# Patient Record
Sex: FEMALE | Race: WHITE | NOT HISPANIC OR LATINO | Employment: OTHER | URBAN - METROPOLITAN AREA
[De-identification: names, ages, dates, MRNs, and addresses within clinical notes are randomized per-mention and may not be internally consistent; named-entity substitution may affect disease eponyms.]

---

## 2017-02-07 ENCOUNTER — ALLSCRIPTS OFFICE VISIT (OUTPATIENT)
Dept: OTHER | Facility: OTHER | Age: 77
End: 2017-02-07

## 2017-02-07 DIAGNOSIS — F41.9 ANXIETY DISORDER: ICD-10-CM

## 2017-02-07 DIAGNOSIS — Z86.79 PERSONAL HISTORY OF OTHER DISEASES OF THE CIRCULATORY SYSTEM: ICD-10-CM

## 2017-02-07 DIAGNOSIS — E78.1 PURE HYPERGLYCERIDEMIA: ICD-10-CM

## 2017-02-07 DIAGNOSIS — E55.9 VITAMIN D DEFICIENCY: ICD-10-CM

## 2017-02-07 DIAGNOSIS — N63.0 BREAST LUMP: ICD-10-CM

## 2017-02-07 DIAGNOSIS — G89.4 CHRONIC PAIN SYNDROME: ICD-10-CM

## 2017-02-17 ENCOUNTER — LAB CONVERSION - ENCOUNTER (OUTPATIENT)
Dept: OTHER | Facility: OTHER | Age: 77
End: 2017-02-17

## 2017-02-17 ENCOUNTER — LAB (OUTPATIENT)
Dept: LAB | Facility: CLINIC | Age: 77
End: 2017-02-17
Payer: MEDICARE

## 2017-02-17 ENCOUNTER — TRANSCRIBE ORDERS (OUTPATIENT)
Dept: LAB | Facility: CLINIC | Age: 77
End: 2017-02-17

## 2017-02-17 DIAGNOSIS — G89.4 CHRONIC PAIN SYNDROME: ICD-10-CM

## 2017-02-17 DIAGNOSIS — Z86.79 PERSONAL HISTORY OF OTHER DISEASES OF THE CIRCULATORY SYSTEM: ICD-10-CM

## 2017-02-17 DIAGNOSIS — E78.1 PURE HYPERGLYCERIDEMIA: ICD-10-CM

## 2017-02-17 DIAGNOSIS — E55.9 VITAMIN D DEFICIENCY: ICD-10-CM

## 2017-02-17 DIAGNOSIS — F41.9 ANXIETY DISORDER: ICD-10-CM

## 2017-02-17 LAB
25(OH)D3 SERPL-MCNC: 13.8 NG/ML (ref 30–100)
ALBUMIN SERPL BCP-MCNC: 4 G/DL (ref 3.5–5)
ALP SERPL-CCNC: 81 U/L (ref 46–116)
ALT SERPL W P-5'-P-CCNC: 22 U/L (ref 12–78)
AMORPH URATE CRY URNS QL MICRO: ABNORMAL /HPF
ANION GAP SERPL CALCULATED.3IONS-SCNC: 5 MMOL/L (ref 4–13)
AST SERPL W P-5'-P-CCNC: 18 U/L (ref 5–45)
BACTERIA UR QL AUTO: ABNORMAL /HPF
BILIRUB SERPL-MCNC: 0.48 MG/DL (ref 0.2–1)
BILIRUB UR QL STRIP: NEGATIVE
BUN SERPL-MCNC: 20 MG/DL (ref 5–25)
CALCIUM SERPL-MCNC: 9.2 MG/DL (ref 8.3–10.1)
CHLORIDE SERPL-SCNC: 105 MMOL/L (ref 100–108)
CHOLEST SERPL-MCNC: 234 MG/DL (ref 50–200)
CLARITY UR: ABNORMAL
CO2 SERPL-SCNC: 32 MMOL/L (ref 21–32)
COLOR UR: YELLOW
CREAT SERPL-MCNC: 0.63 MG/DL (ref 0.6–1.3)
GFR SERPL CREATININE-BSD FRML MDRD: >60 ML/MIN/1.73SQ M
GLUCOSE SERPL-MCNC: 91 MG/DL (ref 65–140)
GLUCOSE UR STRIP-MCNC: NEGATIVE MG/DL
HDLC SERPL-MCNC: 46 MG/DL (ref 40–60)
HGB UR QL STRIP.AUTO: NEGATIVE
KETONES UR STRIP-MCNC: NEGATIVE MG/DL
LDLC SERPL CALC-MCNC: 144 MG/DL (ref 0–100)
LEUKOCYTE ESTERASE UR QL STRIP: ABNORMAL
MUCOUS THREADS UR QL AUTO: ABNORMAL
NITRITE UR QL STRIP: NEGATIVE
NON-SQ EPI CELLS URNS QL MICRO: ABNORMAL /HPF
PH UR STRIP.AUTO: 6 [PH] (ref 4.5–8)
POTASSIUM SERPL-SCNC: 3.8 MMOL/L (ref 3.5–5.3)
PROT SERPL-MCNC: 7.6 G/DL (ref 6.4–8.2)
PROT UR STRIP-MCNC: NEGATIVE MG/DL
RBC #/AREA URNS AUTO: ABNORMAL /HPF
SODIUM SERPL-SCNC: 142 MMOL/L (ref 136–145)
SP GR UR STRIP.AUTO: 1.02 (ref 1–1.03)
TRIGL SERPL-MCNC: 222 MG/DL
TSH SERPL DL<=0.05 MIU/L-ACNC: 2.75 UIU/ML (ref 0.36–3.74)
UROBILINOGEN UR QL STRIP.AUTO: 0.2 E.U./DL
WBC #/AREA URNS AUTO: ABNORMAL /HPF

## 2017-02-17 PROCEDURE — 87086 URINE CULTURE/COLONY COUNT: CPT

## 2017-02-17 PROCEDURE — 80061 LIPID PANEL: CPT

## 2017-02-17 PROCEDURE — 82306 VITAMIN D 25 HYDROXY: CPT

## 2017-02-17 PROCEDURE — 84443 ASSAY THYROID STIM HORMONE: CPT

## 2017-02-17 PROCEDURE — 80053 COMPREHEN METABOLIC PANEL: CPT

## 2017-02-17 PROCEDURE — 81001 URINALYSIS AUTO W/SCOPE: CPT

## 2017-02-17 PROCEDURE — 36415 COLL VENOUS BLD VENIPUNCTURE: CPT

## 2017-02-18 LAB — BACTERIA UR CULT: NORMAL

## 2017-02-20 ENCOUNTER — GENERIC CONVERSION - ENCOUNTER (OUTPATIENT)
Dept: OTHER | Facility: OTHER | Age: 77
End: 2017-02-20

## 2017-03-02 ENCOUNTER — ALLSCRIPTS OFFICE VISIT (OUTPATIENT)
Dept: OTHER | Facility: OTHER | Age: 77
End: 2017-03-02

## 2017-06-05 ENCOUNTER — ALLSCRIPTS OFFICE VISIT (OUTPATIENT)
Dept: OTHER | Facility: OTHER | Age: 77
End: 2017-06-05

## 2017-07-13 ENCOUNTER — GENERIC CONVERSION - ENCOUNTER (OUTPATIENT)
Dept: OTHER | Facility: OTHER | Age: 77
End: 2017-07-13

## 2017-08-04 ENCOUNTER — ALLSCRIPTS OFFICE VISIT (OUTPATIENT)
Dept: OTHER | Facility: OTHER | Age: 77
End: 2017-08-04

## 2017-09-07 ENCOUNTER — ALLSCRIPTS OFFICE VISIT (OUTPATIENT)
Dept: OTHER | Facility: OTHER | Age: 77
End: 2017-09-07

## 2017-09-07 DIAGNOSIS — M85.80 OTHER SPECIFIED DISORDERS OF BONE DENSITY AND STRUCTURE, UNSPECIFIED SITE: ICD-10-CM

## 2017-09-13 ENCOUNTER — GENERIC CONVERSION - ENCOUNTER (OUTPATIENT)
Dept: OTHER | Facility: OTHER | Age: 77
End: 2017-09-13

## 2017-09-19 ENCOUNTER — TRANSCRIBE ORDERS (OUTPATIENT)
Dept: ADMINISTRATIVE | Facility: HOSPITAL | Age: 77
End: 2017-09-19

## 2017-09-19 DIAGNOSIS — Z82.49 FAMILY HISTORY OF ABDOMINAL AORTIC ANEURYSM (AAA): Primary | ICD-10-CM

## 2017-09-28 ENCOUNTER — HOSPITAL ENCOUNTER (OUTPATIENT)
Dept: ULTRASOUND IMAGING | Facility: CLINIC | Age: 77
Discharge: HOME | End: 2017-09-28
Payer: MEDICARE

## 2017-09-28 DIAGNOSIS — Z82.49 FAMILY HISTORY OF ABDOMINAL AORTIC ANEURYSM (AAA): ICD-10-CM

## 2017-09-28 PROCEDURE — 76706 US ABDL AORTA SCREEN AAA: CPT

## 2017-10-03 ENCOUNTER — GENERIC CONVERSION - ENCOUNTER (OUTPATIENT)
Dept: OTHER | Facility: OTHER | Age: 77
End: 2017-10-03

## 2017-11-14 ENCOUNTER — HOSPITAL ENCOUNTER (OUTPATIENT)
Dept: MAMMOGRAPHY | Facility: CLINIC | Age: 77
Discharge: HOME | End: 2017-11-14
Payer: MEDICARE

## 2017-11-14 ENCOUNTER — GENERIC CONVERSION - ENCOUNTER (OUTPATIENT)
Dept: OTHER | Facility: OTHER | Age: 77
End: 2017-11-14

## 2017-11-14 DIAGNOSIS — M85.80 OTHER SPECIFIED DISORDERS OF BONE DENSITY AND STRUCTURE: ICD-10-CM

## 2017-11-14 PROCEDURE — 77080 DXA BONE DENSITY AXIAL: CPT

## 2018-01-11 NOTE — RESULT NOTES
Verified Results  * DXA BONE DENSITY SPINE HIP AND PELVIS [id number] 12:56PM Onesimo Tipton Order Number: [id number]    - Patient Instructions: To schedule this appointment, please contact Central Scheduling at [phone]  Test Name Result Flag Reference   DXA BONE DENSITY SPINE HIP AND PELVIS (Report)     CENTRAL DXA SCAN     CLINICAL HISTORY:  65year old post-menopausal  female risk factors include estrogen deficient     TECHNIQUE: Bone densitometry was performed using a Horizon C bone densitometer  Regions of interest appear properly placed  There are no obvious fractures or other confounding variables which could limit the study  COMPARISON: None  RESULTS:    LUMBAR SPINE: L1, L4:   BMD 1 123 gm/cm2   T-score 0 8   Z-score 3 4     LEFT TOTAL HIP:   BMD 0 830 gm/cm2   T-score -0 9   Z-score 1 1     LEFT FEMORAL NECK:   BMD 0 656 gm/cm2   T-score -1 7   Z-score 0 5     LEFT FOREARM :   BMD 0 569 gm/sq-cm,   T-score is -2 0   Z-score is 1 1          IMPRESSION:   1  Based on the [facility] classification, the T-score of -2 0 in the distal one 3rd left radius  is consistent with low bone mineral density  2  Any secondary causes of low bone mineral density should be excluded prior to treatment, if clinically indicated  3  A daily intake of at least 1200 mg calcium and 800 to 1000 IU of Vitamin D, as well as weight bearing and muscle strengthening exercise, fall prevention and avoidance of tobacco and excessive alcohol intake as basic preventive measures are suggested  4  Repeat DXA in 18 - 24 months, on the same machine, as clinically indicated  The 10 year risk of hip fracture is 2 9%, with the 10 year risk of major osteoporotic fracture being 12%, as calculated by the [facility] fracture risk assessment tool (FRAX)  The current NOF guidelines recommend treating patients with FRAX 10 year risk score    of >3% for hip fracture and >20% for major osteoporotic fracture  WHO CLASSIFICATION:   Normal (a T-score of -1 0 or higher)   Low bone mineral density (a T-score of less than -1 0 but higher than -2 5)   Osteoporosis (a T-score of -2 5 or less)   Severe osteoporosis (a T-score of -2 5 or less with a fragility fracture)             Workstation performed: [id number]     Signed by:   Paige Haji MD   11/14/17

## 2018-01-11 NOTE — PROGRESS NOTES
Assessment    1  Medicare annual wellness visit, initial (V70 0) (Z00 00)   2  Urinary incontinence in female (788 30) (R32)   3  Finger laceration, initial encounter (883 0) (B43 165J)    Plan  FamHx: Family history of abdominal aortic aneurysm (AAA), SocHx: Former smoker    · [address] AAA; Status:Active; Requested TAA:[id number];   Osteopenia    · * DXA BONE DENSITY SPINE HIP AND PELVIS; Status:Active; Requested  IAT:[id number];   Urinary incontinence in female    · The plan of care for urinary incontinence is detailed in the plan and/or discussion section  of today's note ; Status:Complete;   Done: [id number]    Discussion/Summary    Read over the paperwork given  Will give tdap today  Make sure we get copies of all consults from your other physicians  Let me know if you change your mind about the stool test  pt will make her own appts for dexa, and us of the abdomen  Impression: Initial Annual Wellness Visit  Cardiovascular screening and counseling: the risks and benefits of screening were discussed and screening is current  Diabetes screening and counseling: the risks and benefits of screening were discussed and screening is current  Colorectal cancer screening and counseling: the risks and benefits of screening were discussed, the patient declines screening, due for fecal occult blood testing and Pt refuses at this time  Breast cancer screening and counseling: the risks and benefits of screening were discussed and screening is current  Cervical cancer screening and counseling: the risks and benefits of screening were discussed and screening not indicated  Osteoporosis screening and counseling: the risks and benefits of screening were discussed and h/o femur fracture when she fell off a curb  Abdominal aortic aneurysm screening and counseling: the risks and benefits of screening were discussed, screening US recommended and Mom had aaa  Pt is former smoker     Glaucoma screening and counseling: the risks and benefits of screening were discussed and screening is current  Immunizations: the risks and benefits of influenza vaccination were discussed with the patient, influenza vaccine is due today, the risks and benefits of pneumococcal vaccination were discussed with the patient, pneumococcal vaccine due today, the risks and benefits of the Zostavax vaccine were discussed with the patient, Zostavax vaccination up to date, the risks and benefits of the Tdap vaccine were discussed with the patient and Will need tdap as she had a recent laceration to the right 3rd finger  Advance Directive Planning: complete and up to date, Given 5 wishes and Polst  Patient Discussion: plan discussed with the patient, follow-up visit needed in one year  Possible side effects of new medications were reviewed with the patient/guardian today  The treatment plan was reviewed with the patient/guardian  The patient/guardian understands and agrees with the treatment plan      Chief Complaint  Pt is here for her annual wellness exam       History of Present Illness  HPI: Pt is here for her first annual wellness exam    Welcome to Medicare and Wellness Visits: The patient is being seen for the initial annual wellness visit  Medicare Screening and Risk Factors   Hospitalizations: no previous hospitalizations  Once per lifetime medicare screening tests: AAA screening US has not yet been done  Medicare Screening Tests Risk Questions   Abdominal aortic aneurysm risk assessment: family history of AAA  Osteoporosis risk assessment:  and female gender  HIV risk assessment: none indicated  Drug and Alcohol Use: The patient is a former cigarette smoker and quit smoking 2005  The patient reports occasional alcohol use  Alcohol concern:   The patient has no concerns about alcohol abuse  She has never used illicit drugs     Diet and Physical Activity: Current diet includes limited junk food, 2 servings of fruit per day, 2 servings of vegetables per day, 0-1 servings of meat per day, 2 servings of whole grains per day, 0 servings of simple carbohydrates per day, 2 servings of dairy products per day, 1 cups of coffee per day, 0 cans of regular soda per day and 0 cans of diet soda per day  She exercises 3 per week times per week  Exercise: water exercises, stretching 3 hours per week  Mood Disorder and Cognitive Impairment Screening: PHQ-9 Depression Scale   Over the past 2 weeks, how often have you been bothered by the following problems? 1 ) Little interest or pleasure in doing things? Not at all    2 ) Feeling down, depressed or hopeless? Several days  3 ) Trouble falling asleep or sleeping too much? Several days  4 ) Feeling tired or having little energy? Several days  5 ) Poor appetite or overeating? Several days  6 ) Feeling bad about yourself, or that you are a failure, or have let yourself or your family down? Several days  7 ) Trouble concentrating on things, such as reading a newspaper or watching television? Not at all    8 ) Moving or speaking so slowly that other people could have noticed, or the opposite, moving or speaking faster than usual? Not at all    9 ) Thoughts that you would be off dead or of hurting yourself in some way? Not at all  TOTAL SCORE: 5, severity of depression is mild  Cognitive impairment screening: denies difficulty learning/retaining new information, denies difficulty handling complex tasks, denies difficulty with reasoning, denies difficulty with spatial ability and orientation, denies difficulty with language and denies difficulty with behavior  Functional Ability/Level of Safety: Hearing is slightly decreased, slightly decreased in the right ear and slightly decreased in the left ear  She reports hearing difficulties  She uses a hearing aid   The patient is currently able to do activities of daily living with limitations and able to participate in social activities with limitations, but able to drive without limitations  Activities of daily living details: needs help shopping and needs help doing housework  Fall risk factors: The patient fell 0 times in the past 12 months  Injury History: visual impairment, urinary incontinence and no previous fall  Home safety risk factors:  no unfamiliar surroundings, no loose rugs, no poor household lighting, no uneven floors, no household clutter, grab bars in the bathroom and handrails on the stairs  Advance Directives: Advance directives: living will, durable power of  for health care directives and advance directives  end of life decisions were reviewed with the patient and I agree with the patient's decisions  Concerns with the patient's end of life decisions: Does not want to be resuscitated if she stops breathing unless it was pneumonia  Co-Managers and Medical Equipment/Suppliers: See Patient Care Team   Preventive Quality Program 65 and Older: Falls Risk: The patient fell 0 times in the past 12 months  The patient is currently experiencing urinary symptoms  Urinary Incontinence Symptoms includes:    Date of last glaucoma screen was 6 months ago  Patient Care Team    Care Team Member Role Specialty Office Number   Alissaarchie Singletary  Specialist Pain Management [phone] [address] Specialist Pain Management [phone]   [facility] Specialist Neurology [phone]   Isa Husain MD Specialist Nephrology [phone], [address] [phone]   [address] Osteopathy [phone]   chen, Kellie Carty   [phone]   monica nieves   [phone]     Active Problems    1  Anxiety (300 00) (F41 9)   2  Breast mass, left (611 72) (N63)   3  Cervical arthritis with myelopathy (721 1) (M47 12)   4  Cervicogenic headache (784 0) (R51)   5  Chronic pain syndrome (338 4) (G89 4)   6   Depression with anxiety (300 4) (F41 8)   7  Encounter for special screening examination for genitourinary disorder (V81 6) (Z13 89)   8  Foraminal stenosis of cervical region (723 0) (M99 81)   9  Headache, cervicogenic (784 0) (R51)   10  Hypertriglyceridemia, essential (272 1) (E78 1)   11  Insomnia, unspecified type (780 52) (G47 00)   12  Knee pain, left (719 46) (M25 562)   13  Medicare annual wellness visit, initial (V70 0) (Z00 00)   14  Need for pneumococcal vaccine (V03 82) (Z23)   15  Paroxysmal SVT (supraventricular tachycardia) (427 0) (I47 1)   16  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)   17   Vitamin D deficiency (268 9) (E55 9)    Past Medical History    · History of Dry eye syndrome (375 15) (H04 129)   · H/O supraventricular tachycardia (V12 59) (Z86 79)   · History of arthritis (V13 4) (Z87 39)   · History of cataract surgery (V45 61) (Z98 49)   · History of migraine (V12 49) (Z86 69)   · History of total right knee replacement (V43 65) (Z96 651)   · History of Macular degeneration (362 50) (H35 30)   · History of Memory loss (780 93) (R41 3)   · History of Spinal stenosis of lumbar region (724 02) (M48 06)   · History of Vitamin D deficiency (268 9) (E55 9)   · History of Vitamin D deficiency (268 9) (E55 9)    Surgical History    · History of Appendectomy   · History of Cataract Surgery   · History of Femur Repair   · History of Lower Back Surgery Lumbar Disc   · History of Total Knee Replacement Left   · History of Total Knee Replacement Right    Family History  Mother    · Family history of abdominal aortic aneurysm (AAA) (V17 49) (Z82 49)   · Family history of hypertension (V17 49) (Z82 49)   · Family history of malignant neoplasm of breast (V16 3) (Z80 3)  Father    · Family history of hypertension (V17 49) (Z82 49)  Sister    · Family history of hypertension (V17 49) (Z82 49)   · Family history of High cholesterol    Social History    · Always uses seat belt   · Does not use illicit drugs (V49 89) (Z78 9)   · Former smoker (Q92 31) (P14 680)   · Former smoker (W21 49) (P65 701)   · Occasional alcohol use   · Retired   ·     Current Meds   1  Biotin 7500 MCG Oral Tablet; take 1 tablet by mouth once daily; Therapy: (Recorded:07Feb2017) to Recorded   2  BusPIRone HCl - 15 MG Oral Tablet; Take 1/2 to 1 pill twice daily if needed for anxiety; Therapy: 46UWN8445 to (Last Rx:02Mar2017)  Requested for: 79AZB1967 Ordered   3  [employer] Natural Fish Oil 1000 MG Oral Capsule; TAKE 2 CAPSULE Daily; Therapy: 74UHK8832 to (Evaluate:30Jun2017); Last Rx:02Mar2017 Ordered   4  Cyclobenzaprine HCl - 10 MG Oral Tablet; Take 1 PO HS PRN; Therapy: 55YEO6919 to (Last Rx:04Aug2017)  Requested for: 19Ljs2284 Ordered   5  DiazePAM 5 MG Oral Tablet; TAKE 1 TABLET  AS NEEDED; Therapy: 01VOX2310 to (Evaluate:01Exi2152); Last Rx:05Jun2017 Ordered   6  Gabapentin 300 MG Oral Capsule; Therapy: (Paige Farris) to Recorded   7  PreserVision AREDS CAPS; Take 1 capsule twice daily; Therapy: (Recorded:75Ykb9485) to Recorded   8  Restasis 0 05 % Ophthalmic Emulsion; INSTILL 1 DROP IN [facility] EYE TWICE DAILY; Therapy: (Recorded:26Feb2016) to Recorded   9  Sertraline HCl - 100 MG Oral Tablet; TAKE 1 1/2 TABS ONCE DAILY; Last Rx:02Mar2017   Ordered   10  Verapamil HCl  MG Oral Capsule Extended Release 24 Hour; TAKE 1 TABLET    BY MOUTH AT NIGHT  Requested for: 62ROU4641; Last Rx:07Feb2017 Ordered   11  Vitamin D (Ergocalciferol) 12565 UNIT Oral Capsule; Take 1 capsule twice weekly for 8    weeks; Therapy: 06ZYM5302 to (Last Rx:02Mar2017)  Requested for: 71XJO3459 Ordered   12  Vitamin D 1000 UNIT Oral Tablet; TAKE 2000 MG Daily; Therapy: 02KKA9259 to (Evaluate:01Apr2017); Last Rx:02Mar2017 Ordered    Allergies    1  Morphine Derivatives   2  Penicillins   3   Dilaudid TABS    Immunizations   1    PCV  07-Feb-2017    PPSV  01-Jan-1998    Td/DT  01-Mar-1996     Vitals  Signs    Heart Rate: 78  Respiration: 16  Systolic: 109, LUE, Sitting  Diastolic: 60, LUE, Sitting  Height: 5 ft 5 in  Weight: 182 lb   BMI Calculated: 30 29  BSA Calculated: 1 9  O2 Saturation: 97    Results/Data  PHQ-9 Adult Depression Screening 07Sep2017 02:09PM User Larrys     Test Name Result Flag Reference   PHQ-9 Adult Depression Score 5     Over the last two weeks, how often have you been bothered by any of the following problems? Little interest or pleasure in doing things: Not at all - 0  Feeling down, depressed, or hopeless: Several days - 1  Trouble falling or staying asleep, or sleeping too much: Several days - 1  Feeling tired or having little energy: Several days - 1  Poor appetite or over eating: Several days - 1  Feeling bad about yourself - or that you are a failure or have let yourself or your family down: Several days - 1  Trouble concentrating on things, such as reading the newspaper or watching television: Not at all - 0  Moving or speaking so slowly that other people could have noticed  Or the opposite -  being so fidgety or restless that you have been moving around a lot more than usual: Not at all - 0  Thoughts that you would be better off dead, or of hurting yourself in some way: Not at all - 0   PHQ-9 Adult Depression Screening Negative     PHQ-9 Difficulty Level Not difficult at all     PHQ-9 Severity Mild Depression         Attending Note  Collaborating Physician Note: Collaborating Note: I supervised the Advanced Practitioner and I agree with the Advanced Practitioner note  Signatures   Electronically signed by :  CL Gupta; Sep  7 2017  2:40PM EST                       (Author)    Electronically signed by : Rambo Mancilla DO; Sep  7 2017  3:28PM EST                       (Co-author)

## 2018-01-12 VITALS
DIASTOLIC BLOOD PRESSURE: 60 MMHG | BODY MASS INDEX: 30.32 KG/M2 | SYSTOLIC BLOOD PRESSURE: 120 MMHG | WEIGHT: 182 LBS | OXYGEN SATURATION: 97 % | RESPIRATION RATE: 16 BRPM | HEIGHT: 65 IN | HEART RATE: 78 BPM

## 2018-01-12 NOTE — RESULT NOTES
Verified Results  (1) URINALYSIS w URINE C/S REFLEX (will reflex a microscopy if leukocytes, occult blood, or nitrites are not within normal limits) [id number] 10:02AM [facility] Order Number: [id number]     Test Name Result Flag Reference   COLOR Yellow     CLARITY Cloudy     PH UA 6 0  4 5-8 0   LEUKOCYTE ESTERASE UA Small A Negative   NITRITE UA Negative  Negative   PROTEIN UA Negative mg/dl  Negative   GLUCOSE UA Negative mg/dl  Negative   KETONES UA Negative mg/dl  Negative   UROBILINOGEN UA 0 2 E U /dl  0 2, 1 0 E U /dl   BILIRUBIN UA Negative  Negative   BLOOD UA Negative  Negative   SPECIFIC GRAVITY UA 1 022  1 003-1 030   AMORPHOUS URATES Occasional /hpf     BACTERIA Moderate /hpf A None Seen, Occasional   EPITHELIAL CELLS Occasional /hpf  None Seen, Occasional   RBC UA None Seen /hpf  None Seen   WBC UA 2-4 /hpf A None Seen   CLINICAL REPORT (Report)     Test:        Urine culture  Specimen Type:   Urine  Specimen Date:   2/17/2017 10:02 AM  Result Date:    2/18/2017 1:36 PM  Result Status:   Final result  Resulting Lab:    [facility]            Tel: [phone]      CULTURE                                       ------------------                                   10,000-19,000 cfu/ml Mixed Contaminants X4   MUCOUS THREADS Occasional  Occasional, Moderate, Innumerable

## 2018-01-13 VITALS
SYSTOLIC BLOOD PRESSURE: 114 MMHG | BODY MASS INDEX: 29.33 KG/M2 | WEIGHT: 176.03 LBS | OXYGEN SATURATION: 96 % | HEIGHT: 65 IN | HEART RATE: 73 BPM | RESPIRATION RATE: 18 BRPM | DIASTOLIC BLOOD PRESSURE: 58 MMHG

## 2018-01-13 NOTE — MISCELLANEOUS
Message   Recorded as Task   Date: 03/10/2016 11:37 AM, Created By: Rustam Hernández   Task Name: Call Back   Assigned To: Sukhi Aparicio   Regarding Patient: Latasha Camp, Status: Active   Comment:    Tavia Vaz - 10 Mar 2016 11:37 AM     TASK CREATED  please call pt for MRI results  [address] [place] [address] - 10 Mar 2016 3:05 PM     TASK EDITED  Called and discussed results of MRI with the patient   She is considering pain management        Signatures   Electronically signed by : Librado Salinas MD; Mar 10 2016  3:05PM EST                       (Author)

## 2018-01-13 NOTE — RESULT NOTES
Verified Results  US ABDOMINAL AORTA SCREENING AAA [id number] 09:22AM Marisabel Uriarte     Test Name Result Flag Reference   US ABDOMINAL AORTA SCREENING AAA (Report)     ABDOMINAL AORTIC ULTRASOUND     INDICATION: Z82 49: Family history of ischemic heart disease and other diseases of the circulatory system  History taken directly from the electronic ordering system  Evaluate for aortic aneurysm  COMPARISON: None  FINDINGS:      Ultrasound of the abdominal aorta was performed in longitudinal and transverse planes with a curvilinear transducer  Proximal aorta: 1 8 x 1 8 cm   Mid aorta:  1 8 x 1 9 cm   Distal aorta:  2 0 x 1 6 cm   Right common iliac origin: 1 2 cm   Left common iliac origin: 1 1 cm     No periaortic collections or adenopathy detected  IMPRESSION:     No evidence for abdominal aortic aneurysm         Workstation performed: [id number]     Signed by:   Tati Redman MD   10/3/17

## 2018-01-14 VITALS
SYSTOLIC BLOOD PRESSURE: 120 MMHG | DIASTOLIC BLOOD PRESSURE: 64 MMHG | BODY MASS INDEX: 29.82 KG/M2 | WEIGHT: 179 LBS | HEIGHT: 65 IN | RESPIRATION RATE: 17 BRPM | TEMPERATURE: 98.2 F | HEART RATE: 84 BPM | OXYGEN SATURATION: 96 %

## 2018-01-14 VITALS
BODY MASS INDEX: 28.57 KG/M2 | OXYGEN SATURATION: 97 % | HEIGHT: 65 IN | SYSTOLIC BLOOD PRESSURE: 120 MMHG | HEART RATE: 64 BPM | WEIGHT: 171.5 LBS | DIASTOLIC BLOOD PRESSURE: 60 MMHG | RESPIRATION RATE: 16 BRPM

## 2018-01-15 VITALS
WEIGHT: 170 LBS | SYSTOLIC BLOOD PRESSURE: 126 MMHG | HEIGHT: 65 IN | BODY MASS INDEX: 28.32 KG/M2 | OXYGEN SATURATION: 98 % | RESPIRATION RATE: 16 BRPM | HEART RATE: 70 BPM | DIASTOLIC BLOOD PRESSURE: 78 MMHG

## 2018-01-31 ENCOUNTER — TRANSCRIBE ORDERS (OUTPATIENT)
Dept: ADMINISTRATIVE | Facility: HOSPITAL | Age: 78
End: 2018-01-31

## 2018-01-31 DIAGNOSIS — M54.2 CERVICALGIA: Primary | ICD-10-CM

## 2018-02-06 ENCOUNTER — HOSPITAL ENCOUNTER (OUTPATIENT)
Dept: MRI IMAGING | Facility: CLINIC | Age: 78
Discharge: HOME | End: 2018-02-06
Payer: MEDICARE

## 2018-02-06 DIAGNOSIS — M54.2 CERVICALGIA: ICD-10-CM

## 2018-02-06 PROCEDURE — 70553 MRI BRAIN STEM W/O & W/DYE: CPT

## 2018-02-06 PROCEDURE — A9585 GADOBUTROL INJECTION: HCPCS | Performed by: DENTIST

## 2018-02-06 RX ADMIN — GADOBUTROL 8 ML: 604.72 INJECTION INTRAVENOUS at 14:22

## 2018-04-13 ENCOUNTER — OFFICE VISIT (OUTPATIENT)
Dept: FAMILY MEDICINE CLINIC | Facility: CLINIC | Age: 78
End: 2018-04-13
Payer: MEDICARE

## 2018-04-13 VITALS
OXYGEN SATURATION: 97 % | DIASTOLIC BLOOD PRESSURE: 62 MMHG | BODY MASS INDEX: 26.82 KG/M2 | WEIGHT: 161 LBS | HEIGHT: 65 IN | SYSTOLIC BLOOD PRESSURE: 124 MMHG | HEART RATE: 84 BPM

## 2018-04-13 DIAGNOSIS — I47.1 SVT (SUPRAVENTRICULAR TACHYCARDIA) (HCC): Primary | ICD-10-CM

## 2018-04-13 DIAGNOSIS — R51.9 INTRACTABLE HEADACHE, UNSPECIFIED CHRONICITY PATTERN, UNSPECIFIED HEADACHE TYPE: ICD-10-CM

## 2018-04-13 PROCEDURE — 99213 OFFICE O/P EST LOW 20 MIN: CPT | Performed by: FAMILY MEDICINE

## 2018-04-13 RX ORDER — GABAPENTIN 300 MG/1
1 CAPSULE ORAL
COMMUNITY
End: 2018-08-20 | Stop reason: CLARIF

## 2018-04-13 RX ORDER — ACETAMINOPHEN 325 MG/1
650 TABLET ORAL EVERY 6 HOURS
COMMUNITY
End: 2018-12-06

## 2018-04-13 RX ORDER — CYCLOSPORINE 0.5 MG/ML
1 EMULSION OPHTHALMIC 2 TIMES DAILY
COMMUNITY
End: 2021-08-12 | Stop reason: ALTCHOICE

## 2018-04-13 RX ORDER — BUSPIRONE HYDROCHLORIDE 15 MG/1
TABLET ORAL
COMMUNITY
Start: 2017-03-02 | End: 2018-04-13 | Stop reason: ALTCHOICE

## 2018-04-13 RX ORDER — VIT A/VIT C/VIT E/ZINC/COPPER 4296-226
1 CAPSULE ORAL 2 TIMES DAILY
COMMUNITY
End: 2018-04-13 | Stop reason: ALTCHOICE

## 2018-04-13 RX ORDER — SERTRALINE HYDROCHLORIDE 100 MG/1
100 TABLET, FILM COATED ORAL
COMMUNITY
End: 2018-05-20 | Stop reason: SDUPTHER

## 2018-04-13 RX ORDER — ASHW RT/MAG BRK/EPMD/THEAN/PHO 200-150 MG
2 TABLET ORAL DAILY
COMMUNITY
Start: 2017-03-02 | End: 2018-04-13 | Stop reason: ALTCHOICE

## 2018-04-13 RX ORDER — DIAZEPAM 5 MG/1
1 TABLET ORAL
COMMUNITY
Start: 2017-06-05 | End: 2018-04-13 | Stop reason: ALTCHOICE

## 2018-04-13 RX ORDER — ONABOTULINUMTOXINA 100 [USP'U]/1
INJECTION, POWDER, LYOPHILIZED, FOR SOLUTION INTRADERMAL; INTRAMUSCULAR
COMMUNITY
Start: 2018-03-09 | End: 2018-05-04 | Stop reason: CLARIF

## 2018-04-13 RX ORDER — VERAPAMIL HYDROCHLORIDE 120 MG/1
CAPSULE, EXTENDED RELEASE ORAL
COMMUNITY
Start: 2018-04-01 | End: 2018-05-20 | Stop reason: SDUPTHER

## 2018-04-13 RX ORDER — GABAPENTIN 100 MG/1
100 CAPSULE ORAL 2 TIMES DAILY
Qty: 60 CAPSULE | Refills: 0 | Status: SHIPPED | OUTPATIENT
Start: 2018-04-13 | End: 2018-05-04 | Stop reason: CLARIF

## 2018-04-13 RX ORDER — CYCLOBENZAPRINE HCL 10 MG
TABLET ORAL
COMMUNITY
Start: 2017-08-04 | End: 2018-04-13 | Stop reason: ALTCHOICE

## 2018-04-13 RX ORDER — MULTIVITAMIN WITH IRON
1 TABLET ORAL DAILY
COMMUNITY
End: 2018-04-13 | Stop reason: ALTCHOICE

## 2018-04-13 RX ORDER — ERGOCALCIFEROL 1.25 MG/1
CAPSULE ORAL
COMMUNITY
Start: 2017-03-02 | End: 2018-11-20

## 2018-04-13 NOTE — PATIENT INSTRUCTIONS
Will start gabapentin 100 mg twice daily during the day and continue on the 300 mg at night    Keep your appointment with the neurologist   Have the labs and urine testing done fasting and will discuss at follow-up

## 2018-04-13 NOTE — PROGRESS NOTES
Assessment/Plan:     Chronic Problems:  No problem-specific Assessment & Plan notes found for this encounter  Visit Diagnosis:  Diagnoses and all orders for this visit:    SVT (supraventricular tachycardia) ([address] )  Comments:  Continue on the verapamil  Orders:  -     CBC and differential; Future  -     Comprehensive metabolic panel; Future  -     Lipid panel; Future  -     Urinalysis with reflex to microscopic    Intractable headache, unspecified chronicity pattern, unspecified headache type  Comments: Will add on 100 mg a gabapentin b i d  and advised to keep appointment with Dr Kaelyn Avelar  Orders:  -     gabapentin (NEURONTIN) 100 mg capsule; Take 1 capsule (100 mg total) by mouth 2 (two) times a day With a 300 mg at night    Other orders  -     acetaminophen (TYLENOL) 325 mg tablet; Take 650 mg by mouth every 6 (six) hours  -     Discontinue: Multiple Vitamins-Minerals (PRESERVISION AREDS) CAPS; Take 1 capsule by mouth 2 (two) times a day  -     cycloSPORINE (RESTASIS) 0 05 % ophthalmic emulsion; Apply 1 drop to eye 2 (two) times a day  -     sertraline (ZOLOFT) 100 mg tablet; Take 100 mg by mouth  -     gabapentin (NEURONTIN) 300 mg capsule; Take 1 capsule by mouth  -     Discontinue: cyclobenzaprine (FLEXERIL) 10 mg tablet; Take by mouth  -     Discontinue: Omega-3 Fatty Acids ([employer] NATURAL FISH OIL) 1000 MG CAPS; Take 2 capsules by mouth daily  -     Discontinue: Biotin 7500 MCG TABS; Take 1 tablet by mouth daily  -     Discontinue: busPIRone (BUSPAR) 15 mg tablet; Take by mouth  -     Discontinue: diazepam (VALIUM) 5 mg tablet; Take 1 tablet by mouth  -     Discontinue: Magnesium 250 MG TABS; Take 1 tablet by mouth daily  -     BOTOX 100 units;   -     verapamil (VERELAN PM) 120 MG 24 hr capsule;   -     ergocalciferol (VITAMIN D2) 50,000 units; Take by mouth  -     cholecalciferol (VITAMIN D3) 1,000 units tablet; Take by mouth Daily          Subjective:    Patient ID: Kirk Vazquez is a 77 y o  female  Pt is here with c/o botox in her face and head 3 weeks ago for the pain in the head  [place] [facility] [place]  Was not any better after the injection  Feels this pain is affecting her life  Spends a lot of time in bed  Feels her pain is not managed well  Also noticed her eye is not opening like the other eye  He according to the pt, left the room when she expressed her concerns  Has appt with neuro the end of May, Dr Musa Hays  Requesting labs to be drawn  Feels she is not thinking well when she has this pain  Pain is like a pressure feeling which moves when she moves  Tender to the touch of forehead and above the eye and on the scalp  Also has it over the left eye  On gabapentin 300 mg at night by Dr Rebel Xie  Does not have the pain in the head when she sleeps  May wake up with pain  Had an mri of the brain and reports that it was normal          The following portions of the patient's history were reviewed and updated as appropriate: allergies, current medications, past family history, past medical history, past social history, past surgical history and problem list     Review of Systems   Constitutional: Positive for activity change  Negative for appetite change, chills, diaphoresis, fatigue and fever  HENT: Positive for ear pain  Negative for congestion, postnasal drip, sinus pain and sinus pressure  Eyes: Positive for visual disturbance (losing her vision r/t macular degeneration)  Respiratory: Negative for cough, shortness of breath and wheezing  Cardiovascular: Negative for chest pain and palpitations  Gastrointestinal: Positive for vomiting  Negative for abdominal pain, constipation, diarrhea and nausea  Genitourinary:        Sometimes feels she has burning with urination  Not always  No cloudy urine  Musculoskeletal: Positive for arthralgias (knees are better, but still with pains in hands  ) and gait problem     Neurological: Positive for dizziness (first thing in the am  )  Negative for light-headedness and headaches  Psychiatric/Behavioral: Positive for dysphoric mood  The patient is nervous/anxious  /62   Pulse 84   Ht 5' 5" (1 651 m)   Wt 73 kg (161 lb)   SpO2 97%   BMI 26 79 kg/m²   Social History     Social History    Marital status:      Spouse name: N/A    Number of children: N/A    Years of education: N/A     Occupational History    Not on file  Social History Main Topics    Smoking status: Never Smoker    Smokeless tobacco: Never Used    Alcohol use No    Drug use: No    Sexual activity: Not on file     Other Topics Concern    Not on file     Social History Narrative    No narrative on file     No past medical history on file  No family history on file  No past surgical history on file  Current Outpatient Prescriptions:     cholecalciferol (VITAMIN D3) 1,000 units tablet, Take by mouth Daily, Disp: , Rfl:     ergocalciferol (VITAMIN D2) 50,000 units, Take by mouth, Disp: , Rfl:     acetaminophen (TYLENOL) 325 mg tablet, Take 650 mg by mouth every 6 (six) hours, Disp: , Rfl:     BOTOX 100 units, , Disp: , Rfl:     cycloSPORINE (RESTASIS) 0 05 % ophthalmic emulsion, Apply 1 drop to eye 2 (two) times a day, Disp: , Rfl:     gabapentin (NEURONTIN) 100 mg capsule, Take 1 capsule (100 mg total) by mouth 2 (two) times a day With a 300 mg at night, Disp: 60 capsule, Rfl: 0    gabapentin (NEURONTIN) 300 mg capsule, Take 1 capsule by mouth, Disp: , Rfl:     sertraline (ZOLOFT) 100 mg tablet, Take 100 mg by mouth, Disp: , Rfl:     verapamil (VERELAN PM) 120 MG 24 hr capsule, , Disp: , Rfl:     Allergies   Allergen Reactions    Hydromorphone Shortness Of Breath     Other reaction(s): Respiratory Distress  Other reaction(s): HORENESS    Morphine Itching    Penicillins      Rash          Lab Review   No visits with results within 6 Month(s) from this visit     Latest known visit with results is:   Lab on 02/17/2017   Component Date Value    TSH 3RD GENERATON 02/17/2017 2 750     Sodium 02/17/2017 142     Potassium 02/17/2017 3 8     Chloride 02/17/2017 105     CO2 02/17/2017 32     Anion Gap 02/17/2017 5     BUN 02/17/2017 20     Creatinine 02/17/2017 0 63     Glucose 02/17/2017 91     Calcium 02/17/2017 9 2     AST 02/17/2017 18     ALT 02/17/2017 22     Alkaline Phosphatase 02/17/2017 81     Total Protein 02/17/2017 7 6     Albumin 02/17/2017 4 0     Total Bilirubin 02/17/2017 0 48     eGFR 02/17/2017 >60 0     Cholesterol 02/17/2017 234*    Triglycerides 02/17/2017 222*    HDL, Direct 02/17/2017 46     LDL Calculated 02/17/2017 144*    Color, UA 02/17/2017 Yellow     Clarity, UA 02/17/2017 Cloudy     Specific Gravity, UA 02/17/2017 1 022     pH, UA 02/17/2017 6 0     Leukocytes, UA 02/17/2017 Small*    Nitrite, UA 02/17/2017 Negative     Protein, UA 02/17/2017 Negative     Glucose, UA 02/17/2017 Negative     Ketones, UA 02/17/2017 Negative     Urobilinogen, UA 02/17/2017 0 2     Bilirubin, UA 02/17/2017 Negative     Blood, UA 02/17/2017 Negative     Vit D, 25-Hydroxy 02/17/2017 13 8*    RBC, UA 02/17/2017 None Seen     WBC, UA 02/17/2017 2-4*    Epithelial Cells 02/17/2017 Occasional     Bacteria, UA 02/17/2017 Moderate*    AMORPH URATES 02/17/2017 Occasional     MUCOUS THREADS 02/17/2017 Occasional     Urine Culture 02/17/2017 10,000-19,000 cfu/ml Mixed Contaminants X4         Imaging: No results found  Objective:     Physical Exam   Constitutional: She is oriented to person, place, and time  She appears well-developed and well-nourished  No distress  HENT:   Head: Normocephalic and atraumatic  Right Ear: External ear normal    Left Ear: External ear normal    Nose: Nose normal    Mouth/Throat: Oropharynx is clear and moist  No oropharyngeal exudate  Eyes: Conjunctivae and EOM are normal  Pupils are equal, round, and reactive to light   Right eye exhibits no discharge  Left eye exhibits no discharge  Right eye with ? Ptosis  Neck: Normal range of motion  Neck supple  Cardiovascular: Normal rate, regular rhythm and normal heart sounds  No murmur heard  Pulmonary/Chest: Effort normal and breath sounds normal  No respiratory distress  She has no wheezes  She has no rales  Musculoskeletal:   Ambulates with a walker  Lymphadenopathy:     She has no cervical adenopathy  Neurological: She is alert and oriented to person, place, and time  No cranial nerve deficit  Coordination normal    Very tender to bilateral forehead  Skin: She is not diaphoretic  Psychiatric: She has a normal mood and affect  Her behavior is normal  Judgment and thought content normal          Patient Instructions    Will start gabapentin 100 mg twice daily during the day and continue on the 300 mg at night    Keep your appointment with the neurologist   Have the labs and urine testing done fasting and will discuss at follow-up      Follow up here in 3 weeks    CL Cardozo

## 2018-04-18 ENCOUNTER — TELEPHONE (OUTPATIENT)
Dept: FAMILY MEDICINE CLINIC | Facility: CLINIC | Age: 78
End: 2018-04-18

## 2018-04-18 NOTE — TELEPHONE ENCOUNTER
I believe there is another triage about this in the emr re: blood in her urine  Her cholesterol and trigs are slightly elevated but not really an indication to start cholesterol meds at this time

## 2018-04-18 NOTE — TELEPHONE ENCOUNTER
Pt would like to know the results of the labs she had done yesterday they are scanned into the pt's chart

## 2018-04-20 DIAGNOSIS — R31.9 HEMATURIA, UNSPECIFIED TYPE: Primary | ICD-10-CM

## 2018-04-23 ENCOUNTER — TELEPHONE (OUTPATIENT)
Dept: FAMILY MEDICINE CLINIC | Facility: CLINIC | Age: 78
End: 2018-04-23

## 2018-04-23 DIAGNOSIS — R94.6 THYROID FUNCTION STUDY ABNORMALITY: Primary | ICD-10-CM

## 2018-04-23 NOTE — TELEPHONE ENCOUNTER
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

## 2018-05-03 ENCOUNTER — TELEPHONE (OUTPATIENT)
Dept: FAMILY MEDICINE CLINIC | Facility: CLINIC | Age: 78
End: 2018-05-03

## 2018-05-03 NOTE — TELEPHONE ENCOUNTER
Called patient n/a lmov  for call back         ----- Message from [address] [place] [address]  sent at 5/3/2018  1:53 PM EDT -----  Mammo wnl Repeat yearly

## 2018-05-04 ENCOUNTER — OFFICE VISIT (OUTPATIENT)
Dept: FAMILY MEDICINE CLINIC | Facility: CLINIC | Age: 78
End: 2018-05-04
Payer: MEDICARE

## 2018-05-04 VITALS
RESPIRATION RATE: 16 BRPM | HEART RATE: 70 BPM | TEMPERATURE: 98.8 F | DIASTOLIC BLOOD PRESSURE: 60 MMHG | OXYGEN SATURATION: 97 % | BODY MASS INDEX: 26.66 KG/M2 | HEIGHT: 65 IN | WEIGHT: 160 LBS | SYSTOLIC BLOOD PRESSURE: 118 MMHG

## 2018-05-04 DIAGNOSIS — G89.29 OTHER CHRONIC PAIN: ICD-10-CM

## 2018-05-04 DIAGNOSIS — F41.8 DEPRESSION WITH ANXIETY: ICD-10-CM

## 2018-05-04 DIAGNOSIS — G50.0 TRIGEMINAL NEURALGIA: Primary | ICD-10-CM

## 2018-05-04 PROCEDURE — 99214 OFFICE O/P EST MOD 30 MIN: CPT | Performed by: FAMILY MEDICINE

## 2018-05-04 RX ORDER — OXCARBAZEPINE 150 MG/1
TABLET, FILM COATED ORAL
Refills: 1 | COMMUNITY
Start: 2018-04-18 | End: 2018-08-20 | Stop reason: CLARIF

## 2018-05-04 NOTE — PATIENT INSTRUCTIONS
[place] risk 6%  You do not need meds for cholesterol  Will refer to Dr Nas Stevens for consideration of medical marijuana for your TMJ  Continue the current meds  Call when you need refills  Consider the gamma knife  Read about it

## 2018-05-04 NOTE — PROGRESS NOTES
Assessment/Plan:     Chronic Problems:  No problem-specific Assessment & Plan notes found for this encounter  Visit Diagnosis:  Diagnoses and all orders for this visit:    Trigeminal neuralgia  Comments: Will refer to pain management  Keep the appt with Dr Gen Sung  Advised to read about gamma knife  Depression with anxiety  Comments:  Stable  continue meds  Other chronic pain  Comments:  Number given for Dr Juana Mendenhall  Other orders  -     OXcarbazepine (TRILEPTAL) 150 mg tablet; Take 1 1/2 tablet in the morning and 2 tablets at bedtime            Subjective:    Patient ID: Jw Wise is a 77 y o  female  Pt is here for routine f/u  Followed with Dr Gen Sung for trigeminal neuralgia  Has been on 300 mg of gabapentin which helps her sleep  Now on trileptal bid but not helping  Has to discuss with Dr Gen Sung  Feels very miserable from the trigeminal neuralgia  Also with posterior neck pain  Has had mri's in the past  Told she has degenerative changes  Also states that Dr Gen Sung increased her vitamin d3 to 5000 units once a week, but pt is not clear  Had labs done and here to discuss results  Takes all other meds as directed  No side effects noted  The following portions of the patient's history were reviewed and updated as appropriate: allergies, current medications, past family history, past medical history, past social history, past surgical history and problem list     Review of Systems   Constitutional: Negative for chills, diaphoresis, fatigue and fever  HENT: Negative  Eyes: Negative  Respiratory: Negative for cough, shortness of breath and wheezing  Cardiovascular: Negative for chest pain and palpitations  Gastrointestinal: Negative for constipation, diarrhea, nausea, rectal pain and vomiting  Genitourinary: Positive for urgency  Negative for dysuria and frequency  Musculoskeletal: Positive for arthralgias (Has joint pains in the neck   Thinks it is muscular  ) and gait problem  Negative for back pain, joint swelling and myalgias  Skin:        Had her mammo yesterday  Told it was wnl   Neurological: Positive for dizziness  Negative for light-headedness and headaches  /60   Pulse 70   Temp 98 8 °F (37 1 °C) (Tympanic)   Resp 16   Ht 5' 5" (1 651 m)   Wt 72 6 kg (160 lb)   SpO2 97%   BMI 26 63 kg/m²   Social History     Social History    Marital status:       Spouse name: N/A    Number of children: N/A    Years of education: N/A     Occupational History    retired      Social History Main Topics    Smoking status: Former Smoker    Smokeless tobacco: Never Used    Alcohol use Yes      Comment: occasional    Drug use: No    Sexual activity: Not on file     Other Topics Concern    Not on file     Social History Narrative    Always uses seatbelt     Past Medical History:   Diagnosis Date    Arthritis     Dry eye syndrome     Macular degeneration     Memory loss     Migraine     Spinal stenosis of lumbar region     Supraventricular tachycardia ([address] )     Vitamin D deficiency     last assessed 2/7/17     Family History   Problem Relation Age of Onset    Aortic aneurysm Mother      abdominal aortic aneurysm    Hypertension Mother     Breast cancer Mother     Hypertension Father     Hypertension Sister     Hyperlipidemia Sister      Past Surgical History:   Procedure Laterality Date    APPENDECTOMY      BACK SURGERY      lower back surgery lumbar disc    CATARACT EXTRACTION      FEMUR FRACTURE SURGERY      REPLACEMENT TOTAL KNEE Right     REPLACEMENT TOTAL KNEE Left        Current Outpatient Prescriptions:     acetaminophen (TYLENOL) 325 mg tablet, Take 650 mg by mouth every 6 (six) hours, Disp: , Rfl:     cholecalciferol (VITAMIN D3) 1,000 units tablet, Take by mouth Daily, Disp: , Rfl:     cycloSPORINE (RESTASIS) 0 05 % ophthalmic emulsion, Apply 1 drop to eye 2 (two) times a day, Disp: , Rfl:     ergocalciferol (VITAMIN D2) 50,000 units, Take by mouth, Disp: , Rfl:     gabapentin (NEURONTIN) 300 mg capsule, Take 1 capsule by mouth, Disp: , Rfl:     OXcarbazepine (TRILEPTAL) 150 mg tablet, Take 1 1/2 tablet in the morning and 2 tablets at bedtime  , Disp: , Rfl: 1    sertraline (ZOLOFT) 100 mg tablet, Take 100 mg by mouth, Disp: , Rfl:     verapamil (VERELAN PM) 120 MG 24 hr capsule, , Disp: , Rfl:     Allergies   Allergen Reactions    Hydromorphone Shortness Of Breath     Other reaction(s): Respiratory Distress  Other reaction(s): HORENESS    Morphine Itching    Penicillins      Rash          Lab Review   No visits with results within 6 Month(s) from this visit     Latest known visit with results is:   Lab on 02/17/2017   Component Date Value    TSH 3RD GENERATON 02/17/2017 2 750     Sodium 02/17/2017 142     Potassium 02/17/2017 3 8     Chloride 02/17/2017 105     CO2 02/17/2017 32     Anion Gap 02/17/2017 5     BUN 02/17/2017 20     Creatinine 02/17/2017 0 63     Glucose 02/17/2017 91     Calcium 02/17/2017 9 2     AST 02/17/2017 18     ALT 02/17/2017 22     Alkaline Phosphatase 02/17/2017 81     Total Protein 02/17/2017 7 6     Albumin 02/17/2017 4 0     Total Bilirubin 02/17/2017 0 48     eGFR 02/17/2017 >60 0     Cholesterol 02/17/2017 234*    Triglycerides 02/17/2017 222*    HDL, Direct 02/17/2017 46     LDL Calculated 02/17/2017 144*    Color, UA 02/17/2017 Yellow     Clarity, UA 02/17/2017 Cloudy     Specific Gravity, UA 02/17/2017 1 022     pH, UA 02/17/2017 6 0     Leukocytes, UA 02/17/2017 Small*    Nitrite, UA 02/17/2017 Negative     Protein, UA 02/17/2017 Negative     Glucose, UA 02/17/2017 Negative     Ketones, UA 02/17/2017 Negative     Urobilinogen, UA 02/17/2017 0 2     Bilirubin, UA 02/17/2017 Negative     Blood, UA 02/17/2017 Negative     Vit D, 25-Hydroxy 02/17/2017 13 8*    RBC, UA 02/17/2017 None Seen     WBC, UA 02/17/2017 2-4*    Epithelial Cells 02/17/2017 Occasional     Bacteria, UA 02/17/2017 Moderate*    AMORPH URATES 02/17/2017 Occasional     MUCOUS THREADS 02/17/2017 Occasional     Urine Culture 02/17/2017 10,000-19,000 cfu/ml Mixed Contaminants X4         Imaging: No results found  Objective:     Physical Exam   Constitutional: She is oriented to person, place, and time  HENT:   Head: Normocephalic  Right Ear: External ear normal    Left Ear: External ear normal    Eyes: EOM are normal  Pupils are equal, round, and reactive to light  Right eye exhibits no discharge  Neck: Neck supple  No tracheal deviation present  No thyromegaly present  Cardiovascular: Normal rate and normal heart sounds  No murmur heard  Pulmonary/Chest: No respiratory distress  She has no wheezes  She has no rales  Abdominal: Soft  There is no tenderness  Musculoskeletal:   Ambulates with a walker  Lymphadenopathy:     She has no cervical adenopathy  Neurological: She is alert and oriented to person, place, and time  Skin: Skin is warm and dry  Psychiatric: She has a normal mood and affect  Her behavior is normal  Judgment and thought content normal          Patient Instructions   [place] risk 6%  You do not need meds for cholesterol  Will refer to Dr My Stevenson for consideration of medical marijuana for your TMJ  Continue the current meds  Call when you need refills  Consider the gamma knife  Read about it         CL Mcdonnell

## 2018-05-04 NOTE — PROGRESS NOTES
Assessment/Plan:    No problem-specific Assessment & Plan notes found for this encounter  {Assess/PlanSmartLinks:30670}      Subjective:      Patient ID: Derrick Fothergill is a 77 y o  female  HPI    {Common ambulatory SmartLinks:07936}    Review of Systems      Objective: There were no vitals taken for this visit           Physical Exam

## 2018-05-10 DIAGNOSIS — R51.9 INTRACTABLE HEADACHE, UNSPECIFIED CHRONICITY PATTERN, UNSPECIFIED HEADACHE TYPE: ICD-10-CM

## 2018-05-11 RX ORDER — GABAPENTIN 100 MG/1
CAPSULE ORAL
Qty: 60 CAPSULE | Refills: 0 | Status: SHIPPED | OUTPATIENT
Start: 2018-05-11 | End: 2018-08-20 | Stop reason: CLARIF

## 2018-05-20 DIAGNOSIS — I10 ESSENTIAL HYPERTENSION: Primary | ICD-10-CM

## 2018-05-20 DIAGNOSIS — F34.1 DYSTHYMIA: ICD-10-CM

## 2018-05-20 RX ORDER — SERTRALINE HYDROCHLORIDE 100 MG/1
TABLET, FILM COATED ORAL
Qty: 135 TABLET | Refills: 1 | Status: SHIPPED | OUTPATIENT
Start: 2018-05-20 | End: 2019-02-21 | Stop reason: ALTCHOICE

## 2018-05-20 RX ORDER — VERAPAMIL HYDROCHLORIDE 120 MG/1
CAPSULE, EXTENDED RELEASE ORAL
Qty: 90 CAPSULE | Refills: 1 | Status: SHIPPED | OUTPATIENT
Start: 2018-05-20 | End: 2018-10-23 | Stop reason: SDUPTHER

## 2018-05-21 ENCOUNTER — TELEPHONE (OUTPATIENT)
Dept: FAMILY MEDICINE CLINIC | Facility: CLINIC | Age: 78
End: 2018-05-21

## 2018-05-21 NOTE — TELEPHONE ENCOUNTER
Pt called , she would like to know what neurosurgeon you would recommend for the facial pain  She has

## 2018-05-21 NOTE — TELEPHONE ENCOUNTER
Let her know we can set her up with [facility]  I will write out the referral if she is agreeable to this

## 2018-05-21 NOTE — TELEPHONE ENCOUNTER
[facility]   [address] [address] [address] [facility], [address] [place] [address]   Phone: [phone]

## 2018-05-22 ENCOUNTER — TELEPHONE (OUTPATIENT)
Dept: FAMILY MEDICINE CLINIC | Facility: CLINIC | Age: 78
End: 2018-05-22

## 2018-05-22 DIAGNOSIS — G50.0 TRIGEMINAL NEURALGIA: Primary | ICD-10-CM

## 2018-06-04 ENCOUNTER — HOSPITAL ENCOUNTER (EMERGENCY)
Facility: HOSPITAL | Age: 78
Discharge: HOME | End: 2018-06-04
Admitting: EMERGENCY MEDICINE
Payer: MEDICARE

## 2018-06-04 ENCOUNTER — APPOINTMENT (EMERGENCY)
Dept: CT IMAGING | Facility: HOSPITAL | Age: 78
End: 2018-06-04
Payer: MEDICARE

## 2018-06-04 VITALS
RESPIRATION RATE: 20 BRPM | TEMPERATURE: 97.9 F | HEART RATE: 74 BPM | OXYGEN SATURATION: 96 % | BODY MASS INDEX: 28.32 KG/M2 | WEIGHT: 170 LBS | DIASTOLIC BLOOD PRESSURE: 72 MMHG | SYSTOLIC BLOOD PRESSURE: 180 MMHG | HEIGHT: 65 IN

## 2018-06-04 DIAGNOSIS — R53.1 WEAKNESS: Primary | ICD-10-CM

## 2018-06-04 DIAGNOSIS — G89.29 OTHER CHRONIC PAIN: ICD-10-CM

## 2018-06-04 DIAGNOSIS — Z78.9 INABILITY TO PERFORM ACTIVITIES OF DAILY LIVING: ICD-10-CM

## 2018-06-04 DIAGNOSIS — G50.0 TRIGEMINAL NEURALGIA: ICD-10-CM

## 2018-06-04 DIAGNOSIS — G44.209 TENSION HEADACHE: ICD-10-CM

## 2018-06-04 LAB
ALBUMIN SERPL BCP-MCNC: 3.9 G/DL (ref 3.5–5)
ALP SERPL-CCNC: 69 U/L (ref 46–116)
ALT SERPL W P-5'-P-CCNC: 17 U/L (ref 12–78)
ANION GAP SERPL CALCULATED.3IONS-SCNC: 8 MMOL/L (ref 4–13)
AST SERPL W P-5'-P-CCNC: 17 U/L (ref 5–45)
BASOPHILS # BLD AUTO: 0.03 THOUSANDS/ΜL (ref 0–0.1)
BASOPHILS NFR BLD AUTO: 1 % (ref 0–1)
BILIRUB SERPL-MCNC: 0.4 MG/DL (ref 0.2–1)
BUN SERPL-MCNC: 11 MG/DL (ref 5–25)
CALCIUM SERPL-MCNC: 9.4 MG/DL (ref 8.3–10.1)
CHLORIDE SERPL-SCNC: 102 MMOL/L (ref 100–108)
CO2 SERPL-SCNC: 31 MMOL/L (ref 21–32)
CREAT SERPL-MCNC: 0.65 MG/DL (ref 0.6–1.3)
EOSINOPHIL # BLD AUTO: 0.11 THOUSAND/ΜL (ref 0–0.61)
EOSINOPHIL NFR BLD AUTO: 2 % (ref 0–6)
ERYTHROCYTE [DISTWIDTH] IN BLOOD BY AUTOMATED COUNT: 13.1 % (ref 11.6–15.1)
GFR SERPL CREATININE-BSD FRML MDRD: 85 ML/MIN/1.73SQ M
GLUCOSE SERPL-MCNC: 98 MG/DL (ref 65–140)
HCT VFR BLD AUTO: 38 % (ref 34.8–46.1)
HGB BLD-MCNC: 12.5 G/DL (ref 11.5–15.4)
HOLD SPECIMEN: NORMAL
IMM GRANULOCYTES # BLD AUTO: 0.02 THOUSAND/UL (ref 0–0.2)
IMM GRANULOCYTES NFR BLD AUTO: 0 % (ref 0–2)
LYMPHOCYTES # BLD AUTO: 0.94 THOUSANDS/ΜL (ref 0.6–4.47)
LYMPHOCYTES NFR BLD AUTO: 20 % (ref 14–44)
MCH RBC QN AUTO: 31.2 PG (ref 26.8–34.3)
MCHC RBC AUTO-ENTMCNC: 32.9 G/DL (ref 31.4–37.4)
MCV RBC AUTO: 95 FL (ref 82–98)
MONOCYTES # BLD AUTO: 0.43 THOUSAND/ΜL (ref 0.17–1.22)
MONOCYTES NFR BLD AUTO: 9 % (ref 4–12)
NEUTROPHILS # BLD AUTO: 3.1 THOUSANDS/ΜL (ref 1.85–7.62)
NEUTS SEG NFR BLD AUTO: 68 % (ref 43–75)
NRBC BLD AUTO-RTO: 0 /100 WBCS
PLATELET # BLD AUTO: 183 THOUSANDS/UL (ref 149–390)
PMV BLD AUTO: 9.4 FL (ref 8.9–12.7)
POTASSIUM SERPL-SCNC: 4 MMOL/L (ref 3.5–5.3)
PROT SERPL-MCNC: 7.1 G/DL (ref 6.4–8.2)
RBC # BLD AUTO: 4.01 MILLION/UL (ref 3.81–5.12)
SODIUM SERPL-SCNC: 141 MMOL/L (ref 136–145)
WBC # BLD AUTO: 4.63 THOUSAND/UL (ref 4.31–10.16)

## 2018-06-04 PROCEDURE — 36415 COLL VENOUS BLD VENIPUNCTURE: CPT

## 2018-06-04 PROCEDURE — 80053 COMPREHEN METABOLIC PANEL: CPT

## 2018-06-04 PROCEDURE — 96372 THER/PROPH/DIAG INJ SC/IM: CPT

## 2018-06-04 PROCEDURE — 96374 THER/PROPH/DIAG INJ IV PUSH: CPT

## 2018-06-04 PROCEDURE — 85025 COMPLETE CBC W/AUTO DIFF WBC: CPT

## 2018-06-04 PROCEDURE — 93005 ELECTROCARDIOGRAM TRACING: CPT

## 2018-06-04 PROCEDURE — 99285 EMERGENCY DEPT VISIT HI MDM: CPT

## 2018-06-04 PROCEDURE — 70450 CT HEAD/BRAIN W/O DYE: CPT

## 2018-06-04 RX ORDER — ACETAMINOPHEN 325 MG/1
975 TABLET ORAL ONCE
Status: COMPLETED | OUTPATIENT
Start: 2018-06-04 | End: 2018-06-04

## 2018-06-04 RX ORDER — KETOROLAC TROMETHAMINE 30 MG/ML
15 INJECTION, SOLUTION INTRAMUSCULAR; INTRAVENOUS ONCE
Status: COMPLETED | OUTPATIENT
Start: 2018-06-04 | End: 2018-06-04

## 2018-06-04 RX ORDER — OLANZAPINE 10 MG/1
5 INJECTION, POWDER, LYOPHILIZED, FOR SOLUTION INTRAMUSCULAR ONCE
Status: COMPLETED | OUTPATIENT
Start: 2018-06-04 | End: 2018-06-04

## 2018-06-04 RX ORDER — LIDOCAINE HYDROCHLORIDE 10 MG/ML
5 INJECTION, SOLUTION EPIDURAL; INFILTRATION; INTRACAUDAL; PERINEURAL ONCE
Status: COMPLETED | OUTPATIENT
Start: 2018-06-04 | End: 2018-06-04

## 2018-06-04 RX ADMIN — WATER 10 ML: 1 INJECTION INTRAMUSCULAR; INTRAVENOUS; SUBCUTANEOUS at 13:18

## 2018-06-04 RX ADMIN — ACETAMINOPHEN 975 MG: 325 TABLET ORAL at 12:59

## 2018-06-04 RX ADMIN — OLANZAPINE 5 MG: 10 INJECTION, POWDER, FOR SOLUTION INTRAMUSCULAR at 13:17

## 2018-06-04 RX ADMIN — KETOROLAC TROMETHAMINE 15 MG: 30 INJECTION, SOLUTION INTRAMUSCULAR at 13:01

## 2018-06-04 RX ADMIN — LIDOCAINE HYDROCHLORIDE 5 ML: 10 INJECTION, SOLUTION EPIDURAL; INFILTRATION; INTRACAUDAL; PERINEURAL at 13:17

## 2018-06-04 NOTE — SOCIAL WORK
CM received consult  Per ED Attending request to setup [address] [address]  CM met with pt and son at bedside to discuss  Pt was open to [facility]  CM offered OP CM and pt declined  Pt is agreeable to follow up with PCP   CM sent referral

## 2018-06-04 NOTE — ED NOTES
Pt offered IV fluids at this time due to c/o CAMEJO  Pt is currently refusing IV fluids at this time        Yvonne Cruz RN  06/04/18 0372

## 2018-06-04 NOTE — ED PROVIDER NOTES
History  Chief Complaint   Patient presents with    Headache     Patient presents with c/o head and face pain  States she was diagnosed with trigeminal neuralgia 2 months ago  This is a 69-year-old female coming here today with a chief complaint of headache  She suffers from trigeminal neuralgia and admits to not taking her oxcarbazepine medication this morning  She is reporting her typical trigeminal symptoms involving the left jaw but also is complaining of sort of a right-sided headache that originates at the base of the occiput  She denies any trauma recently  Denies any infection  States that she cannot take the pain anymore she cannot live like this  Patient's son reports that he believes a lot of her symptoms are related to depression and grieving for this reason will offer crisis services  Prior to Admission Medications   Prescriptions Last Dose Informant Patient Reported? Taking?    OXcarbazepine (TRILEPTAL) 150 mg tablet   Yes No   Sig: Take 1 1/2 tablet in the morning and 2 tablets at bedtime     acetaminophen (TYLENOL) 325 mg tablet   Yes No   Sig: Take 650 mg by mouth every 6 (six) hours   cholecalciferol (VITAMIN D3) 1,000 units tablet   Yes No   Sig: Take by mouth Daily   cycloSPORINE (RESTASIS) 0 05 % ophthalmic emulsion   Yes No   Sig: Apply 1 drop to eye 2 (two) times a day   ergocalciferol (VITAMIN D2) 50,000 units   Yes No   Sig: Take by mouth   gabapentin (NEURONTIN) 100 mg capsule   No No   Sig: TAKE ONE CAPSULE BY MOUTH TWICE DAILY WITH A 300 MG CAPSULE AT NIGHT   gabapentin (NEURONTIN) 300 mg capsule   Yes No   Sig: Take 1 capsule by mouth   sertraline (ZOLOFT) 100 mg tablet   No No   Sig: TAKE 1 AND 1/2 TABLETS BY  MOUTH ONCE DAILY   verapamil (VERELAN PM) 120 MG 24 hr capsule   No No   Sig: TAKE 1 CAPSULE BY MOUTH AT  NIGHT      Facility-Administered Medications: None       Past Medical History:   Diagnosis Date    Arthritis     Dry eye syndrome     Macular degeneration     Memory loss     Migraine     Spinal stenosis of lumbar region     Supraventricular tachycardia ([address] )     Vitamin D deficiency     last assessed 2/7/17       Past Surgical History:   Procedure Laterality Date    APPENDECTOMY      BACK SURGERY      lower back surgery lumbar disc    CATARACT EXTRACTION      FEMUR FRACTURE SURGERY      REPLACEMENT TOTAL KNEE Right     REPLACEMENT TOTAL KNEE Left        Family History   Problem Relation Age of Onset    Aortic aneurysm Mother      abdominal aortic aneurysm    Hypertension Mother    Robert Gipson Breast cancer Mother     Hypertension Father     Hypertension Sister     Hyperlipidemia Sister      I have reviewed and agree with the history as documented      Social History   Substance Use Topics    Smoking status: Former Smoker    Smokeless tobacco: Never Used    Alcohol use Yes      Comment: occasional        Review of Systems    Physical Exam  Physical Exam    Vital Signs  ED Triage Vitals   Temperature Pulse Respirations Blood Pressure SpO2   06/04/18 1506 06/04/18 1104 06/04/18 1104 06/04/18 1104 06/04/18 1104   97 9 °F (36 6 °C) 82 20 (!) 187/77 94 %      Temp Source Heart Rate Source Patient Position - Orthostatic VS BP Location FiO2 (%)   06/04/18 1104 06/04/18 1104 06/04/18 1104 06/04/18 1104 --   Oral Monitor Lying Right arm       Pain Score       06/04/18 1104       9           Vitals:    06/04/18 1104 06/04/18 1315   BP: (!) 187/77 (!) 180/72   Pulse: 82 74   Patient Position - Orthostatic VS: Lying Lying       Visual Acuity  Visual Acuity      Most Recent Value   L Pupil Size (mm)  4   R Pupil Size (mm)  4          ED Medications  Medications   lidocaine (PF) (XYLOCAINE-MPF) 1 % injection 5 mL (5 mL Infiltration Given 6/4/18 1317)   OLANZapine (ZyPREXA) IM injection 5 mg (5 mg Intramuscular Given 6/4/18 1317)   ketorolac (TORADOL) injection 15 mg (15 mg Intravenous Given 6/4/18 1301)   acetaminophen (TYLENOL) tablet 975 mg (975 mg Oral Given 6/4/18 1259)   sterile water injection **AcuDose Override Pull** (10 mL  Given 6/4/18 1318)       Diagnostic Studies  Results Reviewed     Procedure Component Value Units Date/Time    Comprehensive metabolic panel [[id number]] Collected:  06/04/18 1138    Lab Status:  Final result Specimen:  Blood from Arm, Left Updated:  06/04/18 1201     Sodium 141 mmol/L      Potassium 4 0 mmol/L      Chloride 102 mmol/L      CO2 31 mmol/L      Anion Gap 8 mmol/L      BUN 11 mg/dL      Creatinine 0 65 mg/dL      Glucose 98 mg/dL      Calcium 9 4 mg/dL      AST 17 U/L      ALT 17 U/L      Alkaline Phosphatase 69 U/L      Total Protein 7 1 g/dL      Albumin 3 9 g/dL      Total Bilirubin 0 40 mg/dL      eGFR 85 ml/min/1 73sq m     Narrative:         National Kidney Disease Education Program recommendations are as follows:  GFR calculation is accurate only with a steady state creatinine  Chronic Kidney disease less than 60 ml/min/1 73 sq  meters  Kidney failure less than 15 ml/min/1 73 sq  meters  CBC and differential [[id number]] Collected:  06/04/18 1138    Lab Status:  Final result Specimen:  Blood from Arm, Left Updated:  06/04/18 1144     WBC 4 63 Thousand/uL      RBC 4 01 Million/uL      Hemoglobin 12 5 g/dL      Hematocrit 38 0 %      MCV 95 fL      MCH 31 2 pg      MCHC 32 9 g/dL      RDW 13 1 %      MPV 9 4 fL      Platelets 534 Thousands/uL      nRBC 0 /100 WBCs      Neutrophils Relative 68 %      Immat GRANS % 0 %      Lymphocytes Relative 20 %      Monocytes Relative 9 %      Eosinophils Relative 2 %      Basophils Relative 1 %      Neutrophils Absolute 3 10 Thousands/µL      Immature Grans Absolute 0 02 Thousand/uL      Lymphocytes Absolute 0 94 Thousands/µL      Monocytes Absolute 0 43 Thousand/µL      Eosinophils Absolute 0 11 Thousand/µL      Basophils Absolute 0 03 Thousands/µL                  CT head without contrast   Final Result by Edmar Butler MD (06/04 1256)      No acute intracranial abnormality    Chronic microangiopathic changes  Workstation performed: [id number]                    Procedures  Procedures       Phone Contacts  ED Phone Contact    ED Course                               MDM  Number of Diagnoses or Management Options  Inability to perform activities of daily living: new and requires workup  Other chronic pain: new and requires workup  Tension headache: new and requires workup  Trigeminal neuralgia: new and requires workup  Weakness: new and requires workup  Diagnosis management comments: At this time the patient's headache is significantly improved  She is ready for discharge home  She declined crisis services per underlying depression  Symptomatic relief with trigger point injection to the occipital scalp using 1% lidocaine in increments  Approximately total of 6 mL was used  Amount and/or Complexity of Data Reviewed  Clinical lab tests: reviewed and ordered  Tests in the radiology section of CPT®: reviewed and ordered  Independent visualization of images, tracings, or specimens: yes    Patient Progress  Patient progress: improved    CritCare Time    Disposition  Final diagnoses:   Weakness   Inability to perform activities of daily living   Other chronic pain   Tension headache   Trigeminal neuralgia     Time reflects when diagnosis was documented in both MDM as applicable and the Disposition within this note     Time User Action Codes Description Comment    6/4/2018  1:46 PM Cass Leisure [R53 1] Weakness     6/4/2018  1:46 PM Cass Leisure [R53 81] Inability to perform activities of daily living     6/4/2018  1:46 PM Cass Leisure [G89 29] Other chronic pain     6/4/2018  2:29 PM Kajal Fraser [K97 506] Tension headache     6/4/2018  2:30 PM Kajal Fraser [G50 0] Trigeminal neuralgia       ED Disposition     ED Disposition Condition Comment    Discharge  [address] discharge to home/self care      Condition at discharge: Good        MD Documentation Most Recent Value   Sending [facility]      Follow-up Information     Follow up With Specialties Details Why Contact Marcello Mccarthy DO Family Medicine In 3 days For Continued Evaluation [address] [place] [address]  PUNEET Tellez   [phone]            Discharge Medication List as of 6/4/2018  2:30 PM      CONTINUE these medications which have NOT CHANGED    Details   acetaminophen (TYLENOL) 325 mg tablet Take 650 mg by mouth every 6 (six) hours, Historical Med      cholecalciferol (VITAMIN D3) 1,000 units tablet Take by mouth Daily, Starting Thu 3/2/2017, Historical Med      cycloSPORINE (RESTASIS) 0 05 % ophthalmic emulsion Apply 1 drop to eye 2 (two) times a day, Historical Med      ergocalciferol (VITAMIN D2) 50,000 units Take by mouth, Starting Thu 3/2/2017, Historical Med      !! gabapentin (NEURONTIN) 100 mg capsule TAKE ONE CAPSULE BY MOUTH TWICE DAILY WITH A 300 MG CAPSULE AT NIGHT, Normal      !! gabapentin (NEURONTIN) 300 mg capsule Take 1 capsule by mouth, Historical Med      OXcarbazepine (TRILEPTAL) 150 mg tablet Take 1 1/2 tablet in the morning and 2 tablets at bedtime  , Historical Med      sertraline (ZOLOFT) 100 mg tablet TAKE 1 AND 1/2 TABLETS BY  MOUTH ONCE DAILY, Normal      verapamil (VERELAN PM) 120 MG 24 hr capsule TAKE 1 CAPSULE BY MOUTH AT  NIGHT, Normal       !! - Potential duplicate medications found  Please discuss with provider  Outpatient Discharge Orders  Ambulatory Referral to Home Health   Standing Status: Future  Standing Exp   Date: 12/04/18         ED Provider  Electronically Signed by           CL Vasquez  06/04/18 CL Sharpe  06/04/18 5338

## 2018-06-04 NOTE — DISCHARGE INSTRUCTIONS
Chronic Pain   WHAT YOU NEED TO KNOW:   Chronic pain is pain that does not get better for 3 months or longer  Chronic pain may hurt all the time, or come and go  DISCHARGE INSTRUCTIONS:   Call 911 or have someone call 911 for any of the following:   · You are breathing slower than normal, or you have trouble breathing  · You cannot be awakened  · You have a seizure  Return to the emergency department if:   · Your heart is beating slower than normal     · Your heart feels like it is jumping or fluttering  · You cannot think clearly  Contact your healthcare provider if:   · You have side effects from prescription pain medicine, such as itching, nausea, or vomiting  · You have trouble sleeping  · Your pain gets worse, even after you take medicine  · You don't think the medicine is working  · You have questions or concerns about your condition or care  Medicines: You may need any of the following:  · Acetaminophen  decreases pain  It is available without a doctor's order  Ask how much to take and how often to take it  Follow directions  Read the labels of all other medicines you are using to see if they also contain acetaminophen, or ask your doctor or pharmacist  Acetaminophen can cause liver damage if not taken correctly  Do not use more than 4 grams (4,000 milligrams) total of acetaminophen in one day  · NSAIDs , such as ibuprofen, help decrease swelling, pain, and fever  This medicine is available with or without a doctor's order  NSAIDs can cause stomach bleeding or kidney problems in certain people  If you take blood thinner medicine, always ask your healthcare provider if NSAIDs are safe for you  Always read the medicine label and follow directions  · Prescription pain medicine  called narcotics or opioids may be given  Ask your healthcare provider how to take this medicine safely       · Anesthetics  can be rubbed on your skin or injected into a nerve or muscle to numb an area  · Other medicines  may reduce pain, anxiety, muscle tension, or swelling  · Take your medicine as directed  Contact your healthcare provider if you think your medicine is not helping or if you have side effects  Tell him of her if you are allergic to any medicine  Keep a list of the medicines, vitamins, and herbs you take  Include the amounts, and when and why you take them  Bring the list or the pill bottles to follow-up visits  Carry your medicine list with you in case of an emergency  Manage your chronic pain:   · Apply heat  on the area in pain for 20 to 30 minutes every 2 hours for as many days as directed  Heat helps decrease pain and muscle spasms  · Apply ice  on the part of your body that hurts for 15 to 20 minutes every hour or as directed  Use an ice pack, or put crushed ice in a plastic bag  Cover it with a towel  Ice decreases pain and swelling, and helps prevent tissue damage  · Go to physical therapy as directed  A physical therapist teaches you exercises to help improve movement and strength, and to decrease pain  · Exercise for 30 minutes, 3 times a week  Regular physical activity can help decrease pain and improve your quality of life  Ask your healthcare provider about the best exercise plan for your type of pain  · Get enough sleep  Create a relaxing bedtime routine  Go to sleep and wake up at the same time every day  Avoid caffeine in the afternoon  · Talk with a counselor or therapist   A type of counseling called cognitive behavioral therapy (CBT) can help your chronic pain by changing the way you think about it  CBT can also improve your mood, sleep, and ability to move  What you must know if you take narcotic pain medicine:   · You may need to take a bowel movement softener  The most common side effect of prescription pain medicine is constipation  Bowel movement softeners are available over the counter  · Do not mix prescription pain medicines  This can cause an overdose of medicine, which can become life-threatening  Read labels  Make sure you know the ingredients in all of your medicines  · Do not drink alcohol  when you take prescription pain medicine  It is not safe to mix narcotics or opioids with alcohol or illegal drugs  · Prescription pain medicine may impair your ability to drive or work safely  They may also cause dizziness and increase your risk for falling  · Store prescription pain medicine in a safe location at home  Keep your medicine away from children and other people  Never share your medicine with anyone  Follow up with your healthcare provider as directed: You may be referred to a pain specialist  Write down your questions so you remember to ask them during your visits  © 2017 [employer]  Information is for End User's use only and may not be sold, redistributed or otherwise used for commercial purposes  All illustrations and images included in CareNotes® are the copyrighted property of [employer]  or [employer]  The above information is an  only  It is not intended as medical advice for individual conditions or treatments  Talk to your doctor, nurse or pharmacist before following any medical regimen to see if it is safe and effective for you  Tension Headache   WHAT YOU NEED TO KNOW:   Tension headaches are most often caused by stress, eye strain, or muscle tightness  The pain of a tension headache may start in the forehead or the back of the head  The pain often spreads over the whole head and down into the neck and shoulders  Over-the-counter pain medicine is the most useful and common treatment for a tension headache  Exercise, biofeedback, meditation, or relaxation techniques may also decrease your headache pain    DISCHARGE INSTRUCTIONS:   Return to the emergency department if:   · You have a sudden headache that seems different or much worse than your usual headaches  · You have difficulty seeing, speaking, or moving  · You pass out, become confused, or have a seizure  · You have a headache, fever, and a stiff neck  Contact your healthcare provider if:   · Your headaches continue to get worse  · Your headaches happen so often that they affect your ability to do your work or normal activities  · You need to take medicine to help your headaches more often than your healthcare provider says you should  · Your headaches get so bad that they cause you to vomit  · You have questions or concerns about your condition or care  Medicines:   · NSAIDs , such as ibuprofen, help decrease swelling, pain, and fever  This medicine is available with or without a doctor's order  NSAIDs can cause stomach bleeding or kidney problems in certain people  If you take blood thinner medicine, always ask your healthcare provider if NSAIDs are safe for you  Always read the medicine label and follow directions  · Acetaminophen  decreases pain and fever  It is available without a doctor's order  Ask how much to take and how often to take it  Follow directions  Read the labels of all other medicines you are using to see if they also contain acetaminophen, or ask your doctor or pharmacist  Acetaminophen can cause liver damage if not taken correctly  Do not use more than 4 grams (4,000 milligrams) total of acetaminophen in one day  · Take your medicine as directed  Contact your healthcare provider if you think your medicine is not helping or if you have side effects  Tell him of her if you are allergic to any medicine  Keep a list of the medicines, vitamins, and herbs you take  Include the amounts, and when and why you take them  Bring the list or the pill bottles to follow-up visits  Carry your medicine list with you in case of an emergency  Manage your symptoms:   · Keep a headache record  Include when the headaches start and stop and what made them better   Describe your symptoms, such as how the pain feels, where it is, and how bad it is  Record anything you ate or drank for the past 24 hours before your headache  Bring this to follow-up visits  · Apply heat as directed  Heat may help decrease headache pain and muscle spasms  Apply heat on the area for 20 to 30 minutes every 2 hours for as many days as directed  A warm bath may also help relieve muscle tension and spasms  · Apply ice as directed  Ice may help decrease headache pain  Use an ice pack or put crushed ice in a plastic bag  Cover it with a towel and place it on the area for 15 to 20 minutes every hour as directed  Prevent tension headaches:   · Avoid muscle tension  Do not stay in one position for long periods of time  Use a different pillow if you wake up with sore neck and shoulder muscles  Find ways to relax your muscles, such as massage or resting in a quiet, dark room  · Avoid eye strain  Make sure you have good lighting when you read, sew, or do similar activities  Get yearly eye exams and wear glasses as directed  · Get enough sleep  Get 8 to 10 hours of sleep each night  Create a sleep schedule  Go to bed and wake up at the same times each day  It may be helpful to do something relaxing before bed  Do not watch television right before bed  · Eat a variety of healthy foods  Healthy foods include fruits, vegetables, whole-grain breads, low-fat dairy products, beans, lean meats, and fish  Do not eat foods that trigger your headaches  · Exercise regularly  Exercise helps decrease stress and headaches  Ask about the best exercise plan for you  · Drink liquids as directed  You may need to drink more liquid to prevent dehydration  Dehydration can make a tension headache worse  Ask your healthcare provider how much liquid to drink and which liquids are best for you  Limit caffeine as directed  Caffeine may make a tension headache worse  · Do not drink alcohol    Alcohol can trigger a headache  It can also prevent medicines from stopping your headache  · Do not smoke  Nicotine and other chemicals in cigarettes and cigars can trigger a headache and also cause lung damage  Ask your healthcare provider for information if you currently smoke and need help to quit  E-cigarettes or smokeless tobacco still contain nicotine  Talk to your healthcare provider before you use these products  Follow up with your healthcare provider as directed:  Bring the headache record with you  Write down your questions so you remember to ask them during your visits  © 2017 [employer] Information is for End User's use only and may not be sold, redistributed or otherwise used for commercial purposes  All illustrations and images included in CareNotes® are the copyrighted property of [employer]  or [employer]  The above information is an  only  It is not intended as medical advice for individual conditions or treatments  Talk to your doctor, nurse or pharmacist before following any medical regimen to see if it is safe and effective for you  Trigeminal Neuralgia   WHAT YOU NEED TO KNOW:   Trigeminal neuralgia (TN) is a problem with your trigeminal nerve that causes severe facial pain  You have a trigeminal nerve on each side of your face  The nerves allow you to feel pain, touch, and temperature changes in different areas of your face  DISCHARGE INSTRUCTIONS:   Medicines:  Do not stop taking your medicines without talking with your healthcare provider first  Dinah Canseco can have a bad reaction if you stop your TN medicines suddenly  You may need any of the following:  · Anticonvulsants: These control seizures, help prevent pain attacks, and decrease symptoms  · Antidepressants: These decrease pain and help prevent depression  · Muscle relaxers:  When your facial muscles are relaxed, you may be less likely to have pain attacks  · Pain medicines:   You may be given pain medicines if your facial pain is severe  · Take your medicine as directed  Contact your healthcare provider if you think your medicine is not helping or if you have side effects  Tell him or her if you are allergic to any medicine  Keep a list of the medicines, vitamins, and herbs you take  Include the amounts, and when and why you take them  Bring the list or the pill bottles to follow-up visits  Carry your medicine list with you in case of an emergency  Follow up with your healthcare provider as directed: You may need to have blood tests to check your blood levels of certain medicines  Ask how often you need to return to have these blood tests  Write down your questions so you remember to ask them during your visits  Manage your trigeminal neuralgia:  Even if you have not had symptoms for a long time, keep your medicines nearby  If your symptoms return, contact your healthcare provider before you start to take your medicines again  Contact your healthcare provider if:   · The medicines you are taking are not decreasing your pain  · You feel worried and depressed and find it hard to do your daily activities  · You have headaches, mouth sores, an upset stomach, or diarrhea  · Your pain feels worse, different, or moves to another area of your face  · You have questions or concerns about your condition or care  Return to the emergency department if:   · You have a fever, stiff neck, or you develop a skin rash or peeling skin  · You have clear or yellow fluid leaking from your procedure or surgery site  · You are feeling so depressed you want to harm yourself  · You are confused and cannot think clearly  · You are not eating or drinking, and you are losing weight  · You have eye pain, eye numbness, or sudden vision or hearing changes  · You have sudden dizziness, problems with movement, weakness, or numbness in your face    © 2017 [employer]  Information is for End User's use only and may not be sold, redistributed or otherwise used for commercial purposes  All illustrations and images included in CareNotes® are the copyrighted property of [employer]  or [employer]  The above information is an  only  It is not intended as medical advice for individual conditions or treatments  Talk to your doctor, nurse or pharmacist before following any medical regimen to see if it is safe and effective for you

## 2018-06-04 NOTE — ED NOTES
Pt is a 77 y o  female who presented to the ED due to having a headache, but mentioned to staff that she was very depressed  During assessment, pt admits to severe depression, but indicated that it increases when her pain is more severe  Pt resides with her son at this time, and is not linked with any outpt MH tx  Pt also denies any prior MH tx of any kind, and is declining both inpt and outpt services upon d/c  Pt appears guarded, and uninterested in discussing symptoms other than what brought her in  Pt denies SI/HI, and there is no psychosis present  Pt will be d/c when medically stable  Chief Complaint   Patient presents with    Headache     Patient presents with c/o head and face pain  States she was diagnosed with trigeminal neuralgia 2 months ago  Intake Assessment completed, Safety Risk Assessment completed      Karthik Prescott LMSW  06/04/18  [id number]

## 2018-06-05 LAB
ATRIAL RATE: 65 BPM
P AXIS: 57 DEGREES
PR INTERVAL: 134 MS
QRS AXIS: 21 DEGREES
QRSD INTERVAL: 76 MS
QT INTERVAL: 438 MS
QTC INTERVAL: 455 MS
T WAVE AXIS: 23 DEGREES
VENTRICULAR RATE: 65 BPM

## 2018-06-05 PROCEDURE — 93010 ELECTROCARDIOGRAM REPORT: CPT | Performed by: INTERNAL MEDICINE

## 2018-06-06 ENCOUNTER — TELEPHONE (OUTPATIENT)
Dept: FAMILY MEDICINE CLINIC | Facility: CLINIC | Age: 78
End: 2018-06-06

## 2018-06-07 ENCOUNTER — TELEPHONE (OUTPATIENT)
Dept: FAMILY MEDICINE CLINIC | Facility: CLINIC | Age: 78
End: 2018-06-07

## 2018-06-07 NOTE — TELEPHONE ENCOUNTER
Yuki called to let you know that pt was in the ER recently for her Trigeminal Neuralgia  She was referred to Home care which pt refused  Yuki will do PT twice a week for 4 weeks  *per Yuki    Pt is forgetting things a lot, she is very depressed but want to wait to schedule an appt with after she sees Neurologist on 6/18  *per Yuki  Pt said her med list on her d/c summary from the ER is wrong  She is not taking Vitamin D and her Trileptal she takes 1-2 tablets TID not     1 1/2 tablet in the morning and 2 tablets at bedtime as appears on her d/c summary  Pt is taking Preservision and fish oil  ia that ok?   Pt also wants to complete a DNR form which I faxed to Yuki: [phone]

## 2018-06-07 NOTE — TELEPHONE ENCOUNTER
Yes it is ok for her to take the [address], she can discuss the Trileptal with the neurologist, but she can take it the way she is until she sees them and decides if they are going to change the dose

## 2018-06-18 ENCOUNTER — TELEPHONE (OUTPATIENT)
Dept: FAMILY MEDICINE CLINIC | Facility: CLINIC | Age: 78
End: 2018-06-18

## 2018-06-18 DIAGNOSIS — G50.0 TRIGEMINAL NEURALGIA: Primary | ICD-10-CM

## 2018-06-18 NOTE — TELEPHONE ENCOUNTER
Pt is Meg's  She needs a referral to a neurologist with the diag trigeminal neurologia    She would prefer not to wait for it until Sandie Eduardo comes back from vacation

## 2018-06-19 ENCOUNTER — TELEPHONE (OUTPATIENT)
Dept: FAMILY MEDICINE CLINIC | Facility: CLINIC | Age: 78
End: 2018-06-19

## 2018-06-19 NOTE — TELEPHONE ENCOUNTER
Pt called returing a call to Asia Mahoney asked me to get a fax number from the pt   Pt stated she doesn't have a fax number she was calling for a prescription for Dr Cherri Roman  I informed the pt the referral was created and can be picked up or faxed  Pt gave me the office number where she tried to schedule an appointment with Pema Sparrow  But need the referral    [phone]  [place] [address]  The order is in the chart and be accessed by that office

## 2018-07-09 ENCOUNTER — TELEPHONE (OUTPATIENT)
Dept: FAMILY MEDICINE CLINIC | Facility: CLINIC | Age: 78
End: 2018-07-09

## 2018-07-31 ENCOUNTER — TELEPHONE (OUTPATIENT)
Dept: NEUROLOGY | Facility: CLINIC | Age: 78
End: 2018-07-31

## 2018-08-14 ENCOUNTER — TRANSITIONAL CARE MANAGEMENT (OUTPATIENT)
Dept: FAMILY MEDICINE CLINIC | Facility: CLINIC | Age: 78
End: 2018-08-14

## 2018-08-20 ENCOUNTER — LAB (OUTPATIENT)
Dept: LAB | Facility: HOSPITAL | Age: 78
End: 2018-08-20
Payer: MEDICARE

## 2018-08-20 ENCOUNTER — OFFICE VISIT (OUTPATIENT)
Dept: FAMILY MEDICINE CLINIC | Facility: CLINIC | Age: 78
End: 2018-08-20
Payer: MEDICARE

## 2018-08-20 VITALS
WEIGHT: 163.4 LBS | DIASTOLIC BLOOD PRESSURE: 82 MMHG | SYSTOLIC BLOOD PRESSURE: 130 MMHG | OXYGEN SATURATION: 98 % | BODY MASS INDEX: 27.22 KG/M2 | HEART RATE: 75 BPM | HEIGHT: 65 IN

## 2018-08-20 DIAGNOSIS — R35.0 URINARY FREQUENCY: ICD-10-CM

## 2018-08-20 DIAGNOSIS — Z23 NEED FOR VACCINATION: ICD-10-CM

## 2018-08-20 DIAGNOSIS — E87.1 HYPONATREMIA: ICD-10-CM

## 2018-08-20 DIAGNOSIS — G89.29 CHRONIC NONINTRACTABLE HEADACHE, UNSPECIFIED HEADACHE TYPE: Primary | ICD-10-CM

## 2018-08-20 DIAGNOSIS — R51.9 CHRONIC NONINTRACTABLE HEADACHE, UNSPECIFIED HEADACHE TYPE: Primary | ICD-10-CM

## 2018-08-20 LAB
ALBUMIN SERPL BCP-MCNC: 4.5 G/DL (ref 3.5–5)
ALP SERPL-CCNC: 68 U/L (ref 46–116)
ALT SERPL W P-5'-P-CCNC: 33 U/L (ref 12–78)
ANION GAP SERPL CALCULATED.3IONS-SCNC: 12 MMOL/L (ref 4–13)
AST SERPL W P-5'-P-CCNC: 27 U/L (ref 5–45)
BACTERIA UR QL AUTO: ABNORMAL /HPF
BILIRUB SERPL-MCNC: 0.6 MG/DL (ref 0.2–1)
BILIRUB UR QL STRIP: NEGATIVE
BUN SERPL-MCNC: 12 MG/DL (ref 5–25)
CALCIUM SERPL-MCNC: 10 MG/DL (ref 8.3–10.1)
CHLORIDE SERPL-SCNC: 103 MMOL/L (ref 100–108)
CLARITY UR: CLEAR
CO2 SERPL-SCNC: 28 MMOL/L (ref 21–32)
COLOR UR: ABNORMAL
CREAT SERPL-MCNC: 0.66 MG/DL (ref 0.6–1.3)
GFR SERPL CREATININE-BSD FRML MDRD: 84 ML/MIN/1.73SQ M
GLUCOSE SERPL-MCNC: 100 MG/DL (ref 65–140)
GLUCOSE UR STRIP-MCNC: NEGATIVE MG/DL
HGB UR QL STRIP.AUTO: ABNORMAL
KETONES UR STRIP-MCNC: NEGATIVE MG/DL
LEUKOCYTE ESTERASE UR QL STRIP: ABNORMAL
NITRITE UR QL STRIP: NEGATIVE
NON-SQ EPI CELLS URNS QL MICRO: ABNORMAL /HPF
PH UR STRIP.AUTO: 5.5 [PH] (ref 4.5–8)
POTASSIUM SERPL-SCNC: 4.3 MMOL/L (ref 3.5–5.3)
PROT SERPL-MCNC: 7.9 G/DL (ref 6.4–8.2)
PROT UR STRIP-MCNC: NEGATIVE MG/DL
RBC #/AREA URNS AUTO: ABNORMAL /HPF
SODIUM SERPL-SCNC: 143 MMOL/L (ref 136–145)
SP GR UR STRIP.AUTO: 1.01 (ref 1–1.03)
UROBILINOGEN UR QL STRIP.AUTO: 0.2 E.U./DL
WBC #/AREA URNS AUTO: ABNORMAL /HPF

## 2018-08-20 PROCEDURE — 81001 URINALYSIS AUTO W/SCOPE: CPT

## 2018-08-20 PROCEDURE — 36415 COLL VENOUS BLD VENIPUNCTURE: CPT

## 2018-08-20 PROCEDURE — G0008 ADMIN INFLUENZA VIRUS VAC: HCPCS

## 2018-08-20 PROCEDURE — 90662 IIV NO PRSV INCREASED AG IM: CPT

## 2018-08-20 PROCEDURE — 80053 COMPREHEN METABOLIC PANEL: CPT

## 2018-08-20 PROCEDURE — 99496 TRANSJ CARE MGMT HIGH F2F 7D: CPT | Performed by: FAMILY MEDICINE

## 2018-08-20 PROCEDURE — 87086 URINE CULTURE/COLONY COUNT: CPT

## 2018-08-20 RX ORDER — MV-MIN/FA/VIT K/LUTEIN/ZEAXANT 200MCG-5MG
CAPSULE ORAL
COMMUNITY
End: 2022-03-15 | Stop reason: ALTCHOICE

## 2018-08-20 NOTE — PATIENT INSTRUCTIONS
Will repeat labs to make sure that your numbers have remained normal   Will also repeat the urine specimen and call with all results  Keep your follow-up appointment with Neurology  If you think that you need more time with physical therapy you are to call here and let me now  You seem like you have done well with this additional therapy

## 2018-08-20 NOTE — PROGRESS NOTES
Assessment/Plan:     Chronic Problems:  Chronic nonintractable headache  Keep the appointment with Neurology  Let them know the medications you have been on in the past that have not worked or have caused problems  Visit Diagnosis:  Diagnoses and all orders for this visit:    Chronic nonintractable headache, unspecified headache type    Hyponatremia  Comments: Will recheck labs and call with results  Orders:  -     Comprehensive metabolic panel; Future    Urinary frequency  Comments: Will will repeat urinalysis and culture  Orders:  -     Urinalysis with microscopic; Future  -     Urine culture; Future    Need for vaccination  -     influenza vaccine, [id number], high-dose, PF 0 5 mL, for patients 65 yr+ (FLUZONE HIGH-DOSE)    Other orders  -     Multiple Vitamins-Minerals (PRESERVISION AREDS 2+MULTI VIT) CAPS; BID        Subjective:     Patient ID: Loulou Guillen is a 77 y o  female  Pt is here for hospital f/u  Was following with Dr Lorena Blackman for tgn and on trileptal and apparently the dose was too high  According to son, she became, weak, nauseated, confused, difficulty ambulating  Son thought she was going into a coma  Taken to [facility] via ambulance and was admitted for 4 days and the transferred to rehab for about 4 weeks  Seen by another neurologist, DR Nathalie Berger,  and told she did not have tgn as the sx were not consistent  No further facial pain, but gets pain in the head, but not tgn, not migraines  Has appt with Dr Lorena Blackman but prefers not to go back  Has an appt with neuro from [facility], Dr Cristy Reagan  Still feels very weak and feels she made excellent results in rehab  No head pains now, but terrible first thing in the am or when she changes position at night  Meds reviewed with patient and current  Used methocarbamol for sleep with good results  Pt was treated for uti while admitted and was found to have hyponatremia on admission  Did not take the topamax, but finished the cipro for the uti   No uti symptoms now  Active Problems    Patient Active Problem List   Diagnosis    Chronic nonintractable headache       Past Medical History     Past Medical History:   Diagnosis Date    Arthritis     Dry eye syndrome     Macular degeneration     Memory loss     Migraine     Spinal stenosis of lumbar region     Supraventricular tachycardia ([address] )     Vitamin D deficiency     last assessed 2/7/17       Surgical History    Past Surgical History:   Procedure Laterality Date    APPENDECTOMY      BACK SURGERY      lower back surgery lumbar disc    CATARACT EXTRACTION      FEMUR FRACTURE SURGERY      REPLACEMENT TOTAL KNEE Right     REPLACEMENT TOTAL KNEE Left        Current Meds       Current Outpatient Prescriptions:     acetaminophen (TYLENOL) 325 mg tablet, Take 650 mg by mouth every 6 (six) hours, Disp: , Rfl:     cholecalciferol (VITAMIN D3) 1,000 units tablet, Take by mouth Daily, Disp: , Rfl:     cycloSPORINE (RESTASIS) 0 05 % ophthalmic emulsion, Apply 1 drop to eye 2 (two) times a day, Disp: , Rfl:     ergocalciferol (VITAMIN D2) 50,000 units, Take by mouth, Disp: , Rfl:     Multiple Vitamins-Minerals (PRESERVISION AREDS 2+MULTI VIT) CAPS, BID, Disp: , Rfl:     sertraline (ZOLOFT) 100 mg tablet, TAKE 1 AND 1/2 TABLETS BY  MOUTH ONCE DAILY, Disp: 135 tablet, Rfl: 1    verapamil (VERELAN PM) 120 MG 24 hr capsule, TAKE 1 CAPSULE BY MOUTH AT  NIGHT, Disp: 90 capsule, Rfl: 1    Allergies    Allergies   Allergen Reactions    Hydromorphone Shortness Of Breath     Other reaction(s): Respiratory Distress  Other reaction(s): HORENESS    Morphine Itching    Penicillins      Rash       No images are attached to the encounter      Health Management    Health Maintenance   Topic Date Due    GLAUCOMA SCREENING 65 + YR  01/25/2006    INFLUENZA VACCINE  09/01/2018    Fall Risk  05/04/2019    Urinary Incontinence Screening  05/04/2019    DTaP,Tdap,and Td Vaccines (2 - Td) 09/07/2027    Pneumococcal PPSV23/PCV13 65+ Years / Low and Medium Risk  Completed       CBC:     Results from last 6 Months  Lab Units 06/04/18  1138   WBC Thousand/uL 4 63   RBC Million/uL 4 01   HEMOGLOBIN g/dL 12 5   HEMATOCRIT % 38 0   MCV fL 95   MCH pg 31 2   MCHC g/dL 32 9   RDW % 13 1   MPV fL 9 4   PLATELETS Thousands/uL 183   NRBC AUTO /100 WBCs 0   NEUTROS PCT % 68   LYMPHS PCT % 20   MONOS PCT % 9   EOS PCT % 2   BASOS PCT % 1   NEUTROS ABS Thousands/µL 3 10   LYMPHS ABS Thousands/µL 0 94   MONOS ABS Thousand/µL 0 43   EOS ABS Thousand/µL 0 11     Chemistry Profile:     Results from last 6 Months  Lab Units 06/04/18  1138   SODIUM mmol/L 141   POTASSIUM mmol/L 4 0   CHLORIDE mmol/L 102   CO2 mmol/L 31   ANION GAP mmol/L 8   BUN mg/dL 11   CREATININE mg/dL 0 65   GLUCOSE RANDOM mg/dL 98   CALCIUM mg/dL 9 4   AST U/L 17   ALT U/L 17   ALK PHOS U/L 69   TOTAL PROTEIN g/dL 7 1   BILIRUBIN TOTAL mg/dL 0 40   EGFR ml/min/1 73sq m 85     Coagulation Studies:     Endocrine Studies:       Invalid input(s): [facility]    Imaging: No results found  Review of Systems   Constitutional: Negative for chills, diaphoresis, fatigue and fever  HENT: Negative  Eyes: Negative  But h/o macular degeneration right eye   Respiratory: Negative for cough, shortness of breath and wheezing  Cardiovascular: Positive for palpitations (thinks she may have had rare pvc's, and no tachycaria  )  Negative for chest pain  Gastrointestinal: Negative  Genitourinary: Positive for frequency  Negative for dysuria and urgency  Musculoskeletal:        Feels her joint pains are better  Was walking with a cane in rehab, and wants to start walking with a cane again  Currently using a walker  Neurological: Positive for headaches (mostly in the am and with change of position)  Negative for dizziness and light-headedness  Psychiatric/Behavioral: Negative for dysphoric mood  The patient is nervous/anxious            Objective: Physical Exam   Constitutional: She is oriented to person, place, and time  She appears well-developed and well-nourished  No distress  HENT:   Head: Normocephalic and atraumatic  Right Ear: External ear normal    Left Ear: External ear normal    Mouth/Throat: Oropharynx is clear and moist    Eyes: Conjunctivae and EOM are normal  Pupils are equal, round, and reactive to light  Right eye exhibits no discharge  Left eye exhibits no discharge  Neck: Normal range of motion  Neck supple  No thyromegaly present  Cardiovascular: Normal rate, regular rhythm and normal heart sounds  Exam reveals no friction rub  No murmur heard  Pulmonary/Chest: Effort normal and breath sounds normal  No respiratory distress  She has no wheezes  She has no rales  She exhibits no tenderness  Abdominal: Soft  Bowel sounds are normal  There is no tenderness  Musculoskeletal: Normal range of motion  She exhibits no edema, tenderness or deformity  Ambulates well with a walker  Lymphadenopathy:     She has no cervical adenopathy  Neurological: She is alert and oriented to person, place, and time  No cranial nerve deficit  Skin: Skin is warm and dry  No rash noted  She is not diaphoretic  Psychiatric: She has a normal mood and affect  Her behavior is normal  Judgment and thought content normal          /82   Pulse 75   Ht 5' 5" (1 651 m)   Wt 74 1 kg (163 lb 6 4 oz)   SpO2 98%   BMI 27 19 kg/m²     Vitals:    08/20/18 1306   BP: 130/82   Pulse: 75   SpO2: 98%   Weight: 74 1 kg (163 lb 6 4 oz)   Height: 5' 5" (1 651 m)       Transitional Care Management Review:  Shahla West is a 77 y o  female here for TCM follow up  During the TCM phone call patient stated:    Date and time hospital follow up call was made:  8/14/2018  1:14 PM  Patient was hopsitalized at:   Other (comment), Beni Paci  Disposition:  Home  Current symptoms:  None  Post hospital issues:  None  Scheduled for follow up?:  Yes  I have advised the patient to call PCP with any new or worsening symptoms (please type in name along with any credentials):  PATRICIA Schulz

## 2018-08-20 NOTE — ASSESSMENT & PLAN NOTE
Keep the appointment with Neurology  Let them know the medications you have been on in the past that have not worked or have caused problems

## 2018-08-21 LAB — BACTERIA UR CULT: NORMAL

## 2018-09-13 ENCOUNTER — OFFICE VISIT (OUTPATIENT)
Dept: NEUROLOGY | Facility: CLINIC | Age: 78
End: 2018-09-13
Payer: MEDICARE

## 2018-09-13 VITALS
DIASTOLIC BLOOD PRESSURE: 64 MMHG | HEIGHT: 65 IN | WEIGHT: 166.7 LBS | SYSTOLIC BLOOD PRESSURE: 139 MMHG | BODY MASS INDEX: 27.77 KG/M2 | HEART RATE: 67 BPM

## 2018-09-13 DIAGNOSIS — G50.9 TRIGEMINAL NEUROPATHY: Primary | ICD-10-CM

## 2018-09-13 DIAGNOSIS — G50.0 TRIGEMINAL NEURALGIA: ICD-10-CM

## 2018-09-13 DIAGNOSIS — M54.81 OCCIPITAL NEURALGIA OF RIGHT SIDE: ICD-10-CM

## 2018-09-13 DIAGNOSIS — M79.18 MYOFASCIAL PAIN ON RIGHT SIDE: ICD-10-CM

## 2018-09-13 DIAGNOSIS — G89.29 CHRONIC NONINTRACTABLE HEADACHE, UNSPECIFIED HEADACHE TYPE: ICD-10-CM

## 2018-09-13 DIAGNOSIS — R51.9 CHRONIC NONINTRACTABLE HEADACHE, UNSPECIFIED HEADACHE TYPE: ICD-10-CM

## 2018-09-13 PROCEDURE — 99215 OFFICE O/P EST HI 40 MIN: CPT | Performed by: PSYCHIATRY & NEUROLOGY

## 2018-09-13 RX ORDER — DIVALPROEX SODIUM 250 MG/1
TABLET, EXTENDED RELEASE ORAL
Qty: 60 TABLET | Refills: 1 | Status: SHIPPED | OUTPATIENT
Start: 2018-09-13 | End: 2018-11-20

## 2018-09-13 NOTE — PROGRESS NOTES
Patient ID: Nyla Finch is a 77 y o  female  Assessment/Plan:     Problem List Items Addressed This Visit        Nervous and Auditory    Trigeminal neuralgia    Trigeminal neuropathy - Primary    Relevant Medications    divalproex sodium (DEPAKOTE ER) 250 mg 24 hr tablet       Other    Chronic nonintractable headache    Relevant Medications    divalproex sodium (DEPAKOTE ER) 250 mg 24 hr tablet    Occipital neuralgia of right side    Relevant Orders    Ambulatory referral to Physical Medicine Rehab    Myofascial pain on right side    Relevant Orders    Ambulatory referral to [facility]         Mrs Gurjit Mark presented for evaluation of facial pain and neck pain - we agreed that she has likely several conditions, including right trigeminal neuropathy and right occipital neuralgia with cervicalgia on the same side  Patient had pain to palpation at right upper neck and shoulder with severe foraminal stenosis described on 2016 MRI cervical spine  Patient has been taking diazepam for her issues, with no significant benefits described  We agreed to start B2 200 mg bid and B12 1000 mcg and continue magnesium 400 mg daily; Depakote  mg for 1 week than 500 mg maintenance will be started for headaches and trigeminal neuropathy  No other focal neurologic deficit has héctor reported  Patient is to follow with Dr Blessing Weaver for further evaluation on myofascial pain syndrome  [facility] with [facility] - patient had prior therapy with Botox for her intractable headaches, re-evaluation would be recommended  Greater than 50% of the 60 minutes evaluation was a face-to-face discussion regarding  the pathophysiology of her current symptoms and further plan, as well as counseling, educating, and coordinating the patient's care  Subjective: right sided head pain and face pain    HPI/History of Present Illness  Mrs Gurjit Mark is a 77year old right handed female with a history of chronic pain syndrome, depression and anxiety, dysthymia, HTN, SVT, intractable headaches, who presents today for a consult for trigeminal neuralgia  Patient complains of right sided head pain x 2 years with no relief  Patient stated that she has been caring diagnosis of trigeminal neuralgia and trileptal and gabapentin has not been helping her  She has been on Zoloft 100 mg as well  CT head and MRI brain ( indication cervicalgia) from February 2018 was negative for brain mass, ischemic or hemorrhagic changes  Pain starts from morning she has head pain and face pain- with involvement right ear and V2 and at times V1  Perception of ear blocking  Cold packs to right side of her neck  Magnesium 400 mg has been taken  Patient has been describing facial pain max is 5/10 an best days 0/10  Patient has pain 20-25 days a months  Patient stated that pain radiate to her right occipital area and it is severe  Pain improves       CT Head:Impression:No acute appearing findings  MRI brain February 2018: There are no areas of restricted diffusion  No midline shift, mass effect, or extra-axial collection is identified  No areas of gradient susceptibility to suggest blood product deposition      No abnormal enhancement is identified       Scattered patchy areas of increased T2 and FLAIR signal  are present in the supratentorial white matter which is a nonspecific finding but likely represents mild chronic microvascular ischemia      Mild generalized volume loss noted      VENTRICLES:  The ventricles are normal in size and contour      VASCULATURE:  Major arterial and dural venous flow voids are preserved by spin echo criteria      MRI C-spine 2016: Multilevel degenerative spondylosis     Grade 1 anterolisthesis, mild bilateral foraminal stenosis C4-5     Mild central canal stenosis, moderate to severe bilateral foraminal stenosis C5-C6 and C6-7     Mild bilateral foraminal stenosis, small right foraminal disc protrusion C7-T1      The following portions of the patient's history were reviewed and updated as appropriate:   She  has a past medical history of Arthritis; Dry eye syndrome; Macular degeneration; Memory loss; Migraine; Spinal stenosis of lumbar region; Supraventricular tachycardia ([address] ); and Vitamin D deficiency  She   Patient Active Problem List    Diagnosis Date Noted    Trigeminal neuralgia 09/13/2018    Occipital neuralgia of right side 09/13/2018    Trigeminal neuropathy 09/13/2018    Myofascial pain on right side 09/13/2018    Chronic nonintractable headache 08/20/2018     She  has a past surgical history that includes Cataract extraction; Replacement total knee (Right); Appendectomy; Femur fracture surgery; Back surgery; and Replacement total knee (Left)  Her family history includes Aortic aneurysm in her mother; Breast cancer in her mother; Hyperlipidemia in her sister; Hypertension in her father, mother, and sister  She  reports that she has quit smoking  She has never used smokeless tobacco  She reports that she drinks alcohol  She reports that she does not use drugs  Current Outpatient Prescriptions   Medication Sig Dispense Refill    acetaminophen (TYLENOL) 325 mg tablet Take 650 mg by mouth every 6 (six) hours      cholecalciferol (VITAMIN D3) 1,000 units tablet Take by mouth Daily      cycloSPORINE (RESTASIS) 0 05 % ophthalmic emulsion Apply 1 drop to eye 2 (two) times a day      divalproex sodium (DEPAKOTE ER) 250 mg 24 hr tablet Take 1 tab at night for 7 nights then 2 tab at night 60 tablet 1    ergocalciferol (VITAMIN D2) 50,000 units Take by mouth      Multiple Vitamins-Minerals (PRESERVISION AREDS 2+MULTI VIT) CAPS BID      sertraline (ZOLOFT) 100 mg tablet TAKE 1 AND 1/2 TABLETS BY  MOUTH ONCE DAILY 135 tablet 1    verapamil (VERELAN PM) 120 MG 24 hr capsule TAKE 1 CAPSULE BY MOUTH AT  NIGHT 90 capsule 1     No current facility-administered medications for this visit        Current Outpatient Prescriptions on File Prior to Visit   Medication Sig    acetaminophen (TYLENOL) 325 mg tablet Take 650 mg by mouth every 6 (six) hours    cholecalciferol (VITAMIN D3) 1,000 units tablet Take by mouth Daily    cycloSPORINE (RESTASIS) 0 05 % ophthalmic emulsion Apply 1 drop to eye 2 (two) times a day    ergocalciferol (VITAMIN D2) 50,000 units Take by mouth    Multiple Vitamins-Minerals (PRESERVISION AREDS 2+MULTI VIT) CAPS BID    sertraline (ZOLOFT) 100 mg tablet TAKE 1 AND 1/2 TABLETS BY  MOUTH ONCE DAILY    verapamil (VERELAN PM) 120 MG 24 hr capsule TAKE 1 CAPSULE BY MOUTH AT  NIGHT     No current facility-administered medications on file prior to visit  She is allergic to hydromorphone; morphine; and penicillins            Objective:    Blood pressure 139/64, pulse 67, height 5' 5" (1 651 m), weight 75 6 kg (166 lb 11 2 oz)  Physical Exam/Neurological Exam    CONSTITUTIONAL: NAD, pleasant  NECK: supple, no lymphadenopathy, no thyromegaly, no JVD  CARDIOVASCULAR: RRR, normal S1S2, no murmurs, no rubs  RESP: clear to auscultation bilaterally, no wheezes/rhonchi/rales  ABDOMEN: soft, non tender, non distended  SKIN: no rash or skin lesions  EXTREMITIES: no edema, pulses 2+bilaterally  PSYCH: appropriate mood and affect  NEUROLOGIC COMPREHENSIVE EXAM: Patient is oriented to person, place and time, NAD; appropriate affect  CN II, III, IV, V, VI, VII,VIII,IX,X,XI-XII intact with EOMI, PERRLA, OKN intact, VF grossly intact, fundi poorly visualized secondary to pupillary constriction; symmetric face noted  Motor: 5/5 UE/LE bilateral symmetric; Sensory: decreased to pinprick right V2-V3 distribution; normal vibration sensation feet bilaterally; Coordination within normal limits on FTN and PETERSON testing; DTR: 2/4 through, no Babinski, no clonus  Tandem gait is abnormal  Romberg: negative      ROS:  12 points of review of system was reviewed with the patient and was unremarkable with exception: see HPI  Review of Systems   Constitutional: Negative  Negative for appetite change and fever  HENT: Negative  Negative for hearing loss, tinnitus, trouble swallowing and voice change  Eyes: Negative  Negative for photophobia and pain  Dry eyes, ringing in ears, blurred vision, hearing loss   Respiratory: Negative  Negative for shortness of breath  Cardiovascular: Negative  Negative for palpitations  Gastrointestinal: Negative  Negative for nausea and vomiting  Endocrine: Negative  Negative for cold intolerance and heat intolerance  Genitourinary: Positive for urgency  Negative for dysuria and frequency  Musculoskeletal: Positive for neck pain  Negative for myalgias  Joint and muscle pain   Skin: Negative  Negative for rash  Neurological: Negative  Negative for dizziness, tremors, seizures, syncope, facial asymmetry, speech difficulty, weakness, light-headedness, numbness and headaches  Head pain   Hematological: Negative  Does not bruise/bleed easily  Psychiatric/Behavioral: Negative for confusion, hallucinations and sleep disturbance  The patient is nervous/anxious

## 2018-09-20 ENCOUNTER — OFFICE VISIT (OUTPATIENT)
Dept: NEUROLOGY | Facility: CLINIC | Age: 78
End: 2018-09-20
Payer: MEDICARE

## 2018-09-20 VITALS
BODY MASS INDEX: 28.32 KG/M2 | WEIGHT: 170 LBS | RESPIRATION RATE: 14 BRPM | HEIGHT: 65 IN | HEART RATE: 64 BPM | SYSTOLIC BLOOD PRESSURE: 123 MMHG | DIASTOLIC BLOOD PRESSURE: 58 MMHG

## 2018-09-20 DIAGNOSIS — M54.2 NECK PAIN: ICD-10-CM

## 2018-09-20 DIAGNOSIS — M79.18 CERVICAL MYOFASCIAL PAIN SYNDROME: Primary | ICD-10-CM

## 2018-09-20 DIAGNOSIS — M48.02 DEGENERATIVE CERVICAL SPINAL STENOSIS: ICD-10-CM

## 2018-09-20 PROCEDURE — 99205 OFFICE O/P NEW HI 60 MIN: CPT | Performed by: PHYSICAL MEDICINE & REHABILITATION

## 2018-09-20 PROCEDURE — 20553 NJX 1/MLT TRIGGER POINTS 3/>: CPT | Performed by: PHYSICAL MEDICINE & REHABILITATION

## 2018-09-20 RX ORDER — BUPIVACAINE HYDROCHLORIDE 2.5 MG/ML
5 INJECTION, SOLUTION INFILTRATION; PERINEURAL ONCE
Status: COMPLETED | OUTPATIENT
Start: 2018-09-20 | End: 2018-09-20

## 2018-09-20 RX ORDER — CALCIUM CARBONATE 300MG(750)
TABLET,CHEWABLE ORAL
COMMUNITY
Start: 2018-09-13 | End: 2018-11-20

## 2018-09-20 RX ORDER — CYCLOBENZAPRINE HCL 5 MG
5 TABLET ORAL 3 TIMES DAILY PRN
Qty: 30 TABLET | Refills: 3 | Status: SHIPPED | OUTPATIENT
Start: 2018-09-20 | End: 2018-10-08 | Stop reason: ALTCHOICE

## 2018-09-20 RX ADMIN — BUPIVACAINE HYDROCHLORIDE 5 ML: 2.5 INJECTION, SOLUTION INFILTRATION; PERINEURAL at 13:02

## 2018-09-20 NOTE — PATIENT INSTRUCTIONS
Try the over the counter Creams/Gels/Sprays:  1  Menthol products (Bengay, Salonpas, Icy HOT)  2  Arnica Cream  3   EMU oil

## 2018-09-20 NOTE — PROGRESS NOTES
Physical Medicine & Rehabilitation New Patient Evaluation  Mariaa Childress [place] 77 y o  female      ASSESSMENT/PLAN:     Sylvia Hi is a 77year old female with PMH for chronic headaches, trigeminal neuralgia, known lumbar spinal stenosis who presents today for evaluation of her chronic neck and upper back pain    Patient has failed previous conservative treatment including ice, multiple medications, previous physical therapy, massage  On today's examination, patient has significantly reduced range of motion in extension as well as lateral bending bilaterally and rotation towards the left  Patient also has palpable taut tight muscular knots which when pressed reproduce her exact pain  As part of a conservative treatment program discussed today, I have offered her trigger point injections today which are indicated and may need to be repeated every 3 months  Patient is agreeable to trigger point injection today  Additionally she has never tried Flexeril as a muscular relaxant therefore I would like her to try this 5 mg t i d  p r n  for muscular spasms  She has been educated on potential side effects and has been instructed to call the office with questions or concerns after beginning the medication  Patient also like referral to our Pain Management Department for chronic pain  Today I have also offered her a cervical spine x-ray to review her alignment with known history of cervical stenosis with last MRI performed in 2016, she did not want to obtain an x-ray at this time  Additionally I have offered her repeat physical therapy program after trigger point injections as part of her comprehensive treatment plan, she would like to considered this during our next visit  Diagnoses and all orders for this visit:    Cervical myofascial pain syndrome  -     cyclobenzaprine (FLEXERIL) 5 mg tablet;  Take 1 tablet (5 mg total) by mouth 3 (three) times a day as needed for muscle spasms  -     bupivacaine (MARCAINE) 0 25 % injection 5 mL; Inject 5 mL as directed once   - trigger points administered today see procedure note below    Neck pain  -     Ambulatory referral to Pain Management; Future    Degenerative cervical spinal stenosis  -     Ambulatory referral to Pain Management; Future      Return to clinic in 3 months      Procedure Note:     Procedure: Trigger point injections    Indication: Cervical myofascial pain syndrome       Informed consent was obtained, risks/benefits of procedure and medication were reviewed with the patient, and consent signed by patient prior to procedure and placed in the chart        Trigger and tender points were palpated, identified, and marked right upper trapezius, right levator scapula, right splenius capitis, right semispinalis capitis, left upper trapezius, left levator scapula, right infraspinatus    Patient's skin was prepped and cleaned with alcohol  Using a 25 gauge 1 inch needle, 0 5cc of 0 25% Marcaine was injected into each of 9 trigger/tender points marked in the muscles identified     No complications were experienced and patient tolerated procedure well        Post injection immediate reduction of pain: 40-50 %     Post procedure instructions were provided with patient understanding  *I have spent 60 minutes with Patient  today in which greater than 50% of this time was spent in counseling/coordination of care regarding Intructions for management, Patient and family education and Impressions  HPI:   Joe Pitts 77 y o  female right handed, with  has a past medical history of Arthritis; Dry eye syndrome; Macular degeneration; Memory loss; Migraine; Spinal stenosis of lumbar region; Supraventricular tachycardia ([address] ); and Vitamin D deficiency  Old records were reviewed personally        Last 14 NI 6/4/18 showing no intracranial abnormality    MRI Brain 2/6/18 normal     MRI C-Spine 3/4/16: Grade 1 anteriolisthesis with mild bilateral foraminal stenosis C4/C5, mild central canal stenosis with moderate - severe bilateral foraminal stenosis C5-C7  Small right foraminal disc protrusion C7-T1    SUBJECTIVE:  Patient presents today in the office with chief complaint of headache and associated chronic neck tightness  The headaches have been going on for years  Headaches are intermittent, but daily and worsens with intensity with movement of her head  On average her intensity is moderate to severe  Her headaches are more located on the right side of her scalp  She describes headaches as a sharp pain  She also has a history of trigeminal neuralgia  She is currently being followed by [facility] and has recently been started on Depakote, which she has just started is unclear if she finds helpful  In the past she has received Botox injections for her headaches from an orthopedic spine doctor  With regards her neck, she has a known history of cervical spinal stenosis  In the past she has seen an orthopaedic physician spine physician in [place], [place] who performed previous epidural steroid injections as well as radiofrequency ablation at the cervical facet levels without improvement  She does state her neck feels tight constantly  She also reports a constant sensation of heaviness and feels painful with certain neck movement  She describes her neck tightness is feeling like it is locked in cement  No weakness in arm or numbness in arm or fingers  She is not dropping objects or feeling like her balance is overtly affected  She has a known history of lumbar spinal stenosis and does ambulate with a Rollator walker at baseline with fair overall function  In the past she has tried ICE/lying down, massage, previous muscle relaxants (most recently Valium), and pain medications without much relief  She states she has taken Tylenol with codeine and finds this helpful to help her get to sleep on it    When asked she has done physical therapy she states she has done physical therapy for half her life  She is unclear whether she has had a focussed program however for her headaches and neck  Review of Systems:     Review of Systems   Constitutional: Negative  HENT: Negative  Eyes: Negative  Respiratory: Negative  Cardiovascular: Negative  Gastrointestinal: Negative  Endocrine: Negative  Genitourinary: Negative  Musculoskeletal: Positive for neck pain  Skin: Negative  Allergic/Immunologic: Negative  Neurological: Negative  Hematological: Negative  Psychiatric/Behavioral: Negative          OBJECTIVE:   /58 (BP Location: Left arm, Patient Position: Sitting, Cuff Size: Standard)   Pulse 64   Resp 14   Ht 5' 5" (1 651 m)   Wt 77 1 kg (170 lb)   BMI 28 29 kg/m²      Physical Exam  Physical Exam   Constitutional: She appears well-developed and well-nourished  HENT:   Head: Normocephalic and atraumatic  Eyes: EOM are normal  Pupils are equal, round, and reactive to light  Cardiovascular: Normal rate and regular rhythm  Pulmonary/Chest: Breath sounds normal  She has no wheezes  She has no rales  Abdominal: Soft  Bowel sounds are normal  She exhibits no distension  There is no tenderness  Musculoskeletal:   Cervical spine exam:  On inspection patient does have loss of cervical lordosis and mild kyphosis thoracic  Cervical range of motion is significantly limited in extension lateral bending bilaterally and lateral rotation towards the left  She does have provocative pain with these motions additionally  Palpation of right side of her neck and bilateral upper back musculature are provocative of pain with tight taut muscle bands felt and when palpated reproduce her pain with radiation and exquisite tenderness - causing patient to become tearful   Skin: Skin is warm  Psychiatric: She has a normal mood and affect  Nursing note and vitals reviewed      Neuro:  Sensation to light touch intact  Negative Green's bilaterally  Negative Spurling's bilaterally  Gait:   Slow gait speed, step through gait pattern with Rollator walker  Sit to stand transfer is performed a modified independent level with a Rollator walker  Motor Exam:   Right Left Site Right Left Site   4+ 4+ S Ab:  Shoulder Abductors   HF:  Hip Flexors   4+ 4+ EF:  Elbow Flexors   H Ab: Hip Abductors   4+ 4+ EE:  Elbow Extensors   KF: Knee Flexors   4+ 4+ WE:  Wrist Extensors   KE:  Knee Extensors     FF:  Finger Flexors   DR:  Dorsi Flexors     HI:  Hand Intrinsics   EHL: Ext Carroll Longus   4+ 4+    PF:  Plantar Flexors       Imaging: I personally reviewed pertinent imaging      Labs:  Lab Results   Component Value Date     08/20/2018    K 4 3 08/20/2018     08/20/2018    CO2 28 08/20/2018    BUN 12 08/20/2018    CREATININE 0 66 08/20/2018    CALCIUM 10 0 08/20/2018    AST 27 08/20/2018    ALT 33 08/20/2018    ALKPHOS 68 08/20/2018    EGFR 84 08/20/2018       Past Medical History:   Diagnosis Date    Arthritis     Dry eye syndrome     Macular degeneration     Memory loss     Migraine     Spinal stenosis of lumbar region     Supraventricular tachycardia ([address] )     Vitamin D deficiency     last assessed 2/7/17       Patient Active Problem List    Diagnosis Date Noted    Trigeminal neuralgia 09/13/2018    Occipital neuralgia of right side 09/13/2018    Trigeminal neuropathy 09/13/2018    Myofascial pain on right side 09/13/2018    Chronic nonintractable headache 08/20/2018       Past Surgical History:   Procedure Laterality Date    APPENDECTOMY      BACK SURGERY      lower back surgery lumbar disc    CATARACT EXTRACTION      FEMUR FRACTURE SURGERY      REPLACEMENT TOTAL KNEE Right     REPLACEMENT TOTAL KNEE Left        Family History   Problem Relation Age of Onset    Aortic aneurysm Mother         abdominal aortic aneurysm    Hypertension Mother    [facility] Breast cancer Mother     Hypertension Father     Hypertension Sister     Hyperlipidemia Sister        Social History     Allergies   Allergen Reactions    Hydromorphone Shortness Of Breath     Other reaction(s): Respiratory Distress  Other reaction(s): HORENESS    Morphine Itching    Penicillins      Rash         Current Outpatient Prescriptions:     acetaminophen (TYLENOL) 325 mg tablet, Take 650 mg by mouth every 6 (six) hours, Disp: , Rfl:     Cholecalciferol (VITAMIN D PO), Take 5,000 Units by mouth Daily  , Disp: , Rfl:     cyanocobalamin 100 MCG tablet, Take 100 mcg by mouth daily, Disp: , Rfl:     cycloSPORINE (RESTASIS) 0 05 % ophthalmic emulsion, Apply 1 drop to eye 2 (two) times a day, Disp: , Rfl:     divalproex sodium (DEPAKOTE ER) 250 mg 24 hr tablet, Take 1 tab at night for 7 nights then 2 tab at night, Disp: 60 tablet, Rfl: 1    ergocalciferol (VITAMIN D2) 50,000 units, Take by mouth, Disp: , Rfl:     Magnesium 400 MG TABS, , Disp: , Rfl:     Multiple Vitamins-Minerals (PRESERVISION AREDS 2+MULTI VIT) CAPS, BID, Disp: , Rfl:     sertraline (ZOLOFT) 100 mg tablet, TAKE 1 AND 1/2 TABLETS BY  MOUTH ONCE DAILY, Disp: 135 tablet, Rfl: 1    verapamil (VERELAN PM) 120 MG 24 hr capsule, TAKE 1 CAPSULE BY MOUTH AT  NIGHT, Disp: 90 capsule, Rfl: 1    cyclobenzaprine (FLEXERIL) 5 mg tablet, Take 1 tablet (5 mg total) by mouth 3 (three) times a day as needed for muscle spasms, Disp: 30 tablet, Rfl: 3  No current facility-administered medications for this visit

## 2018-10-08 ENCOUNTER — OFFICE VISIT (OUTPATIENT)
Dept: FAMILY MEDICINE CLINIC | Facility: CLINIC | Age: 78
End: 2018-10-08
Payer: MEDICARE

## 2018-10-08 VITALS
WEIGHT: 169 LBS | HEIGHT: 65 IN | TEMPERATURE: 98.3 F | DIASTOLIC BLOOD PRESSURE: 60 MMHG | HEART RATE: 79 BPM | SYSTOLIC BLOOD PRESSURE: 124 MMHG | OXYGEN SATURATION: 97 % | BODY MASS INDEX: 28.16 KG/M2

## 2018-10-08 DIAGNOSIS — R51.9 CHRONIC NONINTRACTABLE HEADACHE, UNSPECIFIED HEADACHE TYPE: Primary | ICD-10-CM

## 2018-10-08 DIAGNOSIS — G50.0 TRIGEMINAL NEURALGIA: ICD-10-CM

## 2018-10-08 DIAGNOSIS — H61.21 IMPACTED CERUMEN OF RIGHT EAR: ICD-10-CM

## 2018-10-08 DIAGNOSIS — G89.29 CHRONIC NONINTRACTABLE HEADACHE, UNSPECIFIED HEADACHE TYPE: Primary | ICD-10-CM

## 2018-10-08 DIAGNOSIS — M48.02 SPINAL STENOSIS OF CERVICAL REGION: ICD-10-CM

## 2018-10-08 PROCEDURE — 99214 OFFICE O/P EST MOD 30 MIN: CPT | Performed by: FAMILY MEDICINE

## 2018-10-08 PROCEDURE — 69209 REMOVE IMPACTED EAR WAX UNI: CPT | Performed by: FAMILY MEDICINE

## 2018-10-08 RX ORDER — BUTALBITAL, ACETAMINOPHEN AND CAFFEINE 50; 325; 40 MG/1; MG/1; MG/1
1 TABLET ORAL EVERY 4 HOURS PRN
Qty: 29 TABLET | Refills: 0 | Status: SHIPPED | OUTPATIENT
Start: 2018-10-08 | End: 2018-10-31 | Stop reason: SDUPTHER

## 2018-10-08 NOTE — PATIENT INSTRUCTIONS
Discussed all with patient  Right ear lavaged and curetted and now clear of any cerumen  Try the Fioricet for the headaches as I believe this will help 1 pill every 4-6 hours as needed for the headache  If no pain relief okay to call here and I will dispense some Tylenol with codeine until you see pain management  You need to consider if you want to cancel the appointment with the marijuana specialist and just keep the appointment with pain management

## 2018-10-08 NOTE — PROGRESS NOTES
Assessment/Plan:     Chronic Problems:  Chronic nonintractable headache  Will treat with fioricet prior to seeing pain management and then all scripts will come from him  Pt advised to reconsider medical marijuana as she is committed to possibly getting markie's  Trigeminal neuralgia  Keep f/u with Dr Deon Black  Visit Diagnosis:  Diagnoses and all orders for this visit:    Chronic nonintractable headache, unspecified headache type  -     butalbital-acetaminophen-caffeine (FIORICET,ESGIC) -40 mg per tablet; Take 1 tablet by mouth every 4 (four) hours as needed for headaches    Trigeminal neuralgia    Spinal stenosis of cervical region  Comments:  Advised to have the eval done by pain management  Impacted cerumen of right ear  Comments:  Lavaged and curetted til clear  Other orders  -     Ear cerumen removal          Subjective:    Patient ID: Isamar Vega is a 77 y o  female  Pt is here s/p appt with Dr Deon Black  Has f/u with Dr Ayaan Barrow, physiatry for the pain in her head  Was given a cervical trigger point injection but still has not helped much  Also with TGN and spinal stenosis and told she would need to see Dr Jamies again  Pt has fu appt at the end of the month with her  Today has pain on the right temple in to the scalp and the right side of her face and down to the right ear  Also thinks the right ear is blocked  Pain is worse under her eyes  Pt states she needs pain relief from these headaches and tylenol is not cutting it  Pt is on depakote for the tgn with little relief  Also on flexeril, but she can't take it as she is too foggy  Feels diazepam works best  Fonnie Ansley all other meds as directed  No side effects noted  Pt was also given a referral to pain management and has appt at the end of the month  Pt also has appt with Dr Gianni Mcgraw for evaluation of medical marijuana           The following portions of the patient's history were reviewed and updated as appropriate: allergies, current medications, past family history, past medical history, past social history, past surgical history and problem list     Review of Systems   Constitutional: Negative for chills, diaphoresis, fatigue and fever  HENT: Negative for ear pain (but ear feels blocked with cement), sinus pain, sinus pressure, sneezing and sore throat  Eyes: Negative  Respiratory: Negative for cough, shortness of breath and wheezing  Cardiovascular: Negative for chest pain and palpitations  Genitourinary: Negative  Musculoskeletal: Positive for back pain and neck pain  Neurological: Positive for headaches  Negative for dizziness and light-headedness  /60   Pulse 79   Temp 98 3 °F (36 8 °C)   Ht 5' 5" (1 651 m)   Wt 76 7 kg (169 lb)   SpO2 97%   BMI 28 12 kg/m²   Social History     Social History    Marital status:       Spouse name: N/A    Number of children: N/A    Years of education: N/A     Occupational History    retired      Social History Main Topics    Smoking status: Former Smoker    Smokeless tobacco: Never Used    Alcohol use Yes      Comment: occasional    Drug use: No    Sexual activity: Not on file     Other Topics Concern    Not on file     Social History Narrative    Always uses seatbelt     Past Medical History:   Diagnosis Date    Arthritis     Dry eye syndrome     Macular degeneration     Memory loss     Migraine     Spinal stenosis of lumbar region     Supraventricular tachycardia ([address] )     Vitamin D deficiency     last assessed 2/7/17     Family History   Problem Relation Age of Onset    Aortic aneurysm Mother         abdominal aortic aneurysm    Hypertension Mother     Breast cancer Mother     Hypertension Father     Hypertension Sister     Hyperlipidemia Sister      Past Surgical History:   Procedure Laterality Date    APPENDECTOMY      BACK SURGERY      lower back surgery lumbar disc    CATARACT EXTRACTION      FEMUR FRACTURE SURGERY      REPLACEMENT TOTAL KNEE Right     REPLACEMENT TOTAL KNEE Left        Current Outpatient Prescriptions:     acetaminophen (TYLENOL) 325 mg tablet, Take 650 mg by mouth every 6 (six) hours, Disp: , Rfl:     Cholecalciferol (VITAMIN D PO), Take 5,000 Units by mouth Daily  , Disp: , Rfl:     cyanocobalamin 100 MCG tablet, Take 100 mcg by mouth daily, Disp: , Rfl:     cycloSPORINE (RESTASIS) 0 05 % ophthalmic emulsion, Apply 1 drop to eye 2 (two) times a day, Disp: , Rfl:     divalproex sodium (DEPAKOTE ER) 250 mg 24 hr tablet, Take 1 tab at night for 7 nights then 2 tab at night, Disp: 60 tablet, Rfl: 1    ergocalciferol (VITAMIN D2) 50,000 units, Take by mouth, Disp: , Rfl:     Magnesium 400 MG TABS, , Disp: , Rfl:     Multiple Vitamins-Minerals (PRESERVISION AREDS 2+MULTI VIT) CAPS, BID, Disp: , Rfl:     sertraline (ZOLOFT) 100 mg tablet, TAKE 1 AND 1/2 TABLETS BY  MOUTH ONCE DAILY, Disp: 135 tablet, Rfl: 1    butalbital-acetaminophen-caffeine (FIORICET,ESGIC) -40 mg per tablet, Take 1 tablet by mouth every 4 (four) hours as needed for headaches, Disp: 29 tablet, Rfl: 0    verapamil (VERELAN PM) 120 MG 24 hr capsule, TAKE 1 CAPSULE BY MOUTH AT  NIGHT, Disp: 90 capsule, Rfl: 1    Allergies   Allergen Reactions    Hydromorphone Shortness Of Breath     Other reaction(s): Respiratory Distress  Other reaction(s): HORENESS    Morphine Itching    Penicillins      Rash          Lab Review   Lab on 08/20/2018   Component Date Value    Clarity, UA 08/20/2018 Clear     Color, UA 08/20/2018 Light Yellow     Specific Gravity, UA 08/20/2018 1 010     pH, UA 08/20/2018 5 5     Glucose, UA 08/20/2018 Negative     Ketones, UA 08/20/2018 Negative     Blood, UA 08/20/2018 Trace-Intact*    Protein, UA 08/20/2018 Negative     Nitrite, UA 08/20/2018 Negative     Bilirubin, UA 08/20/2018 Negative     Urobilinogen, UA 08/20/2018 0 2     Leukocytes, UA 08/20/2018 Trace*    WBC, UA 08/20/2018 2-4*  RBC, UA 08/20/2018 0-1*    Bacteria, UA 08/20/2018 None Seen     Epithelial Cells 08/20/2018 Occasional     Urine Culture 08/20/2018 10,000-19,000 cfu/ml      Sodium 08/20/2018 143     Potassium 08/20/2018 4 3     Chloride 08/20/2018 103     CO2 08/20/2018 28     ANION GAP 08/20/2018 12     BUN 08/20/2018 12     Creatinine 08/20/2018 0 66     Glucose 08/20/2018 100     Calcium 08/20/2018 10 0     AST 08/20/2018 27     ALT 08/20/2018 33     Alkaline Phosphatase 08/20/2018 68     Total Protein 08/20/2018 7 9     Albumin 08/20/2018 4 5     Total Bilirubin 08/20/2018 0 60     eGFR 08/20/2018 84         Imaging: No results found  Objective:     Physical Exam   Constitutional: She is oriented to person, place, and time  She appears well-developed and well-nourished  No distress  HENT:   Head: Normocephalic and atraumatic  Left Ear: External ear normal    Tender over the right maxillary area  Cerumen impaction on the right removed with lavage and curetting  Eyes: Pupils are equal, round, and reactive to light  Conjunctivae and EOM are normal    Neck:   Limited rom to cervical spine  Cardiovascular: Normal rate, regular rhythm and normal heart sounds  Pulmonary/Chest: Effort normal and breath sounds normal  No respiratory distress  She has no wheezes  Musculoskeletal: She exhibits no deformity  Ambulates very slowly with a walker  Lymphadenopathy:     She has no cervical adenopathy  Neurological: She is alert and oriented to person, place, and time  Skin: Skin is warm and dry  She is not diaphoretic  Psychiatric: She has a normal mood and affect   Her behavior is normal  Judgment and thought content normal      Ear cerumen removal  Date/Time: 10/8/2018 1:49 PM  Performed by: Chelsea Berry by: Gera Smoker     Patient location:  Bedside  Other Assisting Provider: No    Consent:     Consent obtained:  Verbal    Consent given by:  Patient    Risks discussed:  Bleeding, infection, pain and TM perforation  Universal protocol:     Procedure explained and questions answered to patient or proxy's satisfaction: yes      Patient identity confirmed:  Verbally with patient  Procedure details:     Local anesthetic:  None    Location:  R ear and external auditory canal    Procedure type: irrigation      Approach:  External  Post-procedure details:     Complication:  None    Hearing quality:  Improved    Patient tolerance of procedure: Tolerated well, no immediate complications        procd  Patient Instructions   Discussed all with patient  Right ear lavaged and curetted and now clear of any cerumen  Try the Fioricet for the headaches as I believe this will help 1 pill every 4-6 hours as needed for the headache  If no pain relief okay to call here and I will dispense some Tylenol with codeine until you see pain management  You need to consider if you want to cancel the appointment with the marijuana specialist and just keep the appointment with pain management        CL Vu

## 2018-10-08 NOTE — ASSESSMENT & PLAN NOTE
Keep f/u with Dr Zelaya Figures  TRU 4 in PAT assessment (HTN, neck circumference> 17\", Male > 48 yrs of age). BMI 35. Results faxed to PCP for further recommendations regarding sleep apnea evaluation. Confirmation of fax received.

## 2018-10-08 NOTE — ASSESSMENT & PLAN NOTE
Will treat with fioricet prior to seeing pain management and then all scripts will come from him  Pt advised to reconsider medical marijuana as she is committed to possibly getting markie's

## 2018-10-22 ENCOUNTER — CONSULT (OUTPATIENT)
Dept: PAIN MEDICINE | Facility: CLINIC | Age: 78
End: 2018-10-22
Payer: MEDICARE

## 2018-10-22 VITALS
WEIGHT: 170 LBS | TEMPERATURE: 97.9 F | HEART RATE: 82 BPM | SYSTOLIC BLOOD PRESSURE: 138 MMHG | DIASTOLIC BLOOD PRESSURE: 76 MMHG | HEIGHT: 65 IN | BODY MASS INDEX: 28.32 KG/M2

## 2018-10-22 DIAGNOSIS — G89.4 CHRONIC PAIN DISORDER: Primary | ICD-10-CM

## 2018-10-22 DIAGNOSIS — M48.02 DEGENERATIVE CERVICAL SPINAL STENOSIS: ICD-10-CM

## 2018-10-22 DIAGNOSIS — G50.0 TRIGEMINAL NEURALGIA: ICD-10-CM

## 2018-10-22 DIAGNOSIS — M54.81 OCCIPITAL NEURALGIA OF RIGHT SIDE: ICD-10-CM

## 2018-10-22 PROCEDURE — 99204 OFFICE O/P NEW MOD 45 MIN: CPT | Performed by: ANESTHESIOLOGY

## 2018-10-22 NOTE — PATIENT INSTRUCTIONS
Occipital Nerve Block    What is Occipital Neuralgia and why is an Occipital Nerve Block helpful? Occipital neuralgia is a distinct type of headache characterized by piercing, throbbing or electric-shock-like chronic pain in the upper neck, back of the head and behind the ears, usually on one side of the head  Typically, the pain of occipital neuralgia begins in the neck and then spreads upwards  Some individuals will also experience pain in the scalp, forehead and behind the eyes  Their scalp may also be tender to the touch and their eyes especially sensitive to light  The location of pain is related to the areas supplied by the greater and lesser occipital nerves, which run from the area where the spinal column meets the neck, up to the scalp at the back of the head  The pain is caused by irritation or injury to the nerves, which can be the result of trauma to the back of the head, pinching of the nerves by overly tight neck muscles, compression of the nerve as it leaves the spine due to osteoarthritis, or tumors or other types of lesions in the neck  An Occipital Nerve Block is an injection of a small amount of local anesthetic and a small amount of steroid into the area of the occipital nerve where you are experiencing pain and tenderness  The physician who performs the injection specializes in the treatment of pain  The procedure is done in the office and you are awake for the procedure  What happens during the procedure? You may be sitting or lying down for the injection  The area that is to be injected will be cleansed with an antiseptic solution  The doctor will apply pressure with his finger to locate the nerve  Once the specific area of the nerve is identified, it will then be injected  What happens after the procedure? A dressing may be applied to the injection site  You will remain in the office for about 15-20 minutes and the nurse will monitor your blood pressure and pulse   You may experience increased pain following the injection  It is not uncommon to experience some occasional dizziness after the injection  The nurse will review your discharge instructions and you will be able to go home  General Pre/Post Instructions  Eating: You may eat a light, but not full meal at least 1 hour before the procedure, unless receiving intravenous sedation  If you are an insulin dependent diabetic do not alter your normal food intake  Medications: Take your routine medications before the procedure (such as high blood pressure and diabetes medications) except for those that need to be discontinued five days before the procedure such as aspirin and all anti-inflammatory medications (e g  Motrin/Ibuprofen, Aleve, Relafen, Daypro)  These medicines may be re-started the day after the procedure  You may take your regular pain medicine as needed before/after the procedure  If you are taking Coumadin, Heparin, Lovenox, Plavix or Ticlid you must notify the office so that the timing of stopping these medications can be explained  Exercise: You must bring a  with you  You may return to your current level of activities the next day including return to work  Things that may Delay the Procedure  If you are on antibiotics please notify our office; we may delay the procedure  If you have an active infection or fever we will not do the procedure

## 2018-10-22 NOTE — PROGRESS NOTES
Assessment:  1  Chronic pain disorder    2  Degenerative cervical spinal stenosis    3  Occipital neuralgia of right side    4  Trigeminal neuralgia        Plan:  Isamar Vega is a 77 y o  female who presents for initial consultation for head and neck pain  The patient present today with neck and head pain that is multifactorial nature  Her symptoms are consistent with right sided occipital neuralgia, right-sided trigeminal neuralgia and cervical spondylosis causing cervical stenosis  I discussed with the patient about moving forward with a treatment plan that is multimodal in nature  The patient reports increased radiating scalp pain consistent with her right-sided occipital neuralgia  Therefore at this time I discussed with the patient about moving forward with a right occipital nerve block  The patient was educated on the procedures most common risks  She verbalized understanding, would like to proceed with the procedure  Therefore the patient will be scheduled for upcoming office visit to move forward with a right occipital nerve block  She also was noted to have increased cervical spondylosis, which is causing cervical stenosis  I discussed with the patient about repeating a bilateral C3-C6 radiofrequency ablation, to help with her pain  However, we will first proceed with the right occipital nerve block first      I also discussed with her about moving forward with a right-sided sphenopalatine ganglion nerve block to help with the pain and symptoms she experiences from her right-sided trigeminal neuralgia  However, we will first move forward with other treatment first prior to moving forward with this procedure  The patient verbalized understanding  Complete risks and benefits including bleeding, infection, tissue reaction, nerve injury and allergic reaction were discussed  The approach was demonstrated using models and literature was provided  Verbal and written consent was obtained       The patient will follow up after her right occipital nerve block, or sooner with the worsening of symptoms  My impressions and treatment recommendations were discussed in detail with the patient who verbalized understanding and had no further questions  Discharge instructions were provided  I personally saw and examined the patient and I agree with the above discussed plan of care  Orders Placed This Encounter   Procedures    Nerve block     Right sided occipital nerve block  Standing Status:   Future     Standing Expiration Date:   10/22/2019     No orders of the defined types were placed in this encounter  History of Present Illness:    Yudi Walker is a 77 y o  female who presents for initial consultation for head and neck pain  She denies any predisposing injury trauma  She reports that her pain is moderate to severe nature occurring intermittently with symptoms worsened during the morning hours  She currently rates her pain 8 out 10 numeric pain scale  She describes her pain as dull/aching, pressure-like pain  She reports that the pain starts in the right side of her neck and radiates up the posterior aspect of her scalp into her forehead causing headaches  She reports that her pain is decreased with lying down, relaxation  She reports no change in pain with standing, sitting, walking, relaxation, coughing/sneezing, bowel movements  The patient reports that her pain is increased with bending and exercising  The patient's treatment history includes nerve block/injection and heat/ice treatment with moderate pain relief  The patient reports excellent relief of symptoms with the use of heat/ice treatment  The patient is currently taking Tylenol q 6 hours as needed in Fioricet 1 tablet Q 6 hours as needed which provides minimal pain relief      I have personally reviewed and/or updated the patient's past medical history, past surgical history, family history, social history, current medications, allergies, and vital signs today  Review of Systems:    Review of Systems   Constitutional: Negative for fever and unexpected weight change  HENT: Negative for trouble swallowing  Eyes: Negative for visual disturbance  Respiratory: Negative for shortness of breath and wheezing  Cardiovascular: Negative for chest pain and palpitations  Gastrointestinal: Negative for constipation, diarrhea, nausea and vomiting  Endocrine: Negative for cold intolerance, heat intolerance and polydipsia  Genitourinary: Negative for difficulty urinating and frequency  Musculoskeletal: Negative for arthralgias, gait problem, joint swelling and myalgias  Skin: Negative for rash  Neurological: Negative for dizziness, seizures, syncope, weakness and headaches  Hematological: Does not bruise/bleed easily  Psychiatric/Behavioral: Negative for dysphoric mood  All other systems reviewed and are negative        Patient Active Problem List   Diagnosis    Chronic nonintractable headache    Trigeminal neuralgia    Occipital neuralgia of right side    Trigeminal neuropathy    Myofascial pain on right side       Past Medical History:   Diagnosis Date    Arthritis     Dry eye syndrome     Macular degeneration     Memory loss     Migraine     Spinal stenosis of lumbar region     Supraventricular tachycardia ([address] )     Vitamin D deficiency     last assessed 2/7/17       Past Surgical History:   Procedure Laterality Date    APPENDECTOMY      BACK SURGERY      lower back surgery lumbar disc    CATARACT EXTRACTION      FEMUR FRACTURE SURGERY      REPLACEMENT TOTAL KNEE Right     REPLACEMENT TOTAL KNEE Left        Family History   Problem Relation Age of Onset    Aortic aneurysm Mother         abdominal aortic aneurysm    Hypertension Mother    [facility] Breast cancer Mother     Hypertension Father     Hypertension Sister     Hyperlipidemia Sister        Social History     Occupational History    retired Social History Main Topics    Smoking status: Former Smoker    Smokeless tobacco: Never Used    Alcohol use Yes      Comment: occasional    Drug use: No    Sexual activity: Not on file       Current Outpatient Prescriptions on File Prior to Visit   Medication Sig    acetaminophen (TYLENOL) 325 mg tablet Take 650 mg by mouth every 6 (six) hours    butalbital-acetaminophen-caffeine (FIORICET,ESGIC) -40 mg per tablet Take 1 tablet by mouth every 4 (four) hours as needed for headaches    Cholecalciferol (VITAMIN D PO) Take 5,000 Units by mouth Daily      cyanocobalamin 100 MCG tablet Take 100 mcg by mouth daily    cycloSPORINE (RESTASIS) 0 05 % ophthalmic emulsion Apply 1 drop to eye 2 (two) times a day    divalproex sodium (DEPAKOTE ER) 250 mg 24 hr tablet Take 1 tab at night for 7 nights then 2 tab at night    ergocalciferol (VITAMIN D2) 50,000 units Take by mouth    Magnesium 400 MG TABS     Multiple Vitamins-Minerals (PRESERVISION AREDS 2+MULTI VIT) CAPS BID    sertraline (ZOLOFT) 100 mg tablet TAKE 1 AND 1/2 TABLETS BY  MOUTH ONCE DAILY    verapamil (VERELAN PM) 120 MG 24 hr capsule TAKE 1 CAPSULE BY MOUTH AT  NIGHT     No current facility-administered medications on file prior to visit  Allergies   Allergen Reactions    Hydromorphone Shortness Of Breath     Other reaction(s): Respiratory Distress  Other reaction(s): HORENESS    Morphine Itching    Penicillins      Rash       Physical Exam:    /76   Pulse 82   Temp 97 9 °F (36 6 °C) (Oral)   Ht 5' 5" (1 651 m)   Wt 77 1 kg (170 lb)   BMI 28 29 kg/m²     Constitutional: normal, well developed, well nourished, alert, in no distress and non-toxic and no overt pain behavior   and overweight  Eyes: anicteric  HEENT: grossly intact  Neck: supple, symmetric, trachea midline and no masses   Pulmonary:even and unlabored  Cardiovascular:No edema or pitting edema present  Skin:Normal without rashes or lesions and well hydrated  Psychiatric:Mood and affect appropriate  Neurologic:Cranial Nerves II-XII grossly intact  Musculoskeletal:Ambulates with a walker      Cervical Spine Exam    Appearance:  Normal lordosis  Palpation/Tenderness:  right cervical paraspinal tenderness  right trapezium tenderness  Sensory:  no sensory deficits noted  Range of Motion:  Flexion:  Minimally limited  with pain  Extension:  Minimally limited  with pain  Lateral Flexion - Left:  Minimally limited  with pain  Lateral Flexion - Right:  Minimally limited  with pain  Rotation - Left:  Minimally limited  with pain  Rotation - Right:  Minimally limited  with pain  Motor Strength:  Left Arm Flexion  5/5  Left Arm Extension  5/5  Right Arm Flexion  5/5  Right Arm Extension  5/5  Left Wrist Flexion  5/5  Left Wrist Extension  5/5  Right wrist flexion 5/5  Right wrist extension 5/5  Left Finger Abduction  5/5  Right Finger Abduction  5/5  Left    5/5  Right   5/5  Special Tests:  Left Spurlings:  negative  Right Spurlings  negative       Imaging    MRI BRAIN WITH AND WITHOUT CONTRAST     INDICATION: M54 2: Cervicalgia  History taken directly from the electronic ordering system      COMPARISON:  None      TECHNIQUE:  Sagittal T1, axial T2, axial FLAIR, axial T1, axial [place], axial diffusion  Sagittal, axial and coronal T1 postcontrast   Axial BRAVO post contrast        IV Contrast:  8 mL of Gadobutrol injection (SINGLE-DOSE)      IMAGE QUALITY:   Diagnostic      FINDINGS:  BRAIN PARENCHYMA:    There are no areas of restricted diffusion  No midline shift, mass effect, or extra-axial collection is identified   No areas of gradient susceptibility to suggest blood product deposition      No abnormal enhancement is identified       Scattered patchy areas of increased T2 and FLAIR signal  are present in the supratentorial white matter which is a nonspecific finding but likely represents mild chronic microvascular ischemia      Mild generalized volume loss noted      VENTRICLES:  The ventricles are normal in size and contour      VASCULATURE:  Major arterial and dural venous flow voids are preserved by spin echo criteria      ORBITS:  Changes of bilateral lens replacements noted      PARANASAL SINUSES:  Normal      EXTRACRANIAL SOFT TISSUES:  Normal      SELLA AND PITUITARY GLAND:  Hypothalamic and pituitary region are grossly normal        CALVARIUM AND SKULL BASE:  Craniocervical junction is within normal limits  Marrow signal is within normal limits without signs of an infiltrative process         IMPRESSION:     No acute intracranial abnormality or pathologic enhancement      Mild chronic microvascular ischemia

## 2018-10-23 DIAGNOSIS — I10 ESSENTIAL HYPERTENSION: ICD-10-CM

## 2018-10-23 RX ORDER — VERAPAMIL HYDROCHLORIDE 120 MG/1
CAPSULE, EXTENDED RELEASE ORAL
Qty: 90 CAPSULE | Refills: 1 | Status: SHIPPED | OUTPATIENT
Start: 2018-10-23 | End: 2019-08-08 | Stop reason: SDUPTHER

## 2018-10-25 ENCOUNTER — OFFICE VISIT (OUTPATIENT)
Dept: FAMILY MEDICINE CLINIC | Facility: CLINIC | Age: 78
End: 2018-10-25
Payer: MEDICARE

## 2018-10-25 VITALS
DIASTOLIC BLOOD PRESSURE: 62 MMHG | OXYGEN SATURATION: 96 % | HEIGHT: 65 IN | WEIGHT: 169 LBS | SYSTOLIC BLOOD PRESSURE: 128 MMHG | TEMPERATURE: 99.3 F | BODY MASS INDEX: 28.16 KG/M2 | HEART RATE: 88 BPM

## 2018-10-25 DIAGNOSIS — R68.89 FLU-LIKE SYMPTOMS: ICD-10-CM

## 2018-10-25 DIAGNOSIS — R68.83 CHILLS: ICD-10-CM

## 2018-10-25 DIAGNOSIS — M54.81 OCCIPITAL NEURALGIA OF RIGHT SIDE: ICD-10-CM

## 2018-10-25 DIAGNOSIS — B34.9 VIRAL SYNDROME: ICD-10-CM

## 2018-10-25 DIAGNOSIS — R50.9 FEVER, UNSPECIFIED FEVER CAUSE: Primary | ICD-10-CM

## 2018-10-25 DIAGNOSIS — G50.0 TRIGEMINAL NEURALGIA: ICD-10-CM

## 2018-10-25 LAB — S PYO AG THROAT QL: NEGATIVE

## 2018-10-25 PROCEDURE — 87631 RESP VIRUS 3-5 TARGETS: CPT | Performed by: FAMILY MEDICINE

## 2018-10-25 PROCEDURE — 87880 STREP A ASSAY W/OPTIC: CPT | Performed by: FAMILY MEDICINE

## 2018-10-25 PROCEDURE — 99214 OFFICE O/P EST MOD 30 MIN: CPT | Performed by: FAMILY MEDICINE

## 2018-10-25 RX ORDER — GUAIFENESIN AND CODEINE PHOSPHATE 100; 10 MG/5ML; MG/5ML
SOLUTION ORAL
Qty: 120 ML | Refills: 0 | Status: SHIPPED | OUTPATIENT
Start: 2018-10-25 | End: 2018-12-06

## 2018-10-25 NOTE — PROGRESS NOTES
Standard Visit   Assessment/Plan:     Visit Diagnosis:  There are no diagnoses linked to this encounter  Subjective:    Patient ID: Henry Merritt is a 77 y o  female  Patient is here with the following concerns:    HPI    The following portions of the patient's history were reviewed and updated as appropriate: allergies, current medications, past family history, past medical history, past social history, past surgical history and problem list     Review of Systems      /62   Pulse 88   Temp 99 3 °F (37 4 °C)   Ht 5' 5" (1 651 m)   Wt 76 7 kg (169 lb)   SpO2 96%   BMI 28 12 kg/m²   Social History     Social History    Marital status:       Spouse name: N/A    Number of children: N/A    Years of education: N/A     Occupational History    retired      Social History Main Topics    Smoking status: Former Smoker    Smokeless tobacco: Never Used    Alcohol use Yes      Comment: occasional    Drug use: No    Sexual activity: Not on file     Other Topics Concern    Not on file     Social History Narrative    Always uses seatbelt     Past Medical History:   Diagnosis Date    Arthritis     Dry eye syndrome     Macular degeneration     Memory loss     Migraine     Spinal stenosis of lumbar region     Supraventricular tachycardia ([address] )     Vitamin D deficiency     last assessed 2/7/17     Family History   Problem Relation Age of Onset    Aortic aneurysm Mother         abdominal aortic aneurysm    Hypertension Mother     Breast cancer Mother     Hypertension Father     Hypertension Sister     Hyperlipidemia Sister      Past Surgical History:   Procedure Laterality Date    APPENDECTOMY      BACK SURGERY      lower back surgery lumbar disc    CATARACT EXTRACTION      FEMUR FRACTURE SURGERY      REPLACEMENT TOTAL KNEE Right     REPLACEMENT TOTAL KNEE Left        Current Outpatient Prescriptions:     acetaminophen (TYLENOL) 325 mg tablet, Take 650 mg by mouth every 6 (six) hours, Disp: , Rfl:     butalbital-acetaminophen-caffeine (FIORICET,ESGIC) -40 mg per tablet, Take 1 tablet by mouth every 4 (four) hours as needed for headaches, Disp: 29 tablet, Rfl: 0    Cholecalciferol (VITAMIN D PO), Take 5,000 Units by mouth Daily  , Disp: , Rfl:     cyanocobalamin 100 MCG tablet, Take 100 mcg by mouth daily, Disp: , Rfl:     cycloSPORINE (RESTASIS) 0 05 % ophthalmic emulsion, Apply 1 drop to eye 2 (two) times a day, Disp: , Rfl:     divalproex sodium (DEPAKOTE ER) 250 mg 24 hr tablet, Take 1 tab at night for 7 nights then 2 tab at night, Disp: 60 tablet, Rfl: 1    ergocalciferol (VITAMIN D2) 50,000 units, Take by mouth, Disp: , Rfl:     Magnesium 400 MG TABS, , Disp: , Rfl:     Multiple Vitamins-Minerals (PRESERVISION AREDS 2+MULTI VIT) CAPS, BID, Disp: , Rfl:     sertraline (ZOLOFT) 100 mg tablet, TAKE 1 AND 1/2 TABLETS BY  MOUTH ONCE DAILY, Disp: 135 tablet, Rfl: 1    verapamil (VERELAN PM) 120 MG 24 hr capsule, TAKE 1 CAPSULE BY MOUTH AT  NIGHT, Disp: 90 capsule, Rfl: 1      Objective:     Physical Exam      There are no Patient Instructions on file for this visit          March Collet, CRNP

## 2018-10-25 NOTE — PROGRESS NOTES
Standard Visit   Assessment/Plan:     Visit Diagnosis:  Diagnoses and all orders for this visit:    Fever, unspecified fever cause  -     Influenza A/B and RSV by PCR  -     POCT rapid strepA    Chills  -     Influenza A/B and RSV by PCR    Flu-like symptoms  -     Influenza A/B and RSV by PCR  -     guaifenesin-codeine (GUAIFENESIN AC) 100-10 MG/5ML liquid; May take 1 tsp up to 3 times a day as needed for cough aches and pains p r n    patient is not allergic to codeine or morphine the allergy is in error    Occipital neuralgia of right side    Trigeminal neuralgia    Viral syndrome      Send swab out for testing of flu  Point of care strep test negative   Symptomatic care  Hydrate Rest   Cheratussin for cough   Caution with the cough syrup caution with sedation  Any problems with the cough syrup gives a call or proceed to the emergency room   Any worsening of symptoms proceed to the emergency room         Subjective:    Patient ID: Cirilo Monroy is a 77 y o  female  Patient is here with the following concerns:    Here with complaint of sore throat of 3 days duration  States it came on suddenly  Started with a sore throat and continues to have a sore throat   She has a cough   She denies any sick contacts   She denies any contact with children   She has chronic trigeminal neuralgia  For which she takes Depakote and Verapamil   she has chills and feels feverish but she has no actual temperature   she does state that she did get a flu shot   she denies any rash vomiting or diarrhea   denies any abdominal pain          The following portions of the patient's history were reviewed and updated as appropriate: allergies, current medications, past family history, past medical history, past social history, past surgical history and problem list     Review of Systems   Constitutional: Positive for activity change and chills  Negative for fever  HENT: Positive for congestion, sneezing and sore throat      Eyes: Negative  Respiratory: Positive for cough  Negative for chest tightness and shortness of breath  Cardiovascular: Negative for chest pain and leg swelling  Gastrointestinal: Negative for abdominal pain  Endocrine: Negative  Genitourinary: Negative for dysuria  Musculoskeletal: Negative for back pain  Neurological: Positive for headaches  /62   Pulse 88   Temp 99 3 °F (37 4 °C)   Ht 5' 5" (1 651 m)   Wt 76 7 kg (169 lb)   SpO2 96%   BMI 28 12 kg/m²   Social History     Social History    Marital status:       Spouse name: N/A    Number of children: N/A    Years of education: N/A     Occupational History    retired      Social History Main Topics    Smoking status: Former Smoker    Smokeless tobacco: Never Used    Alcohol use Yes      Comment: occasional    Drug use: No    Sexual activity: Not on file     Other Topics Concern    Not on file     Social History Narrative    Always uses seatbelt     Past Medical History:   Diagnosis Date    Arthritis     Dry eye syndrome     Macular degeneration     Memory loss     Migraine     Spinal stenosis of lumbar region     Supraventricular tachycardia ([address] )     Vitamin D deficiency     last assessed 2/7/17     Family History   Problem Relation Age of Onset    Aortic aneurysm Mother         abdominal aortic aneurysm    Hypertension Mother     Breast cancer Mother     Hypertension Father     Hypertension Sister     Hyperlipidemia Sister      Past Surgical History:   Procedure Laterality Date    APPENDECTOMY      BACK SURGERY      lower back surgery lumbar disc    CATARACT EXTRACTION      FEMUR FRACTURE SURGERY      REPLACEMENT TOTAL KNEE Right     REPLACEMENT TOTAL KNEE Left        Current Outpatient Prescriptions:     acetaminophen (TYLENOL) 325 mg tablet, Take 650 mg by mouth every 6 (six) hours, Disp: , Rfl:     butalbital-acetaminophen-caffeine (FIORICET,ESGIC) -40 mg per tablet, Take 1 tablet by mouth every 4 (four) hours as needed for headaches, Disp: 29 tablet, Rfl: 0    Cholecalciferol (VITAMIN D PO), Take 5,000 Units by mouth Daily  , Disp: , Rfl:     cyanocobalamin 100 MCG tablet, Take 100 mcg by mouth daily, Disp: , Rfl:     cycloSPORINE (RESTASIS) 0 05 % ophthalmic emulsion, Apply 1 drop to eye 2 (two) times a day, Disp: , Rfl:     divalproex sodium (DEPAKOTE ER) 250 mg 24 hr tablet, Take 1 tab at night for 7 nights then 2 tab at night, Disp: 60 tablet, Rfl: 1    ergocalciferol (VITAMIN D2) 50,000 units, Take by mouth, Disp: , Rfl:     guaifenesin-codeine (GUAIFENESIN AC) 100-10 MG/5ML liquid, May take 1 tsp up to 3 times a day as needed for cough aches and pains p r n   patient is not allergic to codeine or morphine the allergy is in error, Disp: 120 mL, Rfl: 0    Magnesium 400 MG TABS, , Disp: , Rfl:     Multiple Vitamins-Minerals (PRESERVISION AREDS 2+MULTI VIT) CAPS, BID, Disp: , Rfl:     sertraline (ZOLOFT) 100 mg tablet, TAKE 1 AND 1/2 TABLETS BY  MOUTH ONCE DAILY, Disp: 135 tablet, Rfl: 1    verapamil (VERELAN PM) 120 MG 24 hr capsule, TAKE 1 CAPSULE BY MOUTH AT  NIGHT, Disp: 90 capsule, Rfl: 1      Objective:     Physical Exam   Constitutional: She is oriented to person, place, and time  She appears well-nourished  HENT:   Head: Normocephalic and atraumatic  Eyes: Pupils are equal, round, and reactive to light  Neck: Normal range of motion  Neck supple  Cardiovascular: Normal rate and regular rhythm  Pulmonary/Chest: Effort normal and breath sounds normal    Abdominal: Soft  Musculoskeletal: Normal range of motion  Neurological: She is alert and oriented to person, place, and time  Skin: Skin is warm and dry  Social History     Social History    Marital status:       Spouse name: N/A    Number of children: N/A    Years of education: N/A     Occupational History    retired      Social History Main Topics    Smoking status: Former Smoker    Smokeless tobacco: Never Used    Alcohol use Yes      Comment: occasional    Drug use: No    Sexual activity: Not on file     Other Topics Concern    Not on file     Social History Narrative    Always uses seatbelt     Past Medical History:   Diagnosis Date    Arthritis     Dry eye syndrome     Macular degeneration     Memory loss     Migraine     Spinal stenosis of lumbar region     Supraventricular tachycardia ([address] )     Vitamin D deficiency     last assessed 2/7/17       Current Outpatient Prescriptions:     acetaminophen (TYLENOL) 325 mg tablet, Take 650 mg by mouth every 6 (six) hours, Disp: , Rfl:     butalbital-acetaminophen-caffeine (FIORICET,ESGIC) -40 mg per tablet, Take 1 tablet by mouth every 4 (four) hours as needed for headaches, Disp: 29 tablet, Rfl: 0    Cholecalciferol (VITAMIN D PO), Take 5,000 Units by mouth Daily  , Disp: , Rfl:     cyanocobalamin 100 MCG tablet, Take 100 mcg by mouth daily, Disp: , Rfl:     cycloSPORINE (RESTASIS) 0 05 % ophthalmic emulsion, Apply 1 drop to eye 2 (two) times a day, Disp: , Rfl:     divalproex sodium (DEPAKOTE ER) 250 mg 24 hr tablet, Take 1 tab at night for 7 nights then 2 tab at night, Disp: 60 tablet, Rfl: 1    ergocalciferol (VITAMIN D2) 50,000 units, Take by mouth, Disp: , Rfl:     guaifenesin-codeine (GUAIFENESIN AC) 100-10 MG/5ML liquid, May take 1 tsp up to 3 times a day as needed for cough aches and pains p r n   patient is not allergic to codeine or morphine the allergy is in error, Disp: 120 mL, Rfl: 0    Magnesium 400 MG TABS, , Disp: , Rfl:     Multiple Vitamins-Minerals (PRESERVISION AREDS 2+MULTI VIT) CAPS, BID, Disp: , Rfl:     sertraline (ZOLOFT) 100 mg tablet, TAKE 1 AND 1/2 TABLETS BY  MOUTH ONCE DAILY, Disp: 135 tablet, Rfl: 1    verapamil (VERELAN PM) 120 MG 24 hr capsule, TAKE 1 CAPSULE BY MOUTH AT  NIGHT, Disp: 90 capsule, Rfl: 1  Family History   Problem Relation Age of Onset    Aortic aneurysm Mother         abdominal aortic aneurysm    Hypertension Mother    Ardeth Needs Breast cancer Mother     Hypertension Father     Hypertension Sister     Hyperlipidemia Sister        Patient Instructions    Send swab out for testing of flu  Point of care strep test negative   Symptomatic care  Hydrate Rest   Cheratussin for cough   Caution with the cough syrup caution with sedation  Any problems with the cough syrup gives a call or proceed to the emergency room   Any worsening of symptoms proceed to the emergency room             Shabbir Haq

## 2018-10-25 NOTE — PATIENT INSTRUCTIONS
Send swab out for testing of flu  Point of care strep test negative   Symptomatic care  Hydrate Rest   Cheratussin for cough   Caution with the cough syrup caution with sedation  Any problems with the cough syrup gives a call or proceed to the emergency room   Any worsening of symptoms proceed to the emergency room

## 2018-10-26 LAB
FLUAV AG SPEC QL: NORMAL
FLUBV AG SPEC QL: NORMAL
RSV B RNA SPEC QL NAA+PROBE: NORMAL

## 2018-10-31 ENCOUNTER — OFFICE VISIT (OUTPATIENT)
Dept: NEUROLOGY | Facility: CLINIC | Age: 78
End: 2018-10-31
Payer: MEDICARE

## 2018-10-31 VITALS
DIASTOLIC BLOOD PRESSURE: 62 MMHG | HEIGHT: 65 IN | BODY MASS INDEX: 27.82 KG/M2 | SYSTOLIC BLOOD PRESSURE: 110 MMHG | WEIGHT: 167 LBS

## 2018-10-31 DIAGNOSIS — G50.0 TRIGEMINAL NEURALGIA: Primary | ICD-10-CM

## 2018-10-31 DIAGNOSIS — R51.9 CHRONIC NONINTRACTABLE HEADACHE, UNSPECIFIED HEADACHE TYPE: ICD-10-CM

## 2018-10-31 DIAGNOSIS — G89.29 CHRONIC NONINTRACTABLE HEADACHE, UNSPECIFIED HEADACHE TYPE: ICD-10-CM

## 2018-10-31 DIAGNOSIS — G50.9 TRIGEMINAL NEUROPATHY: ICD-10-CM

## 2018-10-31 PROCEDURE — 99214 OFFICE O/P EST MOD 30 MIN: CPT | Performed by: PSYCHIATRY & NEUROLOGY

## 2018-10-31 RX ORDER — BUTALBITAL, ACETAMINOPHEN AND CAFFEINE 50; 325; 40 MG/1; MG/1; MG/1
1 TABLET ORAL EVERY 6 HOURS PRN
Qty: 12 TABLET | Refills: 4 | Status: SHIPPED | OUTPATIENT
Start: 2018-10-31 | End: 2018-12-06

## 2018-10-31 RX ORDER — DIVALPROEX SODIUM 500 MG/1
500 TABLET, EXTENDED RELEASE ORAL DAILY
Qty: 30 TABLET | Refills: 2 | Status: SHIPPED | OUTPATIENT
Start: 2018-10-31 | End: 2018-11-07 | Stop reason: SDUPTHER

## 2018-10-31 RX ORDER — BUTALBITAL, ACETAMINOPHEN AND CAFFEINE 50; 325; 40 MG/1; MG/1; MG/1
1 TABLET ORAL EVERY 6 HOURS PRN
Qty: 12 TABLET | Refills: 3 | Status: SHIPPED | OUTPATIENT
Start: 2018-10-31 | End: 2018-10-31 | Stop reason: SDUPTHER

## 2018-10-31 NOTE — PROGRESS NOTES
Patient ID: Ace Helton is a 77 y o  female  Assessment/Plan:   Problem List Items Addressed This Visit        Nervous and Auditory    Trigeminal neuralgia - Primary    Relevant Medications    divalproex sodium (DEPAKOTE ER) 500 mg 24 hr tablet    Other Relevant Orders    Valproic acid level, free    CBC and differential    Comprehensive metabolic panel    Trigeminal neuropathy       Other    Chronic nonintractable headache    Relevant Medications    butalbital-acetaminophen-caffeine (FIORICET,ESGIC) -40 mg per tablet         Today I had the pleasure of seeing your patient,Jessica Desouza , in consultation at [address]  Mrs Greg Celis has presented for follow up on occipital neuralgia with myofascial pian and trigeminal neuralgia for at least 2 years now  Patient had started Depakote  mg HS with no significant improvement reported  Response to Fioricet was beneficial but she has been using this medication daily and rebound headache is of concern  Patient was not interested in following with Headache team at the moment despite appointment is available on 11/13/18  She has scheduled appointment with Dr Anatoly Mullins - she is looking forward to meet with physician and she has high expectation in her pain control  Depakote  mg HS will be re-started again and blood level will be checked in 2 weeks after re-initiation of the medication  No new weakness or numbness - remarkable disbalance with known lumbar spinal stenosis noted  Patient is to call us back to report on her pain control progress and if she re-considers to be seen by Headache team    Follow up with me in 5-6 months  Subjective:trigeminal neuralgia, occipital pain    HPI/History of Present Illness  Mrs Greg Celis presents for a follow up of trigeminal neuropathy with no improvement described  Patient has been seeing by Dr Nati Dennison with TPI provided - patient stated that he had improvement      TPI were provided with some improvement in her upper shoulder pain and more in right occipital area- no pain to palpation noted today  Dr Drummond He will be with nerve block   Patient  feeling more sick recently likely due to recovering from viral infection  Patient discontinued depakote for 2 weeks during her sickness - she has worsening pain in face and right occipital area  Retroauricular pain may continue to V2 area and at times V1, pain is severe during the encounter, no pain in right upper neck, mild tenderness at occipital nerve exit  MRI C-spine 2016: Multilevel degenerative spondylosis  Grade 1 anterolisthesis, mild bilateral foraminal stenosis C4-5  Mild central canal stenosis, moderate to severe bilateral foraminal stenosis C5-C6 and C6-7  Mild bilateral foraminal stenosis, small right foraminal disc protrusion C7-T  The following portions of the patient's history were reviewed and updated as appropriate:   She  has a past medical history of Arthritis; Dry eye syndrome; Macular degeneration; Memory loss; Migraine; Spinal stenosis of lumbar region; Supraventricular tachycardia ([address] ); and Vitamin D deficiency  She   Patient Active Problem List    Diagnosis Date Noted    Trigeminal neuralgia 09/13/2018    Occipital neuralgia of right side 09/13/2018    Trigeminal neuropathy 09/13/2018    Myofascial pain on right side 09/13/2018    Chronic nonintractable headache 08/20/2018     She  has a past surgical history that includes Cataract extraction; Replacement total knee (Right); Appendectomy; Femur fracture surgery; Back surgery; and Replacement total knee (Left)  Her family history includes Aortic aneurysm in her mother; Breast cancer in her mother; Hyperlipidemia in her sister; Hypertension in her father, mother, and sister  She  reports that she has quit smoking  She has never used smokeless tobacco  She reports that she drinks alcohol  She reports that she does not use drugs    Current Outpatient Prescriptions Medication Sig Dispense Refill    acetaminophen (TYLENOL) 325 mg tablet Take 650 mg by mouth every 6 (six) hours      Cholecalciferol (VITAMIN D PO) Take 5,000 Units by mouth Daily        cyanocobalamin 100 MCG tablet Take 100 mcg by mouth daily      cycloSPORINE (RESTASIS) 0 05 % ophthalmic emulsion Apply 1 drop to eye 2 (two) times a day      divalproex sodium (DEPAKOTE ER) 250 mg 24 hr tablet Take 1 tab at night for 7 nights then 2 tab at night 60 tablet 1    ergocalciferol (VITAMIN D2) 50,000 units Take by mouth      guaifenesin-codeine (GUAIFENESIN AC) 100-10 MG/5ML liquid May take 1 tsp up to 3 times a day as needed for cough aches and pains p r n    patient is not allergic to codeine or morphine the allergy is in error 120 mL 0    Magnesium 400 MG TABS       Multiple Vitamins-Minerals (PRESERVISION AREDS 2+MULTI VIT) CAPS BID      sertraline (ZOLOFT) 100 mg tablet TAKE 1 AND 1/2 TABLETS BY  MOUTH ONCE DAILY 135 tablet 1    verapamil (VERELAN PM) 120 MG 24 hr capsule TAKE 1 CAPSULE BY MOUTH AT  NIGHT 90 capsule 1    butalbital-acetaminophen-caffeine (FIORICET,ESGIC) -40 mg per tablet Take 1 tablet by mouth every 6 (six) hours as needed for headaches 12 tablet 4    divalproex sodium (DEPAKOTE ER) 500 mg 24 hr tablet Take 1 tablet (500 mg total) by mouth daily 30 tablet 2     No current facility-administered medications for this visit        Current Outpatient Prescriptions on File Prior to Visit   Medication Sig    acetaminophen (TYLENOL) 325 mg tablet Take 650 mg by mouth every 6 (six) hours    Cholecalciferol (VITAMIN D PO) Take 5,000 Units by mouth Daily      cyanocobalamin 100 MCG tablet Take 100 mcg by mouth daily    cycloSPORINE (RESTASIS) 0 05 % ophthalmic emulsion Apply 1 drop to eye 2 (two) times a day    divalproex sodium (DEPAKOTE ER) 250 mg 24 hr tablet Take 1 tab at night for 7 nights then 2 tab at night    ergocalciferol (VITAMIN D2) 50,000 units Take by mouth    guaifenesin-codeine (GUAIFENESIN AC) 100-10 MG/5ML liquid May take 1 tsp up to 3 times a day as needed for cough aches and pains p r n    patient is not allergic to codeine or morphine the allergy is in error    Magnesium 400 MG TABS     Multiple Vitamins-Minerals (PRESERVISION AREDS 2+MULTI VIT) CAPS BID    sertraline (ZOLOFT) 100 mg tablet TAKE 1 AND 1/2 TABLETS BY  MOUTH ONCE DAILY    verapamil (VERELAN PM) 120 MG 24 hr capsule TAKE 1 CAPSULE BY MOUTH AT  NIGHT    [DISCONTINUED] butalbital-acetaminophen-caffeine (FIORICET,ESGIC) -40 mg per tablet Take 1 tablet by mouth every 4 (four) hours as needed for headaches     No current facility-administered medications on file prior to visit  She is allergic to hydromorphone; morphine; and penicillins            Objective:    Blood pressure 110/62, height 5' 5" (1 651 m), weight 75 8 kg (167 lb)  Physical Exam/Neurological Exam    CONSTITUTIONAL: NAD, pleasant  NECK: supple, no lymphadenopathy, no thyromegaly, no JVD  CARDIOVASCULAR: RRR, normal S1S2, no murmurs, no rubs  RESP: clear to auscultation bilaterally, no wheezes/rhonchi/rales  ABDOMEN: soft, non tender, non distended  SKIN: no rash or skin lesions  EXTREMITIES: no edema, pulses 2+bilaterally  PSYCH: appropriate mood and affect  NEUROLOGIC COMPREHENSIVE EXAM: Patient is oriented to person, place and time, NAD; appropriate affect  CN II, III, IV, V, VI, VII,VIII,IX,X,XI-XII intact with EOMI, PERRLA, OKN intact, VF grossly intact, fundi poorly visualized secondary to pupillary constriction; symmetric face noted  Motor: 5/5 UE/LE bilateral symmetric; Sensory: decreased to pinprick right V2-V3 distribution; normal vibration sensation feet bilaterally; Coordination within normal limits on FTN and PETERSON testing; DTR: 2/4 through, no Babinski, no clonus  Tandem gait is abnormal  Romberg: negative    ROS:  12 points of review of system was reviewed with the patient and was unremarkable with exception: see HPI  Review of Systems   Constitutional: Negative  Negative for appetite change and fever  HENT: Negative  Negative for hearing loss, tinnitus, trouble swallowing and voice change  Eyes: Negative  Negative for photophobia and pain  Respiratory: Negative  Negative for shortness of breath  Cardiovascular: Negative  Negative for palpitations  Gastrointestinal: Negative  Negative for nausea and vomiting  Endocrine: Negative  Negative for cold intolerance and heat intolerance  Genitourinary: Negative  Negative for dysuria, frequency and urgency  Musculoskeletal: Positive for neck pain  Negative for myalgias  Skin: Negative  Negative for rash  Neurological: Positive for headaches  Negative for dizziness, tremors, seizures, syncope, facial asymmetry, speech difficulty, weakness, light-headedness and numbness  Hematological: Negative  Does not bruise/bleed easily  Psychiatric/Behavioral: Negative for confusion, hallucinations and sleep disturbance  The patient is nervous/anxious

## 2018-11-07 ENCOUNTER — PROCEDURE VISIT (OUTPATIENT)
Dept: PAIN MEDICINE | Facility: CLINIC | Age: 78
End: 2018-11-07
Payer: MEDICARE

## 2018-11-07 ENCOUNTER — TELEPHONE (OUTPATIENT)
Dept: NEUROLOGY | Facility: CLINIC | Age: 78
End: 2018-11-07

## 2018-11-07 VITALS — TEMPERATURE: 98 F | HEART RATE: 64 BPM | SYSTOLIC BLOOD PRESSURE: 149 MMHG | DIASTOLIC BLOOD PRESSURE: 74 MMHG

## 2018-11-07 DIAGNOSIS — G50.0 TRIGEMINAL NEURALGIA: ICD-10-CM

## 2018-11-07 DIAGNOSIS — M54.81 OCCIPITAL NEURALGIA OF RIGHT SIDE: Primary | ICD-10-CM

## 2018-11-07 PROCEDURE — 64405 NJX AA&/STRD GR OCPL NRV: CPT | Performed by: ANESTHESIOLOGY

## 2018-11-07 RX ORDER — METHYLPREDNISOLONE ACETATE 40 MG/ML
40 INJECTION, SUSPENSION INTRA-ARTICULAR; INTRALESIONAL; INTRAMUSCULAR; SOFT TISSUE ONCE
Status: COMPLETED | OUTPATIENT
Start: 2018-11-07 | End: 2018-11-09

## 2018-11-07 RX ORDER — ROPIVACAINE HYDROCHLORIDE 5 MG/ML
2 INJECTION, SOLUTION EPIDURAL; INFILTRATION; PERINEURAL ONCE
Status: COMPLETED | OUTPATIENT
Start: 2018-11-07 | End: 2018-11-09

## 2018-11-07 RX ORDER — DIVALPROEX SODIUM 500 MG/1
500 TABLET, EXTENDED RELEASE ORAL DAILY
Qty: 90 TABLET | Refills: 1 | Status: SHIPPED | OUTPATIENT
Start: 2018-11-07 | End: 2018-12-04 | Stop reason: SDUPTHER

## 2018-11-07 NOTE — PROGRESS NOTES
Pre-procedure Diagnosis:   1  Occipital neuralgia of right side      Post-procedure Diagnosis:   1  Occipital neuralgia of right side      Operation Title(s):  Right occipital nerve block  Attending Surgeon:   Kareen Haro MD  Anesthesia:   Local    Indications: The patient is a 77y o  year-old female with a diagnosis of   1  Occipital neuralgia of right side      The patient's history and physical exam were reviewed  The risks, benefits and alternatives to the procedure were discussed, and all questions were answered to the patient's satisfaction  The patient agreed to proceed, and written informed consent was obtained  Procedure in Detail: The patient was brought into the exam room and placed in the sitting position on the exam room chair with the head flexed on a pillow  The area of the right occiput was palpated and cleansed with alcohol  Then a 1 5 inch 25 guage needle was advanced until bone was contacted  Negative aspiration was confirmed and a solution consisting of 1 mL 0 25% bupivacaine and 0 5 mL Depo-Medrol (40mg/ml) was easily injected  The same procedure was repeated for the right lesser occipital nerve  Disposition: The patient tolerated the procedure well and there were no apparent complications  The patient was given written discharge instructions for the procedure

## 2018-11-09 RX ADMIN — ROPIVACAINE HYDROCHLORIDE 2 ML: 5 INJECTION, SOLUTION EPIDURAL; INFILTRATION; PERINEURAL at 13:44

## 2018-11-09 RX ADMIN — METHYLPREDNISOLONE ACETATE 40 MG: 40 INJECTION, SUSPENSION INTRA-ARTICULAR; INTRALESIONAL; INTRAMUSCULAR; SOFT TISSUE at 13:43

## 2018-11-20 ENCOUNTER — TELEPHONE (OUTPATIENT)
Dept: PAIN MEDICINE | Facility: MEDICAL CENTER | Age: 78
End: 2018-11-20

## 2018-11-20 ENCOUNTER — APPOINTMENT (OUTPATIENT)
Dept: LAB | Facility: HOSPITAL | Age: 78
End: 2018-11-20
Attending: PSYCHIATRY & NEUROLOGY
Payer: MEDICARE

## 2018-11-20 ENCOUNTER — OFFICE VISIT (OUTPATIENT)
Dept: FAMILY MEDICINE CLINIC | Facility: CLINIC | Age: 78
End: 2018-11-20
Payer: MEDICARE

## 2018-11-20 VITALS
HEIGHT: 65 IN | HEART RATE: 60 BPM | SYSTOLIC BLOOD PRESSURE: 120 MMHG | BODY MASS INDEX: 27.79 KG/M2 | OXYGEN SATURATION: 97 % | TEMPERATURE: 96.6 F | RESPIRATION RATE: 14 BRPM | DIASTOLIC BLOOD PRESSURE: 60 MMHG

## 2018-11-20 DIAGNOSIS — G50.0 TRIGEMINAL NEURALGIA: ICD-10-CM

## 2018-11-20 DIAGNOSIS — G89.29 CHRONIC NONINTRACTABLE HEADACHE, UNSPECIFIED HEADACHE TYPE: Primary | ICD-10-CM

## 2018-11-20 DIAGNOSIS — R51.9 CHRONIC NONINTRACTABLE HEADACHE, UNSPECIFIED HEADACHE TYPE: Primary | ICD-10-CM

## 2018-11-20 LAB
ALBUMIN SERPL BCP-MCNC: 3.7 G/DL (ref 3.5–5)
ALP SERPL-CCNC: 52 U/L (ref 46–116)
ALT SERPL W P-5'-P-CCNC: 17 U/L (ref 12–78)
ANION GAP SERPL CALCULATED.3IONS-SCNC: 7 MMOL/L (ref 4–13)
AST SERPL W P-5'-P-CCNC: 13 U/L (ref 5–45)
BASOPHILS # BLD AUTO: 0.04 THOUSANDS/ΜL (ref 0–0.1)
BASOPHILS NFR BLD AUTO: 1 % (ref 0–1)
BILIRUB SERPL-MCNC: 0.3 MG/DL (ref 0.2–1)
BUN SERPL-MCNC: 19 MG/DL (ref 5–25)
CALCIUM SERPL-MCNC: 9 MG/DL (ref 8.3–10.1)
CHLORIDE SERPL-SCNC: 106 MMOL/L (ref 100–108)
CO2 SERPL-SCNC: 32 MMOL/L (ref 21–32)
CREAT SERPL-MCNC: 0.62 MG/DL (ref 0.6–1.3)
EOSINOPHIL # BLD AUTO: 0.14 THOUSAND/ΜL (ref 0–0.61)
EOSINOPHIL NFR BLD AUTO: 2 % (ref 0–6)
ERYTHROCYTE [DISTWIDTH] IN BLOOD BY AUTOMATED COUNT: 12.7 % (ref 11.6–15.1)
GFR SERPL CREATININE-BSD FRML MDRD: 86 ML/MIN/1.73SQ M
GLUCOSE P FAST SERPL-MCNC: 86 MG/DL (ref 65–99)
HCT VFR BLD AUTO: 41.4 % (ref 34.8–46.1)
HGB BLD-MCNC: 13.3 G/DL (ref 11.5–15.4)
IMM GRANULOCYTES # BLD AUTO: 0.02 THOUSAND/UL (ref 0–0.2)
IMM GRANULOCYTES NFR BLD AUTO: 0 % (ref 0–2)
LYMPHOCYTES # BLD AUTO: 1.82 THOUSANDS/ΜL (ref 0.6–4.47)
LYMPHOCYTES NFR BLD AUTO: 31 % (ref 14–44)
MCH RBC QN AUTO: 31.1 PG (ref 26.8–34.3)
MCHC RBC AUTO-ENTMCNC: 32.1 G/DL (ref 31.4–37.4)
MCV RBC AUTO: 97 FL (ref 82–98)
MONOCYTES # BLD AUTO: 0.53 THOUSAND/ΜL (ref 0.17–1.22)
MONOCYTES NFR BLD AUTO: 9 % (ref 4–12)
NEUTROPHILS # BLD AUTO: 3.29 THOUSANDS/ΜL (ref 1.85–7.62)
NEUTS SEG NFR BLD AUTO: 57 % (ref 43–75)
NRBC BLD AUTO-RTO: 0 /100 WBCS
PLATELET # BLD AUTO: 185 THOUSANDS/UL (ref 149–390)
PMV BLD AUTO: 9.9 FL (ref 8.9–12.7)
POTASSIUM SERPL-SCNC: 4.2 MMOL/L (ref 3.5–5.3)
PROT SERPL-MCNC: 6.8 G/DL (ref 6.4–8.2)
RBC # BLD AUTO: 4.27 MILLION/UL (ref 3.81–5.12)
SODIUM SERPL-SCNC: 145 MMOL/L (ref 136–145)
WBC # BLD AUTO: 5.84 THOUSAND/UL (ref 4.31–10.16)

## 2018-11-20 PROCEDURE — 80053 COMPREHEN METABOLIC PANEL: CPT

## 2018-11-20 PROCEDURE — 80165 DIPROPYLACETIC ACID FREE: CPT

## 2018-11-20 PROCEDURE — 99213 OFFICE O/P EST LOW 20 MIN: CPT | Performed by: FAMILY MEDICINE

## 2018-11-20 PROCEDURE — 85025 COMPLETE CBC W/AUTO DIFF WBC: CPT

## 2018-11-20 PROCEDURE — 36415 COLL VENOUS BLD VENIPUNCTURE: CPT

## 2018-11-20 NOTE — TELEPHONE ENCOUNTER
Patient left message at [facility] on 11/19/18 at 326 pm,  Pt states she never received call from office after her procedure, requests call back    Call back number for patient is [phone]

## 2018-11-20 NOTE — PROGRESS NOTES
Assessment/Plan:     Chronic Problems:  Trigeminal neuropathy  Stay on the current meds and continue your medical marijuana as this has helped your pain by 60%  Speak with them about the gamma knife  Chronic nonintractable headache  Keep your appt with Dr Christine Sprague  Visit Diagnosis:  Diagnoses and all orders for this visit:    Chronic nonintractable headache, unspecified headache type          Subjective:    Patient ID: Shira Dubose is a 77 y o  female  Pt is here for routine f/u  Feels she went to see Dr Shaan Villarreal and he put her on medical marijuana  Seems like it is helping  Following with Dr Pinon [facility] for occipital block with some relief for 2 1/2 weeks  No headaches at all, but the headache started to come back  Still had tgn pain  Not sure if the pain now is from her head or tgn  Dr Christine Sprague  told her she may have atypical tgn  Also when she lies on her back she has tender spots in her head that hurt to the touch  Takes a vape pen for her medical marijuana, also uses oil under the tongue  Pt also had labs done and here to discuss results  Takes all other meds as directed  No side effects noted  The following portions of the patient's history were reviewed and updated as appropriate: allergies, current medications, past family history, past medical history, past social history, past surgical history and problem list     Review of Systems   Constitutional: Positive for fatigue  Negative for chills, diaphoresis and fever  HENT:        Still gets sharp shooting pains in the left ear that travel to her right side of her face  Pt has f/u appt with Dr Zi Mccullough for ? another occipital block  Pt had botox in the past with no relief  Eyes: Negative  Respiratory: Negative for cough, shortness of breath and wheezing  Cardiovascular: Negative for chest pain and palpitations  Gastrointestinal: Negative  Genitourinary: Negative      Musculoskeletal: Positive for back pain (but that is her usual pains  )  Neurological: Positive for dizziness (when she takes medical marijuana), light-headedness and headaches  Psychiatric/Behavioral: Negative for dysphoric mood  The patient is nervous/anxious (feels marijuana helps with that  )  /60   Pulse 60   Temp (!) 96 6 °F (35 9 °C) (Tympanic)   Resp 14   Ht 5' 5" (1 651 m)   SpO2 97%   BMI 27 79 kg/m²   Social History     Social History    Marital status:       Spouse name: N/A    Number of children: N/A    Years of education: N/A     Occupational History    retired      Social History Main Topics    Smoking status: Former Smoker    Smokeless tobacco: Never Used    Alcohol use Yes      Comment: occasional    Drug use: No    Sexual activity: Not on file     Other Topics Concern    Not on file     Social History Narrative    Always uses seatbelt     Past Medical History:   Diagnosis Date    Arthritis     Dry eye syndrome     Macular degeneration     Memory loss     Migraine     Spinal stenosis of lumbar region     Supraventricular tachycardia ([address] )     Vitamin D deficiency     last assessed 2/7/17     Family History   Problem Relation Age of Onset    Aortic aneurysm Mother         abdominal aortic aneurysm    Hypertension Mother     Breast cancer Mother     Hypertension Father     Hypertension Sister     Hyperlipidemia Sister      Past Surgical History:   Procedure Laterality Date    APPENDECTOMY      BACK SURGERY      lower back surgery lumbar disc    CATARACT EXTRACTION      FEMUR FRACTURE SURGERY      REPLACEMENT TOTAL KNEE Right     REPLACEMENT TOTAL KNEE Left        Current Outpatient Prescriptions:     acetaminophen (TYLENOL) 325 mg tablet, Take 650 mg by mouth every 6 (six) hours, Disp: , Rfl:     butalbital-acetaminophen-caffeine (FIORICET,ESGIC) -40 mg per tablet, Take 1 tablet by mouth every 6 (six) hours as needed for headaches, Disp: 12 tablet, Rfl: 4    Cholecalciferol (VITAMIN D PO), Take 5,000 Units by mouth Daily  , Disp: , Rfl:     cycloSPORINE (RESTASIS) 0 05 % ophthalmic emulsion, Apply 1 drop to eye 2 (two) times a day, Disp: , Rfl:     divalproex sodium (DEPAKOTE ER) 500 mg 24 hr tablet, Take 1 tablet (500 mg total) by mouth daily, Disp: 90 tablet, Rfl: 1    guaifenesin-codeine (GUAIFENESIN AC) 100-10 MG/5ML liquid, May take 1 tsp up to 3 times a day as needed for cough aches and pains p r n   patient is not allergic to codeine or morphine the allergy is in error, Disp: 120 mL, Rfl: 0    Multiple Vitamins-Minerals (PRESERVISION AREDS 2+MULTI VIT) CAPS, BID, Disp: , Rfl:     sertraline (ZOLOFT) 100 mg tablet, TAKE 1 AND 1/2 TABLETS BY  MOUTH ONCE DAILY, Disp: 135 tablet, Rfl: 1    verapamil (VERELAN PM) 120 MG 24 hr capsule, TAKE 1 CAPSULE BY MOUTH AT  NIGHT, Disp: 90 capsule, Rfl: 1    Allergies   Allergen Reactions    Hydromorphone Shortness Of Breath     Other reaction(s): Respiratory Distress  Other reaction(s): HORENESS    Morphine Itching    Penicillins      Rash          Lab Review   Appointment on 11/20/2018   Component Date Value    WBC 11/20/2018 5 84     RBC 11/20/2018 4 27     Hemoglobin 11/20/2018 13 3     Hematocrit 11/20/2018 41 4     MCV 11/20/2018 97     MCH 11/20/2018 31 1     MCHC 11/20/2018 32 1     RDW 11/20/2018 12 7     MPV 11/20/2018 9 9     Platelets 34/05/6966 185     nRBC 11/20/2018 0     Neutrophils Relative 11/20/2018 57     Immat GRANS % 11/20/2018 0     Lymphocytes Relative 11/20/2018 31     Monocytes Relative 11/20/2018 9     Eosinophils Relative 11/20/2018 2     Basophils Relative 11/20/2018 1     Neutrophils Absolute 11/20/2018 3 29     Immature Grans Absolute 11/20/2018 0 02     Lymphocytes Absolute 11/20/2018 1 82     Monocytes Absolute 11/20/2018 0 53     Eosinophils Absolute 11/20/2018 0 14     Basophils Absolute 11/20/2018 0 04     Sodium 11/20/2018 145     Potassium 11/20/2018 4 2     Chloride 11/20/2018 106     CO2 11/20/2018 32     ANION GAP 11/20/2018 7     BUN 11/20/2018 19     Creatinine 11/20/2018 0 62     Glucose, Fasting 11/20/2018 86     Calcium 11/20/2018 9 0     AST 11/20/2018 13     ALT 11/20/2018 17     Alkaline Phosphatase 11/20/2018 52     Total Protein 11/20/2018 6 8     Albumin 11/20/2018 3 7     Total Bilirubin 11/20/2018 0 30     eGFR 11/20/2018 86    Office Visit on 10/25/2018   Component Date Value    INFLU A PCR 10/25/2018 None Detected     INFLU B PCR 10/25/2018 None Detected     RSV PCR 10/25/2018 None Detected      RAPID STREP A 10/25/2018 Negative         Imaging: No results found  Objective:     Physical Exam   Constitutional: She is oriented to person, place, and time  She appears well-developed and well-nourished  No distress  HENT:   Head: Normocephalic and atraumatic  Right Ear: External ear normal    Left Ear: External ear normal    Mouth/Throat: Oropharynx is clear and moist    Eyes: Pupils are equal, round, and reactive to light  Conjunctivae and EOM are normal  Right eye exhibits no discharge  Left eye exhibits no discharge  Neck: Normal range of motion  Neck supple  Cardiovascular: Normal rate, regular rhythm and normal heart sounds  Exam reveals no friction rub  No murmur heard  Pulmonary/Chest: Effort normal and breath sounds normal  No respiratory distress  She has no wheezes  She has no rales  She exhibits no tenderness  Musculoskeletal: Normal range of motion  She exhibits no edema, tenderness or deformity  Ambulates with a walker  Neurological: She is alert and oriented to person, place, and time  No cranial nerve deficit  Skin: Skin is warm and dry  No rash noted  She is not diaphoretic  Psychiatric: She has a normal mood and affect  Her behavior is normal  Judgment and thought content normal          Patient Instructions   Discussed all with patient  I am thrilled that your pain level is 60% better    Continue with your current medications the way they are  When you see Dr Navin Parker, ask him if you are a candidate for [facility]   Labs are all normal        CL Milner

## 2018-11-20 NOTE — PATIENT INSTRUCTIONS
Discussed all with patient  I am thrilled that your pain level is 60% better  Continue with your current medications the way they are  When you see Dr Marjorie Baum, ask him if you are a candidate for [facility]   Labs are all normal

## 2018-11-20 NOTE — TELEPHONE ENCOUNTER
S/w pt, she said she never received a call from our office after her procedure, apologized, pt is s/p R occ NB on 11/7  Pt said she was feeling better but now the pain is coming back  She said the pain is minimal but there and she is struggling to find out where the pain is coming from  Pt said she has pain in the back of her back and over her R eye  Pt said her trigeminal neuralgia is acting up  Pt wondering what the next step is?

## 2018-11-20 NOTE — ASSESSMENT & PLAN NOTE
Stay on the current meds and continue your medical marijuana as this has helped your pain by 60%  Speak with them about the gamma knife

## 2018-11-23 LAB — VALPROATE FREE SERPL-MCNC: 6.6 UG/ML (ref 6–22)

## 2018-12-04 DIAGNOSIS — G50.0 TRIGEMINAL NEURALGIA: ICD-10-CM

## 2018-12-04 RX ORDER — DIVALPROEX SODIUM 500 MG/1
500 TABLET, EXTENDED RELEASE ORAL DAILY
Qty: 90 TABLET | Refills: 0 | Status: SHIPPED | OUTPATIENT
Start: 2018-12-04 | End: 2019-01-22 | Stop reason: SDUPTHER

## 2018-12-06 ENCOUNTER — OFFICE VISIT (OUTPATIENT)
Dept: PAIN MEDICINE | Facility: CLINIC | Age: 78
End: 2018-12-06
Payer: MEDICARE

## 2018-12-06 VITALS
WEIGHT: 167 LBS | BODY MASS INDEX: 27.82 KG/M2 | RESPIRATION RATE: 18 BRPM | DIASTOLIC BLOOD PRESSURE: 70 MMHG | TEMPERATURE: 99.1 F | SYSTOLIC BLOOD PRESSURE: 130 MMHG | HEIGHT: 65 IN

## 2018-12-06 DIAGNOSIS — M54.81 OCCIPITAL NEURALGIA OF RIGHT SIDE: ICD-10-CM

## 2018-12-06 DIAGNOSIS — M48.02 CERVICAL SPINAL STENOSIS: ICD-10-CM

## 2018-12-06 DIAGNOSIS — G50.0 TRIGEMINAL NEURALGIA OF RIGHT SIDE OF FACE: Primary | ICD-10-CM

## 2018-12-06 PROCEDURE — 99214 OFFICE O/P EST MOD 30 MIN: CPT | Performed by: NURSE PRACTITIONER

## 2018-12-06 NOTE — PROGRESS NOTES
Pt c/o facial and head pain    Assessment:  1  Trigeminal neuralgia of right side of face    2  Occipital neuralgia of right side    3  Cervical spinal stenosis        Plan:  Ilan Kowalski is a 77 y o  female with a history of chronic pain disorder secondary to cervical spine stenosis, right-sided occipital neuralgia and trigeminal neuralgia  The patient presents today with right facial pain  She was noted to have tenderness with light palpation of the right side of her face  Her pain and symptoms are consistent with trigeminal neuralgia  The patient is requesting to move forward with a right sphenopalatine ganglion nerve block  She was educated on the procedures most common risks  She verbalized understanding would like to proceed with the procedure  Therefore the patient will be scheduled for an upcoming Tuesday or Thursday   Complete risks and benefits including bleeding, infection, tissue reaction, nerve injury and allergic reaction were discussed  The approach was demonstrated using models and literature was provided  The patient will follow up after her right sphenopalatine ganglion nerve block, or sooner with the worsening of symptoms  My impressions and treatment recommendations were discussed in detail with the patient who verbalized understanding and had no further questions  Discharge instructions were provided  I personally saw and examined the patient and I agree with the above discussed plan of care  Orders Placed This Encounter   Procedures    FL spine and pain procedure     Standing Status:   Future     Standing Expiration Date:   12/6/2022     Order Specific Question:   Reason for Exam:     Answer:   right-sided sphenopalatine ganglion nerve block     Order Specific Question:   Anticoagulant hold needed? Answer:   No     No orders of the defined types were placed in this encounter        History of Present Illness:  Ilan Kowalski is a 77 y o  female with a history of chronic pain disorder secondary to cervical spine stenosis, right-sided occipital neuralgia and trigeminal neuralgia  The patient was last seen in the office on 11/07/2018, where she underwent a right occipital nerve block  She reports that this injection provided her 2-3 weeks, prior to symptoms re-emergence of symptoms  who presents for a follow up office visit in regards to Facial Pain; Headache; and Neck Pain  The patients current symptoms include neck pain that radiates up the posterior aspect of the right side of her scalp  However, the patient reports that her facial pain is currently the most severe pain she is experiencing  She reports that is feels like a stabbing pain into her right jaw and cheek  She describes her pain as burning, pressure-like pain that is constant nature with symptoms worsening during the morning hours  The patient reports that her pain is symptoms have worsened since last office visit  She currently rates her pain a 5 to 8/10 numeric pain scale  Current pain medications includes:  Medical marijuana  The patient reports that this regimen is providing 20-30% pain relief  The patient is reporting sleepiness from this pain medication regimen  I have personally reviewed and/or updated the patient's past medical history, past surgical history, family history, social history, current medications, allergies, and vital signs today  Review of Systems   Respiratory: Negative for shortness of breath  Cardiovascular: Negative for chest pain  Gastrointestinal: Negative for constipation, diarrhea, nausea and vomiting  Musculoskeletal: Negative for arthralgias, gait problem, joint swelling and myalgias  Skin: Negative for rash  Neurological: Negative for dizziness, seizures and weakness  All other systems reviewed and are negative        Patient Active Problem List   Diagnosis    Chronic nonintractable headache    Trigeminal neuralgia    Occipital neuralgia of right side    Trigeminal neuropathy    Myofascial pain on right side       Past Medical History:   Diagnosis Date    Arthritis     Dry eye syndrome     Macular degeneration     Memory loss     Migraine     Spinal stenosis of lumbar region     Supraventricular tachycardia ([address] )     Vitamin D deficiency     last assessed 2/7/17       Past Surgical History:   Procedure Laterality Date    APPENDECTOMY      BACK SURGERY      lower back surgery lumbar disc    CATARACT EXTRACTION      FEMUR FRACTURE SURGERY      REPLACEMENT TOTAL KNEE Right     REPLACEMENT TOTAL KNEE Left        Family History   Problem Relation Age of Onset    Aortic aneurysm Mother         abdominal aortic aneurysm    Hypertension Mother    [facility] Breast cancer Mother     Hypertension Father     Hypertension Sister     Hyperlipidemia Sister        Social History     Occupational History    retired      Social History Main Topics    Smoking status: Former Smoker    Smokeless tobacco: Never Used    Alcohol use Yes      Comment: occasional    Drug use: No    Sexual activity: Not on file       Current Outpatient Prescriptions on File Prior to Visit   Medication Sig    Cholecalciferol (VITAMIN D PO) Take 5,000 Units by mouth Daily      cycloSPORINE (RESTASIS) 0 05 % ophthalmic emulsion Apply 1 drop to eye 2 (two) times a day    divalproex sodium (DEPAKOTE ER) 500 mg 24 hr tablet Take 1 tablet (500 mg total) by mouth daily    Multiple Vitamins-Minerals (PRESERVISION AREDS 2+MULTI VIT) CAPS BID    sertraline (ZOLOFT) 100 mg tablet TAKE 1 AND 1/2 TABLETS BY  MOUTH ONCE DAILY    verapamil (VERELAN PM) 120 MG 24 hr capsule TAKE 1 CAPSULE BY MOUTH AT  NIGHT    [DISCONTINUED] acetaminophen (TYLENOL) 325 mg tablet Take 650 mg by mouth every 6 (six) hours    [DISCONTINUED] butalbital-acetaminophen-caffeine (FIORICET,ESGIC) -40 mg per tablet Take 1 tablet by mouth every 6 (six) hours as needed for headaches (Patient not taking: Reported on 12/6/2018 )    [DISCONTINUED] guaifenesin-codeine (GUAIFENESIN AC) 100-10 MG/5ML liquid May take 1 tsp up to 3 times a day as needed for cough aches and pains p r n    patient is not allergic to codeine or morphine the allergy is in error (Patient not taking: Reported on 12/6/2018 )     No current facility-administered medications on file prior to visit  Allergies   Allergen Reactions    Hydromorphone Shortness Of Breath     Other reaction(s): Respiratory Distress  Other reaction(s): HORENESS    Morphine Itching    Penicillins      Rash       Physical Exam:    /70   Temp 99 1 °F (37 3 °C)   Resp 18   Ht 5' 5" (1 651 m)   Wt 75 8 kg (167 lb)   BMI 27 79 kg/m²     Constitutional:normal, well developed, well nourished, alert, in no distress and non-toxic and no overt pain behavior   and overweight  Eyes:anicteric  HEENT:grossly intact  Neck:supple, symmetric, trachea midline and no masses   Pulmonary:even and unlabored  Cardiovascular:No edema or pitting edema present  Skin:Normal without rashes or lesions and well hydrated  Psychiatric:Mood and affect appropriate  Neurologic:Cranial Nerves II-XII grossly intact  Musculoskeletal:normal    Cervical Spine Exam    Appearance:  Normal lordosis  Palpation/Tenderness:  right cervical paraspinal tenderness  right trapezium tenderness  Sensory:  no sensory deficits noted  Range of Motion:  Flexion:  Minimally limited  with pain  Extension:  Minimally limited  with pain  Lateral Flexion - Left:  Minimally limited  with pain  Lateral Flexion - Right:  Minimally limited  with pain  Rotation - Left:  Minimally limited  with pain  Rotation - Right:  Minimally limited  with pain  Motor Strength:  Left Arm Flexion  5/5  Left Arm Extension  5/5  Right Arm Flexion  5/5  Right Arm Extension  5/5  Left Wrist Flexion  5/5  Left Wrist Extension  5/5  Right wrist flexion 5/5  Right wrist extension 5/5  Left Finger Abduction  5/5  Right Finger Abduction  5/5  Left  5/5  Right   5/5  Special Tests:  Left Spurlings:  negative  Right Spurlings  negative       Imaging  MRI CERVICAL SPINE WITHOUT CONTRAST     INDICATION:  79-year-old female, neck pain, headache, myelopathy     COMPARISON:  None      TECHNIQUE:  Sagittal T1, sagittal T2, sagittal inversion recovery, axial T2, axial  2D merge          IMAGE QUALITY:  Diagnostic     FINDINGS:     ALIGNMENT:    Grade 1 degenerative anterolisthesis C4-5  No compression fracture  No scoliosis      MARROW SIGNAL:  Normal marrow signal is identified within the visualized bony structures    No discrete marrow lesion      CERVICAL AND VISUALIZED THORACIC CORD:  Normal signal within the visualized cord      PREVERTEBRAL AND PARASPINAL SOFT TISSUES:  Prevertebral and paraspinal soft tissues are unremarkable      VISUALIZED POSTERIOR FOSSA:  The visualized posterior fossa demonstrates no abnormal signal      CERVICAL DISC SPACES:          C2-C3:  Normal      C3-C4:  Normal      C4-C5:  Mild degenerative disc and facet disease, grade 1 anterolisthesis, mild bilateral foraminal stenosis, no overt neural element impingement     C5-C6:  Moderate to severe degenerative disc and facet disease, mild central canal stenosis, moderate to severe bilateral foraminal stenosis, probable bilateral C6 nerve root encroachment     C6-C7:  Moderate to severe degenerative disc and facet disease, mild central canal stenosis, moderate to severe bilateral foraminal stenosis, probable bilateral C7 nerve root encroachment     C7-T1:  Moderate degenerative disc and facet disease, mild bilateral foraminal stenosis, small right foraminal disc protrusion, possible right C8 nerve root encroachment     UPPER THORACIC DISC SPACES:  Normal      IMPRESSION:  Multilevel degenerative spondylosis     Grade 1 anterolisthesis, mild bilateral foraminal stenosis C4-5     Mild central canal stenosis, moderate to severe bilateral foraminal stenosis C5-C6 and C6-7     Mild bilateral foraminal stenosis, small right foraminal disc protrusion C7-T1

## 2018-12-20 ENCOUNTER — HOSPITAL ENCOUNTER (OUTPATIENT)
Dept: RADIOLOGY | Facility: CLINIC | Age: 78
Discharge: HOME | End: 2018-12-20
Attending: ANESTHESIOLOGY | Admitting: ANESTHESIOLOGY
Payer: MEDICARE

## 2018-12-20 VITALS
OXYGEN SATURATION: 95 % | HEART RATE: 84 BPM | RESPIRATION RATE: 20 BRPM | SYSTOLIC BLOOD PRESSURE: 157 MMHG | TEMPERATURE: 98.5 F | DIASTOLIC BLOOD PRESSURE: 67 MMHG

## 2018-12-20 DIAGNOSIS — G50.0 TRIGEMINAL NEURALGIA OF RIGHT SIDE OF FACE: ICD-10-CM

## 2018-12-20 PROCEDURE — 64505 N BLOCK SPENOPALATINE GANGL: CPT | Performed by: ANESTHESIOLOGY

## 2018-12-20 PROCEDURE — 77002 NEEDLE LOCALIZATION BY XRAY: CPT | Performed by: ANESTHESIOLOGY

## 2018-12-20 RX ORDER — METHYLPREDNISOLONE ACETATE 80 MG/ML
80 INJECTION, SUSPENSION INTRA-ARTICULAR; INTRALESIONAL; INTRAMUSCULAR; PARENTERAL; SOFT TISSUE ONCE
Status: COMPLETED | OUTPATIENT
Start: 2018-12-20 | End: 2018-12-20

## 2018-12-20 RX ORDER — 0.9 % SODIUM CHLORIDE 0.9 %
10 VIAL (ML) INJECTION ONCE
Status: COMPLETED | OUTPATIENT
Start: 2018-12-20 | End: 2018-12-20

## 2018-12-20 RX ORDER — BUPIVACAINE HCL/PF 2.5 MG/ML
30 VIAL (ML) INJECTION ONCE
Status: COMPLETED | OUTPATIENT
Start: 2018-12-20 | End: 2018-12-20

## 2018-12-20 RX ADMIN — Medication 0.5 ML: at 09:14

## 2018-12-20 RX ADMIN — Medication 1 ML: at 09:08

## 2018-12-20 RX ADMIN — METHYLPREDNISOLONE ACETATE 40 MG: 80 INJECTION, SUSPENSION INTRA-ARTICULAR; INTRALESIONAL; INTRAMUSCULAR; PARENTERAL; SOFT TISSUE at 09:14

## 2018-12-20 RX ADMIN — IOHEXOL 0.5 ML: 300 INJECTION, SOLUTION INTRAVENOUS at 09:10

## 2018-12-20 RX ADMIN — SODIUM CHLORIDE 1 ML: 9 INJECTION, SOLUTION INTRAMUSCULAR; INTRAVENOUS; SUBCUTANEOUS at 09:08

## 2018-12-20 NOTE — DISCHARGE INSTR - LAB
1  Do not apply heat to any area that is numb  If you have discomfort or soreness at the injection site, you may apply ice today, 20 minutes on and 20 minutes off  Tomorrow you may use ice or warm, moist heat  Do not apply ice or heat directly to the skin  2  If you experience severe shortness of breath, go to the Emergency Room  3  You may have numbness for several hours from the local anesthetic  Please use caution and common sense, especially with weight-bearing activities  4  You may have an increase or change in the discomfort for 36-48 hours after your treatment  Apply ice and continue with any pain medicine you have been prescribed  5  Do not do anything strenuous today  You may shower, but no tub baths or hot tubs today  You may resume your normal activities tomorrow, but do not overdo it  Resume normal activities slowly when you are feeling better  6  If you experience redness, drainage or swelling at the injection site, or if you develop a fever above 100 degrees, please call [facility] at [phone] or go to the Emergency Room  7  Continue to take all routine medicines prescribed by your primary care physician unless otherwise instructed by our staff  Most blood thinners should be started again according to your regularly scheduled dosing  If you have any questions, please give our office a call  If you have a problem specifically related to your procedure, please call our office at [phone]  Problems not related to your procedure should be directed to your primary care physician

## 2018-12-20 NOTE — H&P
History of Present Illness: The patient is a 77 y o  female who presents with complaints of right-sided facial pain secondary trigeminal neuralgia and is here today for a right sphenopalatine ganglion block      Patient Active Problem List   Diagnosis    Chronic nonintractable headache    Trigeminal neuralgia    Occipital neuralgia of right side    Trigeminal neuropathy    Myofascial pain on right side       Past Medical History:   Diagnosis Date    Arthritis     Dry eye syndrome     Macular degeneration     Memory loss     Migraine     Spinal stenosis of lumbar region     Supraventricular tachycardia ([address] )     Vitamin D deficiency     last assessed 2/7/17       Past Surgical History:   Procedure Laterality Date    APPENDECTOMY      BACK SURGERY      lower back surgery lumbar disc    CATARACT EXTRACTION      FEMUR FRACTURE SURGERY      REPLACEMENT TOTAL KNEE Right     REPLACEMENT TOTAL KNEE Left          Current Outpatient Prescriptions:     Cholecalciferol (VITAMIN D PO), Take 5,000 Units by mouth Daily  , Disp: , Rfl:     cycloSPORINE (RESTASIS) 0 05 % ophthalmic emulsion, Apply 1 drop to eye 2 (two) times a day, Disp: , Rfl:     divalproex sodium (DEPAKOTE ER) 500 mg 24 hr tablet, Take 1 tablet (500 mg total) by mouth daily, Disp: 90 tablet, Rfl: 0    Multiple Vitamins-Minerals (PRESERVISION AREDS 2+MULTI VIT) CAPS, BID, Disp: , Rfl:     sertraline (ZOLOFT) 100 mg tablet, TAKE 1 AND 1/2 TABLETS BY  MOUTH ONCE DAILY, Disp: 135 tablet, Rfl: 1    verapamil (VERELAN PM) 120 MG 24 hr capsule, TAKE 1 CAPSULE BY MOUTH AT  NIGHT, Disp: 90 capsule, Rfl: 1    Allergies   Allergen Reactions    Hydromorphone Shortness Of Breath     Other reaction(s): Respiratory Distress  Other reaction(s): HORENESS    Morphine Itching    Penicillins      Rash       Physical Exam:   Vitals:    12/20/18 0850   Pulse: 70   Resp: 20   Temp: 98 5 °F (36 9 °C)   SpO2: 97%     General: Awake, Alert, Oriented x 3, Mood and affect appropriate  Respiratory: Respirations even and unlabored  Cardiovascular: Peripheral pulses intact; no edema  Musculoskeletal Exam:   Right-sided facial pain    ASA Score: 3    Patient/Chart Verification  Patient ID Verified: Verbal  Consents Confirmed: Procedural, To be obtained in the Pre-Procedure area  H&P( within 30 days) Verified: To be obtained in the Pre-Procedure area  Allergies Reviewed: Yes  Anticoag/NSAID held?: NA  Currently on antibiotics?: No    Assessment:   1   Trigeminal neuralgia of right side of face        Plan: right-sided sphenopalatine ganglion nerve block

## 2018-12-28 ENCOUNTER — TELEPHONE (OUTPATIENT)
Dept: OBGYN CLINIC | Facility: HOSPITAL | Age: 78
End: 2018-12-28

## 2018-12-28 NOTE — TELEPHONE ENCOUNTER
Caller: patient  Call back number: [phone]    Patient called stating that she would like to talk to [facility]  She states the pain went away after her injection but it is back  It is as bad as it was before  She is asking what to do   Please advise

## 2018-12-28 NOTE — TELEPHONE ENCOUNTER
Pt called with update form her Right Sphenopalatine ganglion Block done 12/20  Pt reports 20-30% relief for 1 week, now the pain is back and as bad as it was before the inj  Pt said she sent her pain diary back  I told pt that I would provide update to FQ who will be back in the office on 1/2  We will c/b with his rec of what the next step will be

## 2019-01-03 ENCOUNTER — OFFICE VISIT (OUTPATIENT)
Dept: PAIN MEDICINE | Facility: CLINIC | Age: 79
End: 2019-01-03
Payer: MEDICARE

## 2019-01-03 VITALS
HEIGHT: 65 IN | RESPIRATION RATE: 18 BRPM | WEIGHT: 167 LBS | SYSTOLIC BLOOD PRESSURE: 136 MMHG | BODY MASS INDEX: 27.82 KG/M2 | DIASTOLIC BLOOD PRESSURE: 70 MMHG | TEMPERATURE: 97.7 F

## 2019-01-03 DIAGNOSIS — M48.02 CERVICAL SPINAL STENOSIS: ICD-10-CM

## 2019-01-03 DIAGNOSIS — M54.81 OCCIPITAL NEURALGIA OF RIGHT SIDE: ICD-10-CM

## 2019-01-03 DIAGNOSIS — G89.4 CHRONIC PAIN SYNDROME: Primary | ICD-10-CM

## 2019-01-03 DIAGNOSIS — G50.0 TRIGEMINAL NEURALGIA: ICD-10-CM

## 2019-01-03 PROCEDURE — 99213 OFFICE O/P EST LOW 20 MIN: CPT | Performed by: NURSE PRACTITIONER

## 2019-01-03 NOTE — PROGRESS NOTES
Pt c/o facial pain and head pain    Assessment:  1  Chronic pain syndrome    2  Trigeminal neuralgia    3  Occipital neuralgia of right side    4  Cervical spinal stenosis        Plan:  Yudi Walker is a 77 y o  female with a history of chronic pain disorder secondary to cervical spinal stenosis, right-sided occipital neuralgia and trigeminal neuralgia  The patient continues with ongoing right sided facial pain, consistent with trigeminal neuralgia  She also has symptoms consistent with right sided occipital neuralgia  She has underwent a right sided occipital nerve block and sphenopalatine ganglion nerve block without long term relief of symptoms  I discussed with the patient that her symptoms may be stemming from cervical spondylosis within her cervical spine  I discussed possibly moving forward with a right C3-C6 medial branch block to help diagnose her neck pain and symptoms  However, she reports that she is not interested in any further injections,at this time  She reports that she would like proceed with surgical consultation for a more long term solution  Therefore, she was given a referral to neurosurgery this office visit  The patient is currently using medical marijuana for her pain and it is providing her 50% pain relief  The patient will follow up on a as needed basis or sooner with the worsening of symptoms  My impressions and treatment recommendations were discussed in detail with the patient who verbalized understanding and had no further questions  Discharge instructions were provided  I personally saw and examined the patient and I agree with the above discussed plan of care      Orders Placed This Encounter   Procedures    Ambulatory referral to Neurosurgery     Standing Status:   Future     Standing Expiration Date:   7/3/2019     Referral Priority:   Routine     Referral Type:   Consult - AMB     Referral Reason:   Specialty Services Required     Requested Specialty:   Neurosurgery Number of Visits Requested:   1     Expiration Date:   1/3/2020     No orders of the defined types were placed in this encounter  History of Present Illness  Dinh Vitale is a 77 y o  female with a history of chronic pain disorder secondary to cervical spinal stenosis, right-sided occipital neuralgia and trigeminal neuralgia  The patient was last seen in the office on 12/20/2018 where she underwent a right sided sphenopalatine ganglion nerve block  She reports that this procedure provided her 60% pain relief for 1 week  She presents for a follow up office visit in regards to Facial Pain and Headache  The patients current symptoms include right sided facial pain that radiates into her jaw and below her right eye  She also complains of neck pain that radiates up the posterior aspect of her scalp to her forehead causing pain into her right eye  She reported that she went outside 1 week after her injection and the wind blew strongly into her ear and her  Pain and symptoms re-emerged  She describes her pain as dull aching, sharp pain that is intermittent nature with symptoms worsening during the morning hours  The patient reports that her pain is symptoms have worsened since last office visit  Current pain medications includes: Medical marijuana   The patient reports that this regimen is providing 50% pain relief  The patient is reporting sleepiness from this pain medication regimen  I have personally reviewed and/or updated the patient's past medical history, past surgical history, family history, social history, current medications, allergies, and vital signs today  Review of Systems   Respiratory: Negative for shortness of breath  Cardiovascular: Negative for chest pain  Gastrointestinal: Negative for constipation, diarrhea, nausea and vomiting  Musculoskeletal: Positive for gait problem  Negative for arthralgias, joint swelling and myalgias          Decreased ROM   Skin: Negative for rash    Neurological: Negative for dizziness, seizures and weakness  Memory loss   All other systems reviewed and are negative        Patient Active Problem List   Diagnosis    Chronic nonintractable headache    Trigeminal neuralgia    Occipital neuralgia of right side    Trigeminal neuropathy    Myofascial pain on right side       Past Medical History:   Diagnosis Date    Arthritis     Dry eye syndrome     Macular degeneration     Memory loss     Migraine     Spinal stenosis of lumbar region     Supraventricular tachycardia ([address] )     Vitamin D deficiency     last assessed 2/7/17       Past Surgical History:   Procedure Laterality Date    APPENDECTOMY      BACK SURGERY      lower back surgery lumbar disc    CATARACT EXTRACTION      FEMUR FRACTURE SURGERY      REPLACEMENT TOTAL KNEE Right     REPLACEMENT TOTAL KNEE Left        Family History   Problem Relation Age of Onset    Aortic aneurysm Mother         abdominal aortic aneurysm    Hypertension Mother    Ashley Nine Breast cancer Mother    Ashley Nine Hypertension Father     Hypertension Sister     Hyperlipidemia Sister        Social History     Occupational History    retired      Social History Main Topics    Smoking status: Former Smoker    Smokeless tobacco: Never Used    Alcohol use Yes      Comment: occasional    Drug use: No    Sexual activity: Not on file       Current Outpatient Prescriptions on File Prior to Visit   Medication Sig    Cholecalciferol (VITAMIN D PO) Take 5,000 Units by mouth Daily      cycloSPORINE (RESTASIS) 0 05 % ophthalmic emulsion Apply 1 drop to eye 2 (two) times a day    divalproex sodium (DEPAKOTE ER) 500 mg 24 hr tablet Take 1 tablet (500 mg total) by mouth daily    Multiple Vitamins-Minerals (PRESERVISION AREDS 2+MULTI VIT) CAPS BID    sertraline (ZOLOFT) 100 mg tablet TAKE 1 AND 1/2 TABLETS BY  MOUTH ONCE DAILY    verapamil (VERELAN PM) 120 MG 24 hr capsule TAKE 1 CAPSULE BY MOUTH AT  NIGHT     No current facility-administered medications on file prior to visit  Allergies   Allergen Reactions    Hydromorphone Shortness Of Breath     Other reaction(s): Respiratory Distress  Other reaction(s): HORENESS    Morphine Itching    Penicillins      Rash       Physical Exam:    /70   Temp 97 7 °F (36 5 °C)   Resp 18   Ht 5' 5" (1 651 m)   Wt 75 8 kg (167 lb)   BMI 27 79 kg/m²     Constitutional:normal, well developed, well nourished, alert, in no distress and non-toxic and no overt pain behavior   and overweight  Eyes:anicteric  HEENT:grossly intact  Neck:supple, symmetric, trachea midline and no masses   Pulmonary:even and unlabored  Cardiovascular:No edema or pitting edema present  Skin:Normal without rashes or lesions and well hydrated  Psychiatric:Mood and affect appropriate  Neurologic:Cranial Nerves II-XII grossly intact  Musculoskeletal:normal     Cervical Spine Exam    Appearance:  Normal lordosis  Palpation/Tenderness:  right cervical paraspinal tenderness  Sensory:  no sensory deficits noted  Range of Motion:  Flexion:  Minimally limited  with pain  Extension:  Minimally limited  with pain  Lateral Flexion - Left:  Minimally limited  with pain  Lateral Flexion - Right:  Minimally limited  with pain  Rotation - Left:  Minimally limited  with pain  Rotation - Right:  Minimally limited  with pain  Motor Strength:  Left Arm Flexion  5/5  Left Arm Extension  5/5  Right Arm Flexion  5/5  Right Arm Extension  5/5  Left Wrist Flexion  5/5  Left Wrist Extension  5/5  Left Finger Abduction  5/5  Right Finger Abduction  5/5  Left    5/5  Right   5/5  Special Tests:  Left Spurlings:  negative  Right Spurlings  negative      Imaging  MRI CERVICAL SPINE WITHOUT CONTRAST     INDICATION:  69-year-old female, neck pain, headache, myelopathy     COMPARISON:  None      TECHNIQUE:  Sagittal T1, sagittal T2, sagittal inversion recovery, axial T2, axial  2D merge          IMAGE QUALITY: Diagnostic     FINDINGS:     ALIGNMENT:    Grade 1 degenerative anterolisthesis C4-5  No compression fracture  No scoliosis      MARROW SIGNAL:  Normal marrow signal is identified within the visualized bony structures    No discrete marrow lesion      CERVICAL AND VISUALIZED THORACIC CORD:  Normal signal within the visualized cord      PREVERTEBRAL AND PARASPINAL SOFT TISSUES:  Prevertebral and paraspinal soft tissues are unremarkable      VISUALIZED POSTERIOR FOSSA:  The visualized posterior fossa demonstrates no abnormal signal      CERVICAL DISC SPACES:          C2-C3:  Normal      C3-C4:  Normal      C4-C5:  Mild degenerative disc and facet disease, grade 1 anterolisthesis, mild bilateral foraminal stenosis, no overt neural element impingement     C5-C6:  Moderate to severe degenerative disc and facet disease, mild central canal stenosis, moderate to severe bilateral foraminal stenosis, probable bilateral C6 nerve root encroachment     C6-C7:  Moderate to severe degenerative disc and facet disease, mild central canal stenosis, moderate to severe bilateral foraminal stenosis, probable bilateral C7 nerve root encroachment     C7-T1:  Moderate degenerative disc and facet disease, mild bilateral foraminal stenosis, small right foraminal disc protrusion, possible right C8 nerve root encroachment     UPPER THORACIC DISC SPACES:  Normal      IMPRESSION:  Multilevel degenerative spondylosis     Grade 1 anterolisthesis, mild bilateral foraminal stenosis C4-5     Mild central canal stenosis, moderate to severe bilateral foraminal stenosis C5-C6 and C6-7     Mild bilateral foraminal stenosis, small right foraminal disc protrusion C7-T1

## 2019-01-22 DIAGNOSIS — G50.0 TRIGEMINAL NEURALGIA: ICD-10-CM

## 2019-01-22 RX ORDER — DIVALPROEX SODIUM 500 MG/1
TABLET, EXTENDED RELEASE ORAL
Qty: 90 TABLET | Refills: 0 | Status: SHIPPED | OUTPATIENT
Start: 2019-01-22 | End: 2019-02-19

## 2019-02-19 ENCOUNTER — OFFICE VISIT (OUTPATIENT)
Dept: NEUROSURGERY | Facility: CLINIC | Age: 79
End: 2019-02-19
Payer: MEDICARE

## 2019-02-19 VITALS
BODY MASS INDEX: 28.66 KG/M2 | HEART RATE: 75 BPM | WEIGHT: 172 LBS | HEIGHT: 65 IN | TEMPERATURE: 97.7 F | SYSTOLIC BLOOD PRESSURE: 142 MMHG | RESPIRATION RATE: 12 BRPM | DIASTOLIC BLOOD PRESSURE: 59 MMHG

## 2019-02-19 DIAGNOSIS — G50.0 TRIGEMINAL NEURALGIA: ICD-10-CM

## 2019-02-19 DIAGNOSIS — M54.81 OCCIPITAL NEURALGIA OF RIGHT SIDE: ICD-10-CM

## 2019-02-19 PROBLEM — F41.9 ANXIETY: Status: ACTIVE | Noted: 2019-02-19

## 2019-02-19 PROCEDURE — 99204 OFFICE O/P NEW MOD 45 MIN: CPT | Performed by: NEUROLOGICAL SURGERY

## 2019-02-19 NOTE — PROGRESS NOTES
Neurosurgery Office Note  Xochitl Matt is a [de-identified] y o  female    Type of Visit: Consult        Diagnoses and all orders for this visit:    Trigeminal neuralgia  -     Ambulatory referral to Neurosurgery  -     MRI brain without contrast; Future    Occipital neuralgia of right side  -     Ambulatory referral to Neurosurgery    Other orders  -     Acetaminophen 325 MG CAPS; Take 650 mg by mouth every 6 (six) hours as needed          DISCUSSION SUMMARY  This 76-year-old female with a 2 year history of right facial pain  Is difficult to discern whether this is atypical or typical facial pain because of the patient's anxiety  It is likely that this is typical facial pain  She has been on numerous medications but claims to have had side effects to each 1 of these  She has not had an imaging study specific for the association of the nerve and blood vessels  I recommended for this to be done  I also recommended for her to go back to her neurologist to consider another medication which might be effective for her  She also has a high degree of anxiety and treating this may also improve her facial pain frequency and severity  Will see her back following the completion of the MRI      CHIEF COMPLAINT  Patient presents for initial consult regarding Trigeminal Neuralgia with MRI brain    [facility] NEUROSURGERY PROCEDURES  NONE    TEATMENT HX  gabapentin (NEURONTIN) 100 mg capsule; TAKE ONE CAPSULE BY MOUTH TWICE DAILY WITH A 300 MG CAPSULE AT NIGHT  gabapentin (NEURONTIN) 300 mg capsule; TAKE 1 CAPSULE AT BEDTIME  OXcarbazepine (TRILEPTAL) 150 mg tablet; Take 1 1/2 tablet in the morning and 2 tablets at bedtime    HISTORY OF PRESENT ILLNESS  Patient referred by CL Villalobos (PCP office) for evaluation of Trigeminal neuralgia  She followed with Janette Siddiqi MD (Attending at [facility]) for diagnosis  Has had MRIs in the past  Told she has degenerative changes   Also states that Dr Champ Fajardo increased her vitamin D3 to 5000 units once a week, but patient is not clear  Medications tried: Gabapentin and Oxcarbazepine  Medications did not improve her symptoms  Patient was referred to pain management for chronic pain and was advised to research information about gamma knife in the meantime  Today, she states that she has had previous tooth extraction about 3-4 years ago  Her head pain has been present for 3 years and her right sided facial pain has been present since about 2 years ago  Patient reports severe and intractable facial pain  She is very anxious and is difficult to discern whether the pain is there all the time or periodically  This pain is been worsening over time  She has had dental procedures which have not been helpful  She has not undergone retro gasserian glycerol rhizotomy nor radiofrequency ablation procedures  She denies having weight gain or weight loss  Pain is present on an ongoing basis  Her ear lobe is also involved  Her triggers include almost anything"  She was recently in the hospital for severe hyponatremia which she says is related to medications she says  She is very anxious and reports that she believes this is a primary cause of her problem  During this time she says that she has had increasing balance difficulty  Her son reports that she is falling on a regular basis  She is using a wheeled walker for balance  She also reports pain when she lays down causing headaches  There is a particular spot (over the region of the greater occipital nerve) which elicits this pain  The pain begins sometimes in the forehead radiates to this but more frequently from the occipital cervical junction to the head on the right side  She believes is also worsening  She has stopped her own medications because she says they have not worked or that she has had a side effect to each  Most recently she stopped Depakote because she was concerned that it was causing loose stool    She complains of pain in her right pinna of the ear as if it is infected  She understands that it is not red nor is it swollen nor has it been so  She denies numbness of her face  She denies weight gain or weight loss  She denies having any procedures directly on the ganglia or 5th nerve  REVIEW OF SYSTEMS  Review of Systems   Constitutional: Positive for activity change (using walker)  HENT:        Right ear lobe is tender   Eyes: Positive for visual disturbance (loss of vision of the right eye  which is an ongoing problem)  Respiratory: Negative  Cardiovascular: Negative  Gastrointestinal: Negative  Endocrine: Negative  Genitourinary: Negative  Musculoskeletal: Positive for gait problem  Skin: Negative  Allergic/Immunologic: Negative  Neurological: Positive for headaches (right sided)  Negative for speech difficulty, weakness and numbness  Right sided facial pain that travels from top of right eye down into the cheek bone, jaw, and teeth  Hematological: Negative  Psychiatric/Behavioral: Negative  All other systems reviewed and are negative        I reviewed the ROS    MEDICAL HISTORY  Active Ambulatory Problems     Diagnosis Date Noted    Chronic nonintractable headache 08/20/2018    Trigeminal neuralgia 09/13/2018    Occipital neuralgia of right side 09/13/2018    Trigeminal neuropathy 09/13/2018    Myofascial pain on right side 09/13/2018    Anxiety 02/19/2019     Resolved Ambulatory Problems     Diagnosis Date Noted    No Resolved Ambulatory Problems     Past Medical History:   Diagnosis Date    Arthritis     Dry eye syndrome     Macular degeneration     Memory loss     Migraine     Spinal stenosis of lumbar region     Supraventricular tachycardia ([address] )     Vitamin D deficiency        Past Surgical History:   Procedure Laterality Date    APPENDECTOMY      BACK SURGERY      lower back surgery lumbar disc    CATARACT EXTRACTION      FEMUR FRACTURE SURGERY      REPLACEMENT TOTAL KNEE Right     REPLACEMENT TOTAL KNEE Left        Social History     Tobacco Use   Smoking Status Former Smoker   Smokeless Tobacco Never Used       Social History     Substance and Sexual Activity   Alcohol Use Yes    Comment: occasional       Social History     Substance and Sexual Activity   Drug Use No       Vitals:    02/19/19 0943   BP: 142/59   BP Location: Right arm   Patient Position: Sitting   Cuff Size: Adult   Pulse: 75   Resp: 12   Temp: 97 7 °F (36 5 °C)   TempSrc: Tympanic   Weight: 78 kg (172 lb)   Height: 5' 5" (1 651 m)         Current Outpatient Medications:     Acetaminophen 325 MG CAPS, Take 650 mg by mouth every 6 (six) hours as needed, Disp: , Rfl:     Cholecalciferol (VITAMIN D PO), Take 5,000 Units by mouth Daily  , Disp: , Rfl:     cycloSPORINE (RESTASIS) 0 05 % ophthalmic emulsion, Apply 1 drop to eye 2 (two) times a day, Disp: , Rfl:     Multiple Vitamins-Minerals (PRESERVISION AREDS 2+MULTI VIT) CAPS, BID, Disp: , Rfl:     sertraline (ZOLOFT) 100 mg tablet, TAKE 1 AND 1/2 TABLETS BY  MOUTH ONCE DAILY (Patient taking differently: TAKE 2 TABLETS BY  MOUTH ONCE DAILY), Disp: 135 tablet, Rfl: 1    verapamil (VERELAN PM) 120 MG 24 hr capsule, TAKE 1 CAPSULE BY MOUTH AT  NIGHT, Disp: 90 capsule, Rfl: 1     The following portions of the patient's history were reviewed by MD and updated by MA as appropriate: allergies, current medications, past family history, past medical history, past social history, past surgical history and problem list       Physical Exam  Awake alert and oriented  She initially thought the year was 2009 then corrected to 2014 then corrected to 2019  She had a wavering speech pattern associated with anxiety    She was aware of the present and had opinion regarding him  Her extraocular movements are full  Her facial sensation is intact to fine touch but pinprick is diminished in the right columella to angle of the mouth region  Her tongue protrudes in the midline  Palate elevates symmetrically  She has pain to palpation over the occipital groove on the right side  She has no drift  Her power appears to be 4+ out of 5 bilaterally  She has very great difficulty standing for Romberg  She has a very slow unsteady gait dragging her right foot and leg which she says was operated and she is very depended upon the walker  She tells me that she is very anxious about walking which is why she has to have the walker  She does not extinguish to double simultaneous stimulation  She is tremulous throughout  Her finger-to-nose testing is precise but with wavering movements associated likely with her anxiety    RESULTS/DATA  MRI of the brain is carefully reviewed  This demonstrates degenerative changes but no specific abnormalities in the region of the 5th nerve  Unfortunately the T2 study does not go through this region adequately and there is no FIESTA sequence  The Bravo sequence which is demonstrated below demonstrates some widening of the 5th nerve but nothing that is actionable    Further imaging with FIESTA sequence is important in discerning this association definitively

## 2019-02-21 ENCOUNTER — OFFICE VISIT (OUTPATIENT)
Dept: FAMILY MEDICINE CLINIC | Facility: CLINIC | Age: 79
End: 2019-02-21
Payer: MEDICARE

## 2019-02-21 VITALS
OXYGEN SATURATION: 97 % | WEIGHT: 172 LBS | DIASTOLIC BLOOD PRESSURE: 64 MMHG | BODY MASS INDEX: 28.66 KG/M2 | SYSTOLIC BLOOD PRESSURE: 122 MMHG | HEART RATE: 76 BPM | HEIGHT: 65 IN

## 2019-02-21 DIAGNOSIS — I47.1 SVT (SUPRAVENTRICULAR TACHYCARDIA) (HCC): ICD-10-CM

## 2019-02-21 DIAGNOSIS — D69.6 THROMBOCYTOPENIA (HCC): ICD-10-CM

## 2019-02-21 DIAGNOSIS — F41.8 DEPRESSION WITH ANXIETY: ICD-10-CM

## 2019-02-21 DIAGNOSIS — G50.0 TRIGEMINAL NEURALGIA: Primary | ICD-10-CM

## 2019-02-21 DIAGNOSIS — G89.29 OTHER CHRONIC PAIN: ICD-10-CM

## 2019-02-21 PROBLEM — I47.10 SVT (SUPRAVENTRICULAR TACHYCARDIA): Status: RESOLVED | Noted: 2019-02-21 | Resolved: 2019-02-21

## 2019-02-21 PROBLEM — I47.10 SVT (SUPRAVENTRICULAR TACHYCARDIA): Status: ACTIVE | Noted: 2019-02-21

## 2019-02-21 PROCEDURE — 99214 OFFICE O/P EST MOD 30 MIN: CPT | Performed by: FAMILY MEDICINE

## 2019-02-21 RX ORDER — DULOXETIN HYDROCHLORIDE 30 MG/1
30 CAPSULE, DELAYED RELEASE ORAL DAILY
Qty: 7 CAPSULE | Refills: 0 | Status: SHIPPED | OUTPATIENT
Start: 2019-02-21 | End: 2019-02-26 | Stop reason: SDUPTHER

## 2019-02-21 RX ORDER — DULOXETIN HYDROCHLORIDE 30 MG/1
CAPSULE, DELAYED RELEASE ORAL
Qty: 90 CAPSULE | Refills: 0 | OUTPATIENT
Start: 2019-02-21

## 2019-02-21 RX ORDER — GABAPENTIN 300 MG/1
300 CAPSULE ORAL
Qty: 30 CAPSULE | Refills: 0 | Status: SHIPPED | OUTPATIENT
Start: 2019-02-21 | End: 2019-03-12

## 2019-02-21 NOTE — PATIENT INSTRUCTIONS
Discussed all with patient  Since the diarrhea has resolved by stopping the previous medications I would like you to try gabapentin 300 mg at night  This will make you drowsy but if this is helping your chronic pain I would like you to call here by Monday or Tuesday and we could add lower doses throughout the day  For the depression and the anxiety I would like you to start with 1/2 pill of the sertraline tomorrow and 1 Cymbalta 30 mg for 1 week  If you are tolerating these meds in 1 week I would like you to call here because I will double the dose of Cymbalta and cut out the sertraline  I will see you here in 3 weeks  Keep your follow-up with Dr Emeterio Leonard and have your MRI done

## 2019-02-21 NOTE — PROGRESS NOTES
Assessment/Plan:     Chronic Problems:  Trigeminal neuralgia  Will start gabapentin 300 mg at night and advised pt to call me by Monday with results  If doing better, will increase gabapentin to tid but slowly  Depression with anxiety  Will cut back sertraline to 50 mg and add cymbalta 30 mg for 1 week  Patient is advised to call here in 1 week and the plan will be to discontinue the sertraline and increase the Cymbalta to 60 mg    Other chronic pain  Will make transition from sertraline to cymbalta  Will follow here in 3 weeks  Visit Diagnosis:  Diagnoses and all orders for this visit:    Trigeminal neuralgia  -     gabapentin (NEURONTIN) 300 mg capsule; Take 1 capsule (300 mg total) by mouth daily at bedtime    Depression with anxiety  -     DULoxetine (CYMBALTA) 30 mg delayed release capsule; Take 1 capsule (30 mg total) by mouth daily    Other chronic pain  -     DULoxetine (CYMBALTA) 30 mg delayed release capsule; Take 1 capsule (30 mg total) by mouth daily    SVT (supraventricular tachycardia) (HCC)    Thrombocytopenia (HCC)          Subjective:    Patient ID: Manpreet Seals is a [de-identified] y o  female  Pt is here with multiple complaints  Following with Dr Adelina Ocampo for trigeminal neuralgia and having another mri in March and then he will decide what procedure he will do  Pt also has concerns re: her head pains on the right which radiates to the occipital area  Stopped taking the meds for tgn r/t explosive diarrhea  Thinks the pain meds did not really help the tgn  He asked her to ask us for medication for the tgn  Was on gabapentin in the past for this  Feels her depression is bad and her anxiety is worse  Son tells her the same thing  Feels she needs something for anxiety  Takes all other meds as directed   No side effects noted      The following portions of the patient's history were reviewed and updated as appropriate: allergies, current medications, past family history, past medical history, past social history, past surgical history and problem list     Review of Systems   Constitutional: Negative for chills, diaphoresis, fatigue and fever  HENT: Negative  Eyes: Negative  Respiratory: Negative for cough, shortness of breath and wheezing  Cardiovascular: Negative for chest pain and palpitations  Gastrointestinal: Negative  Genitourinary: Negative  Musculoskeletal: Positive for arthralgias (in her hands  )  Neurological: Positive for headaches  Negative for dizziness and light-headedness  Psychiatric/Behavioral: Positive for dysphoric mood  The patient is nervous/anxious  /64   Pulse 76   Ht 5' 5" (1 651 m)   Wt 78 kg (172 lb)   SpO2 97%   BMI 28 62 kg/m²   Social History     Socioeconomic History    Marital status:       Spouse name: Not on file    Number of children: Not on file    Years of education: Not on file    Highest education level: Not on file   Occupational History    Occupation: retired   Social Needs    Financial resource strain: Not on file    Food insecurity:     Worry: Not on file     Inability: Not on file   [employer] needs:     Medical: Not on file     Non-medical: Not on file   Tobacco Use    Smoking status: Former Smoker    Smokeless tobacco: Never Used   Substance and Sexual Activity    Alcohol use: Yes     Comment: occasional    Drug use: No    Sexual activity: Not on file   Lifestyle    Physical activity:     Days per week: Not on file     Minutes per session: Not on file    Stress: Not on file   Relationships    Social connections:     Talks on phone: Not on file     Gets together: Not on file     Attends [employer] service: Not on file     Active member of club or organization: Not on file     Attends meetings of clubs or organizations: Not on file     Relationship status: Not on file    Intimate partner violence:     Fear of current or ex partner: Not on file     Emotionally abused: Not on file     Physically abused: Not on file     Forced sexual activity: Not on file   Other Topics Concern    Not on file   Social History Narrative    Always uses seatbelt     Past Medical History:   Diagnosis Date    Arthritis     Dry eye syndrome     Macular degeneration     Memory loss     Migraine     Spinal stenosis of lumbar region     Supraventricular tachycardia ([address] )     Vitamin D deficiency     last assessed 2/7/17     Family History   Problem Relation Age of Onset    Aortic aneurysm Mother         abdominal aortic aneurysm    Hypertension Mother     Breast cancer Mother     Hypertension Father     Hypertension Sister     Hyperlipidemia Sister      Past Surgical History:   Procedure Laterality Date    APPENDECTOMY      BACK SURGERY      lower back surgery lumbar disc    CATARACT EXTRACTION      FEMUR FRACTURE SURGERY      REPLACEMENT TOTAL KNEE Right     REPLACEMENT TOTAL KNEE Left        Current Outpatient Medications:     Acetaminophen 325 MG CAPS, Take 650 mg by mouth every 6 (six) hours as needed, Disp: , Rfl:     Cholecalciferol (VITAMIN D PO), Take 5,000 Units by mouth Daily  , Disp: , Rfl:     cycloSPORINE (RESTASIS) 0 05 % ophthalmic emulsion, Apply 1 drop to eye 2 (two) times a day, Disp: , Rfl:     Multiple Vitamins-Minerals (PRESERVISION AREDS 2+MULTI VIT) CAPS, BID, Disp: , Rfl:     verapamil (VERELAN PM) 120 MG 24 hr capsule, TAKE 1 CAPSULE BY MOUTH AT  NIGHT, Disp: 90 capsule, Rfl: 1    DULoxetine (CYMBALTA) 30 mg delayed release capsule, Take 1 capsule (30 mg total) by mouth daily, Disp: 7 capsule, Rfl: 0    gabapentin (NEURONTIN) 300 mg capsule, Take 1 capsule (300 mg total) by mouth daily at bedtime, Disp: 30 capsule, Rfl: 0    Allergies   Allergen Reactions    Dilaudid [Hydromorphone] Shortness Of Breath     Other reaction(s): Respiratory Distress  Other reaction(s): HORENESS    Penicillins      Rash          Lab Review   No visits with results within 2 Month(s) from this visit  Latest known visit with results is:   Appointment on 11/20/2018   Component Date Value    Valproic Acid, Free 11/20/2018 6 6     WBC 11/20/2018 5 84     RBC 11/20/2018 4 27     Hemoglobin 11/20/2018 13 3     Hematocrit 11/20/2018 41 4     MCV 11/20/2018 97     MCH 11/20/2018 31 1     MCHC 11/20/2018 32 1     RDW 11/20/2018 12 7     MPV 11/20/2018 9 9     Platelets 88/89/0987 185     nRBC 11/20/2018 0     Neutrophils Relative 11/20/2018 57     Immat GRANS % 11/20/2018 0     Lymphocytes Relative 11/20/2018 31     Monocytes Relative 11/20/2018 9     Eosinophils Relative 11/20/2018 2     Basophils Relative 11/20/2018 1     Neutrophils Absolute 11/20/2018 3 29     Immature Grans Absolute 11/20/2018 0 02     Lymphocytes Absolute 11/20/2018 1 82     Monocytes Absolute 11/20/2018 0 53     Eosinophils Absolute 11/20/2018 0 14     Basophils Absolute 11/20/2018 0 04     Sodium 11/20/2018 145     Potassium 11/20/2018 4 2     Chloride 11/20/2018 106     CO2 11/20/2018 32     ANION GAP 11/20/2018 7     BUN 11/20/2018 19     Creatinine 11/20/2018 0 62     Glucose, Fasting 11/20/2018 86     Calcium 11/20/2018 9 0     AST 11/20/2018 13     ALT 11/20/2018 17     Alkaline Phosphatase 11/20/2018 52     Total Protein 11/20/2018 6 8     Albumin 11/20/2018 3 7     Total Bilirubin 11/20/2018 0 30     eGFR 11/20/2018 86         Imaging: No results found  Objective:     Physical Exam   Constitutional: She is oriented to person, place, and time  She appears well-developed and well-nourished  No distress  HENT:   Head: Normocephalic and atraumatic  Right Ear: External ear normal    Left Ear: External ear normal    Mouth/Throat: Oropharynx is clear and moist    Eyes: Pupils are equal, round, and reactive to light  Conjunctivae and EOM are normal  Right eye exhibits no discharge  Left eye exhibits no discharge  Neck: Normal range of motion  Neck supple     Cardiovascular: Normal rate, regular rhythm and normal heart sounds  Exam reveals no friction rub  No murmur heard  Pulmonary/Chest: Effort normal and breath sounds normal  No respiratory distress  She has no wheezes  She has no rales  She exhibits no tenderness  Musculoskeletal: She exhibits no edema, tenderness or deformity  Ambulates with a walker per pt  Lymphadenopathy:     She has no cervical adenopathy  Neurological: She is alert and oriented to person, place, and time  No cranial nerve deficit  Skin: Skin is warm and dry  No rash noted  She is not diaphoretic  Psychiatric: Her behavior is normal  Judgment and thought content normal    Affect is flat but good eye contact  Patient Instructions   Discussed all with patient  Since the diarrhea has resolved by stopping the previous medications I would like you to try gabapentin 300 mg at night  This will make you drowsy but if this is helping your chronic pain I would like you to call here by Monday or Tuesday and we could add lower doses throughout the day  For the depression and the anxiety I would like you to start with 1/2 pill of the sertraline tomorrow and 1 Cymbalta 30 mg for 1 week  If you are tolerating these meds in 1 week I would like you to call here because I will double the dose of Cymbalta and cut out the sertraline  I will see you here in 3 weeks  Keep your follow-up with Dr Elli Uriarte and have your MRI done  CL Milner    Portions of the record may have been created with voice recognition software   Occasional wrong word or "sound a like" substitutions may have occurred due to the inherent limitations of voice recognition software   Read the chart carefully and recognize, using context, where substitutions have occurred

## 2019-02-21 NOTE — ASSESSMENT & PLAN NOTE
Will cut back sertraline to 50 mg and add cymbalta 30 mg for 1 week    Patient is advised to call here in 1 week and the plan will be to discontinue the sertraline and increase the Cymbalta to 60 mg

## 2019-02-21 NOTE — ASSESSMENT & PLAN NOTE
Will start gabapentin 300 mg at night and advised pt to call me by Monday with results  If doing better, will increase gabapentin to tid but slowly

## 2019-02-26 ENCOUNTER — TELEPHONE (OUTPATIENT)
Dept: FAMILY MEDICINE CLINIC | Facility: CLINIC | Age: 79
End: 2019-02-26

## 2019-02-26 DIAGNOSIS — G89.29 OTHER CHRONIC PAIN: ICD-10-CM

## 2019-02-26 DIAGNOSIS — F41.8 DEPRESSION WITH ANXIETY: ICD-10-CM

## 2019-02-26 DIAGNOSIS — G50.0 TRIGEMINAL NEURALGIA OF RIGHT SIDE OF FACE: Primary | ICD-10-CM

## 2019-02-26 RX ORDER — DULOXETIN HYDROCHLORIDE 60 MG/1
60 CAPSULE, DELAYED RELEASE ORAL DAILY
Qty: 30 CAPSULE | Refills: 1 | Status: SHIPPED | OUTPATIENT
Start: 2019-02-26 | End: 2019-02-26 | Stop reason: SDUPTHER

## 2019-02-26 RX ORDER — DULOXETIN HYDROCHLORIDE 60 MG/1
CAPSULE, DELAYED RELEASE ORAL
Qty: 90 CAPSULE | Refills: 1 | Status: SHIPPED | OUTPATIENT
Start: 2019-02-26 | End: 2019-03-15 | Stop reason: SDUPTHER

## 2019-02-26 RX ORDER — GABAPENTIN 100 MG/1
CAPSULE ORAL
Qty: 120 CAPSULE | Refills: 0 | Status: SHIPPED | OUTPATIENT
Start: 2019-02-26 | End: 2019-03-15 | Stop reason: SDUPTHER

## 2019-02-26 NOTE — TELEPHONE ENCOUNTER
Tell her to stop the zoloft if it has been a week on the cymbalta 30mg and  I called in cymbalta 60mg to be taken daily instead  Also I increased the gabapentin  Stay on the 300 mg at night and start 100 mg bid for 3 days and then if tolerated, increase to 200 mg bid  And then f/u appt

## 2019-02-26 NOTE — TELEPHONE ENCOUNTER
Spoke with patient se is aware of increase and tos top Zoloft   She also wrote everything done so she wouldn't forget and I did giver her the f/u apt day and time

## 2019-02-26 NOTE — TELEPHONE ENCOUNTER
Patient called about medication  Cymbalta and gabapentin  Patient said that you were going to increase both mediation depending on how she as doing  she said that bot med's are working but not like she would like it too  Diana Hoyt feels if you were to increase right now both med's it would work for her even better

## 2019-03-05 ENCOUNTER — HOSPITAL ENCOUNTER (OUTPATIENT)
Dept: MRI IMAGING | Facility: CLINIC | Age: 79
Discharge: HOME | End: 2019-03-05
Attending: NEUROLOGICAL SURGERY
Payer: MEDICARE

## 2019-03-05 DIAGNOSIS — G50.0 TRIGEMINAL NEURALGIA: ICD-10-CM

## 2019-03-05 PROCEDURE — 70551 MRI BRAIN STEM W/O DYE: CPT

## 2019-03-07 DIAGNOSIS — F41.8 DEPRESSION WITH ANXIETY: Primary | ICD-10-CM

## 2019-03-07 RX ORDER — SERTRALINE HYDROCHLORIDE 100 MG/1
TABLET, FILM COATED ORAL
Qty: 135 TABLET | Refills: 0 | OUTPATIENT
Start: 2019-03-07

## 2019-03-08 ENCOUNTER — TELEPHONE (OUTPATIENT)
Dept: FAMILY MEDICINE CLINIC | Facility: CLINIC | Age: 79
End: 2019-03-08

## 2019-03-08 NOTE — TELEPHONE ENCOUNTER
Returned call to pt  Feels she can't function from the gabapentin  Takes it and not even sure if it helps other than to make her sleep  Advised to cut back the gabapentin by 100 mg every 3 days until discontinued  She has f/u with neuro surg on Friday  I asked her to have him write me a consult with his findings

## 2019-03-12 ENCOUNTER — OFFICE VISIT (OUTPATIENT)
Dept: NEUROSURGERY | Facility: CLINIC | Age: 79
End: 2019-03-12
Payer: MEDICARE

## 2019-03-12 VITALS
SYSTOLIC BLOOD PRESSURE: 134 MMHG | BODY MASS INDEX: 28.66 KG/M2 | RESPIRATION RATE: 16 BRPM | DIASTOLIC BLOOD PRESSURE: 70 MMHG | HEIGHT: 65 IN | TEMPERATURE: 97.3 F | HEART RATE: 65 BPM | WEIGHT: 172 LBS

## 2019-03-12 DIAGNOSIS — G50.0 TRIGEMINAL NEURALGIA: Primary | ICD-10-CM

## 2019-03-12 PROCEDURE — 99213 OFFICE O/P EST LOW 20 MIN: CPT | Performed by: NEUROLOGICAL SURGERY

## 2019-03-12 NOTE — PROGRESS NOTES
[facility] Office Note  Rodrigo Wiley is a [de-identified] y o  female      Type of Visit: Follow-up      Diagnoses and all orders for this visit:    Trigeminal neuralgia  -     Ambulatory referral to Neurosurgery; Future        DISCUSSION SUMMARY  This is an 76-year-old female with severe right facial pain and refractory nest to various medications including gabapentin  An MRI of the trigeminal nerve with attention to the cisternal and ganglion segments does not identify a cause of compression  A surgical decompression is therefore not indicated  I recommended that she go to someone that has the capacity to do a radiofrequency nerve ablation  I have taken the liberty of of making this referral     CHIEF COMPLAINT  Patient presents for follow up after completion of the MRI    [facility] NEUROSURGERY PROCEDURES  NONE    TEATMENT HX  gabapentin (NEURONTIN) 100 mg capsule; TAKE ONE CAPSULE BY MOUTH TWICE DAILY WITH A 300 MG CAPSULE AT NIGHT  gabapentin (NEURONTIN) 300 mg capsule; TAKE 1 CAPSULE AT BEDTIME  OXcarbazepine (TRILEPTAL) 150 mg tablet; Take 1 1/2 tablet in the morning and 2 tablets at bedtime    HISTORY OF PRESENT ILLNESS  Patient referred by CL Mallory (PCP office) for evaluation of Trigeminal neuralgia  She followed with Annemarie Meyer MD (Attending at [facility]) for diagnosis  Has had MRIs in the past  Told she has degenerative changes  Also states that Dr Doug Mccloud increased her vitamin D3 to 5000 units once a week, but patient is not clear  Medications tried: Gabapentin and Oxcarbazepine  Patient was referred to pain management for chronic pain and was advised to research information about gamma knife in the meantime  She states that she has had previous tooth extraction about 3-4 years ago  Her head pain has been present for 3 years and her right sided facial pain has been present since about 2 years ago      She has not had an imaging study specific for the association of the nerve and blood vessels  We recommended for this to be done  We saw the patient on 2/19/19 and recommended for her to go back to her neurologist to consider another medication which might be effective for her  She also has a high degree of anxiety and treating this may also improve her facial pain frequency and severity  Will see her back following the completion of an updated MRI of the brain  Her pain is severe and unrelenting  She separate her pain into 2 different separate entities  The 1st is that of pain that radiates into the lower portion of her face on the right side  She also has intermittent pain over her scalp on the right side  I believe these likely are the different expressions of a similar etiology  REVIEW OF SYSTEMS  Review of Systems   Constitutional: Positive for activity change (using walker)  HENT:        Right ear lobe is tender   Eyes: Positive for visual disturbance (loss of vision of the right eye  which is an ongoing problem; diagnosed with macular degeneration)  Respiratory: Negative  Cardiovascular: Negative  Gastrointestinal: Negative  Endocrine: Negative  Genitourinary: Negative  Musculoskeletal: Positive for gait problem  Skin: Negative  Allergic/Immunologic: Negative  Neurological: Positive for dizziness (when she gets up to quickly from sitting to standing position and also the gabapentin makes her dizzy) and headaches (right sided; pain on top of right eye going up into the head; she has tendernes on the top right side of the head)  Right sided facial pain that travels from top of right eye down into the cheek bone, jaw, and teeth  Hematological: Negative  Psychiatric/Behavioral: Negative  All other systems reviewed and are negative  I reviewed the ROS      MEDICAL HISTORY  Active Ambulatory Problems     Diagnosis Date Noted    Chronic nonintractable headache 08/20/2018    Trigeminal neuralgia 09/13/2018    Occipital neuralgia of right side 09/13/2018    Trigeminal neuropathy 09/13/2018    Myofascial pain on right side 09/13/2018    Depression with anxiety 02/19/2019    Other chronic pain 02/21/2019     Resolved Ambulatory Problems     Diagnosis Date Noted    SVT (supraventricular tachycardia) (HCC) 02/21/2019    Thrombocytopenia ([address] ) 02/21/2019     Past Medical History:   Diagnosis Date    Arthritis     Dry eye syndrome     Macular degeneration     Memory loss     Migraine     Spinal stenosis of lumbar region     Supraventricular tachycardia ([address] )     Vitamin D deficiency        Past Surgical History:   Procedure Laterality Date    APPENDECTOMY      BACK SURGERY      lower back surgery lumbar disc    CATARACT EXTRACTION      FEMUR FRACTURE SURGERY      REPLACEMENT TOTAL KNEE Right     REPLACEMENT TOTAL KNEE Left        Social History     Tobacco Use   Smoking Status Former Smoker   Smokeless Tobacco Never Used       Social History     Substance and Sexual Activity   Alcohol Use Yes    Comment: occasional       Social History     Substance and Sexual Activity   Drug Use No       Vitals:    03/12/19 1403   BP: 134/70   BP Location: Right arm   Patient Position: Sitting   Cuff Size: Adult   Pulse: 65   Resp: 16   Temp: (!) 97 3 °F (36 3 °C)   TempSrc: Tympanic   Weight: 78 kg (172 lb)   Height: 5' 5" (1 651 m)         Current Outpatient Medications:     Acetaminophen 325 MG CAPS, Take 650 mg by mouth every 6 (six) hours as needed, Disp: , Rfl:     Cholecalciferol (VITAMIN D PO), Take 5,000 Units by mouth Daily  , Disp: , Rfl:     cycloSPORINE (RESTASIS) 0 05 % ophthalmic emulsion, Apply 1 drop to eye 2 (two) times a day, Disp: , Rfl:     DULoxetine (CYMBALTA) 60 mg delayed release capsule, TAKE ONE CAPSULE BY MOUTH EVERY DAY, Disp: 90 capsule, Rfl: 1    gabapentin (NEURONTIN) 100 mg capsule, Take 100 mg po bid x 3 days then take 200 mg bid (along with 300 mg hs), Disp: 120 capsule, Rfl: 0    Multiple Vitamins-Minerals (PRESERVISION AREDS 2+MULTI VIT) CAPS, BID, Disp: , Rfl:     verapamil (VERELAN PM) 120 MG 24 hr capsule, TAKE 1 CAPSULE BY MOUTH AT  NIGHT, Disp: 90 capsule, Rfl: 1     Allergies   Allergen Reactions    Dilaudid [Hydromorphone] Shortness Of Breath     Other reaction(s): Respiratory Distress  Other reaction(s): HORENESS    Penicillins      Rash        The following portions of the patient's history were updated by MA and reviewed by MD: allergies, current medications, past family history, past medical history, past social history, past surgical history and problem list       Physical Exam  Awake and alert  She appears depressed and somewhat anxious and angry  Her speech is clear and comprehensible  Her power is 5/5 bilaterally  Her station and gait are normal    RESULTS/DATA  MRI of the brain with attention to the cisternal and ganglion segments of the 5th nerve root carefully reviewed  Images are included below  They are very small vascular structures in the vicinity of the nerve but there does not appear to be any deflection of the course nor is there appear to be any large vessel involvement in the region of the nerve on the right

## 2019-03-15 ENCOUNTER — OFFICE VISIT (OUTPATIENT)
Dept: FAMILY MEDICINE CLINIC | Facility: CLINIC | Age: 79
End: 2019-03-15
Payer: MEDICARE

## 2019-03-15 VITALS
OXYGEN SATURATION: 98 % | BODY MASS INDEX: 28.62 KG/M2 | HEART RATE: 76 BPM | SYSTOLIC BLOOD PRESSURE: 100 MMHG | HEIGHT: 65 IN | DIASTOLIC BLOOD PRESSURE: 66 MMHG | RESPIRATION RATE: 16 BRPM | TEMPERATURE: 97.9 F

## 2019-03-15 DIAGNOSIS — G50.0 TRIGEMINAL NEURALGIA: Primary | ICD-10-CM

## 2019-03-15 DIAGNOSIS — G50.0 TRIGEMINAL NEURALGIA OF RIGHT SIDE OF FACE: ICD-10-CM

## 2019-03-15 DIAGNOSIS — F41.8 DEPRESSION WITH ANXIETY: ICD-10-CM

## 2019-03-15 DIAGNOSIS — G89.29 OTHER CHRONIC PAIN: ICD-10-CM

## 2019-03-15 PROCEDURE — 99214 OFFICE O/P EST MOD 30 MIN: CPT | Performed by: FAMILY MEDICINE

## 2019-03-15 RX ORDER — DULOXETIN HYDROCHLORIDE 60 MG/1
60 CAPSULE, DELAYED RELEASE ORAL DAILY
Qty: 90 CAPSULE | Refills: 0 | Status: SHIPPED | OUTPATIENT
Start: 2019-03-15 | End: 2019-03-24 | Stop reason: SDUPTHER

## 2019-03-15 RX ORDER — GABAPENTIN 300 MG/1
CAPSULE ORAL
Qty: 90 CAPSULE | Refills: 0 | Status: SHIPPED | OUTPATIENT
Start: 2019-03-15 | End: 2019-05-02 | Stop reason: ALTCHOICE

## 2019-03-15 NOTE — PATIENT INSTRUCTIONS
Discussed with patient and her son  Patient admits that she did better with 300 mg of gabapentin with no side effects and slept well  Will keep her on 300 mg at night  Stay on the 60 mg of Cymbalta daily  Keep your appointment with the new neurosurgeon  Follow-up here in 2 months and we will consider if we have to increase the Cymbalta at that time

## 2019-03-15 NOTE — PROGRESS NOTES
Assessment/Plan:     Chronic Problems:  Trigeminal neuralgia  Discussed with pt and son  Will increase the gabapentin 300 mg hs as pt did best on this  Continue on the duloxetine 60 mg during the day  Will reevaluate in 2 months    Depression with anxiety  Contiinue on the cymbalta  F/U here in 2 months  Will consider at that time if we need to increase to a higher dose  Visit Diagnosis:  Diagnoses and all orders for this visit:    Trigeminal neuralgia    Trigeminal neuralgia of right side of face  -     gabapentin (NEURONTIN) 300 mg capsule; Take 300 mg hs    Depression with anxiety  -     DULoxetine (CYMBALTA) 60 mg delayed release capsule; Take 1 capsule (60 mg total) by mouth daily    Other chronic pain  -     DULoxetine (CYMBALTA) 60 mg delayed release capsule; Take 1 capsule (60 mg total) by mouth daily          Subjective:    Patient ID: uDlce Saldivar is a [de-identified] y o  female  Pt is here ac by her son Susanna Andrews  Pt is now off sertraline and on cymbalta 60 mg once daily  Feels ok on this med  No side effects  Thinks she feels better on cymbalta then zoloft  Thinks mood wise it has helped  Not sure about the pain  Still with pain in the afternoon and evening  Seen by Dr Ankit Butler and told she was not taking it correctly  Could not tolerate doses during the day  Feels 300 mg gabapentin at night is a good dose for her  Takes all other meds as directed  No side effects noted  Pt was referred to another neuro surgeon, Dr Costa Skelton for evaluation of injection for possible atypical trigeminal neuralgia  Has appt next Friday  The following portions of the patient's history were reviewed and updated as appropriate: allergies, current medications, past family history, past medical history, past social history, past surgical history and problem list     Review of Systems   Constitutional: Negative for chills, diaphoresis, fatigue and fever  HENT: Negative for ear pain, postnasal drip and sore throat      Eyes: Negative for pain and visual disturbance  Respiratory: Negative for cough, chest tightness, shortness of breath and wheezing  Cardiovascular: Negative for chest pain and palpitations  Gastrointestinal: Negative for abdominal pain, constipation, diarrhea, nausea and vomiting  Genitourinary: Negative for dysuria, frequency and urgency  + urge incontinence  Musculoskeletal: Negative for arthralgias, gait problem and myalgias  Neurological: Positive for headaches  Negative for dizziness, light-headedness and numbness  Has pain int he righ side of her face, ear hurts and radiates to the head  Psychiatric/Behavioral: Positive for dysphoric mood  The patient is nervous/anxious  /66   Pulse 76   Temp 97 9 °F (36 6 °C)   Resp 16   Ht 5' 5" (1 651 m)   SpO2 98%   BMI 28 62 kg/m²   Social History     Socioeconomic History    Marital status:       Spouse name: Not on file    Number of children: Not on file    Years of education: Not on file    Highest education level: Not on file   Occupational History    Occupation: retired   Social Needs    Financial resource strain: Not on file    Food insecurity:     Worry: Not on file     Inability: Not on file   Wrike needs:     Medical: Not on file     Non-medical: Not on file   Tobacco Use    Smoking status: Former Smoker    Smokeless tobacco: Never Used   Substance and Sexual Activity    Alcohol use: Yes     Comment: occasional    Drug use: No    Sexual activity: Not on file   Lifestyle    Physical activity:     Days per week: Not on file     Minutes per session: Not on file    Stress: Not on file   Relationships    Social connections:     Talks on phone: Not on file     Gets together: Not on file     Attends [employer] service: Not on file     Active member of club or organization: Not on file     Attends meetings of clubs or organizations: Not on file     Relationship status: Not on file    Intimate partner violence:     Fear of current or ex partner: Not on file     Emotionally abused: Not on file     Physically abused: Not on file     Forced sexual activity: Not on file   Other Topics Concern    Not on file   Social History Narrative    Always uses seatbelt     Past Medical History:   Diagnosis Date    Arthritis     Dry eye syndrome     Macular degeneration     Memory loss     Migraine     Spinal stenosis of lumbar region     Supraventricular tachycardia ([address] )     Vitamin D deficiency     last assessed 2/7/17     Family History   Problem Relation Age of Onset    Aortic aneurysm Mother         abdominal aortic aneurysm    Hypertension Mother     Breast cancer Mother     Hypertension Father     Hypertension Sister     Hyperlipidemia Sister      Past Surgical History:   Procedure Laterality Date    APPENDECTOMY      BACK SURGERY      lower back surgery lumbar disc    CATARACT EXTRACTION      FEMUR FRACTURE SURGERY      REPLACEMENT TOTAL KNEE Right     REPLACEMENT TOTAL KNEE Left        Current Outpatient Medications:     Acetaminophen 325 MG CAPS, Take 650 mg by mouth every 6 (six) hours as needed, Disp: , Rfl:     Cholecalciferol (VITAMIN D PO), Take 5,000 Units by mouth Daily  , Disp: , Rfl:     cycloSPORINE (RESTASIS) 0 05 % ophthalmic emulsion, Apply 1 drop to eye 2 (two) times a day, Disp: , Rfl:     DULoxetine (CYMBALTA) 60 mg delayed release capsule, Take 1 capsule (60 mg total) by mouth daily, Disp: 90 capsule, Rfl: 0    Multiple Vitamins-Minerals (PRESERVISION AREDS 2+MULTI VIT) CAPS, BID, Disp: , Rfl:     verapamil (VERELAN PM) 120 MG 24 hr capsule, TAKE 1 CAPSULE BY MOUTH AT  NIGHT, Disp: 90 capsule, Rfl: 1    gabapentin (NEURONTIN) 300 mg capsule, Take 300 mg hs, Disp: 90 capsule, Rfl: 0    Allergies   Allergen Reactions    Dilaudid [Hydromorphone] Shortness Of Breath     Other reaction(s): Respiratory Distress  Other reaction(s): HORENESS    Penicillins      [facility] Lab Review   No visits with results within 2 Month(s) from this visit  Latest known visit with results is:   Appointment on 11/20/2018   Component Date Value    Valproic Acid, Free 11/20/2018 6 6     WBC 11/20/2018 5 84     RBC 11/20/2018 4 27     Hemoglobin 11/20/2018 13 3     Hematocrit 11/20/2018 41 4     MCV 11/20/2018 97     MCH 11/20/2018 31 1     MCHC 11/20/2018 32 1     RDW 11/20/2018 12 7     MPV 11/20/2018 9 9     Platelets 69/77/5461 185     nRBC 11/20/2018 0     Neutrophils Relative 11/20/2018 57     Immat GRANS % 11/20/2018 0     Lymphocytes Relative 11/20/2018 31     Monocytes Relative 11/20/2018 9     Eosinophils Relative 11/20/2018 2     Basophils Relative 11/20/2018 1     Neutrophils Absolute 11/20/2018 3 29     Immature Grans Absolute 11/20/2018 0 02     Lymphocytes Absolute 11/20/2018 1 82     Monocytes Absolute 11/20/2018 0 53     Eosinophils Absolute 11/20/2018 0 14     Basophils Absolute 11/20/2018 0 04     Sodium 11/20/2018 145     Potassium 11/20/2018 4 2     Chloride 11/20/2018 106     CO2 11/20/2018 32     ANION GAP 11/20/2018 7     BUN 11/20/2018 19     Creatinine 11/20/2018 0 62     Glucose, Fasting 11/20/2018 86     Calcium 11/20/2018 9 0     AST 11/20/2018 13     ALT 11/20/2018 17     Alkaline Phosphatase 11/20/2018 52     Total Protein 11/20/2018 6 8     Albumin 11/20/2018 3 7     Total Bilirubin 11/20/2018 0 30     eGFR 11/20/2018 86         Imaging: Mri Brain Cavernous / Trigeminal Wo Contrast    Result Date: 3/6/2019  Narrative: MRI BRAIN AND TRIGEMINAL-  WITHOUT CONTRAST INDICATION: G50 0: Trigeminal neuralgia  Right facial pain COMPARISON:  MR 2/6/2018 TECHNIQUE: Brain:   Sagittal T1, axial T2, axial [place], axial T1, axial FLAIR, axial diffusion imaging  Axial BRAVO pre contrast  Brainstem:  Coronal FIESTA, coronal T1 IV Contrast:  None was ordered IMAGE QUALITY:   Diagnostic  FINDINGS: BRAIN PARENCHYMA:  No acute disease    There is no acute ischemia, intracranial mass or mass effect  No pathologic hemosiderin deposition  Diffuse generalized volume loss with enlargement of all CSF containing spaces and a mild to moderate degree of chronic small vessel disease, both stable in the interval since prior study  BRAINSTEM:  Normal trigeminal nerves  normal signal and no mass or mass effect  Adriane Osgood is normal   No evidence of vascular compression  No CP angle mass  VENTRICLES:  Reflect volume loss, appropriate for age [de-identified] AND PITUITARY GLAND:  Normal  ORBITS:  Normal  PARANASAL SINUSES:  Trace sinus mucosal disease VASCULATURE:  Evaluation of the major intracranial vasculature demonstrates appropriate flow voids  CALVARIUM AND SKULL BASE:  Normal  EXTRACRANIAL SOFT TISSUES:  Normal      Impression: No acute disease  No pathology of the trigeminal nerve  Diffuse generalized volume loss and small vessel disease stable since prior study one year earlier  Workstation performed: [id number]       Objective:     Physical Exam   Constitutional: She is oriented to person, place, and time  She appears well-developed and well-nourished  No distress  HENT:   Head: Normocephalic and atraumatic  Right Ear: External ear normal    Left Ear: External ear normal    Mouth/Throat: Oropharynx is clear and moist    Eyes: Pupils are equal, round, and reactive to light  Conjunctivae and EOM are normal  Right eye exhibits no discharge  Left eye exhibits no discharge  Neck: Normal range of motion  Neck supple  Cardiovascular: Normal rate, regular rhythm and normal heart sounds  Exam reveals no friction rub  No murmur heard  Pulmonary/Chest: Effort normal and breath sounds normal  No respiratory distress  She has no wheezes  Musculoskeletal: Normal range of motion  She exhibits no edema, tenderness or deformity  Lymphadenopathy:     She has no cervical adenopathy  Neurological: She is alert and oriented to person, place, and time   No cranial nerve deficit  Skin: Skin is warm and dry  No rash noted  She is not diaphoretic  Psychiatric: She has a normal mood and affect  Her behavior is normal  Judgment and thought content normal          Patient Instructions   Discussed with patient and her son  Patient admits that she did better with 300 mg of gabapentin with no side effects and slept well  Will keep her on 300 mg at night  Stay on the 60 mg of Cymbalta daily  Keep your appointment with the new neurosurgeon  Follow-up here in 2 months and we will consider if we have to increase the Cymbalta at that time  CL Milner    Portions of the record may have been created with voice recognition software   Occasional wrong word or "sound a like" substitutions may have occurred due to the inherent limitations of voice recognition software   Read the chart carefully and recognize, using context, where substitutions have occurred

## 2019-03-15 NOTE — ASSESSMENT & PLAN NOTE
Aleena on the cymbalta  F/U here in 2 months  Will consider at that time if we need to increase to a higher dose

## 2019-03-15 NOTE — ASSESSMENT & PLAN NOTE
Discussed with pt and son  Will increase the gabapentin 300 mg hs as pt did best on this  Continue on the duloxetine 60 mg during the day   Will reevaluate in 2 months

## 2019-03-22 ENCOUNTER — TELEPHONE (OUTPATIENT)
Dept: FAMILY MEDICINE CLINIC | Facility: CLINIC | Age: 79
End: 2019-03-22

## 2019-03-22 ENCOUNTER — TELEPHONE (OUTPATIENT)
Dept: NEUROSURGERY | Facility: CLINIC | Age: 79
End: 2019-03-22

## 2019-03-22 ENCOUNTER — OFFICE VISIT (OUTPATIENT)
Dept: LAB | Facility: HOSPITAL | Age: 79
End: 2019-03-22
Attending: NEUROLOGICAL SURGERY
Payer: MEDICARE

## 2019-03-22 ENCOUNTER — OFFICE VISIT (OUTPATIENT)
Dept: NEUROSURGERY | Facility: CLINIC | Age: 79
End: 2019-03-22
Payer: MEDICARE

## 2019-03-22 VITALS
SYSTOLIC BLOOD PRESSURE: 154 MMHG | RESPIRATION RATE: 16 BRPM | DIASTOLIC BLOOD PRESSURE: 79 MMHG | HEIGHT: 65 IN | HEART RATE: 78 BPM | TEMPERATURE: 98 F | WEIGHT: 177 LBS | BODY MASS INDEX: 29.49 KG/M2

## 2019-03-22 DIAGNOSIS — G50.0 TRIGEMINAL NEURALGIA: ICD-10-CM

## 2019-03-22 LAB
ALBUMIN SERPL BCP-MCNC: 4 G/DL (ref 3.5–5)
ALP SERPL-CCNC: 67 U/L (ref 46–116)
ALT SERPL W P-5'-P-CCNC: 17 U/L (ref 12–78)
ANION GAP SERPL CALCULATED.3IONS-SCNC: 8 MMOL/L (ref 4–13)
APTT PPP: 30 SECONDS (ref 26–38)
AST SERPL W P-5'-P-CCNC: 14 U/L (ref 5–45)
ATRIAL RATE: 72 BPM
BACTERIA UR QL AUTO: ABNORMAL /HPF
BASOPHILS # BLD AUTO: 0.04 THOUSANDS/ΜL (ref 0–0.1)
BASOPHILS NFR BLD AUTO: 1 % (ref 0–1)
BILIRUB SERPL-MCNC: 0.4 MG/DL (ref 0.2–1)
BILIRUB UR QL STRIP: NEGATIVE
BUN SERPL-MCNC: 16 MG/DL (ref 5–25)
CALCIUM SERPL-MCNC: 9.1 MG/DL (ref 8.3–10.1)
CHLORIDE SERPL-SCNC: 105 MMOL/L (ref 100–108)
CLARITY UR: CLEAR
CO2 SERPL-SCNC: 30 MMOL/L (ref 21–32)
COLOR UR: YELLOW
CREAT SERPL-MCNC: 0.61 MG/DL (ref 0.6–1.3)
EOSINOPHIL # BLD AUTO: 0.09 THOUSAND/ΜL (ref 0–0.61)
EOSINOPHIL NFR BLD AUTO: 2 % (ref 0–6)
ERYTHROCYTE [DISTWIDTH] IN BLOOD BY AUTOMATED COUNT: 13.1 % (ref 11.6–15.1)
EST. AVERAGE GLUCOSE BLD GHB EST-MCNC: 111 MG/DL
GFR SERPL CREATININE-BSD FRML MDRD: 86 ML/MIN/1.73SQ M
GLUCOSE SERPL-MCNC: 92 MG/DL (ref 65–140)
GLUCOSE UR STRIP-MCNC: NEGATIVE MG/DL
HBA1C MFR BLD: 5.5 % (ref 4.2–6.3)
HCT VFR BLD AUTO: 40.3 % (ref 34.8–46.1)
HGB BLD-MCNC: 12.8 G/DL (ref 11.5–15.4)
HGB UR QL STRIP.AUTO: NEGATIVE
IMM GRANULOCYTES # BLD AUTO: 0.03 THOUSAND/UL (ref 0–0.2)
IMM GRANULOCYTES NFR BLD AUTO: 1 % (ref 0–2)
INR PPP: 0.98 (ref 0.86–1.17)
KETONES UR STRIP-MCNC: NEGATIVE MG/DL
LEUKOCYTE ESTERASE UR QL STRIP: ABNORMAL
LYMPHOCYTES # BLD AUTO: 1.21 THOUSANDS/ΜL (ref 0.6–4.47)
LYMPHOCYTES NFR BLD AUTO: 22 % (ref 14–44)
MCH RBC QN AUTO: 30.6 PG (ref 26.8–34.3)
MCHC RBC AUTO-ENTMCNC: 31.8 G/DL (ref 31.4–37.4)
MCV RBC AUTO: 96 FL (ref 82–98)
MONOCYTES # BLD AUTO: 0.43 THOUSAND/ΜL (ref 0.17–1.22)
MONOCYTES NFR BLD AUTO: 8 % (ref 4–12)
NEUTROPHILS # BLD AUTO: 3.7 THOUSANDS/ΜL (ref 1.85–7.62)
NEUTS SEG NFR BLD AUTO: 66 % (ref 43–75)
NITRITE UR QL STRIP: NEGATIVE
NON-SQ EPI CELLS URNS QL MICRO: ABNORMAL /HPF
NRBC BLD AUTO-RTO: 0 /100 WBCS
P AXIS: 54 DEGREES
PH UR STRIP.AUTO: 6.5 [PH]
PLATELET # BLD AUTO: 209 THOUSANDS/UL (ref 149–390)
PMV BLD AUTO: 10.7 FL (ref 8.9–12.7)
POTASSIUM SERPL-SCNC: 4 MMOL/L (ref 3.5–5.3)
PR INTERVAL: 120 MS
PROT SERPL-MCNC: 7.1 G/DL (ref 6.4–8.2)
PROT UR STRIP-MCNC: NEGATIVE MG/DL
PROTHROMBIN TIME: 12.9 SECONDS (ref 11.8–14.2)
QRS AXIS: -6 DEGREES
QRSD INTERVAL: 74 MS
QT INTERVAL: 406 MS
QTC INTERVAL: 444 MS
RBC # BLD AUTO: 4.18 MILLION/UL (ref 3.81–5.12)
RBC #/AREA URNS AUTO: ABNORMAL /HPF
SODIUM SERPL-SCNC: 143 MMOL/L (ref 136–145)
SP GR UR STRIP.AUTO: 1.01 (ref 1–1.03)
T WAVE AXIS: -5 DEGREES
UROBILINOGEN UR QL STRIP.AUTO: 0.2 E.U./DL
VENTRICULAR RATE: 72 BPM
WBC # BLD AUTO: 5.5 THOUSAND/UL (ref 4.31–10.16)
WBC #/AREA URNS AUTO: ABNORMAL /HPF

## 2019-03-22 PROCEDURE — 85730 THROMBOPLASTIN TIME PARTIAL: CPT

## 2019-03-22 PROCEDURE — 81001 URINALYSIS AUTO W/SCOPE: CPT | Performed by: NEUROLOGICAL SURGERY

## 2019-03-22 PROCEDURE — 85610 PROTHROMBIN TIME: CPT

## 2019-03-22 PROCEDURE — 36415 COLL VENOUS BLD VENIPUNCTURE: CPT

## 2019-03-22 PROCEDURE — 99213 OFFICE O/P EST LOW 20 MIN: CPT | Performed by: NEUROLOGICAL SURGERY

## 2019-03-22 PROCEDURE — 85025 COMPLETE CBC W/AUTO DIFF WBC: CPT

## 2019-03-22 PROCEDURE — 93010 ELECTROCARDIOGRAM REPORT: CPT | Performed by: INTERNAL MEDICINE

## 2019-03-22 PROCEDURE — 83036 HEMOGLOBIN GLYCOSYLATED A1C: CPT

## 2019-03-22 PROCEDURE — 80053 COMPREHEN METABOLIC PANEL: CPT

## 2019-03-22 PROCEDURE — 93005 ELECTROCARDIOGRAM TRACING: CPT

## 2019-03-22 RX ORDER — VANCOMYCIN HYDROCHLORIDE 1 G/200ML
1000 INJECTION, SOLUTION INTRAVENOUS ONCE
Status: CANCELLED | OUTPATIENT
Start: 2019-03-26 | End: 2019-03-22

## 2019-03-22 RX ORDER — CHLORHEXIDINE GLUCONATE 0.12 MG/ML
15 RINSE ORAL ONCE
Status: CANCELLED | OUTPATIENT
Start: 2019-03-26 | End: 2019-03-22

## 2019-03-22 NOTE — H&P (VIEW-ONLY)
Assessment/Plan:    No problem-specific Assessment & Plan notes found for this encounter  Patient with right facial pain most likely type 2 trigeminal neuralgia  V1 and V2  Will attempt V2 RF ablation  OR for right V2 rhizotomy     Diagnoses and all orders for this visit:    Trigeminal neuralgia  -     Ambulatory referral to Neurosurgery  -     Case request operating room: RHIZOTOMY TRIGEMINAL NERVES V2, RIGHT; Standing  -     Case request operating room: RHIZOTOMY TRIGEMINAL NERVES V2, RIGHT    Other orders  -     Diet NPO; Sips with meds; Standing  -     Nursing Communication [address] [place] [address] Interventions Implemented; Standing  -     Nursing Communication Use 2 CHG cloths, have the patient wash his/her body from the neck down or have staff wash entire body (from neck down) if patient is unable; Standing  -     Nursing Communication Swab both nares with Povidone-Iodine solution, EXCLUDE if patient has shellfish/Iodine allergy; Standing  -     chlorhexidine (PERIDEX) 0 12 % oral rinse 15 mL  -     Void on call to OR; Standing  -     Insert peripheral IV; Standing  -     vancomycin (VANCOCIN) IVPB (premix) 1,000 mg          Subjective:      Patient ID: Sunday Cordova is a [de-identified] y o  female  Patient with chronic right facial pain  When it started it was an intermittent pain  Now it is constant and dull ache  In V1 and V2 distributions  Triggers include weather and activity  Patient reports some effect with gabapentin  No facial weakness or numbness  No prior treatment in the past        The following portions of the patient's history were reviewed and updated as appropriate:   She  has a past medical history of Arthritis, Dry eye syndrome, Macular degeneration, Memory loss, Migraine, Spinal stenosis of lumbar region, Supraventricular tachycardia ([address] ), and Vitamin D deficiency    She   Patient Active Problem List    Diagnosis Date Noted    Other chronic pain 02/21/2019    Depression with anxiety 02/19/2019    Trigeminal neuralgia 09/13/2018    Occipital neuralgia of right side 09/13/2018    Trigeminal neuropathy 09/13/2018    Myofascial pain on right side 09/13/2018    Chronic nonintractable headache 08/20/2018     She  has a past surgical history that includes Cataract extraction; Replacement total knee (Right); Appendectomy; Femur fracture surgery; Back surgery; and Replacement total knee (Left)  Her family history includes Aortic aneurysm in her mother; Breast cancer in her mother; Hyperlipidemia in her sister; Hypertension in her father, mother, and sister  She  reports that she has quit smoking  She has never used smokeless tobacco  She reports that she drinks alcohol  She reports that she does not use drugs  Current Outpatient Medications on File Prior to Visit   Medication Sig    Acetaminophen 325 MG CAPS Take 650 mg by mouth every 6 (six) hours as needed    Cholecalciferol (VITAMIN D PO) Take 5,000 Units by mouth Daily      cycloSPORINE (RESTASIS) 0 05 % ophthalmic emulsion Apply 1 drop to eye 2 (two) times a day    DULoxetine (CYMBALTA) 60 mg delayed release capsule Take 1 capsule (60 mg total) by mouth daily    gabapentin (NEURONTIN) 300 mg capsule Take 300 mg hs    Multiple Vitamins-Minerals (PRESERVISION AREDS 2+MULTI VIT) CAPS BID    verapamil (VERELAN PM) 120 MG 24 hr capsule TAKE 1 CAPSULE BY MOUTH AT  NIGHT     No current facility-administered medications on file prior to visit       Review of Systems   Constitutional: Negative  HENT: Positive for ear pain (occasionally in right ear)  Eyes: Negative  Respiratory: Negative  Cardiovascular: Negative  Gastrointestinal: Negative  Endocrine: Negative  Genitourinary: Negative  Musculoskeletal: Positive for gait problem  Allergic/Immunologic: Negative  Neurological: Positive for dizziness (when standing)  Hematological: Negative  Psychiatric/Behavioral: Negative            Objective:      /79 (BP Location: Right arm, Patient Position: Sitting)   Pulse 78   Temp 98 °F (36 7 °C) (Tympanic)   Resp 16   Ht 5' 5" (1 651 m)   Wt 80 3 kg (177 lb)   BMI 29 45 kg/m²          Physical Exam   Constitutional: She is oriented to person, place, and time  She appears well-developed  HENT:   Head: Normocephalic  Eyes: Pupils are equal, round, and reactive to light  EOM are normal    Cardiovascular: Normal rate  Pulmonary/Chest: Effort normal    Neurological: She is alert and oriented to person, place, and time  She has normal strength  No cranial nerve deficit or sensory deficit

## 2019-03-22 NOTE — PRE-PROCEDURE INSTRUCTIONS
Pre-Surgery Instructions:   Medication Instructions    Acetaminophen 325 MG CAPS Instructed patient per Anesthesia Guidelines   Cholecalciferol (VITAMIN D PO) Instructed patient per Anesthesia Guidelines   cycloSPORINE (RESTASIS) 0 05 % ophthalmic emulsion Instructed patient per Anesthesia Guidelines   DULoxetine (CYMBALTA) 60 mg delayed release capsule Instructed patient per Anesthesia Guidelines   gabapentin (NEURONTIN) 300 mg capsule Instructed patient per Anesthesia Guidelines   verapamil (VERELAN PM) 120 MG 24 hr capsule Instructed patient per Anesthesia Guidelines  Pre-op Showering Instructions for Surgery Patients    Before your operation, you play an important role in decreasing your risk for infection by washing with special antiseptic soap  This is an effective way to reduce bacteria on the skin which may help to prevent infections at the surgical site  Please read the following directions in advance  1  In the week before your operation, purchase a 4 ounce bottle of antiseptic soap containing chlorhexidine gluconate (CHG)  4%  Some brand names include: Aplicare®, Endure, and Hibiclens®  The cost is usually less than $5 00   For your convenience, the [address] [place] [address] carries the soap   It may also be available at your doctors office or pre-admission testing center, and at most retail pharmacies   If you are allergic or sensitive to soaps containing CHG, please let your doctor know so another antiseptic can be suggested   CHG antiseptic soap is for external use only  2   The day before your operation, follow these instructions carefully to get ready   Please clean linens (sheets) on your bed; you should sleep on clean sheets after your evening shower   Get clean towels and washcloth ready - you need enough for 2 showers   Set aside clean underwear, pajamas, and clothing to wear after the showers     Reminders:   DO NOT use any other soap or body rinse on your skin during or after the antiseptic showers   DO NOT use lotion, powder, deodorant, or perfume/aftershave of any kind on your skin after your antiseptic shower   DO NOT shave any body parts in the 24 hours/day before your operation   DO NOT get the antiseptic soap in your eyes, ears, nose, mouth, or vaginal area    3  You will need to shower the night before AND the morning of your surgery  Shower 1:   The first evening before the operation, take the first shower   First, shampoo your hair with regular shampoo and rinse it completely before you use the antiseptic soap  After washing and rinsing your hair, rinse your body   Next, use a clean washcloth to apply the antiseptic soap and wash your body from the neck down to your toes using ½ bottle of the antiseptic soap  You will use the other ½ bottle for the second shower   Clean the area where your incision will be; lather this area well for about 2 minutes   If you are having head or neck surgery, wash areas with the antiseptic soap   Rinse yourself completely with running water   Use a clean towel to dry off   Wear clean underwear and clothing/pajamas  Shower 2   The morning of your operation, take the second shower following the same steps as Shower 1 using the second ½ of the bottle of antiseptic soap   Use clean cloths and towels to wash and dry yourself   Wear clean underwear and clothing    Pre-procedure instructions given without any further questions or concern at this time  Patient is aware she will need a ride home following anesthesia  She is "working on this"  Aware if she uses a driving service, she still needs someone with her  Has the CHG wash and will review the written instructions from Dr Ivanna Lopez as they vary slightly from above

## 2019-03-22 NOTE — TELEPHONE ENCOUNTER
Several calls to patient to update on surgical procedure etc  Dr Zhao Going changing to original  Procedure for the management of trigeminal neuralgia instead of RF will perform Glycerin rhizotomy --  Glycerin product now available in [id number] [place] [address], Orders sent to Kyler Pat @ [facility]  Several VM left will continue to try and reach  Signed surgical consent in the presence of surgeon after procedure explained: glycerin rhizotomy right face -V2  --  Assessment for comorbid medical conditions:   HO adverse response to general anesthesia:Denies   Cardiac:SVT treating with verapamil   Pulmonary: Denies inhaler use, COPD, asthma , sleep apnea , admits to  Smoker 1 PPD  Stopped smoking 15 years ago     Endocrine:  Denies   MISC/Oncology : [place] cell--10 years ago excision  , no longer routinely follow w/ dermatologist   Skin intact/Diversion devices (urostomy, colostomy,ileostomy,):---denies   Hold Medications requirement pre-&- post-surgical procedure (AC/antiplatelets, ASA, NSAID, Vitamins, dietary supplements, OTC, DMARDs etc   Medications reviewed cross referenced with medication hold list, provided medication hold list  --reviewed in detail   Anticoagulant/Antiplatelet use etc : denies   Personal history of venous thromboembolic disease: denies   Imagining: MRI Brain images in PACS viewed   Pain management:  Gabapentin Duloxetine   Medication hold list for surgery (AC, ASA, NSAID, vitamins, dietary supplements, OTC):--reinforced   Discussed overview of surgical process from office appointment thru 6 weeks post op:   Patient/SO verbalized and understanding

## 2019-03-22 NOTE — TELEPHONE ENCOUNTER
Called and spoke with Dr Reva Hall office and made aware that you will complete the clearance  Also to insure that the labs and ekg will be sent to [facility] for review in order for the clearance  They then explained that Nay Groves will be going to [facility] to have everything done

## 2019-03-22 NOTE — PROGRESS NOTES
Assessment/Plan:    No problem-specific Assessment & Plan notes found for this encounter  Patient with right facial pain most likely type 2 trigeminal neuralgia  V1 and V2  Will attempt V2 RF ablation  OR for right V2 rhizotomy     Diagnoses and all orders for this visit:    Trigeminal neuralgia  -     Ambulatory referral to Neurosurgery  -     Case request operating room: RHIZOTOMY TRIGEMINAL NERVES V2, RIGHT; Standing  -     Case request operating room: RHIZOTOMY TRIGEMINAL NERVES V2, RIGHT    Other orders  -     Diet NPO; Sips with meds; Standing  -     Nursing Communication [address] [place] [address] Interventions Implemented; Standing  -     Nursing Communication Use 2 CHG cloths, have the patient wash his/her body from the neck down or have staff wash entire body (from neck down) if patient is unable; Standing  -     Nursing Communication Swab both nares with Povidone-Iodine solution, EXCLUDE if patient has shellfish/Iodine allergy; Standing  -     chlorhexidine (PERIDEX) 0 12 % oral rinse 15 mL  -     Void on call to OR; Standing  -     Insert peripheral IV; Standing  -     vancomycin (VANCOCIN) IVPB (premix) 1,000 mg          Subjective:      Patient ID: Kim Landis is a [de-identified] y o  female  Patient with chronic right facial pain  When it started it was an intermittent pain  Now it is constant and dull ache  In V1 and V2 distributions  Triggers include weather and activity  Patient reports some effect with gabapentin  No facial weakness or numbness  No prior treatment in the past        The following portions of the patient's history were reviewed and updated as appropriate:   She  has a past medical history of Arthritis, Dry eye syndrome, Macular degeneration, Memory loss, Migraine, Spinal stenosis of lumbar region, Supraventricular tachycardia ([address] ), and Vitamin D deficiency    She   Patient Active Problem List    Diagnosis Date Noted    Other chronic pain 02/21/2019    Depression with anxiety 02/19/2019    Trigeminal neuralgia 09/13/2018    Occipital neuralgia of right side 09/13/2018    Trigeminal neuropathy 09/13/2018    Myofascial pain on right side 09/13/2018    Chronic nonintractable headache 08/20/2018     She  has a past surgical history that includes Cataract extraction; Replacement total knee (Right); Appendectomy; Femur fracture surgery; Back surgery; and Replacement total knee (Left)  Her family history includes Aortic aneurysm in her mother; Breast cancer in her mother; Hyperlipidemia in her sister; Hypertension in her father, mother, and sister  She  reports that she has quit smoking  She has never used smokeless tobacco  She reports that she drinks alcohol  She reports that she does not use drugs  Current Outpatient Medications on File Prior to Visit   Medication Sig    Acetaminophen 325 MG CAPS Take 650 mg by mouth every 6 (six) hours as needed    Cholecalciferol (VITAMIN D PO) Take 5,000 Units by mouth Daily      cycloSPORINE (RESTASIS) 0 05 % ophthalmic emulsion Apply 1 drop to eye 2 (two) times a day    DULoxetine (CYMBALTA) 60 mg delayed release capsule Take 1 capsule (60 mg total) by mouth daily    gabapentin (NEURONTIN) 300 mg capsule Take 300 mg hs    Multiple Vitamins-Minerals (PRESERVISION AREDS 2+MULTI VIT) CAPS BID    verapamil (VERELAN PM) 120 MG 24 hr capsule TAKE 1 CAPSULE BY MOUTH AT  NIGHT     No current facility-administered medications on file prior to visit       Review of Systems   Constitutional: Negative  HENT: Positive for ear pain (occasionally in right ear)  Eyes: Negative  Respiratory: Negative  Cardiovascular: Negative  Gastrointestinal: Negative  Endocrine: Negative  Genitourinary: Negative  Musculoskeletal: Positive for gait problem  Allergic/Immunologic: Negative  Neurological: Positive for dizziness (when standing)  Hematological: Negative  Psychiatric/Behavioral: Negative            Objective:      /79 (BP Location: Right arm, Patient Position: Sitting)   Pulse 78   Temp 98 °F (36 7 °C) (Tympanic)   Resp 16   Ht 5' 5" (1 651 m)   Wt 80 3 kg (177 lb)   BMI 29 45 kg/m²          Physical Exam   Constitutional: She is oriented to person, place, and time  She appears well-developed  HENT:   Head: Normocephalic  Eyes: Pupils are equal, round, and reactive to light  EOM are normal    Cardiovascular: Normal rate  Pulmonary/Chest: Effort normal    Neurological: She is alert and oriented to person, place, and time  She has normal strength  No cranial nerve deficit or sensory deficit

## 2019-03-23 DIAGNOSIS — G89.29 OTHER CHRONIC PAIN: ICD-10-CM

## 2019-03-23 DIAGNOSIS — F41.8 DEPRESSION WITH ANXIETY: ICD-10-CM

## 2019-03-24 RX ORDER — DULOXETIN HYDROCHLORIDE 60 MG/1
CAPSULE, DELAYED RELEASE ORAL
Qty: 30 CAPSULE | Refills: 0 | Status: SHIPPED | OUTPATIENT
Start: 2019-03-24 | End: 2019-06-18 | Stop reason: SDUPTHER

## 2019-03-24 NOTE — TELEPHONE ENCOUNTER
Finally reached patient updated on operative procedure Tuesday glycerin rhizotomy instead of RF , will remain overnight stay extended recovery  I will call her tomorrow with additional d/c information  Reinforced will receive june Monday between 1500 and 1900 w/ time to reports for surgery on Tuesday  She verbalized and understanding of information communicated

## 2019-03-25 ENCOUNTER — DOCUMENTATION (OUTPATIENT)
Dept: FAMILY MEDICINE CLINIC | Facility: CLINIC | Age: 79
End: 2019-03-25

## 2019-03-25 ENCOUNTER — DOCUMENTATION (OUTPATIENT)
Dept: NEUROSURGERY | Facility: CLINIC | Age: 79
End: 2019-03-25

## 2019-03-26 ENCOUNTER — APPOINTMENT (OUTPATIENT)
Dept: RADIOLOGY | Facility: HOSPITAL | Age: 79
End: 2019-03-26
Payer: MEDICARE

## 2019-03-26 ENCOUNTER — HOSPITAL ENCOUNTER (OUTPATIENT)
Facility: HOSPITAL | Age: 79
Setting detail: OUTPATIENT SURGERY
Discharge: HOME | End: 2019-03-27
Attending: NEUROLOGICAL SURGERY | Admitting: NEUROLOGICAL SURGERY
Payer: MEDICARE

## 2019-03-26 ENCOUNTER — ANESTHESIA EVENT (OUTPATIENT)
Dept: PERIOP | Facility: HOSPITAL | Age: 79
End: 2019-03-26
Payer: MEDICARE

## 2019-03-26 ENCOUNTER — ANESTHESIA (OUTPATIENT)
Dept: PERIOP | Facility: HOSPITAL | Age: 79
End: 2019-03-26
Payer: MEDICARE

## 2019-03-26 DIAGNOSIS — G50.0 TRIGEMINAL NEURALGIA: Primary | ICD-10-CM

## 2019-03-26 PROCEDURE — 70250 X-RAY EXAM OF SKULL: CPT

## 2019-03-26 PROCEDURE — 61790 TREAT TRIGEMINAL NERVE: CPT | Performed by: NEUROLOGICAL SURGERY

## 2019-03-26 RX ORDER — CEPHALEXIN 500 MG/1
500 CAPSULE ORAL EVERY 6 HOURS SCHEDULED
Qty: 12 CAPSULE | Refills: 0 | Status: SHIPPED | OUTPATIENT
Start: 2019-03-26 | End: 2019-03-29

## 2019-03-26 RX ORDER — OXYCODONE HYDROCHLORIDE 5 MG/1
10 TABLET ORAL EVERY 4 HOURS PRN
Status: DISCONTINUED | OUTPATIENT
Start: 2019-03-26 | End: 2019-03-27 | Stop reason: HOSPADM

## 2019-03-26 RX ORDER — FENTANYL CITRATE 50 UG/ML
INJECTION, SOLUTION INTRAMUSCULAR; INTRAVENOUS AS NEEDED
Status: DISCONTINUED | OUTPATIENT
Start: 2019-03-26 | End: 2019-03-26 | Stop reason: SURG

## 2019-03-26 RX ORDER — DULOXETIN HYDROCHLORIDE 30 MG/1
60 CAPSULE, DELAYED RELEASE ORAL DAILY
Status: DISCONTINUED | OUTPATIENT
Start: 2019-03-26 | End: 2019-03-27 | Stop reason: HOSPADM

## 2019-03-26 RX ORDER — ACETAMINOPHEN 325 MG/1
650 TABLET ORAL EVERY 4 HOURS PRN
Status: DISCONTINUED | OUTPATIENT
Start: 2019-03-26 | End: 2019-03-26

## 2019-03-26 RX ORDER — CHLORHEXIDINE GLUCONATE 0.12 MG/ML
15 RINSE ORAL ONCE
Status: COMPLETED | OUTPATIENT
Start: 2019-03-26 | End: 2019-03-26

## 2019-03-26 RX ORDER — SODIUM CHLORIDE, SODIUM LACTATE, POTASSIUM CHLORIDE, CALCIUM CHLORIDE 600; 310; 30; 20 MG/100ML; MG/100ML; MG/100ML; MG/100ML
75 INJECTION, SOLUTION INTRAVENOUS CONTINUOUS
Status: DISCONTINUED | OUTPATIENT
Start: 2019-03-26 | End: 2019-03-26

## 2019-03-26 RX ORDER — GABAPENTIN 300 MG/1
300 CAPSULE ORAL
Status: DISCONTINUED | OUTPATIENT
Start: 2019-03-26 | End: 2019-03-27 | Stop reason: HOSPADM

## 2019-03-26 RX ORDER — PROMETHAZINE HYDROCHLORIDE 25 MG/ML
25 INJECTION, SOLUTION INTRAMUSCULAR; INTRAVENOUS EVERY 6 HOURS PRN
Status: DISCONTINUED | OUTPATIENT
Start: 2019-03-26 | End: 2019-03-26

## 2019-03-26 RX ORDER — VANCOMYCIN HYDROCHLORIDE 1 G/200ML
1000 INJECTION, SOLUTION INTRAVENOUS ONCE
Status: DISCONTINUED | OUTPATIENT
Start: 2019-03-26 | End: 2019-03-26 | Stop reason: HOSPADM

## 2019-03-26 RX ORDER — DEXAMETHASONE SODIUM PHOSPHATE 4 MG/ML
4 INJECTION, SOLUTION INTRA-ARTICULAR; INTRALESIONAL; INTRAMUSCULAR; INTRAVENOUS; SOFT TISSUE EVERY 6 HOURS SCHEDULED
Status: DISCONTINUED | OUTPATIENT
Start: 2019-03-27 | End: 2019-03-26

## 2019-03-26 RX ORDER — FENTANYL CITRATE/PF 50 MCG/ML
25 SYRINGE (ML) INJECTION
Status: COMPLETED | OUTPATIENT
Start: 2019-03-26 | End: 2019-03-26

## 2019-03-26 RX ORDER — ACETAMINOPHEN 325 MG/1
650 TABLET ORAL EVERY 4 HOURS PRN
Status: DISCONTINUED | OUTPATIENT
Start: 2019-03-26 | End: 2019-03-27 | Stop reason: HOSPADM

## 2019-03-26 RX ORDER — SODIUM CHLORIDE, SODIUM LACTATE, POTASSIUM CHLORIDE, CALCIUM CHLORIDE 600; 310; 30; 20 MG/100ML; MG/100ML; MG/100ML; MG/100ML
75 INJECTION, SOLUTION INTRAVENOUS CONTINUOUS
Status: DISCONTINUED | OUTPATIENT
Start: 2019-03-26 | End: 2019-03-27 | Stop reason: HOSPADM

## 2019-03-26 RX ORDER — VANCOMYCIN HYDROCHLORIDE 1 G/200ML
1000 INJECTION, SOLUTION INTRAVENOUS ONCE
Status: COMPLETED | OUTPATIENT
Start: 2019-03-26 | End: 2019-03-26

## 2019-03-26 RX ORDER — PROPOFOL 10 MG/ML
INJECTION, EMULSION INTRAVENOUS AS NEEDED
Status: DISCONTINUED | OUTPATIENT
Start: 2019-03-26 | End: 2019-03-26 | Stop reason: SURG

## 2019-03-26 RX ORDER — MORPHINE SULFATE 4 MG/ML
2 INJECTION, SOLUTION INTRAMUSCULAR; INTRAVENOUS
Status: DISCONTINUED | OUTPATIENT
Start: 2019-03-26 | End: 2019-03-26 | Stop reason: HOSPADM

## 2019-03-26 RX ORDER — POLYVINYL ALCOHOL 14 MG/ML
1 SOLUTION/ DROPS OPHTHALMIC EVERY 12 HOURS SCHEDULED
Status: DISCONTINUED | OUTPATIENT
Start: 2019-03-26 | End: 2019-03-27 | Stop reason: HOSPADM

## 2019-03-26 RX ORDER — CHLORHEXIDINE GLUCONATE 0.12 MG/ML
15 RINSE ORAL ONCE
Status: DISCONTINUED | OUTPATIENT
Start: 2019-03-26 | End: 2019-03-26 | Stop reason: HOSPADM

## 2019-03-26 RX ORDER — ONDANSETRON 2 MG/ML
4 INJECTION INTRAMUSCULAR; INTRAVENOUS EVERY 6 HOURS PRN
Status: DISCONTINUED | OUTPATIENT
Start: 2019-03-26 | End: 2019-03-27 | Stop reason: HOSPADM

## 2019-03-26 RX ORDER — OXYCODONE HYDROCHLORIDE 5 MG/1
5 TABLET ORAL EVERY 4 HOURS PRN
Status: DISCONTINUED | OUTPATIENT
Start: 2019-03-26 | End: 2019-03-27 | Stop reason: HOSPADM

## 2019-03-26 RX ORDER — MIDAZOLAM HYDROCHLORIDE 1 MG/ML
INJECTION INTRAMUSCULAR; INTRAVENOUS AS NEEDED
Status: DISCONTINUED | OUTPATIENT
Start: 2019-03-26 | End: 2019-03-26 | Stop reason: SURG

## 2019-03-26 RX ADMIN — OXYCODONE 10 MG: 5 TABLET ORAL at 20:05

## 2019-03-26 RX ADMIN — FENTANYL CITRATE 25 MCG: 50 INJECTION, SOLUTION INTRAMUSCULAR; INTRAVENOUS at 15:21

## 2019-03-26 RX ADMIN — PROPOFOL 80 MG: 10 INJECTION, EMULSION INTRAVENOUS at 14:18

## 2019-03-26 RX ADMIN — VERAPAMIL HYDROCHLORIDE 120 MG: 120 TABLET, FILM COATED, EXTENDED RELEASE ORAL at 21:22

## 2019-03-26 RX ADMIN — CHLORHEXIDINE GLUCONATE 15 ML: 1.2 RINSE ORAL at 15:00

## 2019-03-26 RX ADMIN — FENTANYL CITRATE 50 MCG: 50 INJECTION, SOLUTION INTRAMUSCULAR; INTRAVENOUS at 14:18

## 2019-03-26 RX ADMIN — VANCOMYCIN HYDROCHLORIDE 1000 MG: 1 INJECTION, SOLUTION INTRAVENOUS at 15:00

## 2019-03-26 RX ADMIN — GABAPENTIN 300 MG: 300 CAPSULE ORAL at 21:23

## 2019-03-26 RX ADMIN — FENTANYL CITRATE 25 MCG: 50 INJECTION, SOLUTION INTRAMUSCULAR; INTRAVENOUS at 15:13

## 2019-03-26 RX ADMIN — MIDAZOLAM HYDROCHLORIDE 1 MG: 1 INJECTION, SOLUTION INTRAMUSCULAR; INTRAVENOUS at 14:18

## 2019-03-26 RX ADMIN — FENTANYL CITRATE 25 MCG: 50 INJECTION, SOLUTION INTRAMUSCULAR; INTRAVENOUS at 15:31

## 2019-03-26 RX ADMIN — FENTANYL CITRATE 25 MCG: 50 INJECTION, SOLUTION INTRAMUSCULAR; INTRAVENOUS at 15:01

## 2019-03-26 RX ADMIN — CHLORHEXIDINE GLUCONATE 0.12% ORAL RINSE 15 ML: 1.2 LIQUID ORAL at 11:34

## 2019-03-26 RX ADMIN — DULOXETINE HYDROCHLORIDE 60 MG: 30 CAPSULE, DELAYED RELEASE ORAL at 21:22

## 2019-03-26 RX ADMIN — VANCOMYCIN HYDROCHLORIDE 1000 MG: 1 INJECTION, SOLUTION INTRAVENOUS at 14:10

## 2019-03-26 RX ADMIN — POLYVINYL ALCOHOL 1 DROP: 14 SOLUTION/ DROPS OPHTHALMIC at 21:24

## 2019-03-26 RX ADMIN — FENTANYL CITRATE 50 MCG: 50 INJECTION, SOLUTION INTRAMUSCULAR; INTRAVENOUS at 14:28

## 2019-03-26 RX ADMIN — SODIUM CHLORIDE, SODIUM LACTATE, POTASSIUM CHLORIDE, AND CALCIUM CHLORIDE 75 ML/HR: .6; .31; .03; .02 INJECTION, SOLUTION INTRAVENOUS at 11:34

## 2019-03-26 RX ADMIN — SODIUM CHLORIDE, SODIUM LACTATE, POTASSIUM CHLORIDE, AND CALCIUM CHLORIDE 75 ML/HR: .6; .31; .03; .02 INJECTION, SOLUTION INTRAVENOUS at 18:00

## 2019-03-26 RX ADMIN — ACETAMINOPHEN 650 MG: 325 TABLET ORAL at 18:26

## 2019-03-26 RX ADMIN — PROPOFOL 20 MG: 10 INJECTION, EMULSION INTRAVENOUS at 14:19

## 2019-03-26 NOTE — ANESTHESIA POSTPROCEDURE EVALUATION
Post-Op Assessment Note    CV Status:  Stable    Pain management: adequate     Mental Status:  Alert   Hydration Status:  Stable   PONV Controlled:  None   Airway Patency:  Patent   Post Op Vitals Reviewed: Yes      Staff: JOSUE           BP (!) 185/75 (03/26/19 1501)    Temp 97 6 °F (36 4 °C) (03/26/19 1501)    Pulse 89 (03/26/19 1501)   Resp 12 (03/26/19 1501)    SpO2 99 % (03/26/19 1501)

## 2019-03-26 NOTE — DISCHARGE INSTRUCTIONS
RHIZOTOMY DISCHARGE INSTRUCTIONS    What happens when I go home after the Rhizotomy? Surgical site: The bandage may be removed the day after surgery    Activity:   Resume normal activity level    Shower: You may shower the same day    Antibiotics: Take Keflex four times a day for 3 days after discharge    Pain:   Take acetaminophen (i e  Tylenol) for pain according to  instructions    Continue all your previous facial pain medications without change      Please call the office at [phone] if you have any of the followin  Redness, swelling, heat, or unusual drainage (green, yellow, white, smelly) from the surgical site  2  Heavy bleeding from the surgical site  3  Chills or fever above 101 degrees   4  Pain not relieved by acetaminophen  5  Questions or concerns about your surgery

## 2019-03-26 NOTE — OP NOTE
OPERATIVE REPORT  PATIENT NAME: Manpreet Seals    :  1940  MRN: [id number]  Pt Location: [facility] OR ROOM 01    SURGERY DATE: 3/26/2019    Surgeon(s) and Role:     * Lauren Sauer MD - Primary     * Lakia Huerta PA-C - Assisting    Preop Diagnosis:  Trigeminal neuralgia [G50 0]    Post-Op Diagnosis Codes:     * Trigeminal neuralgia [G50 0]    Procedure(s) (LRB):  GLYCEROL RHIZOTOMY TRIGEMINAL NERVES (V1-V3), RIGHT (Right)    Specimen(s):  * No specimens in log *    Estimated Blood Loss:   Minimal    Drains:  * No LDAs found *    Anesthesia Type:   General    Operative Indications:  Trigeminal neuralgia [G50 0]      Operative Findings:  See dictated note,     Complications:   None    Procedure and Technique:  The patient was taken to the operative theatre  She was positioned semisitting upright with her head neutral  The lateral Yesika was brought into the field  I planned an entry 2 5 cm from the medial lip and aimed a trajectory at the intersection of the medial pupil and 3 cm from the EAC       I used lateral fluoro to confirm that I was approximately 5-10 mm from the sella along the clival line  I used a 20G needle for entry   After the inner stylet was removed I noted CSF flow  I used omnipaque and confirmed I was in the the trigeminal cistern and then used 0 3 cc of glycerol 99% anhydrous   I then withdrew the needle  The patient was allowed to awaken and extubated she was taken to the post op recovery area in stable condition      I was present for the entire procedure, A qualified resident physician was not available and A physician assistant was required during the procedure for retraction tissue handling,dissection and suturing    Patient Disposition:  PACU     SIGNATURE: Lauren Sauer MD  DATE: 2019  TIME: 2:54 PM

## 2019-03-26 NOTE — ANESTHESIA PREPROCEDURE EVALUATION
Review of Systems/Medical History  Patient summary reviewed  Chart reviewed      Cardiovascular  EKG reviewed, Dysrhythmias , history of PSVT,    Pulmonary       GI/Hepatic            Endo/Other     GYN       Hematology   Musculoskeletal    Arthritis     Neurology    Headaches,    Psychology   Anxiety,   Chronic pain,            Physical Exam    Airway    Mallampati score: II  TM Distance: >3 FB  Neck ROM: full     Dental       Cardiovascular  Cardiovascular exam normal    Pulmonary  Pulmonary exam normal     Other Findings        Anesthesia Plan  ASA Score- 3     Anesthesia Type- general with ASA Monitors  Additional Monitors:   Airway Plan: LMA  Plan Factors-    Induction- intravenous  Postoperative Plan-     Informed Consent- Anesthetic plan and risks discussed with patient  I personally reviewed this patient with the CRNA  Discussed and agreed on the Anesthesia Plan with the CRNA  Stephanie Alva

## 2019-03-27 VITALS
WEIGHT: 173 LBS | HEART RATE: 74 BPM | HEIGHT: 65 IN | RESPIRATION RATE: 17 BRPM | BODY MASS INDEX: 28.82 KG/M2 | OXYGEN SATURATION: 94 % | SYSTOLIC BLOOD PRESSURE: 136 MMHG | TEMPERATURE: 98.2 F | DIASTOLIC BLOOD PRESSURE: 64 MMHG

## 2019-03-27 RX ADMIN — OXYCODONE 5 MG: 5 TABLET ORAL at 06:32

## 2019-03-27 RX ADMIN — OXYCODONE 5 MG: 5 TABLET ORAL at 02:36

## 2019-03-27 RX ADMIN — POLYVINYL ALCOHOL 1 DROP: 14 SOLUTION/ DROPS OPHTHALMIC at 09:27

## 2019-03-27 RX ADMIN — SODIUM CHLORIDE, SODIUM LACTATE, POTASSIUM CHLORIDE, AND CALCIUM CHLORIDE 75 ML/HR: .6; .31; .03; .02 INJECTION, SOLUTION INTRAVENOUS at 06:32

## 2019-03-27 NOTE — SOCIAL WORK
Met with Pt  Pt reports to  that she is feeling a lot better since this morning  Pt does not believe she needs VNA  Pt reports that her son will transport her home around 1:30pm  Will follow

## 2019-03-27 NOTE — PLAN OF CARE
Problem: Potential for Falls  Goal: Patient will remain free of falls  Description  INTERVENTIONS:  - Assess patient frequently for physical needs  -  Identify cognitive and physical deficits and behaviors that affect risk of falls    -  [place] fall precautions as indicated by assessment   - Educate patient/family on patient safety including physical limitations  - Instruct patient to call for assistance with activity based on assessment  - Modify environment to reduce risk of injury  - Consider OT/PT consult to assist with strengthening/mobility  Outcome: Progressing     Problem: PAIN - ADULT  Goal: Verbalizes/displays adequate comfort level or baseline comfort level  Description  Interventions:  - Encourage patient to monitor pain and request assistance  - Assess pain using appropriate pain scale  - Administer analgesics based on type and severity of pain and evaluate response  - Implement non-pharmacological measures as appropriate and evaluate response  - Consider cultural and social influences on pain and pain management  - Notify physician/advanced practitioner if interventions unsuccessful or patient reports new pain  Outcome: Progressing

## 2019-03-27 NOTE — PLAN OF CARE
Problem: Potential for Falls  Goal: Patient will remain free of falls  Description  INTERVENTIONS:  - Assess patient frequently for physical needs  -  Identify cognitive and physical deficits and behaviors that affect risk of falls    -  [place] fall precautions as indicated by assessment   - Educate patient/family on patient safety including physical limitations  - Instruct patient to call for assistance with activity based on assessment  - Modify environment to reduce risk of injury  - Consider OT/PT consult to assist with strengthening/mobility  Outcome: Progressing     Problem: DISCHARGE PLANNING - CARE MANAGEMENT  Goal: Discharge to post-acute care or home with appropriate resources  Description  INTERVENTIONS:  - Conduct assessment to determine patient/family and health care team treatment goals, and need for post-acute services based on payer coverage, community resources, and patient preferences, and barriers to discharge  - Address psychosocial, clinical, and financial barriers to discharge as identified in assessment in conjunction with the patient/family and health care team  - Arrange appropriate level of post-acute services according to patient?s   needs and preference and payer coverage in collaboration with the physician and health care team  - Communicate with and update the patient/family, physician, and health care team regarding progress on the discharge plan  - Arrange appropriate transportation to post-acute venues  Outcome: Progressing     Problem: PAIN - ADULT  Goal: Verbalizes/displays adequate comfort level or baseline comfort level  Description  Interventions:  - Encourage patient to monitor pain and request assistance  - Assess pain using appropriate pain scale  - Administer analgesics based on type and severity of pain and evaluate response  - Implement non-pharmacological measures as appropriate and evaluate response  - Consider cultural and social influences on pain and pain management  - Notify physician/advanced practitioner if interventions unsuccessful or patient reports new pain  Outcome: Progressing

## 2019-03-27 NOTE — PROGRESS NOTES
Progress Note   Assessment:  Trigeminal neurolgia, status post glycerol rhizotomy trigeminal nerve right    Plan:  Continue pain medication as ordered  Continue home medications  Regular diet  DC home today  Follow-up with Neurosurgery postop   ______________________________________________________________________    Subjective:  Complaining of right-sided eye pain and dull headache similar as usually she gets at home  No new symptoms  Comfortable, states that she needs more pain medications  No major events overnight  Tolerating p o  Well, no nausea vomiting  Vitals:  /60 (BP Location: Left arm)   Pulse 73   Temp (!) 96 1 °F (35 6 °C) (Tympanic)   Resp 18   Ht 5' 5" (1 651 m)   Wt 78 5 kg (173 lb)   SpO2 94%   BMI 28 79 kg/m²     I/Os:  I/O last 3 completed shifts: In: 1551 3 [P O :250; I V :1301 3]  Out: -   No intake/output data recorded      Lab Results and Cultures:   CBC with diff:   Lab Results   Component Value Date    WBC 5 50 03/22/2019    HGB 12 8 03/22/2019    HCT 40 3 03/22/2019    MCV 96 03/22/2019     03/22/2019    MCH 30 6 03/22/2019    MCHC 31 8 03/22/2019    RDW 13 1 03/22/2019    MPV 10 7 03/22/2019    NRBC 0 03/22/2019   ,   BMP/CMP:  Lab Results   Component Value Date    K 4 0 03/22/2019     03/22/2019    CO2 30 03/22/2019    BUN 16 03/22/2019    CREATININE 0 61 03/22/2019    CALCIUM 9 1 03/22/2019    AST 14 03/22/2019    ALT 17 03/22/2019    ALKPHOS 67 03/22/2019    EGFR 86 03/22/2019   ,   Lipid Panel: No results found for: CHOL,   Coags:   Lab Results   Component Value Date    PTT 30 03/22/2019    INR 0 98 03/22/2019   ,     Blood Culture: No results found for: BLOODCX,   Urinalysis:   Lab Results   Component Value Date    COLORU Yellow 03/22/2019    CLARITYU Clear 03/22/2019    SPECGRAV 1 015 03/22/2019    PHUR 6 5 03/22/2019    PHUR 5 5 08/20/2018    LEUKOCYTESUR Trace (A) 03/22/2019    NITRITE Negative 03/22/2019    GLUCOSEU Negative 03/22/2019    [place] Shows Negative 03/22/2019    BILIRUBINUR Negative 03/22/2019    BLOODU Negative 03/22/2019   ,   Urine Culture:   Lab Results   Component Value Date    URINECX 10,000-19,000 cfu/ml  08/20/2018   ,   Wound Culure: No results found for: WOUNDCULT    Medications:  Current Facility-Administered Medications   Medication Dose Route Frequency    acetaminophen (TYLENOL) tablet 650 mg  650 mg Oral Q4H PRN    DULoxetine (CYMBALTA) delayed release capsule 60 mg  60 mg Oral Daily    gabapentin (NEURONTIN) capsule 300 mg  300 mg Oral HS    lactated ringers infusion  75 mL/hr Intravenous Continuous    ondansetron (ZOFRAN) injection 4 mg  4 mg Intravenous Q6H PRN    oxyCODONE (ROXICODONE) IR tablet 10 mg  10 mg Oral Q4H PRN    oxyCODONE (ROXICODONE) IR tablet 5 mg  5 mg Oral Q4H PRN    polyvinyl alcohol (LIQUIFILM TEARS) 1 4 % ophthalmic solution 1 drop  1 drop Both Eyes Q12H [address]    verapamil (CALAN-SR) CR tablet 120 mg  120 mg Oral Daily         Physical Exam:    General:  Oriented to person, place and time, no acute distress,, teary eyes  CV:  Regular rate and rhythm  Respiratory:  Clear to auscultation bilaterally  Extremities:  Moving upper and lower extremities  Neurologic:  Oriented to place person and time, grossly neurologically intact as tested      Omega Montez PA-C  3/27/2019

## 2019-03-27 NOTE — SOCIAL WORK
Met with Pt  Pt presents AA&Ox3  Discussed role of   Pt lives with son(Kosta) in [address] [place] [address], has 1st floor setup, steps to enter  Pt is independent with adls and ambulation  Pt has DME(walker, cane, shower chair, commode and wheelchair) but does not use it  Pt's son drives and does grocery shopping  Pt has had VNA in past but unable to recall which agency  Pt [facility] [address]  Pt feels she will need VNA upon discharge  Pt requested  to call son after 11am to discuss VNA agency  Will follow

## 2019-03-28 ENCOUNTER — TELEPHONE (OUTPATIENT)
Dept: NEUROSURGERY | Facility: CLINIC | Age: 79
End: 2019-03-28

## 2019-03-28 NOTE — TELEPHONE ENCOUNTER
Spoke with Amanda Ward to see how she is doing after surgery yesterday  She reports that she is doing very well overall and denies any incisional issues or fevers  She is thrilled with the results  Advised that after three days she may take a shower and allow soapy water to wash over the surgical site, do not submerge in water until cleared by the surgeon  Went over incision care with patient including keeping incision clean and dry and not to apply any creams or ointments to the site  She has no further questions at this time  Verified date/time/location of her upcoming POV on 4/10/2019 and advised her to call the office with any further questions or concerns, or if any incisional issues or fevers would arise  Patient was appreciative for the call

## 2019-03-28 NOTE — TELEPHONE ENCOUNTER
Post operative call s/p surgery on   w/ DR Ivanna Lopez  s/p ;Procedure: GLYCEROL RHIZOTOMY TRIGEMINAL NERVES (V1-V3), RIGHT (Right Face)  Reports 80 % efficacy for improvement in trigeminal neuralgia symptoms , pain on numeric scale 1/10   Pleased wit results of surgery   Post operative pain:controlled requiring no pain medications since d/c , reports neck stiffness   Antibiotic:CEPHALEXIN X 3 DAYS     Gastrointestinal (BM, NVD, Appetite /Fluid intake),    DENIES NAUSEA EATING REGULAR DIET toast yougert, and 1/4 burger last evening , DRINKING FLUIDS WITHOUT RESTRICTION                   Call if you develop any signs or symptoms of incision (s) redness, drainage, dehiscence, or  fever of 101 or higher , uncontrolled nausea and /or vomiting  --REINFORCED     Wound/dressing; as per postoperative discharge instructions remove dressings on --N/A    Post operative Hygiene: with a clean wash cloth and pat dry using a clean wash towel  Reinforced use clean wash cloth and towel daily, use antibacterial soap, change bed linens 1-2 times per week and pajamas several times per week  -----    Post operative Activity:  Ambulate as tolerate, Lift no greater than 10 LBS until 6 weeks, Avoid submersion in water x 3 weeks, and refrain for bending or twisting until about 4 weeks -light duty until then      No NSAID (aspirin, Motrin, ibuprofen, OTC, supplements etc ) for 2 weeks, may resume after 2 week postoperative visit     Post Operative Visits:4/10/2019     Patent verbalized an understanding of information communicated

## 2019-04-02 ENCOUNTER — TELEPHONE (OUTPATIENT)
Dept: NEUROSURGERY | Facility: CLINIC | Age: 79
End: 2019-04-02

## 2019-04-02 DIAGNOSIS — G89.4 CHRONIC PAIN SYNDROME: ICD-10-CM

## 2019-04-02 DIAGNOSIS — G50.0 TRIGEMINAL NEURALGIA: ICD-10-CM

## 2019-04-02 DIAGNOSIS — G89.29 OTHER CHRONIC PAIN: ICD-10-CM

## 2019-04-02 DIAGNOSIS — G50.9 TRIGEMINAL NEUROPATHY: Primary | ICD-10-CM

## 2019-04-03 DIAGNOSIS — G50.0 TRIGEMINAL NEURALGIA: ICD-10-CM

## 2019-04-03 DIAGNOSIS — G89.4 CHRONIC PAIN SYNDROME: ICD-10-CM

## 2019-04-03 DIAGNOSIS — G50.9 TRIGEMINAL NEUROPATHY: ICD-10-CM

## 2019-04-03 RX ORDER — BACLOFEN 10 MG/1
TABLET ORAL
Qty: 15 TABLET | Refills: 0 | Status: SHIPPED | OUTPATIENT
Start: 2019-04-03 | End: 2019-04-03 | Stop reason: SDUPTHER

## 2019-04-03 RX ORDER — OXYCODONE HYDROCHLORIDE AND ACETAMINOPHEN 5; 325 MG/1; MG/1
TABLET ORAL
Qty: 28 TABLET | Refills: 0 | Status: SHIPPED | OUTPATIENT
Start: 2019-04-03 | End: 2019-06-12

## 2019-04-04 RX ORDER — BACLOFEN 10 MG/1
TABLET ORAL
Qty: 270 TABLET | Refills: 0 | Status: SHIPPED | OUTPATIENT
Start: 2019-04-04 | End: 2019-04-09 | Stop reason: SDUPTHER

## 2019-04-05 RX ORDER — VANCOMYCIN HYDROCHLORIDE 1 G/200ML
1000 INJECTION, SOLUTION INTRAVENOUS ONCE
Status: CANCELLED | OUTPATIENT
Start: 2019-04-05 | End: 2019-04-05

## 2019-04-05 RX ORDER — CHLORHEXIDINE GLUCONATE 0.12 MG/ML
15 RINSE ORAL ONCE
Status: CANCELLED | OUTPATIENT
Start: 2019-04-05 | End: 2019-04-05

## 2019-04-08 ENCOUNTER — DOCUMENTATION (OUTPATIENT)
Dept: NEUROSURGERY | Facility: CLINIC | Age: 79
End: 2019-04-08

## 2019-04-09 ENCOUNTER — TELEPHONE (OUTPATIENT)
Dept: NEUROSURGERY | Facility: CLINIC | Age: 79
End: 2019-04-09

## 2019-04-09 DIAGNOSIS — G50.0 TRIGEMINAL NEURALGIA: Primary | ICD-10-CM

## 2019-04-09 DIAGNOSIS — G50.9 TRIGEMINAL NEUROPATHY: ICD-10-CM

## 2019-04-09 DIAGNOSIS — G89.4 CHRONIC PAIN SYNDROME: ICD-10-CM

## 2019-04-09 RX ORDER — BACLOFEN 10 MG/1
TABLET ORAL
Qty: 270 TABLET | Refills: 0 | Status: SHIPPED | OUTPATIENT
Start: 2019-04-09 | End: 2019-04-09 | Stop reason: CLARIF

## 2019-04-09 RX ORDER — BACLOFEN 10 MG/1
TABLET ORAL
Qty: 90 TABLET | Refills: 0 | Status: SHIPPED | OUTPATIENT
Start: 2019-04-09 | End: 2019-04-11 | Stop reason: DRUGHIGH

## 2019-04-10 ENCOUNTER — CLINICAL SUPPORT (OUTPATIENT)
Dept: NEUROSURGERY | Facility: CLINIC | Age: 79
End: 2019-04-10

## 2019-04-10 VITALS — TEMPERATURE: 98.2 F | SYSTOLIC BLOOD PRESSURE: 150 MMHG | DIASTOLIC BLOOD PRESSURE: 68 MMHG

## 2019-04-10 DIAGNOSIS — Z98.890 POST-OPERATIVE STATE: Primary | ICD-10-CM

## 2019-04-10 PROCEDURE — 99024 POSTOP FOLLOW-UP VISIT: CPT

## 2019-04-11 ENCOUNTER — OFFICE VISIT (OUTPATIENT)
Dept: FAMILY MEDICINE CLINIC | Facility: CLINIC | Age: 79
End: 2019-04-11
Payer: MEDICARE

## 2019-04-11 VITALS
HEART RATE: 62 BPM | HEIGHT: 65 IN | WEIGHT: 176 LBS | OXYGEN SATURATION: 97 % | BODY MASS INDEX: 29.32 KG/M2 | DIASTOLIC BLOOD PRESSURE: 70 MMHG | SYSTOLIC BLOOD PRESSURE: 120 MMHG

## 2019-04-11 DIAGNOSIS — R59.1 LYMPHADENOPATHY: ICD-10-CM

## 2019-04-11 DIAGNOSIS — B99.9 INFECTION: Primary | ICD-10-CM

## 2019-04-11 DIAGNOSIS — F41.8 DEPRESSION WITH ANXIETY: ICD-10-CM

## 2019-04-11 DIAGNOSIS — G50.0 TRIGEMINAL NEURALGIA: ICD-10-CM

## 2019-04-11 PROCEDURE — 99213 OFFICE O/P EST LOW 20 MIN: CPT | Performed by: FAMILY MEDICINE

## 2019-04-11 RX ORDER — ZOLPIDEM TARTRATE 10 MG/1
TABLET ORAL
COMMUNITY
End: 2019-05-02 | Stop reason: ALTCHOICE

## 2019-04-11 RX ORDER — BACLOFEN 10 MG/1
5 TABLET ORAL 3 TIMES DAILY
Qty: 5 TABLET | Refills: 0 | Status: SHIPPED | OUTPATIENT
Start: 2019-04-11 | End: 2019-05-17 | Stop reason: ALTCHOICE

## 2019-04-11 RX ORDER — CEPHALEXIN 500 MG/1
CAPSULE ORAL
Qty: 30 CAPSULE | Refills: 0 | Status: SHIPPED | OUTPATIENT
Start: 2019-04-11 | End: 2019-04-21

## 2019-04-18 ENCOUNTER — TELEPHONE (OUTPATIENT)
Dept: NEUROSURGERY | Facility: CLINIC | Age: 79
End: 2019-04-18

## 2019-04-22 ENCOUNTER — DOCUMENTATION (OUTPATIENT)
Dept: NEUROSURGERY | Facility: CLINIC | Age: 79
End: 2019-04-22

## 2019-04-22 ENCOUNTER — TELEPHONE (OUTPATIENT)
Dept: NEUROSURGERY | Facility: CLINIC | Age: 79
End: 2019-04-22

## 2019-05-02 ENCOUNTER — OFFICE VISIT (OUTPATIENT)
Dept: NEUROSURGERY | Facility: CLINIC | Age: 79
End: 2019-05-02

## 2019-05-02 VITALS
HEIGHT: 65 IN | RESPIRATION RATE: 16 BRPM | BODY MASS INDEX: 29.32 KG/M2 | HEART RATE: 66 BPM | SYSTOLIC BLOOD PRESSURE: 158 MMHG | TEMPERATURE: 97.9 F | DIASTOLIC BLOOD PRESSURE: 90 MMHG | WEIGHT: 176 LBS

## 2019-05-02 DIAGNOSIS — M54.81 OCCIPITAL NEURALGIA OF RIGHT SIDE: ICD-10-CM

## 2019-05-02 DIAGNOSIS — G89.29 OTHER CHRONIC PAIN: ICD-10-CM

## 2019-05-02 DIAGNOSIS — G50.0 TRIGEMINAL NEURALGIA: Primary | ICD-10-CM

## 2019-05-02 DIAGNOSIS — Z48.89 AFTERCARE FOLLOWING SURGERY: ICD-10-CM

## 2019-05-02 PROCEDURE — 99024 POSTOP FOLLOW-UP VISIT: CPT | Performed by: NURSE PRACTITIONER

## 2019-05-02 RX ORDER — GABAPENTIN 100 MG/1
CAPSULE ORAL
COMMUNITY
End: 2019-05-09

## 2019-05-09 ENCOUNTER — OFFICE VISIT (OUTPATIENT)
Dept: PAIN MEDICINE | Facility: CLINIC | Age: 79
End: 2019-05-09
Payer: MEDICARE

## 2019-05-09 VITALS
HEART RATE: 70 BPM | SYSTOLIC BLOOD PRESSURE: 132 MMHG | DIASTOLIC BLOOD PRESSURE: 78 MMHG | WEIGHT: 176 LBS | RESPIRATION RATE: 16 BRPM | HEIGHT: 65 IN | BODY MASS INDEX: 29.32 KG/M2

## 2019-05-09 DIAGNOSIS — M48.02 CERVICAL SPINAL STENOSIS: ICD-10-CM

## 2019-05-09 DIAGNOSIS — Z79.891 LONG-TERM CURRENT USE OF OPIATE ANALGESIC: ICD-10-CM

## 2019-05-09 DIAGNOSIS — G89.4 CHRONIC PAIN SYNDROME: Primary | ICD-10-CM

## 2019-05-09 DIAGNOSIS — M54.81 OCCIPITAL NEURALGIA OF RIGHT SIDE: ICD-10-CM

## 2019-05-09 DIAGNOSIS — G50.0 TRIGEMINAL NEURALGIA: ICD-10-CM

## 2019-05-09 DIAGNOSIS — F11.20 UNCOMPLICATED OPIOID DEPENDENCE (HCC): ICD-10-CM

## 2019-05-09 PROCEDURE — 99214 OFFICE O/P EST MOD 30 MIN: CPT | Performed by: NURSE PRACTITIONER

## 2019-05-09 PROCEDURE — 80305 DRUG TEST PRSMV DIR OPT OBS: CPT | Performed by: NURSE PRACTITIONER

## 2019-05-15 ENCOUNTER — TELEPHONE (OUTPATIENT)
Dept: PAIN MEDICINE | Facility: MEDICAL CENTER | Age: 79
End: 2019-05-15

## 2019-05-15 ENCOUNTER — TELEPHONE (OUTPATIENT)
Dept: NEUROSURGERY | Facility: CLINIC | Age: 79
End: 2019-05-15

## 2019-05-17 ENCOUNTER — OFFICE VISIT (OUTPATIENT)
Dept: FAMILY MEDICINE CLINIC | Facility: CLINIC | Age: 79
End: 2019-05-17
Payer: MEDICARE

## 2019-05-17 VITALS
HEIGHT: 65 IN | TEMPERATURE: 97.9 F | SYSTOLIC BLOOD PRESSURE: 132 MMHG | WEIGHT: 173.4 LBS | HEART RATE: 96 BPM | DIASTOLIC BLOOD PRESSURE: 80 MMHG | BODY MASS INDEX: 28.89 KG/M2 | OXYGEN SATURATION: 96 %

## 2019-05-17 DIAGNOSIS — M54.81 OCCIPITAL NEURALGIA OF RIGHT SIDE: ICD-10-CM

## 2019-05-17 DIAGNOSIS — R53.81 PHYSICAL DECONDITIONING: ICD-10-CM

## 2019-05-17 DIAGNOSIS — L98.9 SKIN LESION OF LEFT ARM: ICD-10-CM

## 2019-05-17 DIAGNOSIS — F41.8 DEPRESSION WITH ANXIETY: Primary | ICD-10-CM

## 2019-05-17 PROCEDURE — 99214 OFFICE O/P EST MOD 30 MIN: CPT | Performed by: FAMILY MEDICINE

## 2019-05-17 RX ORDER — DULOXETIN HYDROCHLORIDE 30 MG/1
30 CAPSULE, DELAYED RELEASE ORAL DAILY
Qty: 30 CAPSULE | Refills: 1 | Status: SHIPPED | OUTPATIENT
Start: 2019-05-17 | End: 2019-05-28 | Stop reason: ALTCHOICE

## 2019-05-22 DIAGNOSIS — G50.0 TRIGEMINAL NEURALGIA: Primary | ICD-10-CM

## 2019-05-22 DIAGNOSIS — G50.0 TRIGEMINAL NEURALGIA: ICD-10-CM

## 2019-05-22 RX ORDER — BACLOFEN 10 MG/1
TABLET ORAL
Qty: 30 TABLET | Refills: 0 | Status: SHIPPED | OUTPATIENT
Start: 2019-05-22 | End: 2019-05-22 | Stop reason: SDUPTHER

## 2019-05-23 RX ORDER — BACLOFEN 10 MG/1
TABLET ORAL
Qty: 90 TABLET | Refills: 0 | Status: SHIPPED | OUTPATIENT
Start: 2019-05-23 | End: 2019-07-11

## 2019-05-28 ENCOUNTER — HOSPITAL ENCOUNTER (OUTPATIENT)
Dept: RADIOLOGY | Facility: CLINIC | Age: 79
Discharge: HOME | End: 2019-05-28
Attending: ANESTHESIOLOGY
Payer: MEDICARE

## 2019-05-28 VITALS
SYSTOLIC BLOOD PRESSURE: 134 MMHG | RESPIRATION RATE: 20 BRPM | HEART RATE: 67 BPM | DIASTOLIC BLOOD PRESSURE: 73 MMHG | OXYGEN SATURATION: 98 % | TEMPERATURE: 98.1 F

## 2019-05-28 DIAGNOSIS — M54.81 OCCIPITAL NEURALGIA OF RIGHT SIDE: ICD-10-CM

## 2019-05-28 PROCEDURE — 64405 NJX AA&/STRD GR OCPL NRV: CPT | Performed by: ANESTHESIOLOGY

## 2019-05-28 RX ORDER — BUPIVACAINE HCL/PF 2.5 MG/ML
10 VIAL (ML) INJECTION ONCE
Status: COMPLETED | OUTPATIENT
Start: 2019-05-28 | End: 2019-05-28

## 2019-05-28 RX ORDER — METHYLPREDNISOLONE ACETATE 80 MG/ML
80 INJECTION, SUSPENSION INTRA-ARTICULAR; INTRALESIONAL; INTRAMUSCULAR; PARENTERAL; SOFT TISSUE ONCE
Status: COMPLETED | OUTPATIENT
Start: 2019-05-28 | End: 2019-05-28

## 2019-05-28 RX ORDER — 0.9 % SODIUM CHLORIDE 0.9 %
10 VIAL (ML) INJECTION ONCE
Status: COMPLETED | OUTPATIENT
Start: 2019-05-28 | End: 2019-05-28

## 2019-05-28 RX ADMIN — SODIUM CHLORIDE 5 ML: 9 INJECTION, SOLUTION INTRAMUSCULAR; INTRAVENOUS; SUBCUTANEOUS at 14:01

## 2019-05-28 RX ADMIN — BUPIVACAINE HYDROCHLORIDE 2 ML: 2.5 INJECTION, SOLUTION EPIDURAL; INFILTRATION; INTRACAUDAL at 14:02

## 2019-05-28 RX ADMIN — Medication 2.5 ML: at 14:01

## 2019-05-28 RX ADMIN — METHYLPREDNISOLONE ACETATE 80 MG: 80 INJECTION, SUSPENSION INTRA-ARTICULAR; INTRALESIONAL; INTRAMUSCULAR; PARENTERAL; SOFT TISSUE at 14:02

## 2019-06-04 ENCOUNTER — TELEPHONE (OUTPATIENT)
Dept: PAIN MEDICINE | Facility: CLINIC | Age: 79
End: 2019-06-04

## 2019-06-04 NOTE — TELEPHONE ENCOUNTER
Pt reports no improvement post inj   Pain level 10/10   She said she feels terrible especially when she wakes up in the morning   She wants to know what she can do since shes in so much pain

## 2019-06-07 NOTE — TELEPHONE ENCOUNTER
Patient is calling back in stating that she would like a call back from Brooklyn Causey and not the nurse  She needs to speak with Brooklyn Causey before making an appt to return   Thank you

## 2019-06-10 NOTE — TELEPHONE ENCOUNTER
I called and left a messages on both of the telephone numbers listed on the patient's chart for her to call the office back

## 2019-06-12 ENCOUNTER — OFFICE VISIT (OUTPATIENT)
Dept: PAIN MEDICINE | Facility: CLINIC | Age: 79
End: 2019-06-12
Payer: MEDICARE

## 2019-06-12 VITALS
WEIGHT: 173 LBS | SYSTOLIC BLOOD PRESSURE: 138 MMHG | BODY MASS INDEX: 28.82 KG/M2 | HEIGHT: 65 IN | HEART RATE: 68 BPM | DIASTOLIC BLOOD PRESSURE: 66 MMHG | RESPIRATION RATE: 16 BRPM

## 2019-06-12 DIAGNOSIS — G50.9 TRIGEMINAL NEUROPATHY: ICD-10-CM

## 2019-06-12 DIAGNOSIS — G50.0 TRIGEMINAL NEURALGIA: ICD-10-CM

## 2019-06-12 DIAGNOSIS — G89.4 CHRONIC PAIN SYNDROME: Primary | ICD-10-CM

## 2019-06-12 DIAGNOSIS — M54.81 OCCIPITAL NEURALGIA OF RIGHT SIDE: ICD-10-CM

## 2019-06-12 PROCEDURE — 99213 OFFICE O/P EST LOW 20 MIN: CPT | Performed by: NURSE PRACTITIONER

## 2019-06-12 RX ORDER — GABAPENTIN 100 MG/1
CAPSULE ORAL
Qty: 90 CAPSULE | Refills: 0 | Status: SHIPPED | OUTPATIENT
Start: 2019-06-12 | End: 2019-07-17

## 2019-06-13 ENCOUNTER — TELEPHONE (OUTPATIENT)
Dept: PAIN MEDICINE | Facility: CLINIC | Age: 79
End: 2019-06-13

## 2019-06-13 ENCOUNTER — TELEPHONE (OUTPATIENT)
Dept: NEUROSURGERY | Facility: CLINIC | Age: 79
End: 2019-06-13

## 2019-06-14 DIAGNOSIS — M47.812 OSTEOARTHRITIS OF CERVICAL SPINE, UNSPECIFIED SPINAL OSTEOARTHRITIS COMPLICATION STATUS: ICD-10-CM

## 2019-06-14 DIAGNOSIS — M48.02 CERVICAL SPINAL STENOSIS: Primary | ICD-10-CM

## 2019-06-14 DIAGNOSIS — M50.20 PROTRUSION OF CERVICAL INTERVERTEBRAL DISC: ICD-10-CM

## 2019-06-18 DIAGNOSIS — F41.8 DEPRESSION WITH ANXIETY: ICD-10-CM

## 2019-06-18 DIAGNOSIS — G89.29 OTHER CHRONIC PAIN: ICD-10-CM

## 2019-06-20 ENCOUNTER — TELEPHONE (OUTPATIENT)
Dept: PAIN MEDICINE | Facility: CLINIC | Age: 79
End: 2019-06-20

## 2019-06-20 RX ORDER — DULOXETIN HYDROCHLORIDE 60 MG/1
60 CAPSULE, DELAYED RELEASE ORAL DAILY
Qty: 90 CAPSULE | Refills: 1 | Status: SHIPPED | OUTPATIENT
Start: 2019-06-20 | End: 2019-11-07 | Stop reason: SDUPTHER

## 2019-06-25 ENCOUNTER — TELEPHONE (OUTPATIENT)
Dept: PAIN MEDICINE | Facility: CLINIC | Age: 79
End: 2019-06-25

## 2019-06-28 ENCOUNTER — HOSPITAL ENCOUNTER (OUTPATIENT)
Dept: MRI IMAGING | Facility: CLINIC | Age: 79
Discharge: HOME | End: 2019-06-28
Payer: MEDICARE

## 2019-06-28 DIAGNOSIS — M47.812 OSTEOARTHRITIS OF CERVICAL SPINE, UNSPECIFIED SPINAL OSTEOARTHRITIS COMPLICATION STATUS: ICD-10-CM

## 2019-06-28 DIAGNOSIS — M50.20 PROTRUSION OF CERVICAL INTERVERTEBRAL DISC: ICD-10-CM

## 2019-06-28 DIAGNOSIS — M48.02 CERVICAL SPINAL STENOSIS: ICD-10-CM

## 2019-06-28 PROCEDURE — 72141 MRI NECK SPINE W/O DYE: CPT

## 2019-07-02 ENCOUNTER — TELEPHONE (OUTPATIENT)
Dept: PAIN MEDICINE | Facility: MEDICAL CENTER | Age: 79
End: 2019-07-02

## 2019-07-02 NOTE — TELEPHONE ENCOUNTER
I called the patient with results of her cervical MRI which showed multilevel spondylosis as well as a circumferential disc bulge at C5-C6 causing bilateral C6 radiculitis  She was also noted to have C4-C5 and C7-T1 anterolisthesis as well  I discussed with the patient about proceeding with a cervical epidural steroid injection to help with her pain is symptoms  However the patient reports that she would first like for Dr Malave to review her MRI and agree with her treatment plan prior to proceeding with a cervical epidural steroid injection  I instructed the patient that our office will call her after I have have heard back from Leigh Steele 50  She verbalized an understanding

## 2019-07-03 ENCOUNTER — TELEPHONE (OUTPATIENT)
Dept: NEUROSURGERY | Facility: CLINIC | Age: 79
End: 2019-07-03

## 2019-07-03 NOTE — TELEPHONE ENCOUNTER
Patient is calling stating that there has been some kind of mix up her and Dr Venita Hart office  Also she is stating that Scotty Chavez said that she would make sure that the [facility] within  will get her results for the MRI  I let patient know that all testing done in  would be in her chart  However is saying that the [facility] dept did not receive her results  Please have someone follow up with her  Patient would like a call back today if possible

## 2019-07-03 NOTE — TELEPHONE ENCOUNTER
Patient called office to find out what the outcome of Dr Leonardo Perez reviewing her MRI was, I explained I do not see a referral to discuss anything about MRI C/spine  I advised I see a note with previous NP that was going to discuss with another Provider but I was unsure about the plan and I directed her to contact the other office to find out the plan

## 2019-07-03 NOTE — TELEPHONE ENCOUNTER
S/w pt, she said that there is a mix up  The MRI was to be reviewed by Dr Walker Swift, not Dr Bhavin Hawkins

## 2019-07-05 ENCOUNTER — OFFICE VISIT (OUTPATIENT)
Dept: FAMILY MEDICINE CLINIC | Facility: CLINIC | Age: 79
End: 2019-07-05
Payer: MEDICARE

## 2019-07-05 VITALS
BODY MASS INDEX: 28.82 KG/M2 | TEMPERATURE: 97.8 F | RESPIRATION RATE: 18 BRPM | HEART RATE: 71 BPM | SYSTOLIC BLOOD PRESSURE: 120 MMHG | DIASTOLIC BLOOD PRESSURE: 70 MMHG | WEIGHT: 173 LBS | HEIGHT: 65 IN | OXYGEN SATURATION: 97 %

## 2019-07-05 DIAGNOSIS — Z23 ENCOUNTER FOR IMMUNIZATION: ICD-10-CM

## 2019-07-05 DIAGNOSIS — Z12.31 ENCOUNTER FOR SCREENING MAMMOGRAM FOR BREAST CANCER: ICD-10-CM

## 2019-07-05 DIAGNOSIS — L98.9 ENCOUNTER FOR REMOVAL OF SKIN LESION: ICD-10-CM

## 2019-07-05 DIAGNOSIS — Z78.0 ASYMPTOMATIC POSTMENOPAUSAL STATE: ICD-10-CM

## 2019-07-05 DIAGNOSIS — Z00.00 MEDICARE ANNUAL WELLNESS VISIT, SUBSEQUENT: Primary | ICD-10-CM

## 2019-07-05 PROCEDURE — 88305 TISSUE EXAM BY PATHOLOGIST: CPT | Performed by: PATHOLOGY

## 2019-07-05 PROCEDURE — G0439 PPPS, SUBSEQ VISIT: HCPCS | Performed by: FAMILY MEDICINE

## 2019-07-05 PROCEDURE — 11102 TANGNTL BX SKIN SINGLE LES: CPT | Performed by: FAMILY MEDICINE

## 2019-07-05 PROCEDURE — 99213 OFFICE O/P EST LOW 20 MIN: CPT | Performed by: FAMILY MEDICINE

## 2019-07-05 RX ORDER — ZOSTER VACCINE RECOMBINANT, ADJUVANTED 50 MCG/0.5
50 KIT INTRAMUSCULAR ONCE
Qty: 1 EACH | Refills: 1 | Status: SHIPPED | OUTPATIENT
Start: 2019-07-05 | End: 2019-07-05

## 2019-07-05 NOTE — PATIENT INSTRUCTIONS
Lesion on left upper arm was cleansed with Betadine injected with 1% lidocaine and removed by shave excision  Gel foam applied and covered with Band-Aid  No active bleeding  Will call with the results of the pathology report  Have the DEXA scan done the end of November early December  If you are able to get the new shingles shot please call and let us know that you are able to get the shot  Patient is refer refusing any further stool tests  Have the mammogram done when you feel you're able and I will call with all results  Obesity   AMBULATORY CARE:   Obesity  is when your body mass index (BMI) is greater than 30  Your healthcare provider will use your height and weight to measure your BMI  The risks of obesity include  many health problems, such as injuries or physical disability  You may need tests to check for the following:  · Diabetes     · High blood pressure or high cholesterol     · Heart disease     · Gallbladder or liver disease     · Cancer of the colon, breast, prostate, liver, or kidney     · Sleep apnea     · Arthritis or gout  Seek care immediately if:   · You have a severe headache, confusion, or difficulty speaking  · You have weakness on one side of your body  · You have chest pain, sweating, or shortness of breath  Contact your healthcare provider if:   · You have symptoms of gallbladder or liver disease, such as pain in your upper abdomen  · You have knee or hip pain and discomfort while walking  · You have symptoms of diabetes, such as intense hunger and thirst, and frequent urination  · You have symptoms of sleep apnea, such as snoring or daytime sleepiness  · You have questions or concerns about your condition or care  Treatment for obesity  focuses on helping you lose weight to improve your health  Even a small decrease in BMI can reduce the risk for many health problems   Your healthcare provider will help you set a weight-loss goal   · Lifestyle changes are the first step in treating obesity  These include making healthy food choices and getting regular physical activity  Your healthcare provider may suggest a weight-loss program that involves coaching, education, and therapy  · Medicine  may help you lose weight when it is used with a healthy diet and physical activity  · Surgery  can help you lose weight if you are very obese and have other health problems  There are several types of weight-loss surgery  Ask your healthcare provider for more information  Be successful losing weight:   · Set small, realistic goals  An example of a small goal is to walk for 20 minutes 5 days a week  Anther goal is to lose 5% of your body weight  · Tell friends, family members, and coworkers about your goals  and ask for their support  Ask a friend to lose weight with you, or join a weight-loss support group  · Identify foods or triggers that may cause you to overeat , and find ways to avoid them  Remove tempting high-calorie foods from your home and workplace  Place a bowl of fresh fruit on your kitchen counter  If stress causes you to eat, then find other ways to cope with stress  · Keep a diary to track what you eat and drink  Also write down how many minutes of physical activity you do each day  Weigh yourself once a week and record it in your diary  Eating changes: You will need to eat 500 to 1,000 fewer calories each day than you currently eat to lose 1 to 2 pounds a week  The following changes will help you cut calories:  · Eat smaller portions  Use small plates, no larger than 9 inches in diameter  Fill your plate half full of fruits and vegetables  Measure your food using measuring cups until you know what a serving size looks like  · Eat 3 meals and 1 or 2 snacks each day  Plan your meals in advance  Shreya Ordonez and eat at home most of the time  Eat slowly       · Eat fruits and vegetables at every meal   They are low in calories and high in fiber, which makes you feel full  Do not add butter, margarine, or cream sauce to vegetables  Use herbs to season steamed vegetables  · Eat less fat and fewer fried foods  Eat more baked or grilled chicken and fish  These protein sources are lower in calories and fat than red meat  Limit fast food  Dress your salads with olive oil and vinegar instead of bottled dressing  · Limit the amount of sugar you eat  Do not drink sugary beverages  Limit alcohol  Activity changes:  Physical activity is good for your body in many ways  It helps you burn calories and build strong muscles  It decreases stress and depression, and improves your mood  It can also help you sleep better  Talk to your healthcare provider before you begin an exercise program   · Exercise for at least 30 minutes 5 days a week  Start slowly  Set aside time each day for physical activity that you enjoy and that is convenient for you  It is best to do both weight training and an activity that increases your heart rate, such as walking, bicycling, or swimming  · Find ways to be more active  Do yard work and housecleaning  Walk up the stairs instead of using elevators  Spend your leisure time going to events that require walking, such as outdoor festivals or fairs  This extra physical activity can help you lose weight and keep it off  Follow up with your healthcare provider as directed: You may need to meet with a dietitian  Write down your questions so you remember to ask them during your visits  © 2017 [employer] Information is for End User's use only and may not be sold, redistributed or otherwise used for commercial purposes  All illustrations and images included in CareNotes® are the copyrighted property of A D A M [employer] Inc  or Cheng Bailey  The above information is an  only  It is not intended as medical advice for individual conditions or treatments   Talk to your doctor, nurse or pharmacist before following any medical regimen to see if it is safe and effective for you  Urinary Incontinence   WHAT YOU NEED TO KNOW:   What is urinary incontinence? Urinary incontinence (UI) is when you lose control of your bladder  What causes UI? UI occurs because your bladder cannot store or empty urine properly  The following are the most common types of UI:  · Stress incontinence  is when you leak urine due to increased bladder pressure  This may happen when you cough, sneeze, or exercise  · Urge incontinence  is when you feel the need to urinate right away and leak urine accidentally  · Mixed incontinence  is when you have both stress and urge UI  What are the signs and symptoms of UI?   · You feel like your bladder does not empty completely when you urinate  · You urinate often and need to urinate immediately  · You leak urine when you sleep, or you wake up with the urge to urinate  · You leak urine when you cough, sneeze, exercise, or laugh  How is UI diagnosed? Your healthcare provider will ask how often you leak urine and whether you have stress or urge symptoms  Tell him which medicines you take, how often you urinate, and how much liquid you drink each day  You may need any of the following tests:  · Urine tests  may show infection or kidney function  · A pelvic exam  may be done to check for blockages  A pelvic exam will also show if your bladder, uterus, or other organs have moved out of place  · An x-ray, ultrasound, or CT  may show problems with parts of your urinary system  You may be given contrast liquid to help your organs show up better in the pictures  Tell the healthcare provider if you have ever had an allergic reaction to contrast liquid  Do not enter the MRI room with anything metal  Metal can cause serious injury  Tell the healthcare provider if you have any metal in or on your body  · A bladder scan  will show how much urine is left in your bladder after you urinate  You will be asked to urinate and then healthcare providers will use a small ultrasound machine to check the urine left in your bladder  · Cystometry  is used to check the function of your urinary system  Your healthcare provider checks the pressure in your bladder while filling it with fluid  Your bladder pressure may also be tested when your bladder is full and while you urinate  How is UI treated? · Medicines  can help strengthen your bladder control  · Electrical stimulation  is used to send a small amount of electrical energy to your pelvic floor muscles  This helps control your bladder function  Electrodes may be placed outside your body or in your rectum  For women, the electrodes may be placed in the vagina  · A bulking agent  may be injected into the wall of your urethra to make it thicker  This helps keep your urethra closed and decreases urine leakage  · Devices  such as a clamp, pessary, or tampon may help stop urine leaks  Ask your healthcare provider for more information about these and other devices  · Surgery  may be needed if other treatments do not work  Several types of surgery can help improve your bladder control  Ask your healthcare provider for more information about the surgery you may need  How can I manage my symptoms? · Do pelvic muscle exercises often  Your pelvic muscles help you stop urinating  Squeeze these muscles tight for 5 seconds, then relax for 5 seconds  Gradually work up to squeezing for 10 seconds  Do 3 sets of 15 repetitions a day, or as directed  This will help strengthen your pelvic muscles and improve bladder control  · A catheter  may be used to help empty your bladder  A catheter is a tiny, plastic tube that is put into your bladder to drain your urine  Your healthcare provider may tell you to use a catheter to prevent your bladder from getting too full and leaking urine  · Keep a UI record    Write down how often you leak urine and how much you leak  Make a note of what you were doing when you leaked urine  · Train your bladder  Go to the bathroom at set times, such as every 2 hours, even if you do not feel the urge to go  You can also try to hold your urine when you feel the urge to go  For example, hold your urine for 5 minutes when you feel the urge to go  As that becomes easier, hold your urine for 10 minutes  · Drink liquids as directed  Ask your healthcare provider how much liquid to drink each day and which liquids are best for you  You may need to limit the amount of liquid you drink to help control your urine leakage  Limit or do not have drinks that contain caffeine or alcohol  Do not drink any liquid right before you go to bed  · Prevent constipation  Eat a variety of high-fiber foods  Good examples are high-fiber cereals, beans, vegetables, and whole-grain breads  Prune juice may help make your bowel movement softer  Walking is the best way to trigger your intestines to have a bowel movement  · Exercise regularly and maintain a healthy weight  Ask your healthcare provider how much you should weigh and about the best exercise plan for you  Weight loss and exercise will decrease pressure on your bladder and help you control your leakage  Ask him to help you create a weight loss plan if you are overweight  When should I seek immediate care? · You have severe pain  · You are confused or cannot think clearly  When should I contact my healthcare provider? · You have a fever  · You see blood in your urine  · You have pain when you urinate  · You have new or worse pain, even after treatment  · Your mouth feels dry or you have vision changes  · Your urine is cloudy or smells bad  · You have questions or concerns about your condition or care  CARE AGREEMENT:   You have the right to help plan your care  Learn about your health condition and how it may be treated   Discuss treatment options with your caregivers to decide what care you want to receive  You always have the right to refuse treatment  The above information is an  only  It is not intended as medical advice for individual conditions or treatments  Talk to your doctor, nurse or pharmacist before following any medical regimen to see if it is safe and effective for you  © 2017 [address] [employer] Information is for End User's use only and may not be sold, redistributed or otherwise used for commercial purposes  All illustrations and images included in CareNotes® are the copyrighted property of [employer]  or Cheng Lynn  Cigarette Smoking and Your Health   AMBULATORY CARE:   Risks to your health if you smoke:  Nicotine and other chemicals found in tobacco damage every cell in your body  Even if you are a light smoker, you have an increased risk for cancer, heart disease, and lung disease  If you are pregnant or have diabetes, smoking increases your risk for complications  Benefits to your health if you stop smoking:   · You decrease respiratory symptoms such as coughing, wheezing, and shortness of breath  · You reduce your risk for cancers of the lung, mouth, throat, kidney, bladder, pancreas, stomach, and cervix  If you already have cancer, you increase the benefits of chemotherapy  You also reduce your risk for cancer returning or a second cancer from developing  · You reduce your risk for heart disease, blood clots, heart attack, and stroke  · You reduce your risk for lung infections, and diseases such as pneumonia, asthma, chronic bronchitis, and emphysema  · Your circulation improves  More oxygen can be delivered to your body  If you have diabetes, you lower your risk for complications, such as kidney, artery, and eye diseases  You also lower your risk for nerve damage  Nerve damage can lead to amputations, poor vision, and blindness      · You improve your body's ability to heal and to fight infections  Benefits to the health of others if you stop smoking:  Tobacco is harmful to nonsmokers who breathe in your secondhand smoke  The following are ways the health of others around you may improve when you stop smoking:  · You lower the risks for lung cancer and heart disease in nonsmoking adults  · If you are pregnant, you lower the risk for miscarriage, early delivery, low birth weight, and stillbirth  You also lower your baby's risk for SIDS, obesity, developmental delay, and neurobehavioral problems, such as ADHD  · If you have children, you lower their risk for ear infections, colds, pneumonia, bronchitis, and asthma  For more information and support to stop smoking:   · [employer]  gov  Phone: 0- 475 - 524-1021  Web Address: www [employer]  Follow up with your healthcare provider as directed:  Write down your questions so you remember to ask them during your visits  © 2017 [address] [employer] Information is for End User's use only and may not be sold, redistributed or otherwise used for commercial purposes  All illustrations and images included in CareNotes® are the copyrighted property of [employer]  or Cheng Bailey  The above information is an  only  It is not intended as medical advice for individual conditions or treatments  Talk to your doctor, nurse or pharmacist before following any medical regimen to see if it is safe and effective for you  Fall Prevention   AMBULATORY CARE:   Fall prevention  includes ways to make your home and other areas safer  It also includes ways you can move more carefully to prevent a fall  Health conditions that cause changes in your blood pressure, vision, or muscle strength and coordination may increase your risk for falls  Medicines may also increase your risk for falls if they make you dizzy, weak, or sleepy  Call 911 or have someone else call if:   · You have fallen and are unconscious      · You have fallen and cannot move part of your body  Contact your healthcare provider if:   · You have fallen and have pain or a headache  · You have questions or concerns about your condition or care  Fall prevention tips:   · Stand or sit up slowly  This may help you keep your balance and prevent falls  · Use assistive devices as directed  Your healthcare provider may suggest that you use a cane or walker to help you keep your balance  You may need to have grab bars put in your bathroom near the toilet or in the shower  · Wear shoes that fit well and have soles that   Wear shoes both inside and outside  Use slippers with good   Do not wear shoes with high heels  · Wear a personal alarm  This is a device that allows you to call 911 if you fall and need help  Ask your healthcare provider for more information  · Stay active  Exercise can help strengthen your muscles and improve your balance  Your healthcare provider may recommend water aerobics or walking  He or she may also recommend physical therapy to improve your coordination  Never start an exercise program without talking to your healthcare provider first      · Manage your medical conditions  Keep all appointments with your healthcare providers  Visit your eye doctor as directed  Home safety tips:   · Add items to prevent falls in the bathroom  Put nonslip strips on your bath or shower floor to prevent you from slipping  Use a bath mat if you do not have carpet in the bathroom  This will prevent you from falling when you step out of the bath or shower  Use a shower seat so you do not need to stand while you shower  Sit on the toilet or a chair in your bathroom to dry yourself and put on clothing  This will prevent you from losing your balance from drying or dressing yourself while you are standing  · Keep paths clear  Remove books, shoes, and other objects from walkways and stairs   Place cords for telephones and lamps out of the way so that you do not need to walk over them  Tape them down if you cannot move them  Remove small rugs  If you cannot remove a rug, secure it with double-sided tape  This will prevent you from tripping  · Install bright lights in your home  Use night lights to help light paths to the bathroom or kitchen  Always turn on the light before you start walking  · Keep items you use often on shelves within reach  Do not use a step stool to help you reach an item  · Paint or place reflective tape on the edges of your stairs  This will help you see the stairs better  Follow up with your healthcare provider as directed:  Write down your questions so you remember to ask them during your visits  © 2017 [address] [employer]  Information is for End User's use only and may not be sold, redistributed or otherwise used for commercial purposes  All illustrations and images included in CareNotes® are the copyrighted property of [employer] M , Inc  or Cheng Bailey  The above information is an  only  It is not intended as medical advice for individual conditions or treatments  Talk to your doctor, nurse or pharmacist before following any medical regimen to see if it is safe and effective for you  Advance Directives   WHAT YOU NEED TO KNOW:   What are advance directives? Advance directives are legal documents that state your wishes and plans for medical care  These plans are made ahead of time in case you lose your ability to make decisions for yourself  Advance directives can apply to any medical decision, such as the treatments you want, and if you want to donate organs  What are the types of advance directives? There are many types of advance directives, and each state has rules about how to use them  You may choose a combination of any of the following:  · Living will: This is a written record of the treatment you want   You can also choose which treatments you do not want, which to limit, and which to stop at a certain time  This includes surgery, medicine, IV fluid, and tube feedings  · Durable power of  for healthcare [facility]): This is a written record that states who you want to make healthcare choices for you when you are unable to make them for yourself  This person, called a proxy, is usually a family member or a friend  You may choose more than 1 proxy  · Do not resuscitate (DNR) order:  A DNR order is used in case your heart stops beating or you stop breathing  It is a request not to have certain forms of treatment, such as CPR  A DNR order may be included in other types of advance directives  · Medical directive: This covers the care that you want if you are in a coma, near death, or unable to make decisions for yourself  You can list the treatments you want for each condition  Treatment may include pain medicine, surgery, blood transfusions, dialysis, IV or tube feedings, and a ventilator (breathing machine)  · Values history: This document has questions about your views, beliefs, and how you feel and think about life  This information can help others choose the care that you would choose  Why are advance directives important? An advance directive helps you control your care  Although spoken wishes may be used, it is better to have your wishes written down  Spoken wishes can be misunderstood, or not followed  Treatments may be given even if you do not want them  An advance directive may make it easier for your family to make difficult choices about your care  How do I decide what to put in my advance directives? · Make informed decisions:  Make sure you fully understand treatments or care you may receive  Think about the benefits and problems your decisions could cause for you or your family  Talk to healthcare providers if you have concerns or questions before you write down your wishes   You may also want to talk with your Christian or , or a   Check your state laws to make sure that what you put in your advance directive is legal      · Sign all forms:  Sign and date your advance directive when you have finished  You may also need 2 witnesses to sign the forms  Witnesses cannot be your doctor or his staff, your spouse, heirs or beneficiaries, people you owe money to, or your chosen proxy  Talk to your family, proxy, and healthcare providers about your advance directive  Give each person a copy, and keep one for yourself in a place you can get to easily  Do not keep it hidden or locked away  · Review and revise your plans: You can revise your advance directive at any time, as long as you are able to make decisions  Review your plan every year, and when there are changes in your life, or your health  When you make changes, let your family, proxy, and healthcare providers know  Give each a new copy  Where can I find more information? · [employer]  [address] [place] , [address]  Phone: 1- 264 - [phone]  Phone: 7- [phone] - [phone]  Web Address: http://www  aafp org  · [address] [facility])  [address]  Phone: [phone]  Phone: [phone]  Web Address: Macarena goncalves  CARE AGREEMENT:   You have the right to help plan your care  To help with this plan, you must learn about your health condition and treatment options  You must also learn about advance directives and how they are used  Work with your healthcare providers to decide what care will be used to treat you  You always have the right to refuse treatment  The above information is an  only  It is not intended as medical advice for individual conditions or treatments  Talk to your doctor, nurse or pharmacist before following any medical regimen to see if it is safe and effective for you    © 2017 [address] [employer] Information is for End User's use only and may not be sold, redistributed or otherwise used for commercial purposes  All illustrations and images included in CareNotes® are the copyrighted property of A D A M , Inc  or Cheng Bailey  Obesity   AMBULATORY CARE:   Obesity  is when your body mass index (BMI) is greater than 30  Your healthcare provider will use your height and weight to measure your BMI  The risks of obesity include  many health problems, such as injuries or physical disability  You may need tests to check for the following:  · Diabetes     · High blood pressure or high cholesterol     · Heart disease     · Gallbladder or liver disease     · Cancer of the colon, breast, prostate, liver, or kidney     · Sleep apnea     · Arthritis or gout  Seek care immediately if:   · You have a severe headache, confusion, or difficulty speaking  · You have weakness on one side of your body  · You have chest pain, sweating, or shortness of breath  Contact your healthcare provider if:   · You have symptoms of gallbladder or liver disease, such as pain in your upper abdomen  · You have knee or hip pain and discomfort while walking  · You have symptoms of diabetes, such as intense hunger and thirst, and frequent urination  · You have symptoms of sleep apnea, such as snoring or daytime sleepiness  · You have questions or concerns about your condition or care  Treatment for obesity  focuses on helping you lose weight to improve your health  Even a small decrease in BMI can reduce the risk for many health problems  Your healthcare provider will help you set a weight-loss goal   · Lifestyle changes  are the first step in treating obesity  These include making healthy food choices and getting regular physical activity  Your healthcare provider may suggest a weight-loss program that involves coaching, education, and therapy       · Medicine  may help you lose weight when it is used with a healthy diet and physical activity  · Surgery  can help you lose weight if you are very obese and have other health problems  There are several types of weight-loss surgery  Ask your healthcare provider for more information  Be successful losing weight:   · Set small, realistic goals  An example of a small goal is to walk for 20 minutes 5 days a week  Anther goal is to lose 5% of your body weight  · Tell friends, family members, and coworkers about your goals  and ask for their support  Ask a friend to lose weight with you, or join a weight-loss support group  · Identify foods or triggers that may cause you to overeat , and find ways to avoid them  Remove tempting high-calorie foods from your home and workplace  Place a bowl of fresh fruit on your kitchen counter  If stress causes you to eat, then find other ways to cope with stress  · Keep a diary to track what you eat and drink  Also write down how many minutes of physical activity you do each day  Weigh yourself once a week and record it in your diary  Eating changes: You will need to eat 500 to 1,000 fewer calories each day than you currently eat to lose 1 to 2 pounds a week  The following changes will help you cut calories:  · Eat smaller portions  Use small plates, no larger than 9 inches in diameter  Fill your plate half full of fruits and vegetables  Measure your food using measuring cups until you know what a serving size looks like  · Eat 3 meals and 1 or 2 snacks each day  Plan your meals in advance  [place] and eat at home most of the time  Eat slowly  · Eat fruits and vegetables at every meal   They are low in calories and high in fiber, which makes you feel full  Do not add butter, margarine, or cream sauce to vegetables  Use herbs to season steamed vegetables  · Eat less fat and fewer fried foods  Eat more baked or grilled chicken and fish  These protein sources are lower in calories and fat than red meat   Limit fast food  Dress your salads with olive oil and vinegar instead of bottled dressing  · Limit the amount of sugar you eat  Do not drink sugary beverages  Limit alcohol  Activity changes:  Physical activity is good for your body in many ways  It helps you burn calories and build strong muscles  It decreases stress and depression, and improves your mood  It can also help you sleep better  Talk to your healthcare provider before you begin an exercise program   · Exercise for at least 30 minutes 5 days a week  Start slowly  Set aside time each day for physical activity that you enjoy and that is convenient for you  It is best to do both weight training and an activity that increases your heart rate, such as walking, bicycling, or swimming  · Find ways to be more active  Do yard work and housecleaning  Walk up the stairs instead of using elevators  Spend your leisure time going to events that require walking, such as outdoor festivals or fairs  This extra physical activity can help you lose weight and keep it off  Follow up with your healthcare provider as directed: You may need to meet with a dietitian  Write down your questions so you remember to ask them during your visits  © 2017 [facility] Information is for End User's use only and may not be sold, redistributed or otherwise used for commercial purposes  All illustrations and images included in CareNotes® are the copyrighted property of [employer]  or Cheng Bailey  The above information is an  only  It is not intended as medical advice for individual conditions or treatments  Talk to your doctor, nurse or pharmacist before following any medical regimen to see if it is safe and effective for you  Urinary Incontinence   WHAT YOU NEED TO KNOW:   What is urinary incontinence? Urinary incontinence (UI) is when you lose control of your bladder  What causes UI?   UI occurs because your bladder cannot store or empty urine properly  The following are the most common types of UI:  · Stress incontinence  is when you leak urine due to increased bladder pressure  This may happen when you cough, sneeze, or exercise  · Urge incontinence  is when you feel the need to urinate right away and leak urine accidentally  · Mixed incontinence  is when you have both stress and urge UI  What are the signs and symptoms of UI?   · You feel like your bladder does not empty completely when you urinate  · You urinate often and need to urinate immediately  · You leak urine when you sleep, or you wake up with the urge to urinate  · You leak urine when you cough, sneeze, exercise, or laugh  How is UI diagnosed? Your healthcare provider will ask how often you leak urine and whether you have stress or urge symptoms  Tell him which medicines you take, how often you urinate, and how much liquid you drink each day  You may need any of the following tests:  · Urine tests  may show infection or kidney function  · A pelvic exam  may be done to check for blockages  A pelvic exam will also show if your bladder, uterus, or other organs have moved out of place  · An x-ray, ultrasound, or CT  may show problems with parts of your urinary system  You may be given contrast liquid to help your organs show up better in the pictures  Tell the healthcare provider if you have ever had an allergic reaction to contrast liquid  Do not enter the MRI room with anything metal  Metal can cause serious injury  Tell the healthcare provider if you have any metal in or on your body  · A bladder scan  will show how much urine is left in your bladder after you urinate  You will be asked to urinate and then healthcare providers will use a small ultrasound machine to check the urine left in your bladder  · Cystometry  is used to check the function of your urinary system   Your healthcare provider checks the pressure in your bladder while filling it with fluid  Your bladder pressure may also be tested when your bladder is full and while you urinate  How is UI treated? · Medicines  can help strengthen your bladder control  · Electrical stimulation  is used to send a small amount of electrical energy to your pelvic floor muscles  This helps control your bladder function  Electrodes may be placed outside your body or in your rectum  For women, the electrodes may be placed in the vagina  · A bulking agent  may be injected into the wall of your urethra to make it thicker  This helps keep your urethra closed and decreases urine leakage  · Devices  such as a clamp, pessary, or tampon may help stop urine leaks  Ask your healthcare provider for more information about these and other devices  · Surgery  may be needed if other treatments do not work  Several types of surgery can help improve your bladder control  Ask your healthcare provider for more information about the surgery you may need  How can I manage my symptoms? · Do pelvic muscle exercises often  Your pelvic muscles help you stop urinating  Squeeze these muscles tight for 5 seconds, then relax for 5 seconds  Gradually work up to squeezing for 10 seconds  Do 3 sets of 15 repetitions a day, or as directed  This will help strengthen your pelvic muscles and improve bladder control  · A catheter  may be used to help empty your bladder  A catheter is a tiny, plastic tube that is put into your bladder to drain your urine  Your healthcare provider may tell you to use a catheter to prevent your bladder from getting too full and leaking urine  · Keep a UI record  Write down how often you leak urine and how much you leak  Make a note of what you were doing when you leaked urine  · Train your bladder  Go to the bathroom at set times, such as every 2 hours, even if you do not feel the urge to go  You can also try to hold your urine when you feel the urge to go   For example, hold your urine for 5 minutes when you feel the urge to go  As that becomes easier, hold your urine for 10 minutes  · Drink liquids as directed  Ask your healthcare provider how much liquid to drink each day and which liquids are best for you  You may need to limit the amount of liquid you drink to help control your urine leakage  Limit or do not have drinks that contain caffeine or alcohol  Do not drink any liquid right before you go to bed  · Prevent constipation  Eat a variety of high-fiber foods  Good examples are high-fiber cereals, beans, vegetables, and whole-grain breads  Prune juice may help make your bowel movement softer  Walking is the best way to trigger your intestines to have a bowel movement  · Exercise regularly and maintain a healthy weight  Ask your healthcare provider how much you should weigh and about the best exercise plan for you  Weight loss and exercise will decrease pressure on your bladder and help you control your leakage  Ask him to help you create a weight loss plan if you are overweight  When should I seek immediate care? · You have severe pain  · You are confused or cannot think clearly  When should I contact my healthcare provider? · You have a fever  · You see blood in your urine  · You have pain when you urinate  · You have new or worse pain, even after treatment  · Your mouth feels dry or you have vision changes  · Your urine is cloudy or smells bad  · You have questions or concerns about your condition or care  CARE AGREEMENT:   You have the right to help plan your care  Learn about your health condition and how it may be treated  Discuss treatment options with your caregivers to decide what care you want to receive  You always have the right to refuse treatment  The above information is an  only  It is not intended as medical advice for individual conditions or treatments   Talk to your doctor, nurse or pharmacist before following any medical regimen to see if it is safe and effective for you  © 2017 [address] [employer] Information is for End User's use only and may not be sold, redistributed or otherwise used for commercial purposes  All illustrations and images included in CareNotes® are the copyrighted property of [employer]  or Cheng Bailey  Cigarette Smoking and Your Health   AMBULATORY CARE:   Risks to your health if you smoke:  Nicotine and other chemicals found in tobacco damage every cell in your body  Even if you are a light smoker, you have an increased risk for cancer, heart disease, and lung disease  If you are pregnant or have diabetes, smoking increases your risk for complications  Benefits to your health if you stop smoking:   · You decrease respiratory symptoms such as coughing, wheezing, and shortness of breath  · You reduce your risk for cancers of the lung, mouth, throat, kidney, bladder, pancreas, stomach, and cervix  If you already have cancer, you increase the benefits of chemotherapy  You also reduce your risk for cancer returning or a second cancer from developing  · You reduce your risk for heart disease, blood clots, heart attack, and stroke  · You reduce your risk for lung infections, and diseases such as pneumonia, asthma, chronic bronchitis, and emphysema  · Your circulation improves  More oxygen can be delivered to your body  If you have diabetes, you lower your risk for complications, such as kidney, artery, and eye diseases  You also lower your risk for nerve damage  Nerve damage can lead to amputations, poor vision, and blindness  · You improve your body's ability to heal and to fight infections  Benefits to the health of others if you stop smoking:  Tobacco is harmful to nonsmokers who breathe in your secondhand smoke   The following are ways the health of others around you may improve when you stop smoking:  · You lower the risks for lung cancer and heart disease in nonsmoking adults  · If you are pregnant, you lower the risk for miscarriage, early delivery, low birth weight, and stillbirth  You also lower your baby's risk for SIDS, obesity, developmental delay, and neurobehavioral problems, such as ADHD  · If you have children, you lower their risk for ear infections, colds, pneumonia, bronchitis, and asthma  For more information and support to stop smoking:   · [employer]  Phone: 2- 048 - 563-5315  Web Address: www [employer]  Follow up with your healthcare provider as directed:  Write down your questions so you remember to ask them during your visits  © 2017 [address] [employer] Information is for End User's use only and may not be sold, redistributed or otherwise used for commercial purposes  All illustrations and images included in CareNotes® are the copyrighted property of [employer] Inc  or Cheng Bailey  The above information is an  only  It is not intended as medical advice for individual conditions or treatments  Talk to your doctor, nurse or pharmacist before following any medical regimen to see if it is safe and effective for you  Fall Prevention   AMBULATORY CARE:   Fall prevention  includes ways to make your home and other areas safer  It also includes ways you can move more carefully to prevent a fall  Health conditions that cause changes in your blood pressure, vision, or muscle strength and coordination may increase your risk for falls  Medicines may also increase your risk for falls if they make you dizzy, weak, or sleepy  Call 911 or have someone else call if:   · You have fallen and are unconscious  · You have fallen and cannot move part of your body  Contact your healthcare provider if:   · You have fallen and have pain or a headache  · You have questions or concerns about your condition or care  Fall prevention tips:   · Stand or sit up slowly    This may help you keep your balance and prevent falls  · Use assistive devices as directed  Your healthcare provider may suggest that you use a cane or walker to help you keep your balance  You may need to have grab bars put in your bathroom near the toilet or in the shower  · Wear shoes that fit well and have soles that   Wear shoes both inside and outside  Use slippers with good   Do not wear shoes with high heels  · Wear a personal alarm  This is a device that allows you to call 911 if you fall and need help  Ask your healthcare provider for more information  · Stay active  Exercise can help strengthen your muscles and improve your balance  Your healthcare provider may recommend water aerobics or walking  He or she may also recommend physical therapy to improve your coordination  Never start an exercise program without talking to your healthcare provider first      · Manage your medical conditions  Keep all appointments with your healthcare providers  Visit your eye doctor as directed  Home safety tips:   · Add items to prevent falls in the bathroom  Put nonslip strips on your bath or shower floor to prevent you from slipping  Use a bath mat if you do not have carpet in the bathroom  This will prevent you from falling when you step out of the bath or shower  Use a shower seat so you do not need to stand while you shower  Sit on the toilet or a chair in your bathroom to dry yourself and put on clothing  This will prevent you from losing your balance from drying or dressing yourself while you are standing  · Keep paths clear  Remove books, shoes, and other objects from walkways and stairs  Place cords for telephones and lamps out of the way so that you do not need to walk over them  Tape them down if you cannot move them  Remove small rugs  If you cannot remove a rug, secure it with double-sided tape  This will prevent you from tripping  · Install bright lights in your home    Use night lights to help light paths to the bathroom or kitchen  Always turn on the light before you start walking  · Keep items you use often on shelves within reach  Do not use a step stool to help you reach an item  · Paint or place reflective tape on the edges of your stairs  This will help you see the stairs better  Follow up with your healthcare provider as directed:  Write down your questions so you remember to ask them during your visits  © 2017 [address] [employer] Information is for End User's use only and may not be sold, redistributed or otherwise used for commercial purposes  All illustrations and images included in CareNotes® are the copyrighted property of [employer] M , Inc  or [employer]  The above information is an  only  It is not intended as medical advice for individual conditions or treatments  Talk to your doctor, nurse or pharmacist before following any medical regimen to see if it is safe and effective for you  Advance Directives   WHAT YOU NEED TO KNOW:   What are advance directives? Advance directives are legal documents that state your wishes and plans for medical care  These plans are made ahead of time in case you lose your ability to make decisions for yourself  Advance directives can apply to any medical decision, such as the treatments you want, and if you want to donate organs  What are the types of advance directives? There are many types of advance directives, and each state has rules about how to use them  You may choose a combination of any of the following:  · Living will: This is a written record of the treatment you want  You can also choose which treatments you do not want, which to limit, and which to stop at a certain time  This includes surgery, medicine, IV fluid, and tube feedings  · Durable power of  for healthcare [facility]):   This is a written record that states who you want to make healthcare choices for you when you are unable to make them for yourself  This person, called a proxy, is usually a family member or a friend  You may choose more than 1 proxy  · Do not resuscitate (DNR) order:  A DNR order is used in case your heart stops beating or you stop breathing  It is a request not to have certain forms of treatment, such as CPR  A DNR order may be included in other types of advance directives  · Medical directive: This covers the care that you want if you are in a coma, near death, or unable to make decisions for yourself  You can list the treatments you want for each condition  Treatment may include pain medicine, surgery, blood transfusions, dialysis, IV or tube feedings, and a ventilator (breathing machine)  · Values history: This document has questions about your views, beliefs, and how you feel and think about life  This information can help others choose the care that you would choose  Why are advance directives important? An advance directive helps you control your care  Although spoken wishes may be used, it is better to have your wishes written down  Spoken wishes can be misunderstood, or not followed  Treatments may be given even if you do not want them  An advance directive may make it easier for your family to make difficult choices about your care  How do I decide what to put in my advance directives? · Make informed decisions:  Make sure you fully understand treatments or care you may receive  Think about the benefits and problems your decisions could cause for you or your family  Talk to healthcare providers if you have concerns or questions before you write down your wishes  You may also want to talk with your Shinto or , or a   Check your state laws to make sure that what you put in your advance directive is legal      · Sign all forms:  Sign and date your advance directive when you have finished  You may also need 2 witnesses to sign the forms   Witnesses cannot be your doctor or his staff, your spouse, heirs or beneficiaries, people you owe money to, or your chosen proxy  Talk to your family, proxy, and healthcare providers about your advance directive  Give each person a copy, and keep one for yourself in a place you can get to easily  Do not keep it hidden or locked away  · Review and revise your plans: You can revise your advance directive at any time, as long as you are able to make decisions  Review your plan every year, and when there are changes in your life, or your health  When you make changes, let your family, proxy, and healthcare providers know  Give each a new copy  Where can I find more information? · [employer]  [address] [employer] , [place] [address]  Phone: [phone]  Phone: [phone]  Web Address: http://www  [facility] org  · [address] [facility])  [address] [address] [facility], [address]  Phone: [phone] [phone]  Phone: [phone]  Web Address: Macarena goncalves  CARE AGREEMENT:   You have the right to help plan your care  To help with this plan, you must learn about your health condition and treatment options  You must also learn about advance directives and how they are used  Work with your healthcare providers to decide what care will be used to treat you  You always have the right to refuse treatment  The above information is an  only  It is not intended as medical advice for individual conditions or treatments  Talk to your doctor, nurse or pharmacist before following any medical regimen to see if it is safe and effective for you  © 2017 [address]  Information is for End User's use only and may not be sold, redistributed or otherwise used for commercial purposes  All illustrations and images included in CareNotes® are the copyrighted property of [employer]  or Cheng Bailey

## 2019-07-05 NOTE — PROGRESS NOTES
Assessment and Plan:     Problem List Items Addressed This Visit     None      Visit Diagnoses     Medicare annual wellness visit, subsequent    -  Primary    Encounter for screening mammogram for breast cancer        Relevant Orders    Mammo screening bilateral w 3d & cad    Asymptomatic postmenopausal state        Relevant Orders    DXA bone density spine hip and pelvis    Encounter for immunization        Relevant Medications    SHINGRIX 50 MCG/0 5ML SUSR         History of Present Illness:     Patient presents for Medicare Annual Wellness visit    Patient Care Team:  Suni Finch as PCP - General (Family Medicine)  DO Leatha Zelaya MD Dalbert Peng, CRNP Ellene Bale, MD (Pain Medicine)     Problem List:     Patient Active Problem List   Diagnosis    Chronic nonintractable headache    Trigeminal neuralgia    Occipital neuralgia of right side    Trigeminal neuropathy    Myofascial pain on right side    Depression with anxiety    Other chronic pain      Past Medical and Surgical History:     Past Medical History:   Diagnosis Date    Arthritis     Dry eye syndrome     Foraminal stenosis of cervical region     Macular degeneration     Memory loss     Migraine     Spinal stenosis of lumbar region     Supraventricular tachycardia ([address] )     Vitamin D deficiency     last assessed 2/7/17     Past Surgical History:   Procedure Laterality Date    APPENDECTOMY      BACK SURGERY      lower back surgery lumbar disc    CATARACT EXTRACTION      FEMUR FRACTURE SURGERY      [place] STEREO TREAT TRIGEMINAL NERVE Right 3/26/2019    Procedure: GLYCEROL RHIZOTOMY TRIGEMINAL NERVES (V1-V3), RIGHT;  Surgeon: Marylee Amsterdam, MD;  Location:  MAIN OR;  Service: Neurosurgery    REPLACEMENT TOTAL KNEE Right     REPLACEMENT TOTAL KNEE Left       Family History:     Family History   Problem Relation Age of Onset    Aortic aneurysm Mother         abdominal aortic aneurysm    Hypertension Mother    [place] Breast cancer Mother     Hypertension Father     Hypertension Sister     Hyperlipidemia Sister       Social History:     Social History     Tobacco Use   Smoking Status Former Smoker   Smokeless Tobacco Never Used     Social History     Substance and Sexual Activity   Alcohol Use Yes    Comment: occasional     Social History     Substance and Sexual Activity   Drug Use No      Medications and Allergies:     Current Outpatient Medications   Medication Sig Dispense Refill    Acetaminophen 325 MG CAPS Take 650 mg by mouth every 6 (six) hours as needed      baclofen 10 mg tablet Take 1/2 po q8h prn spasm/pain 90 tablet 0    Cholecalciferol (VITAMIN D PO) Take 5,000 Units by mouth Daily        cycloSPORINE (RESTASIS) 0 05 % ophthalmic emulsion Apply 1 drop to eye 2 (two) times a day      DULoxetine (CYMBALTA) 60 mg delayed release capsule Take 1 capsule (60 mg total) by mouth daily 90 capsule 1    gabapentin (NEURONTIN) 100 mg capsule Take 1 capsule at bedtime for 3 days, then increase to 2 capsules at bedtime for 3 days and then increase to 3 capsules at bedtime  90 capsule 0    Multiple Vitamins-Minerals (PRESERVISION AREDS 2+MULTI VIT) CAPS BID      SHINGRIX 50 MCG/0 5ML SUSR Inject 50 mcg into a muscle once for 1 dose 1 each 1    verapamil (VERELAN PM) 120 MG 24 hr capsule TAKE 1 CAPSULE BY MOUTH AT  NIGHT 90 capsule 1     No current facility-administered medications for this visit        Allergies   Allergen Reactions    Dilaudid [Hydromorphone] Shortness Of Breath     Other reaction(s): Respiratory Distress  Other reaction(s): HORENESS    Penicillins Rash     Rash      Immunizations:     Immunization History   Administered Date(s) Administered    H1N1, All Formulations 01/19/2010    INFLUENZA 10/27/2008, 10/20/2009, 10/09/2012, 09/01/2013, 09/02/2014    Influenza Split High Dose Preservative Free IM 09/07/2017    Influenza, high dose seasonal 0 5 mL 08/20/2018    Pneumococcal Conjugate 13-Valent 2017    Pneumococcal Polysaccharide PPV23 1998, 2017    Td (adult), adsorbed 1996    Tdap 2017    Zoster 2009, 2009      Medicare Screening Tests and Risk Assessments:     Christopher Quinn is here for her Subsequent Wellness visit  Health Risk Assessment:  Patient rates overall health as poor  Patient feels that their physical health rating is Slightly worse  Eyesight was rated as Slightly worse  Hearing was rated as Same  Patient feels that their emotional and mental health rating is Same  Pain experienced by patient in the last 7 days has been A lot  Patient's pain rating has been 8/10  Patient states that she has experienced no weight loss or gain in last 6 months  Emotional/Mental Health:  Patient has been feeling nervous/anxious  PHQ-9 Depression Screening:    Frequency of the following problems over the past two weeks:      1  Little interest or pleasure in doing things: 3 - nearly every day      2  Feeling down, depressed, or hopeless: 3 - nearly every day      3  Trouble falling or staying asleep, or sleeping too much: 3 - nearly every day      4  Feeling tired or having little energy: 3 - nearly every day      5  Poor appetite or overeatin - not at all      6  Feeling bad about yourself - or that you are a failure or have let yourself or your family down: 3 - nearly every day      7  Trouble concentrating on things, such as reading the newspaper or watching television: 1 - several days      8  Moving or speaking so slowly that other people could have noticed  Or the opposite - being so fidgety or restless that you have been moving around a lot more than usual: 2 - more than half the days      9  Thoughts that you would be better off dead, or of hurting yourself in some way: 1 - several days  PHQ-2 Score: 6  PHQ-9 Score: 19    Broken Bones/Falls: Fall Risk Assessment:    In the past year, patient has experienced:  No history of falling in past year          Bladder/Bowel:  Patient has not leaked urine accidently in the last six months  Patient reports no loss of bowel control  Immunizations:  Patient has had a flu vaccination within the last year  Patient has received a pneumonia shot  Patient has received a shingles shot  Patient has not received tetanus/diphtheria shot  Home Safety:  Patient has trouble with stairs inside or outside of their home  Patient currently reports that there are no safety hazards present in home, working smoke alarms, working carbon monoxide detectors  Preventative Screenings:   No breast cancer screening performed, no colon cancer screen completed, cholesterol screen completed, no glaucoma eye exam completed    Nutrition:  Current diet: Regular with servings of the following:    Medications:  Patient is currently taking over-the-counter supplements  Patient is able to manage medications  Lifestyle Choices:  Patient reports no tobacco use  Patient has smoked or used tobacco in the past   Patient has stopped her tobacco use  Tobacco use quit date: 20+ years ago  Patient reports no alcohol use  Patient does not drive a vehicle  Patient wears seat belt  Current level of exercise of physical activity described by patient as: unable to exercise  Activities of Daily Living:  Can get out of bed by his or her self, able to dress self, unable to make own meals, unable to do own shopping, unable to bathe self, can do own laundry/housekeeping, can manage own money, pay bills and track expenses    Previous Hospitalizations:  No hospitalization or ED visit in past 12 months        Advanced Directives:  Patient has decided on a power of   Patient has spoken to designated power of   Patient has completed advanced directive          Preventative Screening/Counseling:      Cardiovascular:      General: Screening Current and Risks and Benefits Discussed Diabetes:      General: Risks and Benefits Discussed and Screening Current          Colorectal Cancer:      General: Risks and Benefits Discussed and Patient Declines          Breast Cancer:      General: Risks and Benefits Discussed          Cervical Cancer:      General: Risks and Benefits Discussed and Screening Not Indicated          Osteoporosis:      General: Risks and Benefits Discussed      Due for studies: DXA Appendicular          AAA:      General: Risks and Benefits Discussed and Screening Current          Glaucoma:      General: Risks and Benefits Discussed and Patient Declines      Comments: Pt cancelled her appts as she can't sit that long  Pt refusing at this time  HIV:      General: Risks and Benefits Discussed and Screening Not Indicated          Hepatitis C:      General: Risks and Benefits Discussed and Screening Not Indicated        Advanced Directives:   Patient has living will for healthcare, has durable POA for healthcare, patient has an advanced directive  Information on ACP and/or AD provided  5 wishes given  End of life assessment reviewed with patient  Provider agrees with end of life decisions   Additional Comments: Pt would not want resuscitation if no hope for recovery     Immunizations:      Influenza: Risks & Benefits Discussed and Influenza UTD This Year      Pneumococcal: Risks & Benefits Discussed and Lifetime Vaccine Completed      Shingrix: Risks & Benefits Discussed and Shingrix Vaccine Needed Today      Zostavax: Risks & Benefits Discussed and Zostavax Vaccine UTD      TDAP: Risks & Benefits Discussed and Tdap Vaccine UTD

## 2019-07-05 NOTE — PROGRESS NOTES
Assessment/Plan:     Chronic Problems:  No problem-specific Assessment & Plan notes found for this encounter  Visit Diagnosis:  Diagnoses and all orders for this visit:    Medicare annual wellness visit, subsequent    Encounter for screening mammogram for breast cancer  -     Mammo screening bilateral w 3d & cad; Future    Asymptomatic postmenopausal state  -     DXA bone density spine hip and pelvis; Future    Encounter for immunization  -     [address] 50 MCG/0 5ML SUSR; Inject 50 mcg into a muscle once for 1 dose    Encounter for removal of skin lesion  Comments:  Injected with lido and lesion shaved  Gelfoam applied no bleeding  Band aid placed  Orders:  -     Skin excision  -     Tissue Exam; Future          Subjective:    Patient ID: Karyna Mccarthy is a [de-identified] y o  female  Pt is here for removal of lesion on the left upper arm over the past 6 months  Bleeds on occasion  No pain  Wants removal  Takes all other meds as directed  No side effects noted  The following portions of the patient's history were reviewed and updated as appropriate: allergies, current medications, past family history, past medical history, past social history, past surgical history and problem list     Review of Systems   Constitutional: Negative for chills, diaphoresis, fatigue and fever  HENT: Negative  Eyes: Negative  Respiratory: Negative for cough, shortness of breath and wheezing  Cardiovascular: Negative for chest pain and palpitations  Skin:        Lesion on left upper arm  Never itchy  Pt has concerns re: multi colors  Neurological: Negative for dizziness, light-headedness and headaches  /70   Pulse 71   Temp 97 8 °F (36 6 °C)   Resp 18   Ht 5' 5" (1 651 m)   Wt 78 5 kg (173 lb)   SpO2 97%   BMI 28 79 kg/m²   Social History     Socioeconomic History    Marital status:       Spouse name: Not on file    Number of children: Not on file    Years of education: Not on file    Highest education level: Not on file   Occupational History    Occupation: retired   Social Needs    Financial resource strain: Not on file    Food insecurity:     Worry: Not on file     Inability: Not on file   [email or web address] needs:     Medical: Not on file     Non-medical: Not on file   Tobacco Use    Smoking status: Former Smoker    Smokeless tobacco: Never Used   Substance and Sexual Activity    Alcohol use: Yes     Comment: occasional    Drug use: No    Sexual activity: Not on file   Lifestyle    Physical activity:     Days per week: Not on file     Minutes per session: Not on file    Stress: Not on file   Relationships    Social connections:     Talks on phone: Not on file     Gets together: Not on file     Attends Anabaptist service: Not on file     Active member of club or organization: Not on file     Attends meetings of clubs or organizations: Not on file     Relationship status: Not on file    Intimate partner violence:     Fear of current or ex partner: Not on file     Emotionally abused: Not on file     Physically abused: Not on file     Forced sexual activity: Not on file   Other Topics Concern    Not on file   Social History Narrative    Always uses seatbelt     Past Medical History:   Diagnosis Date    Arthritis     Dry eye syndrome     Foraminal stenosis of cervical region     Macular degeneration     Memory loss     Migraine     Spinal stenosis of lumbar region     Supraventricular tachycardia ([facility] [address] )     Vitamin D deficiency     last assessed 2/7/17     Family History   Problem Relation Age of Onset    Aortic aneurysm Mother         abdominal aortic aneurysm    Hypertension Mother     Breast cancer Mother     Hypertension Father     Hypertension Sister     Hyperlipidemia Sister      Past Surgical History:   Procedure Laterality Date    APPENDECTOMY      BACK SURGERY      lower back surgery lumbar disc    CATARACT EXTRACTION      FEMUR FRACTURE SURGERY      DE STEREO TREAT TRIGEMINAL NERVE Right 3/26/2019    Procedure: GLYCEROL RHIZOTOMY TRIGEMINAL NERVES (V1-V3), RIGHT;  Surgeon: Michelle Shah MD;  Location: [facility];  Service: Neurosurgery    REPLACEMENT TOTAL KNEE Right     REPLACEMENT TOTAL KNEE Left        Current Outpatient Medications:     Acetaminophen 325 MG CAPS, Take 650 mg by mouth every 6 (six) hours as needed, Disp: , Rfl:     baclofen 10 mg tablet, Take 1/2 po q8h prn spasm/pain, Disp: 90 tablet, Rfl: 0    Cholecalciferol (VITAMIN D PO), Take 5,000 Units by mouth Daily  , Disp: , Rfl:     cycloSPORINE (RESTASIS) 0 05 % ophthalmic emulsion, Apply 1 drop to eye 2 (two) times a day, Disp: , Rfl:     DULoxetine (CYMBALTA) 60 mg delayed release capsule, Take 1 capsule (60 mg total) by mouth daily, Disp: 90 capsule, Rfl: 1    gabapentin (NEURONTIN) 100 mg capsule, Take 1 capsule at bedtime for 3 days, then increase to 2 capsules at bedtime for 3 days and then increase to 3 capsules at bedtime  , Disp: 90 capsule, Rfl: 0    Multiple Vitamins-Minerals (PRESERVISION AREDS 2+MULTI VIT) CAPS, BID, Disp: , Rfl:     SHINGRIX 50 MCG/0 5ML SUSR, Inject 50 mcg into a muscle once for 1 dose, Disp: 1 each, Rfl: 1    verapamil (VERELAN PM) 120 MG 24 hr capsule, TAKE 1 CAPSULE BY MOUTH AT  NIGHT, Disp: 90 capsule, Rfl: 1    Allergies   Allergen Reactions    Dilaudid [Hydromorphone] Shortness Of Breath     Other reaction(s): Respiratory Distress  Other reaction(s): HORENESS    Penicillins Rash     Rash          Lab Review   No visits with results within 2 Month(s) from this visit     Latest known visit with results is:   Appointment on 03/22/2019   Component Date Value    Sodium 03/22/2019 143     Potassium 03/22/2019 4 0     Chloride 03/22/2019 105     CO2 03/22/2019 30     ANION GAP 03/22/2019 8     BUN 03/22/2019 16     Creatinine 03/22/2019 0 61     Glucose 03/22/2019 92     Calcium 03/22/2019 9 1     AST 03/22/2019 14     ALT 03/22/2019 17     Alkaline Phosphatase 03/22/2019 67     Total Protein 03/22/2019 7 1     Albumin 03/22/2019 4 0     Total Bilirubin 03/22/2019 0 40     eGFR 03/22/2019 86     WBC 03/22/2019 5 50     RBC 03/22/2019 4 18     Hemoglobin 03/22/2019 12 8     Hematocrit 03/22/2019 40 3     MCV 03/22/2019 96     MCH 03/22/2019 30 6     MCHC 03/22/2019 31 8     RDW 03/22/2019 13 1     MPV 03/22/2019 10 7     Platelets 54/63/5040 209     nRBC 03/22/2019 0     Neutrophils Relative 03/22/2019 66     Immat GRANS % 03/22/2019 1     Lymphocytes Relative 03/22/2019 22     Monocytes Relative 03/22/2019 8     Eosinophils Relative 03/22/2019 2     Basophils Relative 03/22/2019 1     Neutrophils Absolute 03/22/2019 3 70     Immature Grans Absolute 03/22/2019 0 03     Lymphocytes Absolute 03/22/2019 1 21     Monocytes Absolute 03/22/2019 0 43     Eosinophils Absolute 03/22/2019 0 09     Basophils Absolute 03/22/2019 0 04     PTT 03/22/2019 30     Protime 03/22/2019 12 9     INR 03/22/2019 0 98     Hemoglobin A1C 03/22/2019 5 5     EAG 03/22/2019 111         Imaging: Mri Cervical Spine Wo Contrast    Result Date: 7/1/2019  Narrative: MRI CERVICAL SPINE WITHOUT CONTRAST INDICATION: M48 02: Spinal stenosis, cervical region M47 812: Spondylosis without myelopathy or radiculopathy, cervical region M50 20: Other cervical disc displacement, unspecified cervical region  Head and neck pain COMPARISON:  MR 3/4/2016 TECHNIQUE:  Sagittal T1, sagittal T2, sagittal inversion recovery, axial T2, axial  2D merge IMAGE QUALITY:  Diagnostic FINDINGS: ALIGNMENT:  Loss of disc height C5-C6, C6-C7 and C7/T1  Degenerative grade 1 C4 anterolisthesis  MARROW SIGNAL:  Normal marrow signal is identified within the visualized bony structures  No discrete marrow lesion  CERVICAL AND VISUALIZED THORACIC CORD:  Normal signal within the visualized cord   PREVERTEBRAL AND PARASPINAL SOFT TISSUES:  Normal  VISUALIZED POSTERIOR FOSSA:  The visualized posterior fossa demonstrates no abnormal signal  CERVICAL DISC SPACES: C2-C3: Left facet arthrosis  C3-C4:  Left greater than right facet arthrosis  C4-C5:  Bilateral facet arthrosis, left greater than right, grade 1 anterolisthesis, mild canal and mild left foraminal stenosis  Stable  C5-C6:  Loss of disc height, marginal osteophytes, circumferential bulge  Bilateral facet and uncinate arthrosis with relatively severe foraminal stenosis, correlate for bilateral C6 radiculitis  Findings have progressed slightly  Rosella Goldmann C6-C7:  Loss of disc height, circumferential bulge with marginal osteophytes  Left greater than right uncinate arthrosis  Moderate bilateral facet arthrosis  No critical stenosis  C7-T1:  Advanced bilateral facet arthrosis, grade 1 anterolisthesis, circumferential bulge with prominent anterior osteophytes, no critical stenosis  UPPER THORACIC DISC SPACES:  Minor bulges T1-T2 and T2-T3  Impression: Multilevel degenerative changes which are stable or have progressed only minimally since prior study  Mild multilevel canal stenosis  Potentially significant bilateral C5-C6 foraminal stenosis, correlate for bilateral C6 radiculitis  Workstation performed: [id number]       Objective:     Physical Exam   Constitutional: She appears well-developed and well-nourished  HENT:   Head: Normocephalic and atraumatic  Eyes: Pupils are equal, round, and reactive to light  EOM are normal    Neck: Normal range of motion  Neck supple  Cardiovascular: Normal rate, regular rhythm and normal heart sounds  Pulmonary/Chest: Effort normal and breath sounds normal  No respiratory distress  Skin: Skin is warm  Pt with 8 mm raised flesh colored lesion with bloody areas noted on the periphery  Also seems to be surrounded by reddened area  Skin excision  Date/Time: 7/5/2019 3:28 PM  Performed by: CL Yanes  Authorized by:  [address], CRNP     Procedure Details - Skin Excision:     Number of Lesions:  1  Lesion 1:     Body area:  Upper extremity    Upper extremity location:  L upper arm    Skin lesion 1 size (mm): 8  Final defect size (mm): 10 mm  Malignancy: malignancy unknown      Surgical defect: 9 mm  Gel foam applied  Hemostasis achieved  Patient Instructions     Lesion on left upper arm was cleansed with Betadine injected with 1% lidocaine and removed by shave excision  Gel foam applied and covered with Band-Aid  No active bleeding  Will call with the results of the pathology report  Have the DEXA scan done the end of November early December  If you are able to get the new shingles shot please call and let us know that you are able to get the shot  Patient is refer refusing any further stool tests  Have the mammogram done when you feel you're able and I will call with all results  Obesity   AMBULATORY CARE:   Obesity  is when your body mass index (BMI) is greater than 30  Your healthcare provider will use your height and weight to measure your BMI  The risks of obesity include  many health problems, such as injuries or physical disability  You may need tests to check for the following:  · Diabetes     · High blood pressure or high cholesterol     · Heart disease     · Gallbladder or liver disease     · Cancer of the colon, breast, prostate, liver, or kidney     · Sleep apnea     · Arthritis or gout  Seek care immediately if:   · You have a severe headache, confusion, or difficulty speaking  · You have weakness on one side of your body  · You have chest pain, sweating, or shortness of breath  Contact your healthcare provider if:   · You have symptoms of gallbladder or liver disease, such as pain in your upper abdomen  · You have knee or hip pain and discomfort while walking  · You have symptoms of diabetes, such as intense hunger and thirst, and frequent urination  · You have symptoms of sleep apnea, such as snoring or daytime sleepiness  · You have questions or concerns about your condition or care  Treatment for obesity  focuses on helping you lose weight to improve your health  Even a small decrease in BMI can reduce the risk for many health problems  Your healthcare provider will help you set a weight-loss goal   · Lifestyle changes  are the first step in treating obesity  These include making healthy food choices and getting regular physical activity  Your healthcare provider may suggest a weight-loss program that involves coaching, education, and therapy  · Medicine  may help you lose weight when it is used with a healthy diet and physical activity  · Surgery  can help you lose weight if you are very obese and have other health problems  There are several types of weight-loss surgery  Ask your healthcare provider for more information  Be successful losing weight:   · Set small, realistic goals  An example of a small goal is to walk for 20 minutes 5 days a week  Anther goal is to lose 5% of your body weight  · Tell friends, family members, and coworkers about your goals  and ask for their support  Ask a friend to lose weight with you, or join a weight-loss support group  · Identify foods or triggers that may cause you to overeat , and find ways to avoid them  Remove tempting high-calorie foods from your home and workplace  Place a bowl of fresh fruit on your kitchen counter  If stress causes you to eat, then find other ways to cope with stress  · Keep a diary to track what you eat and drink  Also write down how many minutes of physical activity you do each day  Weigh yourself once a week and record it in your diary  Eating changes: You will need to eat 500 to 1,000 fewer calories each day than you currently eat to lose 1 to 2 pounds a week  The following changes will help you cut calories:  · Eat smaller portions  Use small plates, no larger than 9 inches in diameter   Fill your plate half full of fruits and vegetables  Measure your food using measuring cups until you know what a serving size looks like  · Eat 3 meals and 1 or 2 snacks each day  Plan your meals in advance  Tereza Stalls and eat at home most of the time  Eat slowly  · Eat fruits and vegetables at every meal   They are low in calories and high in fiber, which makes you feel full  Do not add butter, margarine, or cream sauce to vegetables  Use herbs to season steamed vegetables  · Eat less fat and fewer fried foods  Eat more baked or grilled chicken and fish  These protein sources are lower in calories and fat than red meat  Limit fast food  Dress your salads with olive oil and vinegar instead of bottled dressing  · Limit the amount of sugar you eat  Do not drink sugary beverages  Limit alcohol  Activity changes:  Physical activity is good for your body in many ways  It helps you burn calories and build strong muscles  It decreases stress and depression, and improves your mood  It can also help you sleep better  Talk to your healthcare provider before you begin an exercise program   · Exercise for at least 30 minutes 5 days a week  Start slowly  Set aside time each day for physical activity that you enjoy and that is convenient for you  It is best to do both weight training and an activity that increases your heart rate, such as walking, bicycling, or swimming  · Find ways to be more active  Do yard work and housecleaning  Walk up the stairs instead of using elevators  Spend your leisure time going to events that require walking, such as outdoor festivals or fairs  This extra physical activity can help you lose weight and keep it off  Follow up with your healthcare provider as directed: You may need to meet with a dietitian  Write down your questions so you remember to ask them during your visits     © 2017 [employer] Information is for End User's use only and may not be sold, redistributed or otherwise used for commercial purposes  All illustrations and images included in CareNotes® are the copyrighted property of [employer]  or Cheng Bailey  The above information is an  only  It is not intended as medical advice for individual conditions or treatments  Talk to your doctor, nurse or pharmacist before following any medical regimen to see if it is safe and effective for you  Urinary Incontinence   WHAT YOU NEED TO KNOW:   What is urinary incontinence? Urinary incontinence (UI) is when you lose control of your bladder  What causes UI? UI occurs because your bladder cannot store or empty urine properly  The following are the most common types of UI:  · Stress incontinence  is when you leak urine due to increased bladder pressure  This may happen when you cough, sneeze, or exercise  · Urge incontinence  is when you feel the need to urinate right away and leak urine accidentally  · Mixed incontinence  is when you have both stress and urge UI  What are the signs and symptoms of UI?   · You feel like your bladder does not empty completely when you urinate  · You urinate often and need to urinate immediately  · You leak urine when you sleep, or you wake up with the urge to urinate  · You leak urine when you cough, sneeze, exercise, or laugh  How is UI diagnosed? Your healthcare provider will ask how often you leak urine and whether you have stress or urge symptoms  Tell him which medicines you take, how often you urinate, and how much liquid you drink each day  You may need any of the following tests:  · Urine tests  may show infection or kidney function  · A pelvic exam  may be done to check for blockages  A pelvic exam will also show if your bladder, uterus, or other organs have moved out of place  · An x-ray, ultrasound, or CT  may show problems with parts of your urinary system  You may be given contrast liquid to help your organs show up better in the pictures   Tell the healthcare provider if you have ever had an allergic reaction to contrast liquid  Do not enter the MRI room with anything metal  Metal can cause serious injury  Tell the healthcare provider if you have any metal in or on your body  · A bladder scan  will show how much urine is left in your bladder after you urinate  You will be asked to urinate and then healthcare providers will use a small ultrasound machine to check the urine left in your bladder  · Cystometry  is used to check the function of your urinary system  Your healthcare provider checks the pressure in your bladder while filling it with fluid  Your bladder pressure may also be tested when your bladder is full and while you urinate  How is UI treated? · Medicines  can help strengthen your bladder control  · Electrical stimulation  is used to send a small amount of electrical energy to your pelvic floor muscles  This helps control your bladder function  Electrodes may be placed outside your body or in your rectum  For women, the electrodes may be placed in the vagina  · A bulking agent  may be injected into the wall of your urethra to make it thicker  This helps keep your urethra closed and decreases urine leakage  · Devices  such as a clamp, pessary, or tampon may help stop urine leaks  Ask your healthcare provider for more information about these and other devices  · Surgery  may be needed if other treatments do not work  Several types of surgery can help improve your bladder control  Ask your healthcare provider for more information about the surgery you may need  How can I manage my symptoms? · Do pelvic muscle exercises often  Your pelvic muscles help you stop urinating  Squeeze these muscles tight for 5 seconds, then relax for 5 seconds  Gradually work up to squeezing for 10 seconds  Do 3 sets of 15 repetitions a day, or as directed  This will help strengthen your pelvic muscles and improve bladder control      · A catheter  may be used to help empty your bladder  A catheter is a tiny, plastic tube that is put into your bladder to drain your urine  Your healthcare provider may tell you to use a catheter to prevent your bladder from getting too full and leaking urine  · Keep a UI record  Write down how often you leak urine and how much you leak  Make a note of what you were doing when you leaked urine  · Train your bladder  Go to the bathroom at set times, such as every 2 hours, even if you do not feel the urge to go  You can also try to hold your urine when you feel the urge to go  For example, hold your urine for 5 minutes when you feel the urge to go  As that becomes easier, hold your urine for 10 minutes  · Drink liquids as directed  Ask your healthcare provider how much liquid to drink each day and which liquids are best for you  You may need to limit the amount of liquid you drink to help control your urine leakage  Limit or do not have drinks that contain caffeine or alcohol  Do not drink any liquid right before you go to bed  · Prevent constipation  Eat a variety of high-fiber foods  Good examples are high-fiber cereals, beans, vegetables, and whole-grain breads  Prune juice may help make your bowel movement softer  Walking is the best way to trigger your intestines to have a bowel movement  · Exercise regularly and maintain a healthy weight  Ask your healthcare provider how much you should weigh and about the best exercise plan for you  Weight loss and exercise will decrease pressure on your bladder and help you control your leakage  Ask him to help you create a weight loss plan if you are overweight  When should I seek immediate care? · You have severe pain  · You are confused or cannot think clearly  When should I contact my healthcare provider? · You have a fever  · You see blood in your urine  · You have pain when you urinate      · You have new or worse pain, even after treatment  · Your mouth feels dry or you have vision changes  · Your urine is cloudy or smells bad  · You have questions or concerns about your condition or care  CARE AGREEMENT:   You have the right to help plan your care  Learn about your health condition and how it may be treated  Discuss treatment options with your caregivers to decide what care you want to receive  You always have the right to refuse treatment  The above information is an  only  It is not intended as medical advice for individual conditions or treatments  Talk to your doctor, nurse or pharmacist before following any medical regimen to see if it is safe and effective for you  © 2017 [address]  Information is for End User's use only and may not be sold, redistributed or otherwise used for commercial purposes  All illustrations and images included in CareNotes® are the copyrighted property of [employer]  or Cheng Lynn  Cigarette Smoking and Your Health   AMBULATORY CARE:   Risks to your health if you smoke:  Nicotine and other chemicals found in tobacco damage every cell in your body  Even if you are a light smoker, you have an increased risk for cancer, heart disease, and lung disease  If you are pregnant or have diabetes, smoking increases your risk for complications  Benefits to your health if you stop smoking:   · You decrease respiratory symptoms such as coughing, wheezing, and shortness of breath  · You reduce your risk for cancers of the lung, mouth, throat, kidney, bladder, pancreas, stomach, and cervix  If you already have cancer, you increase the benefits of chemotherapy  You also reduce your risk for cancer returning or a second cancer from developing  · You reduce your risk for heart disease, blood clots, heart attack, and stroke  · You reduce your risk for lung infections, and diseases such as pneumonia, asthma, chronic bronchitis, and emphysema  · Your circulation improves  More oxygen can be delivered to your body  If you have diabetes, you lower your risk for complications, such as kidney, artery, and eye diseases  You also lower your risk for nerve damage  Nerve damage can lead to amputations, poor vision, and blindness  · You improve your body's ability to heal and to fight infections  Benefits to the health of others if you stop smoking:  Tobacco is harmful to nonsmokers who breathe in your secondhand smoke  The following are ways the health of others around you may improve when you stop smoking:  · You lower the risks for lung cancer and heart disease in nonsmoking adults  · If you are pregnant, you lower the risk for miscarriage, early delivery, low birth weight, and stillbirth  You also lower your baby's risk for SIDS, obesity, developmental delay, and neurobehavioral problems, such as ADHD  · If you have children, you lower their risk for ear infections, colds, pneumonia, bronchitis, and asthma  For more information and support to stop smoking:   · [employer]  Phone: 8- [phone]  Web Address: www [employer]  Follow up with your healthcare provider as directed:  Write down your questions so you remember to ask them during your visits  © 2017 [address]  Information is for End User's use only and may not be sold, redistributed or otherwise used for commercial purposes  All illustrations and images included in CareNotes® are the copyrighted property of [employer]  or Cheng Bailey  The above information is an  only  It is not intended as medical advice for individual conditions or treatments  Talk to your doctor, nurse or pharmacist before following any medical regimen to see if it is safe and effective for you  Fall Prevention   AMBULATORY CARE:   Fall prevention  includes ways to make your home and other areas safer   It also includes ways you can move more carefully to prevent a fall  Health conditions that cause changes in your blood pressure, vision, or muscle strength and coordination may increase your risk for falls  Medicines may also increase your risk for falls if they make you dizzy, weak, or sleepy  Call 911 or have someone else call if:   · You have fallen and are unconscious  · You have fallen and cannot move part of your body  Contact your healthcare provider if:   · You have fallen and have pain or a headache  · You have questions or concerns about your condition or care  Fall prevention tips:   · Stand or sit up slowly  This may help you keep your balance and prevent falls  · Use assistive devices as directed  Your healthcare provider may suggest that you use a cane or walker to help you keep your balance  You may need to have grab bars put in your bathroom near the toilet or in the shower  · Wear shoes that fit well and have soles that   Wear shoes both inside and outside  Use slippers with good   Do not wear shoes with high heels  · Wear a personal alarm  This is a device that allows you to call 911 if you fall and need help  Ask your healthcare provider for more information  · Stay active  Exercise can help strengthen your muscles and improve your balance  Your healthcare provider may recommend water aerobics or walking  He or she may also recommend physical therapy to improve your coordination  Never start an exercise program without talking to your healthcare provider first      · Manage your medical conditions  Keep all appointments with your healthcare providers  Visit your eye doctor as directed  Home safety tips:   · Add items to prevent falls in the bathroom  Put nonslip strips on your bath or shower floor to prevent you from slipping  Use a bath mat if you do not have carpet in the bathroom  This will prevent you from falling when you step out of the bath or shower   Use a shower seat so you do not need to stand while you shower  Sit on the toilet or a chair in your bathroom to dry yourself and put on clothing  This will prevent you from losing your balance from drying or dressing yourself while you are standing  · Keep paths clear  Remove books, shoes, and other objects from walkways and stairs  Place cords for telephones and lamps out of the way so that you do not need to walk over them  Tape them down if you cannot move them  Remove small rugs  If you cannot remove a rug, secure it with double-sided tape  This will prevent you from tripping  · Install bright lights in your home  Use night lights to help light paths to the bathroom or kitchen  Always turn on the light before you start walking  · Keep items you use often on shelves within reach  Do not use a step stool to help you reach an item  · Paint or place reflective tape on the edges of your stairs  This will help you see the stairs better  Follow up with your healthcare provider as directed:  Write down your questions so you remember to ask them during your visits  © 2017 [address] [facility] Information is for End User's use only and may not be sold, redistributed or otherwise used for commercial purposes  All illustrations and images included in CareNotes® are the copyrighted property of A D A M , Inc  or Cheng Bailey  The above information is an  only  It is not intended as medical advice for individual conditions or treatments  Talk to your doctor, nurse or pharmacist before following any medical regimen to see if it is safe and effective for you  Advance Directives   WHAT YOU NEED TO KNOW:   What are advance directives? Advance directives are legal documents that state your wishes and plans for medical care  These plans are made ahead of time in case you lose your ability to make decisions for yourself   Advance directives can apply to any medical decision, such as the treatments you want, and if you want to donate organs  What are the types of advance directives? There are many types of advance directives, and each state has rules about how to use them  You may choose a combination of any of the following:  · Living will: This is a written record of the treatment you want  You can also choose which treatments you do not want, which to limit, and which to stop at a certain time  This includes surgery, medicine, IV fluid, and tube feedings  · Durable power of  for healthcare [facility]): This is a written record that states who you want to make healthcare choices for you when you are unable to make them for yourself  This person, called a proxy, is usually a family member or a friend  You may choose more than 1 proxy  · Do not resuscitate (DNR) order:  A DNR order is used in case your heart stops beating or you stop breathing  It is a request not to have certain forms of treatment, such as CPR  A DNR order may be included in other types of advance directives  · Medical directive: This covers the care that you want if you are in a coma, near death, or unable to make decisions for yourself  You can list the treatments you want for each condition  Treatment may include pain medicine, surgery, blood transfusions, dialysis, IV or tube feedings, and a ventilator (breathing machine)  · Values history: This document has questions about your views, beliefs, and how you feel and think about life  This information can help others choose the care that you would choose  Why are advance directives important? An advance directive helps you control your care  Although spoken wishes may be used, it is better to have your wishes written down  Spoken wishes can be misunderstood, or not followed  Treatments may be given even if you do not want them  An advance directive may make it easier for your family to make difficult choices about your care  How do I decide what to put in my advance directives?    · Make informed decisions:  Make sure you fully understand treatments or care you may receive  Think about the benefits and problems your decisions could cause for you or your family  Talk to healthcare providers if you have concerns or questions before you write down your wishes  You may also want to talk with your Confucianism or , or a   Check your state laws to make sure that what you put in your advance directive is legal      · Sign all forms:  Sign and date your advance directive when you have finished  You may also need 2 witnesses to sign the forms  Witnesses cannot be your doctor or his staff, your spouse, heirs or beneficiaries, people you owe money to, or your chosen proxy  Talk to your family, proxy, and healthcare providers about your advance directive  Give each person a copy, and keep one for yourself in a place you can get to easily  Do not keep it hidden or locked away  · Review and revise your plans: You can revise your advance directive at any time, as long as you are able to make decisions  Review your plan every year, and when there are changes in your life, or your health  When you make changes, let your family, proxy, and healthcare providers know  Give each a new copy  Where can I find more information? · [employer]  [address] , [address]  Phone: 1- [phone]  Phone: 0- [phone]  Web Address: http://www  aafp org  · [address] [facility])  [address] [facility], [address]  Phone: [phone]  Phone: [phone]  Web Address: Macarena goncalves  CARE AGREEMENT:   You have the right to help plan your care  To help with this plan, you must learn about your health condition and treatment options  You must also learn about advance directives and how they are used   Work with your healthcare providers to decide what care will be used to treat you  You always have the right to refuse treatment  The above information is an  only  It is not intended as medical advice for individual conditions or treatments  Talk to your doctor, nurse or pharmacist before following any medical regimen to see if it is safe and effective for you  © 2017 [address] [employer] Information is for End User's use only and may not be sold, redistributed or otherwise used for commercial purposes  All illustrations and images included in CareNotes® are the copyrighted property of [employer]  or Cheng Bailey  Obesity   AMBULATORY CARE:   Obesity  is when your body mass index (BMI) is greater than 30  Your healthcare provider will use your height and weight to measure your BMI  The risks of obesity include  many health problems, such as injuries or physical disability  You may need tests to check for the following:  · Diabetes     · High blood pressure or high cholesterol     · Heart disease     · Gallbladder or liver disease     · Cancer of the colon, breast, prostate, liver, or kidney     · Sleep apnea     · Arthritis or gout  Seek care immediately if:   · You have a severe headache, confusion, or difficulty speaking  · You have weakness on one side of your body  · You have chest pain, sweating, or shortness of breath  Contact your healthcare provider if:   · You have symptoms of gallbladder or liver disease, such as pain in your upper abdomen  · You have knee or hip pain and discomfort while walking  · You have symptoms of diabetes, such as intense hunger and thirst, and frequent urination  · You have symptoms of sleep apnea, such as snoring or daytime sleepiness  · You have questions or concerns about your condition or care  Treatment for obesity  focuses on helping you lose weight to improve your health  Even a small decrease in BMI can reduce the risk for many health problems   Your healthcare provider will help you set a weight-loss goal   · Lifestyle changes  are the first step in treating obesity  These include making healthy food choices and getting regular physical activity  Your healthcare provider may suggest a weight-loss program that involves coaching, education, and therapy  · Medicine  may help you lose weight when it is used with a healthy diet and physical activity  · Surgery  can help you lose weight if you are very obese and have other health problems  There are several types of weight-loss surgery  Ask your healthcare provider for more information  Be successful losing weight:   · Set small, realistic goals  An example of a small goal is to walk for 20 minutes 5 days a week  Anther goal is to lose 5% of your body weight  · Tell friends, family members, and coworkers about your goals  and ask for their support  Ask a friend to lose weight with you, or join a weight-loss support group  · Identify foods or triggers that may cause you to overeat , and find ways to avoid them  Remove tempting high-calorie foods from your home and workplace  Place a bowl of fresh fruit on your kitchen counter  If stress causes you to eat, then find other ways to cope with stress  · Keep a diary to track what you eat and drink  Also write down how many minutes of physical activity you do each day  Weigh yourself once a week and record it in your diary  Eating changes: You will need to eat 500 to 1,000 fewer calories each day than you currently eat to lose 1 to 2 pounds a week  The following changes will help you cut calories:  · Eat smaller portions  Use small plates, no larger than 9 inches in diameter  Fill your plate half full of fruits and vegetables  Measure your food using measuring cups until you know what a serving size looks like  · Eat 3 meals and 1 or 2 snacks each day  Plan your meals in advance  Jossie Colmenares and eat at home most of the time  Eat slowly       · Eat fruits and vegetables at every meal  They are low in calories and high in fiber, which makes you feel full  Do not add butter, margarine, or cream sauce to vegetables  Use herbs to season steamed vegetables  · Eat less fat and fewer fried foods  Eat more baked or grilled chicken and fish  These protein sources are lower in calories and fat than red meat  Limit fast food  Dress your salads with olive oil and vinegar instead of bottled dressing  · Limit the amount of sugar you eat  Do not drink sugary beverages  Limit alcohol  Activity changes:  Physical activity is good for your body in many ways  It helps you burn calories and build strong muscles  It decreases stress and depression, and improves your mood  It can also help you sleep better  Talk to your healthcare provider before you begin an exercise program   · Exercise for at least 30 minutes 5 days a week  Start slowly  Set aside time each day for physical activity that you enjoy and that is convenient for you  It is best to do both weight training and an activity that increases your heart rate, such as walking, bicycling, or swimming  · Find ways to be more active  Do yard work and housecleaning  Walk up the stairs instead of using elevators  Spend your leisure time going to events that require walking, such as outdoor festivals or fairs  This extra physical activity can help you lose weight and keep it off  Follow up with your healthcare provider as directed: You may need to meet with a dietitian  Write down your questions so you remember to ask them during your visits  © 2017 [address] [employer] Information is for End User's use only and may not be sold, redistributed or otherwise used for commercial purposes  All illustrations and images included in CareNotes® are the copyrighted property of A D A M , [employer]  or Cheng Bailey  The above information is an  only  It is not intended as medical advice for individual conditions or treatments   Talk to your doctor, nurse or pharmacist before following any medical regimen to see if it is safe and effective for you  Urinary Incontinence   WHAT YOU NEED TO KNOW:   What is urinary incontinence? Urinary incontinence (UI) is when you lose control of your bladder  What causes UI? UI occurs because your bladder cannot store or empty urine properly  The following are the most common types of UI:  · Stress incontinence  is when you leak urine due to increased bladder pressure  This may happen when you cough, sneeze, or exercise  · Urge incontinence  is when you feel the need to urinate right away and leak urine accidentally  · Mixed incontinence  is when you have both stress and urge UI  What are the signs and symptoms of UI?   · You feel like your bladder does not empty completely when you urinate  · You urinate often and need to urinate immediately  · You leak urine when you sleep, or you wake up with the urge to urinate  · You leak urine when you cough, sneeze, exercise, or laugh  How is UI diagnosed? Your healthcare provider will ask how often you leak urine and whether you have stress or urge symptoms  Tell him which medicines you take, how often you urinate, and how much liquid you drink each day  You may need any of the following tests:  · Urine tests  may show infection or kidney function  · A pelvic exam  may be done to check for blockages  A pelvic exam will also show if your bladder, uterus, or other organs have moved out of place  · An x-ray, ultrasound, or CT  may show problems with parts of your urinary system  You may be given contrast liquid to help your organs show up better in the pictures  Tell the healthcare provider if you have ever had an allergic reaction to contrast liquid  Do not enter the MRI room with anything metal  Metal can cause serious injury  Tell the healthcare provider if you have any metal in or on your body      · A bladder scan  will show how much urine is left in your bladder after you urinate  You will be asked to urinate and then healthcare providers will use a small ultrasound machine to check the urine left in your bladder  · Cystometry  is used to check the function of your urinary system  Your healthcare provider checks the pressure in your bladder while filling it with fluid  Your bladder pressure may also be tested when your bladder is full and while you urinate  How is UI treated? · Medicines  can help strengthen your bladder control  · Electrical stimulation  is used to send a small amount of electrical energy to your pelvic floor muscles  This helps control your bladder function  Electrodes may be placed outside your body or in your rectum  For women, the electrodes may be placed in the vagina  · A bulking agent  may be injected into the wall of your urethra to make it thicker  This helps keep your urethra closed and decreases urine leakage  · Devices  such as a clamp, pessary, or tampon may help stop urine leaks  Ask your healthcare provider for more information about these and other devices  · Surgery  may be needed if other treatments do not work  Several types of surgery can help improve your bladder control  Ask your healthcare provider for more information about the surgery you may need  How can I manage my symptoms? · Do pelvic muscle exercises often  Your pelvic muscles help you stop urinating  Squeeze these muscles tight for 5 seconds, then relax for 5 seconds  Gradually work up to squeezing for 10 seconds  Do 3 sets of 15 repetitions a day, or as directed  This will help strengthen your pelvic muscles and improve bladder control  · A catheter  may be used to help empty your bladder  A catheter is a tiny, plastic tube that is put into your bladder to drain your urine  Your healthcare provider may tell you to use a catheter to prevent your bladder from getting too full and leaking urine  · Keep a UI record    Write down how often you leak urine and how much you leak  Make a note of what you were doing when you leaked urine  · Train your bladder  Go to the bathroom at set times, such as every 2 hours, even if you do not feel the urge to go  You can also try to hold your urine when you feel the urge to go  For example, hold your urine for 5 minutes when you feel the urge to go  As that becomes easier, hold your urine for 10 minutes  · Drink liquids as directed  Ask your healthcare provider how much liquid to drink each day and which liquids are best for you  You may need to limit the amount of liquid you drink to help control your urine leakage  Limit or do not have drinks that contain caffeine or alcohol  Do not drink any liquid right before you go to bed  · Prevent constipation  Eat a variety of high-fiber foods  Good examples are high-fiber cereals, beans, vegetables, and whole-grain breads  Prune juice may help make your bowel movement softer  Walking is the best way to trigger your intestines to have a bowel movement  · Exercise regularly and maintain a healthy weight  Ask your healthcare provider how much you should weigh and about the best exercise plan for you  Weight loss and exercise will decrease pressure on your bladder and help you control your leakage  Ask him to help you create a weight loss plan if you are overweight  When should I seek immediate care? · You have severe pain  · You are confused or cannot think clearly  When should I contact my healthcare provider? · You have a fever  · You see blood in your urine  · You have pain when you urinate  · You have new or worse pain, even after treatment  · Your mouth feels dry or you have vision changes  · Your urine is cloudy or smells bad  · You have questions or concerns about your condition or care  CARE AGREEMENT:   You have the right to help plan your care  Learn about your health condition and how it may be treated  Discuss treatment options with your caregivers to decide what care you want to receive  You always have the right to refuse treatment  The above information is an  only  It is not intended as medical advice for individual conditions or treatments  Talk to your doctor, nurse or pharmacist before following any medical regimen to see if it is safe and effective for you  © 2017 [address] [employer] Information is for End User's use only and may not be sold, redistributed or otherwise used for commercial purposes  All illustrations and images included in CareNotes® are the copyrighted property of [employer]  or Cheng Lynn  Cigarette Smoking and Your Health   AMBULATORY CARE:   Risks to your health if you smoke:  Nicotine and other chemicals found in tobacco damage every cell in your body  Even if you are a light smoker, you have an increased risk for cancer, heart disease, and lung disease  If you are pregnant or have diabetes, smoking increases your risk for complications  Benefits to your health if you stop smoking:   · You decrease respiratory symptoms such as coughing, wheezing, and shortness of breath  · You reduce your risk for cancers of the lung, mouth, throat, kidney, bladder, pancreas, stomach, and cervix  If you already have cancer, you increase the benefits of chemotherapy  You also reduce your risk for cancer returning or a second cancer from developing  · You reduce your risk for heart disease, blood clots, heart attack, and stroke  · You reduce your risk for lung infections, and diseases such as pneumonia, asthma, chronic bronchitis, and emphysema  · Your circulation improves  More oxygen can be delivered to your body  If you have diabetes, you lower your risk for complications, such as kidney, artery, and eye diseases  You also lower your risk for nerve damage  Nerve damage can lead to amputations, poor vision, and blindness      · You improve your body's ability to heal and to fight infections  Benefits to the health of others if you stop smoking:  Tobacco is harmful to nonsmokers who breathe in your secondhand smoke  The following are ways the health of others around you may improve when you stop smoking:  · You lower the risks for lung cancer and heart disease in nonsmoking adults  · If you are pregnant, you lower the risk for miscarriage, early delivery, low birth weight, and stillbirth  You also lower your baby's risk for SIDS, obesity, developmental delay, and neurobehavioral problems, such as ADHD  · If you have children, you lower their risk for ear infections, colds, pneumonia, bronchitis, and asthma  For more information and support to stop smoking:   · [employer]  Phone: 7- 661 - [phone]  Web Address: www [employer]  Follow up with your healthcare provider as directed:  Write down your questions so you remember to ask them during your visits  © 2017 [address]  Information is for End User's use only and may not be sold, redistributed or otherwise used for commercial purposes  All illustrations and images included in CareNotes® are the copyrighted property of A D A M , Inc  or Cheng Bailey  The above information is an  only  It is not intended as medical advice for individual conditions or treatments  Talk to your doctor, nurse or pharmacist before following any medical regimen to see if it is safe and effective for you  Fall Prevention   AMBULATORY CARE:   Fall prevention  includes ways to make your home and other areas safer  It also includes ways you can move more carefully to prevent a fall  Health conditions that cause changes in your blood pressure, vision, or muscle strength and coordination may increase your risk for falls  Medicines may also increase your risk for falls if they make you dizzy, weak, or sleepy     Call 911 or have someone else call if:   · You have fallen and are unconscious  · You have fallen and cannot move part of your body  Contact your healthcare provider if:   · You have fallen and have pain or a headache  · You have questions or concerns about your condition or care  Fall prevention tips:   · Stand or sit up slowly  This may help you keep your balance and prevent falls  · Use assistive devices as directed  Your healthcare provider may suggest that you use a cane or walker to help you keep your balance  You may need to have grab bars put in your bathroom near the toilet or in the shower  · Wear shoes that fit well and have soles that   Wear shoes both inside and outside  Use slippers with good   Do not wear shoes with high heels  · Wear a personal alarm  This is a device that allows you to call 911 if you fall and need help  Ask your healthcare provider for more information  · Stay active  Exercise can help strengthen your muscles and improve your balance  Your healthcare provider may recommend water aerobics or walking  He or she may also recommend physical therapy to improve your coordination  Never start an exercise program without talking to your healthcare provider first      · Manage your medical conditions  Keep all appointments with your healthcare providers  Visit your eye doctor as directed  Home safety tips:   · Add items to prevent falls in the bathroom  Put nonslip strips on your bath or shower floor to prevent you from slipping  Use a bath mat if you do not have carpet in the bathroom  This will prevent you from falling when you step out of the bath or shower  Use a shower seat so you do not need to stand while you shower  Sit on the toilet or a chair in your bathroom to dry yourself and put on clothing  This will prevent you from losing your balance from drying or dressing yourself while you are standing  · Keep paths clear  Remove books, shoes, and other objects from walkways and stairs   Place cords for telephones and lamps out of the way so that you do not need to walk over them  Tape them down if you cannot move them  Remove small rugs  If you cannot remove a rug, secure it with double-sided tape  This will prevent you from tripping  · Install bright lights in your home  Use night lights to help light paths to the bathroom or kitchen  Always turn on the light before you start walking  · Keep items you use often on shelves within reach  Do not use a step stool to help you reach an item  · Paint or place reflective tape on the edges of your stairs  This will help you see the stairs better  Follow up with your healthcare provider as directed:  Write down your questions so you remember to ask them during your visits  © 2017 [address]  Information is for End User's use only and may not be sold, redistributed or otherwise used for commercial purposes  All illustrations and images included in CareNotes® are the copyrighted property of A D A M , Inc  or Cheng Bailey  The above information is an  only  It is not intended as medical advice for individual conditions or treatments  Talk to your doctor, nurse or pharmacist before following any medical regimen to see if it is safe and effective for you  Advance Directives   WHAT YOU NEED TO KNOW:   What are advance directives? Advance directives are legal documents that state your wishes and plans for medical care  These plans are made ahead of time in case you lose your ability to make decisions for yourself  Advance directives can apply to any medical decision, such as the treatments you want, and if you want to donate organs  What are the types of advance directives? There are many types of advance directives, and each state has rules about how to use them  You may choose a combination of any of the following:  · Living will: This is a written record of the treatment you want   You can also choose which treatments you do not want, which to limit, and which to stop at a certain time  This includes surgery, medicine, IV fluid, and tube feedings  · Durable power of  for healthcare [facility]): This is a written record that states who you want to make healthcare choices for you when you are unable to make them for yourself  This person, called a proxy, is usually a family member or a friend  You may choose more than 1 proxy  · Do not resuscitate (DNR) order:  A DNR order is used in case your heart stops beating or you stop breathing  It is a request not to have certain forms of treatment, such as CPR  A DNR order may be included in other types of advance directives  · Medical directive: This covers the care that you want if you are in a coma, near death, or unable to make decisions for yourself  You can list the treatments you want for each condition  Treatment may include pain medicine, surgery, blood transfusions, dialysis, IV or tube feedings, and a ventilator (breathing machine)  · Values history: This document has questions about your views, beliefs, and how you feel and think about life  This information can help others choose the care that you would choose  Why are advance directives important? An advance directive helps you control your care  Although spoken wishes may be used, it is better to have your wishes written down  Spoken wishes can be misunderstood, or not followed  Treatments may be given even if you do not want them  An advance directive may make it easier for your family to make difficult choices about your care  How do I decide what to put in my advance directives? · Make informed decisions:  Make sure you fully understand treatments or care you may receive  Think about the benefits and problems your decisions could cause for you or your family  Talk to healthcare providers if you have concerns or questions before you write down your wishes   You may also want to talk with your Jehovah's witness or , or a   Check your state laws to make sure that what you put in your advance directive is legal      · Sign all forms:  Sign and date your advance directive when you have finished  You may also need 2 witnesses to sign the forms  Witnesses cannot be your doctor or his staff, your spouse, heirs or beneficiaries, people you owe money to, or your chosen proxy  Talk to your family, proxy, and healthcare providers about your advance directive  Give each person a copy, and keep one for yourself in a place you can get to easily  Do not keep it hidden or locked away  · Review and revise your plans: You can revise your advance directive at any time, as long as you are able to make decisions  Review your plan every year, and when there are changes in your life, or your health  When you make changes, let your family, proxy, and healthcare providers know  Give each a new copy  Where can I find more information? · [employer]  [address] , [address]  Phone: [phone]  Phone: [phone]  Web Address: http://www  aafp org  · [address] [facility])  [address] [address] [facility], [address]  Phone: [phone]  Phone: [phone]  Web Address: Macarena MCCABE AGREEMENT:   You have the right to help plan your care  To help with this plan, you must learn about your health condition and treatment options  You must also learn about advance directives and how they are used  Work with your healthcare providers to decide what care will be used to treat you  You always have the right to refuse treatment  The above information is an  only  It is not intended as medical advice for individual conditions or treatments  Talk to your doctor, nurse or pharmacist before following any medical regimen to see if it is safe and effective for you    © 2017 [address] [facility] Information is for End User's use only and may not be sold, redistributed or otherwise used for commercial purposes  All illustrations and images included in CareNotes® are the copyrighted property of A D A M , Inc  or CL Kruger    Portions of the record may have been created with voice recognition software   Occasional wrong word or "sound a like" substitutions may have occurred due to the inherent limitations of voice recognition software   Read the chart carefully and recognize, using context, where substitutions have occurred

## 2019-07-08 ENCOUNTER — TELEPHONE (OUTPATIENT)
Dept: NEUROSURGERY | Facility: CLINIC | Age: 79
End: 2019-07-08

## 2019-07-08 NOTE — TELEPHONE ENCOUNTER
Patient called to check on the status of you reviewing her MRI and is asking if she should come in to see you or should she continue to see Dr Arun Sloan? Patient was told by Pain Management that a MSG was sent to you regarding the MRI that was completed       Please advise

## 2019-07-09 ENCOUNTER — TELEPHONE (OUTPATIENT)
Dept: FAMILY MEDICINE CLINIC | Facility: CLINIC | Age: 79
End: 2019-07-09

## 2019-07-09 DIAGNOSIS — C44.619 BASAL CELL CARCINOMA (BCC) OF LEFT UPPER ARM: Primary | ICD-10-CM

## 2019-07-09 NOTE — TELEPHONE ENCOUNTER
----- Message from [address] [place] [address]  sent at 7/9/2019 12:24 PM EDT -----  Please let her know that she does have a basal cell carcinoma  I need to refer her out   I put a slip in emr for referral

## 2019-07-11 ENCOUNTER — TELEPHONE (OUTPATIENT)
Dept: PAIN MEDICINE | Facility: MEDICAL CENTER | Age: 79
End: 2019-07-11

## 2019-07-11 ENCOUNTER — OFFICE VISIT (OUTPATIENT)
Dept: NEUROSURGERY | Facility: CLINIC | Age: 79
End: 2019-07-11
Payer: MEDICARE

## 2019-07-11 VITALS
HEIGHT: 65 IN | TEMPERATURE: 97.1 F | SYSTOLIC BLOOD PRESSURE: 128 MMHG | RESPIRATION RATE: 16 BRPM | DIASTOLIC BLOOD PRESSURE: 80 MMHG | WEIGHT: 173 LBS | BODY MASS INDEX: 28.82 KG/M2

## 2019-07-11 DIAGNOSIS — M54.2 CERVICALGIA: ICD-10-CM

## 2019-07-11 DIAGNOSIS — M48.02 CERVICAL SPINAL STENOSIS: Primary | ICD-10-CM

## 2019-07-11 DIAGNOSIS — M54.81 BILATERAL OCCIPITAL NEURALGIA: Primary | ICD-10-CM

## 2019-07-11 PROCEDURE — 99214 OFFICE O/P EST MOD 30 MIN: CPT | Performed by: NEUROLOGICAL SURGERY

## 2019-07-11 RX ORDER — OXYCODONE HYDROCHLORIDE AND ACETAMINOPHEN 5; 325 MG/1; MG/1
1 TABLET ORAL EVERY 4 HOURS PRN
COMMUNITY
End: 2020-04-27

## 2019-07-11 NOTE — TELEPHONE ENCOUNTER
I placed an order in Epic for cervical epidural steroid injection  Please call and schedule the patient for this procedure

## 2019-07-11 NOTE — TELEPHONE ENCOUNTER
Pt is calling stating she saw Dr Aren Sanchez today and now would like to move forward with the cervical injection   Pt can be reached at [phone]

## 2019-07-11 NOTE — LETTER
July 11, 2019     Ana M Rich, [address] [address]    Patient: Joe Pitts   YOB: 1940   Date of Visit: 7/11/2019       Dear Dr Irasema Lee:    Thank you for referring Elias Dugan to me for evaluation  Below are my notes for this consultation  If you have questions, please do not hesitate to call me  I look forward to following your patient along with you  Sincerely,        Lico Dolan MD        CC: MD Lico Arana MD  7/11/2019 12:35 PM  Sign at close encounter  Assessment/Plan:    No problem-specific Assessment & Plan notes found for this encounter  History, physical examination and diagnostic tests were reviewed and questions answered  Diagnosis, care plan and treatment options were discussed  The patient understand instructions and will follow up as directed  The patient has cervical arthritis but does not appear to correspond with her headache syndrome, which could be occipital neuralgia  I do not feel that she also has cervicogenic headaches since she has mostly right sided headaches and she has mostly left sided high cervical foraminal stenosis and not right sided  She reports efficacy from trigeminal rhizotomy  I do not believe that cervical surgery will be effective  I recommend continued injections at this time  IMAGING REVIEWED: MRI cervical reviewed multilevel degeneration and some moderate stenosis without signs of cord compression  She has multilevel of foraminal stenosis but at C2-4 she only has foraminal compression on the left       There are no diagnoses linked to this encounter  Subjective:      Patient ID: Joe Pitts is a [de-identified] y o  female  Patient is a [de-identified]year old female with symptoms of posterior and frontal headache pain  Pain is severe and throbbing in quality  Pain distribution is mostly right sided  Pain is worse with sometimes neck movement   The pain has been present ever since the procedure although son states that she had pain prior to the glycerol rhizotomy  She follows with Alvaro Cruz for injections and pain management  The following portions of the patient's history were reviewed and updated as appropriate: allergies, current medications, past family history, past medical history, past social history, past surgical history and problem list     Review of Systems   Constitutional: Negative  HENT:        Uses hearing aide in left ear    Eyes:        Right eye macular degeneration    Respiratory: Negative  Cardiovascular: Negative  Gastrointestinal: Negative  Endocrine: Negative  Genitourinary: Negative  Musculoskeletal: Positive for neck pain (bilateral neck  pain with spasms  )  Skin: Negative  Allergic/Immunologic: Negative  Neurological: Positive for dizziness and headaches  Hematological: Negative  Psychiatric/Behavioral: Negative  All other systems reviewed and are negative  Objective:      /80 (BP Location: Left arm)   Temp (!) 97 1 °F (36 2 °C) (Tympanic)   Resp 16   Ht 5' 5" (1 651 m)   Wt 78 5 kg (173 lb)   BMI 28 79 kg/m²           Physical Exam   Constitutional: She is oriented to person, place, and time  She appears well-developed  HENT:   Head: Normocephalic and atraumatic  Nose: Nose normal    Eyes: Pupils are equal, round, and reactive to light  Conjunctivae and EOM are normal  Right eye exhibits no discharge  Left eye exhibits no discharge  No scleral icterus  Cardiovascular: Normal rate  Pulmonary/Chest: Effort normal and breath sounds normal  No respiratory distress  Musculoskeletal: Normal range of motion  Neurological: She is alert and oriented to person, place, and time  She has normal strength  No cranial nerve deficit or sensory deficit  Skin: Skin is warm and dry  Psychiatric: She has a normal mood and affect   Her behavior is normal

## 2019-07-11 NOTE — PROGRESS NOTES
Assessment/Plan:    No problem-specific Assessment & Plan notes found for this encounter  History, physical examination and diagnostic tests were reviewed and questions answered  Diagnosis, care plan and treatment options were discussed  The patient understand instructions and will follow up as directed  The patient has cervical arthritis but does not appear to correspond with her headache syndrome, which could be occipital neuralgia  I do not feel that she also has cervicogenic headaches since she has mostly right sided headaches and she has mostly left sided high cervical foraminal stenosis and not right sided  She reports efficacy from trigeminal rhizotomy  I do not believe that cervical surgery will be effective  I recommend continued injections at this time  IMAGING REVIEWED: MRI cervical reviewed multilevel degeneration and some moderate stenosis without signs of cord compression  She has multilevel of foraminal stenosis but at C2-4 she only has foraminal compression on the left       There are no diagnoses linked to this encounter  Subjective:      Patient ID: Luz Elena Cid is a [de-identified] y o  female  Patient is a [de-identified]year old female with symptoms of posterior and frontal headache pain  Pain is severe and throbbing in quality  Pain distribution is mostly right sided  Pain is worse with sometimes neck movement  The pain has been present ever since the procedure although son states that she had pain prior to the glycerol rhizotomy  She follows with Fito Araya for injections and pain management  The following portions of the patient's history were reviewed and updated as appropriate: allergies, current medications, past family history, past medical history, past social history, past surgical history and problem list     Review of Systems   Constitutional: Negative  HENT:        Uses hearing aide in left ear    Eyes:        Right eye macular degeneration    Respiratory: Negative  Cardiovascular: Negative  Gastrointestinal: Negative  Endocrine: Negative  Genitourinary: Negative  Musculoskeletal: Positive for neck pain (bilateral neck  pain with spasms  )  Skin: Negative  Allergic/Immunologic: Negative  Neurological: Positive for dizziness and headaches  Hematological: Negative  Psychiatric/Behavioral: Negative  All other systems reviewed and are negative  Objective:      /80 (BP Location: Left arm)   Temp (!) 97 1 °F (36 2 °C) (Tympanic)   Resp 16   Ht 5' 5" (1 651 m)   Wt 78 5 kg (173 lb)   BMI 28 79 kg/m²          Physical Exam   Constitutional: She is oriented to person, place, and time  She appears well-developed  HENT:   Head: Normocephalic and atraumatic  Nose: Nose normal    Eyes: Pupils are equal, round, and reactive to light  Conjunctivae and EOM are normal  Right eye exhibits no discharge  Left eye exhibits no discharge  No scleral icterus  Cardiovascular: Normal rate  Pulmonary/Chest: Effort normal and breath sounds normal  No respiratory distress  Musculoskeletal: Normal range of motion  Neurological: She is alert and oriented to person, place, and time  She has normal strength  No cranial nerve deficit or sensory deficit  Skin: Skin is warm and dry  Psychiatric: She has a normal mood and affect   Her behavior is normal

## 2019-07-16 ENCOUNTER — TELEPHONE (OUTPATIENT)
Dept: PAIN MEDICINE | Facility: CLINIC | Age: 79
End: 2019-07-16

## 2019-07-16 NOTE — TELEPHONE ENCOUNTER
S/w pt  Pt taking 500mg gabapentin at HS and denies any side effects  Pt states HA told her she can increase it further  Pt is asking how to increase at this time  Rates pain 5/10 to head and neck  States the increase from 400 to 500 on 6/25 has not provided any additional pain relief  Pt states she currently has a few 500mg tabs and 100mg tabs remaining but will need a [facility]  Please advise if pt should increase gabapentin further?

## 2019-07-17 ENCOUNTER — TELEPHONE (OUTPATIENT)
Dept: DERMATOLOGY | Facility: CLINIC | Age: 79
End: 2019-07-17

## 2019-07-17 DIAGNOSIS — M54.81 OCCIPITAL NEURALGIA OF RIGHT SIDE: ICD-10-CM

## 2019-07-17 DIAGNOSIS — G50.9 TRIGEMINAL NEUROPATHY: Primary | ICD-10-CM

## 2019-07-17 RX ORDER — GABAPENTIN 300 MG/1
CAPSULE ORAL
Qty: 60 CAPSULE | Refills: 0 | Status: SHIPPED | OUTPATIENT
Start: 2019-07-17 | End: 2019-08-28 | Stop reason: SDUPTHER

## 2019-07-17 NOTE — TELEPHONE ENCOUNTER
I called the patient back she would like to increase her gabapentin further  She is denying any side effects such as dizziness drowsiness or swelling hands and feet  Therefore at this time I have increased the patient's gabapentin to 300 mg 2 capsules at bedtime  She was instructed that increasing her gabapentin dose can increase her risk for medication side effects such as dizziness, drowsiness and swelling hands and feet  She was cautioned on getting up from a seated position and walking during the nighttime hours while taking this medication  She was also cautioned on driving or operating heavy machinery while taking this medication  She verbalized understanding  She stated that she would call the office in 2 weeks with an update on the effectiveness of increasing this medication, or sooner with adverse medication side effects

## 2019-07-17 NOTE — TELEPHONE ENCOUNTER
Pt called to reschedule appt  [facility] has biopsied her should stating it was basal cell and would like to know if we can remove it during this appt or if she would need another, due to distance for her  Told her I would call her back to let her know

## 2019-07-18 ENCOUNTER — HOSPITAL ENCOUNTER (OUTPATIENT)
Dept: RADIOLOGY | Facility: CLINIC | Age: 79
Discharge: HOME | End: 2019-07-18
Attending: ANESTHESIOLOGY
Payer: MEDICARE

## 2019-07-18 VITALS
SYSTOLIC BLOOD PRESSURE: 110 MMHG | DIASTOLIC BLOOD PRESSURE: 73 MMHG | OXYGEN SATURATION: 99 % | HEART RATE: 75 BPM | TEMPERATURE: 98.2 F | RESPIRATION RATE: 20 BRPM

## 2019-07-18 DIAGNOSIS — M48.02 CERVICAL SPINAL STENOSIS: ICD-10-CM

## 2019-07-18 PROCEDURE — 62321 NJX INTERLAMINAR CRV/THRC: CPT | Performed by: ANESTHESIOLOGY

## 2019-07-18 RX ORDER — LIDOCAINE HYDROCHLORIDE 10 MG/ML
5 INJECTION, SOLUTION EPIDURAL; INFILTRATION; INTRACAUDAL; PERINEURAL ONCE
Status: COMPLETED | OUTPATIENT
Start: 2019-07-18 | End: 2019-07-18

## 2019-07-18 RX ORDER — METHYLPREDNISOLONE ACETATE 80 MG/ML
80 INJECTION, SUSPENSION INTRA-ARTICULAR; INTRALESIONAL; INTRAMUSCULAR; PARENTERAL; SOFT TISSUE ONCE
Status: COMPLETED | OUTPATIENT
Start: 2019-07-18 | End: 2019-07-18

## 2019-07-18 RX ADMIN — METHYLPREDNISOLONE ACETATE 80 MG: 80 INJECTION, SUSPENSION INTRA-ARTICULAR; INTRALESIONAL; INTRAMUSCULAR; PARENTERAL; SOFT TISSUE at 15:11

## 2019-07-18 RX ADMIN — IOHEXOL 1 ML: 300 INJECTION, SOLUTION INTRAVENOUS at 15:11

## 2019-07-18 RX ADMIN — LIDOCAINE HYDROCHLORIDE 5 ML: 10 INJECTION, SOLUTION EPIDURAL; INFILTRATION; INTRACAUDAL; PERINEURAL at 15:11

## 2019-07-18 NOTE — H&P
History of Present Illness: The patient is a [de-identified] y o  female who presents with complaints of neck pain secondary to cervical stenosis and is here today for cervical epidural steroid injection  Patient Active Problem List   Diagnosis    Chronic nonintractable headache    Trigeminal neuralgia    Occipital neuralgia of right side    Trigeminal neuropathy    Myofascial pain on right side    Depression with anxiety    Other chronic pain       Past Medical History:   Diagnosis Date    Arthritis     Dry eye syndrome     Foraminal stenosis of cervical region     Macular degeneration     Memory loss     Migraine     Spinal stenosis of lumbar region     Supraventricular tachycardia ([address] )     Vitamin D deficiency     last assessed 2/7/17       Past Surgical History:   Procedure Laterality Date    APPENDECTOMY      BACK SURGERY      lower back surgery lumbar disc    CATARACT EXTRACTION      FEMUR FRACTURE SURGERY      VT STEREO TREAT TRIGEMINAL NERVE Right 3/26/2019    Procedure: GLYCEROL RHIZOTOMY TRIGEMINAL NERVES (V1-V3), RIGHT;  Surgeon: Lila Valentine MD;  Location: [facility] OR;  Service: Neurosurgery    REPLACEMENT TOTAL KNEE Right     REPLACEMENT TOTAL KNEE Left          Current Outpatient Medications:     Acetaminophen 325 MG CAPS, Take 650 mg by mouth every 6 (six) hours as needed, Disp: , Rfl:     Cholecalciferol (VITAMIN D PO), Take 5,000 Units by mouth Daily  , Disp: , Rfl:     cycloSPORINE (RESTASIS) 0 05 % ophthalmic emulsion, Apply 1 drop to eye 2 (two) times a day, Disp: , Rfl:     DULoxetine (CYMBALTA) 60 mg delayed release capsule, Take 1 capsule (60 mg total) by mouth daily, Disp: 90 capsule, Rfl: 1    gabapentin (NEURONTIN) 300 mg capsule, Take 2 capsules at bedtime  , Disp: 60 capsule, Rfl: 0    Multiple Vitamins-Minerals (PRESERVISION AREDS 2+MULTI VIT) CAPS, BID, Disp: , Rfl:     oxyCODONE-acetaminophen (PERCOCET) 5-325 mg per tablet, Take 1 tablet by mouth every 4 (four) hours as needed for moderate pain, Disp: , Rfl:     verapamil (VERELAN PM) 120 MG 24 hr capsule, TAKE 1 CAPSULE BY MOUTH AT  NIGHT, Disp: 90 capsule, Rfl: 1    Current Facility-Administered Medications:     iohexol (OMNIPAQUE) 300 mg/mL injection 50 mL, 50 mL, Epidural, Once, Agueda Iqbal MD    lidocaine (PF) (XYLOCAINE-MPF) 1 % injection 5 mL, 5 mL, Infiltration, Once, Agueda Iqbal MD    methylPREDNISolone acetate (DEPO-MEDROL) injection 80 mg, 80 mg, Epidural, Once, Agueda Iqbal MD    Allergies   Allergen Reactions    Dilaudid [Hydromorphone] Shortness Of Breath     Other reaction(s): Respiratory Distress  Other reaction(s): HORENESS    Penicillins Rash     Rash       Physical Exam:   Vitals:    07/18/19 1455   BP: 128/73   Pulse: 69   Resp: 20   Temp: 98 2 °F (36 8 °C)   SpO2: 99%     General: Awake, Alert, Oriented x 3, Mood and affect appropriate  Respiratory: Respirations even and unlabored  Cardiovascular: Peripheral pulses intact; no edema  Musculoskeletal Exam:   Neck tenderness    ASA Score: 2    Patient/Chart Verification  Patient ID Verified: Verbal  Consents Confirmed: To be obtained in the Pre-Procedure area  H&P( within 30 days) Verified: To be obtained in the Pre-Procedure area  Allergies Reviewed: Yes  Anticoag/NSAID held?: NA  Currently on antibiotics?: No    Assessment:   1   Cervical spinal stenosis        Plan: SHERRY

## 2019-07-18 NOTE — DISCHARGE INSTR - LAB
Epidural Steroid Injection   WHAT YOU NEED TO KNOW:   An epidural steroid injection (CARON) is a procedure to inject steroid medicine into the epidural space  The epidural space is between your spinal cord and vertebrae  Steroids reduce inflammation and fluid buildup in your spine that may be causing pain  You may be given pain medicine along with the steroids  ACTIVITY  · Do not drive or operate machinery today  · No strenuous activity today - bending, lifting, etc   · You may resume normal activites starting tomorrow - start slowly and as tolerated  · You may shower today, but no tub baths or hot tubs  · You may have numbness for several hours from the local anesthetic  Please use caution and common sense, especially with weight-bearing activities  CARE OF THE INJECTION SITE  · If you have soreness or pain, apply ice to the area today (20 minutes on/20 minutes off)  · Starting tomorrow, you may use warm, moist heat or ice if needed  · You may have an increase or change in your discomfort for 36-48 hours after your treatment  · Apply ice and continue with any pain medication you have been prescribed  · Notify [facility] if you have any of the following: redness, drainage, swelling, headache, stiff neck or fever above 100°F     SPECIAL INSTRUCTIONS  · Our office will contact you in approximately 7 days for a progress report  MEDICATIONS  · Continue to take all routine medications  · Our office may have instructed you to hold some medications  If you have a problem specifically related to your procedure, please call our office at [phone]  Problems not related to your procedure should be directed to your primary care physician

## 2019-07-25 ENCOUNTER — TELEPHONE (OUTPATIENT)
Dept: PAIN MEDICINE | Facility: CLINIC | Age: 79
End: 2019-07-25

## 2019-07-31 ENCOUNTER — CONSULT (OUTPATIENT)
Dept: DERMATOLOGY | Facility: CLINIC | Age: 79
End: 2019-07-31
Payer: MEDICARE

## 2019-07-31 VITALS — BODY MASS INDEX: 28.82 KG/M2 | WEIGHT: 173 LBS | HEIGHT: 65 IN | TEMPERATURE: 98.5 F

## 2019-07-31 DIAGNOSIS — L82.1 SEBORRHEIC KERATOSES: ICD-10-CM

## 2019-07-31 DIAGNOSIS — C44.619 BASAL CELL CARCINOMA (BCC) OF LEFT UPPER ARM: Primary | ICD-10-CM

## 2019-07-31 DIAGNOSIS — D18.01 CHERRY ANGIOMA: ICD-10-CM

## 2019-07-31 DIAGNOSIS — L57.0 ACTINIC KERATOSIS: ICD-10-CM

## 2019-07-31 PROCEDURE — 88305 TISSUE EXAM BY PATHOLOGIST: CPT | Performed by: PATHOLOGY

## 2019-07-31 PROCEDURE — 11603 EXC TR-EXT MAL+MARG 2.1-3 CM: CPT | Performed by: DERMATOLOGY

## 2019-07-31 PROCEDURE — 17004 DESTROY PREMAL LESIONS 15/>: CPT | Performed by: DERMATOLOGY

## 2019-07-31 PROCEDURE — 12032 INTMD RPR S/A/T/EXT 2.6-7.5: CPT | Performed by: DERMATOLOGY

## 2019-07-31 PROCEDURE — 99204 OFFICE O/P NEW MOD 45 MIN: CPT | Performed by: DERMATOLOGY

## 2019-07-31 NOTE — PROGRESS NOTES
[address] [facility] Note     Patient Name: Yomi Lange  Encounter Date: 07/31/2019    Today's Chief Concerns:  Catrachita Ham Concern #1:  Excision of biopsy proven Basal call carcinoma left upper arm      Past Medical History:  Have you ever had or currently have any of the following medical conditions or treatments? · HIV/AIDS: No  · Hepatitis B: No  · Hepatitis C: No   · Diabetes: No  · Tuberculosis: No  · Biologic Therapy/Chemotherapy: No  · Organ or Bone Marrow Transplantation: No  · Radiation Treatment: No  · Cancer (If Yes, which types)- No      Have you ever had any of the following skin conditions? · Melanoma? (If Yes, please provide more detail)- No  · Basal Cell Carcinoma: YES  · Squamous Cell Carcinoma: YES  · Sebaceous Cell Carcinoma: No  · Merkel Cell Carcinoma: No  · Angiosarcoma: No  · Blistering Sunburns: No  · Eczema: No  · Psoriasis: No    Social History:    What is your current Smoking Status? Former smoker    What is/was your primary occupation? Retired    What are your hobbies/past-times? Family history:  Do any of your "first degree relatives" (parent, brother, sister, or child) have any of the following conditions? · Melanoma? (If Yes, which relatives?) No  · Eczema: No  · Asthma: No  · Hay Fever/Seasonal Allergies: No  · Psoriasis: No  · Arthritis: No  · Thyroid Problems: No  · Lupus/Connective Tissue Disease: No  · Diabetes: YES, son  · Stroke: No  · Blood Clots: No  · IBD/Crohn's/Ulcerative Colitis: No  · Vitiligo: No  · Scarring/Keloids: No  · Severe Acne: No  · Pancreatic Cancer: No  · Other known Skin Condition? If Yes, what condition and which relatives?   No    Current Medications:    Current Outpatient Medications:     Acetaminophen 325 MG CAPS, Take 650 mg by mouth every 6 (six) hours as needed, Disp: , Rfl:     Cholecalciferol (VITAMIN D PO), Take 5,000 Units by mouth Daily  , Disp: , Rfl:     cycloSPORINE (RESTASIS) 0 05 % ophthalmic emulsion, Apply 1 drop to eye 2 (two) times a day, Disp: , Rfl:     DULoxetine (CYMBALTA) 60 mg delayed release capsule, Take 1 capsule (60 mg total) by mouth daily, Disp: 90 capsule, Rfl: 1    gabapentin (NEURONTIN) 300 mg capsule, Take 2 capsules at bedtime  , Disp: 60 capsule, Rfl: 0    Multiple Vitamins-Minerals (PRESERVISION AREDS 2+MULTI VIT) CAPS, BID, Disp: , Rfl:     oxyCODONE-acetaminophen (PERCOCET) 5-325 mg per tablet, Take 1 tablet by mouth every 4 (four) hours as needed for moderate pain, Disp: , Rfl:     verapamil (VERELAN PM) 120 MG 24 hr capsule, TAKE 1 CAPSULE BY MOUTH AT  NIGHT, Disp: 90 capsule, Rfl: 1    Specific Alerts:    Are you pregnant or planning to become pregnant? N/A    Are you currently or planning to be nursing or breast feeding? N/A    Allergies   Allergen Reactions    Dilaudid [Hydromorphone] Shortness Of Breath     Other reaction(s): Respiratory Distress  Other reaction(s): HORENESS    Penicillins Rash     Rash       May we call your Preferred Phone number to discuss your specific medical information? YES    May we leave a detailed message that includes your specific medical information? YES    Have you traveled outside of the [facility] in the past 3 months? No    Do you currently have a pacemaker or defibrillator? No    Do you have any artificial heart valves, joints, plates, screws, rods, stents, pins, etc? YES   - If Yes, were any placed within the last 2 years? Bilateral knee replacements 2015    Do you require any medications prior to a surgical procedure? No   - If Yes, for which procedure? - If Yes, what medications to you require? Are you taking any medications that cause you to bleed more easily ("blood thinners") No    Have you ever experienced a rapid heartbeat with epinephrine? No    Have you ever been treated with "gold" (gold sodium thiomalate) therapy?  No    [facility] can help with wrinkles, "laugh lines," facial volume loss, "double chin," "love handles," age spots, and more  Are you interested in learning today about some of the skin enhancement procedures that we offer? (If Yes, please provide more detail) No    Review of Systems:  Have you recently had or currently have any of the following? · Fever or chills: No  · Night Sweats: No  · Headaches: No  · Weight Gain: {No  · Weight Loss: No  · Blurry Vision: No  · Nausea: No  · Vomiting: No  · Diarrhea: No  · Blood in Stool: No  · Abdominal Pain: No  · Itchy Skin: No  · Painful Joints: No  · Swollen Joints: No  · Muscle Pain: No  · Irregular Mole: No  · Sun Burn: YES  · Dry Skin: No  · Skin Color Changes: No  · Scar or Keloid: No  · Cold Sores/Fever Blisters: No  · Bacterial Infections/MRSA: No  · Anxiety: YES, treated  · Depression: No  · Suicidal or Homicidal Thoughts: No      PHYSICAL EXAM:      Was a chaperone (Derm Clinical Assistant) present for the entirety of the Physical Exam? YES    Did the Dermatology Team specifically ask and  the patient on the importance of a Full Skin Exam to be sure that nothing is missed clinically?  YES    Did the patient request or accept a Full Skin Exam?  YES    Did the patient specifically refuse to have the areas "under-the-bra" examined by the Dermatologist? No    Did the patient specifically refuse to have the areas "under-the-underwear" examined by the Dermatologist? No      CONSTITUTIONAL:   Vitals:    07/31/19 1248   Temp: 98 5 °F (36 9 °C)   TempSrc: Tympanic   Weight: 78 5 kg (173 lb)   Height: 5' 5" (1 651 m)       Today's Height:   5' 5"  Today's Weight (in kilograms):   78 5 kg  Today's Temperature:   98 5    PSYCH: Normal mood and affect  EYES: Normal conjunctiva  ENT: Normal lips and oral mucosa  CARDIOVASCULAR: No edema  RESPIRATORY: Normal respirations  HEME/LYMPH/IMMUNO:  No regional lymphadenopathy except as noted below in ASSESSMENT AND PLAN BY DIAGNOSIS    FULL ORGAN SYSTEM SKIN EXAM (SKIN)  Hair, Scalp, Ears, Face Normal except as noted below in Assessment   Neck, Cervical Chain Nodes Normal except as noted below in Assessment   Right Arm/Hand/Fingers Normal except as noted below in Assessment   Left Arm/Hand/Fingers Normal except as noted below in Assessment   Chest/Breasts/Axillae Viewed areas Normal except as noted below in Assessment   Abdomen, Umbilicus Normal except as noted below in Assessment   Back/Spine Normal except as noted below in Assessment   Groin/Genitalia/Buttocks Viewed areas Normal except as noted below in Assessment   Right Leg, Foot, Toes Normal except as noted below in Assessment   Left Leg, Foot, Toes Normal except as noted below in Assessment        ASSESSMENT AND PLAN BY DIAGNOSIS:    History of Present Condition:     Duration:  How long has this been an issue for you?    o  Biopsied on 2019   Location Affected:  Where on the body is this affecting you? o  Left upper arm   Quality:  Is there any bleeding, pain, itch, burning/irritation, or redness associated with the skin lesion?    o  Denies   Severity:  Describe any bleeding, pain, itch, burning/irritation, or redness on a scale of 1 to 10 (with 10 being the worst)  o  1   Timing:  Does this condition seem to be there pretty constantly or do you notice it more at specific times throughout the day?    o  Denies   Context:  Have you ever noticed that this condition seems to be associated with specific activities you do?    o  Denies   Modifying Factors:    o Anything that seems to make the condition worse?    -  Denies  o What have you tried to do to make the condition better? -  Denies   Associated Signs and Symptoms:  Does this skin lesion seem to be associated with any of the followin   BASAL CELL CARCINOMA    Physical Exam:   Anatomic Location Affected:  Left arm   Morphological Description:  1 5 cm pink plaque   Pertinent Positives:   Pertinent Negatives:No lymphadenopathy   Prior biopsy: Yes;  If YES, prior accession or case #:[id number]     Assessment and Plan:    What is basal cell carcinoma? Basal cell carcinoma (BCC) is a common, locally invasive, keratinocytic, or non-melanoma, skin cancer  It is also known as rodent ulcer and basalioma  Patients with BCC often develop multiple primary tumours over time  Who gets basal cell carcinoma? Risk factors for BCC include:  [facility] Age and sex: BCCs are particularly prevalent in elderly males  However, they also affect females and younger adults    Previous BCC or other form of skin cancer (squamous cell carcinoma, melanoma)    Sun damage (photoaging, actinic keratoses)    Repeated prior episodes of sunburn    Fair skin, blue eyes and blond or red hair--note; BCC can also affect darker skin types    Previous cutaneous injury, thermal burn, disease (eg cutaneous lupus, sebaceous naevus)    Inherited syndromes: BCC is a particular problem for families with basal cell naevus syndrome (Gorlin syndrome), Nalvh-Cogié-Hyeuqbhe syndrome, Rombo syndrome, Oley syndrome and xeroderma pigmentosum    Other risk factors include ionising radiation, exposure to arsenic, and immune suppression due to disease or medicines    What causes basal cell carcinoma? The cause of BCC is multifactorial    Most often, there are DNA mutations in the patched [facility]) tumour suppressor gene, part of hedgehog signalling pathway    These may be triggered by exposure to ultraviolet radiation    Various spontaneous and inherited gene defects predispose to [facility]    What are the clinical features of basal cell carcinoma? BCC is a locally invasive skin tumour  The main characteristics are:   Slowly growing plaque or nodule    Skin coloured, pink or pigmented    Varies in size from a few millimetres to several centimetres in diameter    Spontaneous bleeding or ulceration  BCC is very rarely a threat to life  A tiny proportion of BCCs grow rapidly, invade deeply, and/or metastasise to local lymph nodes      Types of basal cell carcinoma  There are several distinct clinical types of BCC, and over 20 histological growth patterns of BCC  Nodular BCC   Most common type of facial BCC    Shiny or pearly nodule with a smooth surface    May have central depression or ulceration, so its edges appear rolled    Blood vessels cross its surface    Cystic variant is soft, with jelly-like contents    Micronodular, microcystic and infiltrative types are potentially aggressive subtypes    Also known as nodulocystic carcinoma  Superficial BCC   Most common type in younger adults    Most common type on upper trunk and shoulders    Slightly scaly, irregular plaque    Thin, translucent rolled border    Multiple microerosions  Morphoeaform BCC   Usually found in mid-facial sites    Waxy, scar-like plaque with indistinct borders    Wide and deep subclinical extension    May infiltrate cutaneous nerves (perineural spread)    Also known as morpheic, morphoeiform or sclerosing BCC  Basosquamous carcinoma   Mixed basal cell carcinoma (BCC) and squamous cell carcinoma (SCC)    Infiltrative growth pattern    Potentially more aggressive than other forms of BCC    Also known as basisquamous carcinoma and mixed basal-squamous cell carcinoma       Complications of basal cell carcinoma    Recurrent BCC  Recurrence of BCC after initial treatment is not uncommon  Characteristics of recurrent BCC often include:  o Incomplete excision or narrow margins at primary excision   o Morphoeic, micronodular, and infiltrative subtypes   o Location on head and neck    Advanced BCC  Advanced BCCs are large, often neglected tumours    o They may be several centimetres in diameter   o They may be deeply infiltrating into tissues below the skin   o They are difficult or impossible to treat surgically    Metastatic BCC  o Very rare   o Primary tumour is often large, neglected or recurrent, located on head and neck, with aggressive subtype   o May have had multiple prior treatments   o May arise in site exposed to ionising radiation   o Can be fatal    How is basal cell carcinoma diagnosed? BCC is diagnosed clinically by the presence of a slowly enlarging skin lesion with typical appearance  The diagnosis and  by a diagnostic biopsy or following excision  Some typical superficial BCCs on trunk and limbs are clinically diagnosed and have non-surgical treatment without histology  What is the treatment for primary basal cell carcinoma? The treatment for a [address] depends on its type, size and location, the number to be treated, patient factors, and the preference or expertise of the doctor  Most BCCs are treated surgically  Long-term follow-up is recommended to check for new lesions and recurrence; the latter may be unnecessary if histology has reported wide clear margins  Excision biopsy  Excision means the lesion is cut out and the skin stitched up   Most appropriate treatment for nodular, infiltrative and morphoeic BCCs    Should include 3 to 5 mm margin of normal skin around the tumour    Very large lesions may require flap or skin graft to repair the defect    Pathologist will report deep and lateral margins    Further surgery is recommended for lesions that are incompletely excised    Mohs micrographically controlled excision  Mohs micrographically controlled surgery involves examining carefully marked excised tissue under the microscope, layer by layer, to ensure complete excision   Very high cure rates achieved by trained Mohs surgeons    Used in high-risk areas of the face around eyes, lips and nose    Suitable for ill-defined, morphoeic, infiltrative and recurrent subtypes    Large defects are repaired by flap or skin graft    Superficial skin surgery  Superficial skin surgery comprises shave, curettage, and electrocautery  It is a rapid technique using local anaesthesia and does not require sutures     Suitable for small, well-defined nodular or superficial BCCs    Lesions are usually located on trunk or limbs    Wound is left open to heal by secondary intention    Moist wound dressings lead to healing within a few weeks    Eventual scar quality variable    Cryotherapy  Cryotherapy is the treatment of a superficial skin lesion by freezing it, usually with liquid nitrogen   Suitable for small superficial BCCs on covered areas of trunk and limbs    Best avoided for BCCs on head and neck, and distal to knees    Double freeze-thaw technique    Results in a blister that crusts over and heals within several weeks   Leaves permanent white fabricio    Photodynamic therapy  Photodynamic therapy (PDT) refers to a technique in which [address] is treated with a photosensitising chemical, and exposed to light several hours later   Topical photosensitisers include aminolevulinic acid lotion and methyl aminolevulinate cream    Suitable for low-risk small, superficial BCCs    Best avoided if tumour in site at high risk of recurrence    Results in inflammatory reaction, maximal 3-4 days after procedure    Treatment repeated 7 days after initial treatment    Excellent cosmetic results    Imiquimod cream  Imiquimod is an immune response modifier   Best used for superficial BCCs less than 2 cm diameter    Applied three to five times each week, for 6-16 weeks    Results in a variable inflammatory reaction, maximal at three weeks    Minimal scarring is usual    Fluorouracil cream  5-Fluorouracil cream is a topical cytotoxic agent   Used to treat small superficial basal cell carcinomas    Requires prolonged course, eg twice daily for 6-12 weeks    Causes inflammatory reaction    Has high recurrence rates    Radiotherapy  Radiotherapy or X-ray treatment can be used to treat primary BCCs or as adjunctive treatment if margins are incomplete     Mainly used if surgery is not suitable    Best avoided in young patients and in genetic conditions predisposing to skin cancer    Best cosmetic results achieved using multiple fractions    Typically, patient attends once-weekly for several weeks    Causes inflammatory reaction followed by scar    Risk of radiodermatitis, late recurrence, and new tumours    What is the treatment for advanced or metastatic basal cell carcinoma? Locally advanced primary, recurrent or metastatic BCC requires multidisciplinary consultation  Often a combination of treatments is used   Surgery    Radiotherapy    Targeted therapy  Targeted therapy refers to the hedgehog signalling pathway inhibitors, vismodegib and sonidegib  These drugs have some important risks and side effects  How can basal cell carcinoma be prevented? The most important way to prevent BCC is to avoid sunburn  This is especially important in childhood and early life  Fair skinned individuals and those with a personal or family history of BCC should protect their skin from sun exposure daily, year-round and lifelong   Stay indoors or under the shade in the middle of the day    Wear covering clothing    Apply high protection factor SPF50+ broad-spectrum sunscreens generously to exposed skin if outdoors    Avoid indoor tanning (sun beds, solaria)   Oral nicotinamide (vitamin B3) in a dose of 500 mg twice daily may reduce the number and severity of BCCs  What is the outlook for basal cell carcinoma? Most BCCs are cured by treatment  Cure is most likely if treatment is undertaken when the lesion is small  About 50% of people with BCC develop a second one within 3 years of the first  They are also at increased risk of other skin cancers, especially melanoma  Regular self-skin examinations and long-term annual skin checks by an experienced health professional are recommended  Treatment options for this lesion were reviewed and discussed     It was elected to proceed with Excision  Treatment was scheduled for today    PROCEDURE:  EXCISION WITH INTERMEDIATE LAYERED CLOSURE     Attending: Eriberto Nj  Assistant: Gloria Rangel    Pre-Op Diagnosis: Basal cell carcinoma  Post-Op Diagnosis: Same    Malignant       Lesion Anatomic Location: Left arm (Previous Accession Number: [id number])  Pre-op size: 1 5 cm  Size of defect:  2 1 cm (with 0 3 centimeter margins)  Final repaired wound length:  5 4 cm    Written and verbal, witnessed informed consent was obtained  I discussed that excision is a method of removing lesions both benign and malignant lesions  A portion of normal skin is often taken to ensure completeness of removal   I reviewed that procedure will include numbing the area,  cutting around and under defect, undermining tissue, and closing the wound with sutures both inside and out  These sutures are usually removed in 7 to 14 days  Risks (bleeding, pain, infection, scarring, recurrence) and benefits discussed  It was discussed with patient that every effort is made to minimize scar, but scarring is influenced also by extrinsic factor such as location, age and genetics  Time Out: performed:  yes  Correct patient: yes  Correct site per Clinic Report: yes  Correct site per Patient Report: yes    LOCAL ANESTHESIA: 1% xylocaine with epi     DESCRIPTION OF PROCEDURE: The patient was brought back into the procedure room, anesthetized locally, prepped and draped in the usual fashion  Using a #15 blade with a scalpel, the lesion was excised in elliptical fashion  The wound was  undermined in the  fascial plane  Hemostasis was achieved with light electrocoagulation  Purpose of undermining was to decrease wound tension and facilitate closure  The wound was closed with subcutaneous sutures as follows:    Deep suture:4-0 Monocryl       Epidermal edge closure was accomplished with superficial sutures as follows:    Superficial suture: 4-0 Prolene  Superficial suture type: Interrupted     Estimated blood loss less than 3ml      The patient tolerated the procedure well without any complications  The wound was cleaned with sterile saline, dried off, surgical vaseline ointment was applied, and the wound was covered  A pressure dressing was applied for stabilization and light pressure  The patient was given detailed oral and written instructions on postoperative care as detailed in consent  The patient left in good medical condition  POSTOP DISCUSSION DISCUSSION AND INSTRUCTIONS FOR PATIENT      Rationale for Procedure  A skin excision allows the dermatologist to remove a lesion  The procedure involves a local numbing medication and removing the entire lesion  Typically, the lesion is being removed because it is atypical, traumatized, or for significant appearance reasons  The area will be open like a brush burn and allowed to heal    There will be no sutures  Tissue is sent to pathologist who will reconfirm diagnosis and verify completeness of lesion removal     Description of Procedure  We would like to perform a skin excision today  A local anesthetic, similar to the kind that a dentist uses when filling a cavity, will be injected with a very small needle into the skin area to be sampled  The injected skin and tissue underneath should go to sleep and become numbed so that no further pain should be felt  A scalpel will be used to cut around the lesion and tissue will be submitted to pathology for examination  Depending on the diagnosis the lesion will be excised with a certain amount of normal skin to help assure completeness of lesion removal   The physician will discuss in advance the anticipated size and extent of removal    Bleeding will occur, but it will stopped with direct pressure, sutures, and electrocautery  Surgical Vaseline-type ointment will also applied after the procedure to help create a barrier between the wound and the outside world        Risks and Potential Complications  The advantage of a skin excision is that it allows us to remove a problem lesion quickly  Although this usually permits the lesion to heal as soon as possible with the least scarring, there are some risks and potential complications that include but are not limited to the following:  - Some bleeding is normal at time of procedure and some bleeding on gauze is normal  the first few days after surgery  Profuse bleeding and bleeding with swelling and pain should be reported as detailed  below  - Infection is uncommon in skin surgery  Infection should be reported and is indicated by pain, redness, and discharge of purulent material   - Some dull to at time sharp pain could occur initially the day after surgery  Persistent pain or increasing pain days after surgery is not expected and should be reported  - Every effort is made to minimize scar, but location, size, and genetics do play a role in scar appearance  A surgical wound does not achieve its optimal appearance until 6 months  There are several treatments available if scarring would be problematic including scar creams, silicone pad, laser and scar revision   - Skin discoloration can occur especially in people of color  Its important to avoid sun on wound in first 6 months after surgery  Treatment is available if pigment is problematic   - Incomplete removal of the lesion or recurrence of lesion can occur and this would then require further treatment and more surgery   - Nerve Damage/Numbness/Loss of Function is very rare, but is most likely to occur if lesion is large or if it is in a high risk location  - Allergic Reaction to lidocaine is rare  More commonly,  epinephrine is used  with the lidocaine  Occasionally, epinephrine (aka adrenalin) may cause a brief  feeling of anxiety or jitteriness  - The person at the microscope  (pathologist) may provide additional information that was unexpected  This unexpected finding could provoke the need for additional treatment or evaluation      What You Will Need to Do After the Procedure  1  Keep the area clean and dry the first day  Try NOT to remove the bandage for the first day  2  Gently clean the area with soap and water and apply Vaseline ointment (this is over the counter and not a prescription) to the excision  site for up to 2 weeks  3  Apply a clean appropriately sized bandage to area  Gauze and paper tape are recommended for sensitive skin  4  Return for suture removal as instructed (generally 1 week for the face, 2 weeks for the body)  5  Take Acetaminophen (Tylenol) for discomfort, if no contraindications  Do NOT take Ibuprofen or aspirin unless specifically told to do so by your Dermatologist because these medications can make bleeding worse  6  Call our office immediately for signs of infection: fever, chills, increased redness, warmth, tenderness, discomfort/pain, or pus or foul smell coming from the wound  If bleeding is noticed, place a clean cloth over the area and apply firm pressure for thirty minutes  Check the wound ONLY after 30 minutes of direct pressure; do not cheat and sneak a peak, as that does not count  If bleeding persists after 30 minutes of legitimate direct pressure, then try one more round of direct pressure for an additional 10 minutes to the area  Should the bleeding become heavier or not stop after the second attempt, call [facility] directly at [phone] ([phone]) or, if after hours, go to your local Emergency Room/Emergency Department  2  SEBORRHEIC KERATOSIS; NON-INFLAMED    Physical Exam:   Anatomic Location Affected:  Trunk and extremities   Morphological Description:  Flat and raised, waxy, smooth to warty textured, yellow to brownish-grey to dark brown to blackish, discrete, "stuck-on" appearing papules   Pertinent Positives:   Pertinent Negatives:No lymphadenopathy    Additional History of Present Condition:  Patient reports new bumps on the skin  Denies itch, burn, pain, bleeding or ulceration    Present constantly; nothing seems to make it worse or better  No prior treatment  Assessment and Plan:  Based on a thorough discussion of this condition and the management approach to it (including a comprehensive discussion of the known risks, side effects and potential benefits of treatment), the patient (family) agrees to implement the following specific plan:    Seborrheic Keratosis  A seborrheic keratosis is a harmless warty spot that appears during adult life as a common sign of skin aging  Seborrheic keratoses can arise on any area of skin, covered or uncovered, with the usual exception of the palms and soles  They do not arise from mucous membranes  Seborrheic keratoses can have highly variable appearance  Seborrheic keratoses are extremely common  It has been estimated that over 90% of adults over the age of 61 years have one or more of them  They occur in males and females of all races, typically beginning to erupt in the 35s or 45s  They are uncommon under the age of 21 years  The precise cause of seborrhoeic keratoses is not known  Seborrhoeic keratoses are considered degenerative in nature  As time goes by, seborrheic keratoses tend to become more numerous  Some people inherit a tendency to develop a very large number of them; some people may have hundreds of them  The name "seborrheic keratosis" is misleading, because these lesions are not limited to a seborrhoeic distribution (scalp, mid-face, chest, upper back), nor are they formed from sebaceous glands, nor are they associated with sebum -- which is greasy    Seborrheic keratosis may also be called "SK," "Seb K," "basal cell papilloma," "senile wart," or "barnacle "      Researchers have noted:   Eruptive seborrhoeic keratoses can follow sunburn or dermatitis   Skin friction may be the reason they appear in body folds   Viral cause (e g , human papillomavirus) seems unlikely   Stable and clonal mutations or activation of FRFR3, PIK3CA, PAULA, AKT1 and EGFR genes are found in seborrhoeic keratoses   Seborrhoeic keratosis can arise from solar lentigo   FRFR3 mutations also arise in solar lentigines  These mutations are associated with increased age and location on the head and neck, suggesting a role of ultraviolet radiation in these lesions   Seborrheic keratoses do not harbour tumour suppressor gene mutations   Epidermal growth factor receptor inhibitors, which are used to treat some cancers, often result in an increase in verrucal (warty) keratoses  There is no easy way to remove multiple lesions on a single occasion  Unless a specific lesion is "inflamed" and is causing pain or stinging/burning or is bleeding, most insurance companies do not authorize treatment  3  ACTINIC KERATOSIS    Physical Exam:   Anatomic Location Affected: Upper arms, forearms, hands and right mid chest   Morphological Description:  Scaly pink plaques    Additional History of Present Condition:     Assessment and Plan:  Based on a thorough discussion of this condition and the management approach to it (including a comprehensive discussion of the known risks, side effects and potential benefits of treatment), the patient (family) agrees to implement the following specific plan:     Defer treatment all together, understanding the potential risks of malignant transformation (we counseled against this)   Requests medical treatment with a "field area" agent   Requests Photodynamic Therapy (PDT) to be scheduled  Actinic keratoses are very common on sites repeatedly exposed to the sun, especially the backs of the hands and the face, most often affecting the ears, nose, cheeks, upper lip, vermilion of the lower lip, temples, forehead and balding scalp  In severely chronically sun-damaged individuals, they may also be found on the upper trunk, upper and lower limbs, and dorsum of feet      We discussed the theoretical premalignant (pre-cancerous) nature and etiology of these growths  We discussed the prevailing notion that actinic keratoses are a reflection of abnormal skin cell development due to DNA damage by short wavelength UVB  They are more likely to appear if the immune function is poor, due to aging, recent sun exposure, predisposing disease or certain drugs  We discussed that the main concern is that actinic keratoses may predispose to squamous cell carcinoma  It is rare for a solitary actinic keratosis to evolve to squamous cell carcinoma (SCC), but the risk of SCC occurring at some stage in a patient with more than 10 actinic keratoses is thought to be about 10 to 15%  A tender, thickened, ulcerated or enlarging actinic keratosis is suspicious of SCC  Actinic keratoses may be prevented by strict sun protection  If already present, keratoses may improve with a very high sun protection factor (50+) broad-spectrum sunscreen applied at least daily to affected areas, year-round  We recommend that UPF-rated clothing and hats and sunglasses be worn whenever possible and that a sunscreen-moisturizer combination product such as Neutrogena Daily Defense be applied at least three times a day  We performed a thorough discussion of treatment options and specific risk/benefits/alternatives including but not limited to medical field treatment with medications such as the following:     Topical field area medications such as 5-fluorouracil or Aldara (specifically, the trouble with long-term compliance, blistering and local skin reaction versus the convenience of at-home therapy and that field therapy gets what is not yet seen)   Cryotherapy (specifically, local pain, scarring, dyspigmentation, blistering, possible superinfection, and treats only what we see versus directed treatment today)       Photodynamic therapy (specifically, local pain, scarring, dyspigmentation, blistering, possible superinfection, need to schedule for a later date, and time spent in the office versus field therapy that gets what is not yet seen)     PROCEDURE:  DESTRUCTION OF PRE-MALIGNANT LESIONS  After a thorough discussion of treatment options and risk/benefits/alternatives (including but not limited to local pain, scarring, dyspigmentation, blistering, and possible superinfection), verbal and written consent were obtained and the aforementioned lesions were treated on with cryotherapy using liquid nitrogen x 1 cycle for 5-10 seconds   TOTAL NUMBER of 17 pre-malignant lesions were treated today on the ANATOMIC LOCATION: Upper arms, forearms, hands and right mid chest      The patient tolerated the procedure well, and after-care instructions were provided  4  CHERRY ANGIOMAS    Physical Exam:   Anatomic Location Affected:  Trunk and extremities   Morphological Description:  Scattered cherry red, 1-4 mm papules   Pertinent Positives:   Pertinent Negatives:No lymphadenopathy    Additional History of Present Condition:      Assessment and Plan:  Based on a thorough discussion of this condition and the management approach to it (including a comprehensive discussion of the known risks, side effects and potential benefits of treatment), the patient (family) agrees to implement the following specific plan:    Assessment and Plan:    Cherry angioma, also known as Susanna Ohms spots, are benign vascular skin lesions  A "cherry angioma" is a firm red, blue or purple papule, 0 1-1 cm in diameter  When thrombosed, they can appear black in colour until evaluated with a dermatoscope when the red or purple colour is more easily seen  Cherry angioma may develop on any part of the body but most often appear on the scalp, face, lips and trunk  An angioma is due to proliferating endothelial cells; these are the cells that line the inside of a blood vessel  Angiomas can arise in early life or later in life; the most common type of angioma is a cherry angioma    Cherry angiomas are very common in males and females of any age or race  They are more noticeable in white skin than in skin of colour  They markedly increase in number from about the age of 36  There may be a family history of similar lesions  Eruptive cherry angiomas have been rarely reported to be associated with internal malignancy  The cause of angiomas is unknown  Genetic analysis of cherry angiomas has shown that they frequently carry specific somatic missense mutations in the GNAQ and GNA11 (Q209H) genes, which are involved in other vascular and melanocytic proliferations  Cherry angioma is usually diagnosed clinically and no investigations are necessary for the majority of lesions  It has a characteristic red-clod or lobular pattern on dermatoscopy (called lacunar pattern using conventional pattern analysis)  When there is uncertainty about the diagnosis, a biopsy may be performed  The angioma is composed of venules in a thickened papillary dermis  Collagen bundles may be prominent between the lobules  Cherry angiomas are harmless, so they do not usually have to be treated  Occasionally, they are removed to exclude a malignant skin lesion such as a nodular melanoma or because they are irritated or bleeding (and a subsequent risk for infection)  To decrease friction over the lesions, we recommend Neutrogena Daily Defense SPF 50+ at least 3 times a day

## 2019-07-31 NOTE — PATIENT INSTRUCTIONS
1  BASAL CELL CARCINOMA    Physical Exam:   Anatomic Location Affected:  Left arm    What is basal cell carcinoma? Basal cell carcinoma (BCC) is a common, locally invasive, keratinocytic, or non-melanoma, skin cancer  It is also known as rodent ulcer and basalioma  Patients with BCC often develop multiple primary tumours over time  Who gets basal cell carcinoma? Risk factors for BCC include:  [facility] Age and sex: BCCs are particularly prevalent in elderly males  However, they also affect females and younger adults    Previous BCC or other form of skin cancer (squamous cell carcinoma, melanoma)    Sun damage (photoaging, actinic keratoses)    Repeated prior episodes of sunburn    Fair skin, blue eyes and blond or red hair--note; BCC can also affect darker skin types    Previous cutaneous injury, thermal burn, disease (eg cutaneous lupus, sebaceous naevus)    Inherited syndromes: BCC is a particular problem for families with basal cell naevus syndrome (Gorlin syndrome), Aekns-Lghvé-Rkmupzgq syndrome, Rombo syndrome, Oley syndrome and xeroderma pigmentosum    Other risk factors include ionising radiation, exposure to arsenic, and immune suppression due to disease or medicines    What causes basal cell carcinoma? The cause of BCC is multifactorial    Most often, there are DNA mutations in the patched [facility]) tumour suppressor gene, part of hedgehog signalling pathway    These may be triggered by exposure to ultraviolet radiation    Various spontaneous and inherited gene defects predispose to [address]    What are the clinical features of basal cell carcinoma? BCC is a locally invasive skin tumour  The main characteristics are:   Slowly growing plaque or nodule    Skin coloured, pink or pigmented    Varies in size from a few millimetres to several centimetres in diameter    Spontaneous bleeding or ulceration  BCC is very rarely a threat to life   A tiny proportion of BCCs grow rapidly, invade deeply, and/or metastasise to local lymph nodes  Types of basal cell carcinoma  There are several distinct clinical types of BCC, and over 20 histological growth patterns of BCC  Nodular BCC   Most common type of facial BCC    Shiny or pearly nodule with a smooth surface    May have central depression or ulceration, so its edges appear rolled    Blood vessels cross its surface    Cystic variant is soft, with jelly-like contents    Micronodular, microcystic and infiltrative types are potentially aggressive subtypes    Also known as nodulocystic carcinoma  Superficial BCC   Most common type in younger adults    Most common type on upper trunk and shoulders    Slightly scaly, irregular plaque    Thin, translucent rolled border    Multiple microerosions  Morphoeaform BCC   Usually found in mid-facial sites    Waxy, scar-like plaque with indistinct borders    Wide and deep subclinical extension    May infiltrate cutaneous nerves (perineural spread)    Also known as morpheic, morphoeiform or sclerosing BCC  Basosquamous carcinoma   Mixed basal cell carcinoma (BCC) and squamous cell carcinoma (SCC)    Infiltrative growth pattern    Potentially more aggressive than other forms of BCC    Also known as basisquamous carcinoma and mixed basal-squamous cell carcinoma       Complications of basal cell carcinoma    Recurrent BCC  Recurrence of BCC after initial treatment is not uncommon  Characteristics of recurrent BCC often include:  o Incomplete excision or narrow margins at primary excision   o Morphoeic, micronodular, and infiltrative subtypes   o Location on head and neck    Advanced BCC  Advanced BCCs are large, often neglected tumours    o They may be several centimetres in diameter   o They may be deeply infiltrating into tissues below the skin   o They are difficult or impossible to treat surgically    Metastatic BCC  o Very rare   o Primary tumour is often large, neglected or recurrent, located on head and neck, with aggressive subtype   o May have had multiple prior treatments   o May arise in site exposed to ionising radiation   o Can be fatal    How is basal cell carcinoma diagnosed? BCC is diagnosed clinically by the presence of a slowly enlarging skin lesion with typical appearance  The diagnosis and  by a diagnostic biopsy or following excision  Some typical superficial BCCs on trunk and limbs are clinically diagnosed and have non-surgical treatment without histology  What is the treatment for primary basal cell carcinoma? The treatment for a [address] depends on its type, size and location, the number to be treated, patient factors, and the preference or expertise of the doctor  Most BCCs are treated surgically  Long-term follow-up is recommended to check for new lesions and recurrence; the latter may be unnecessary if histology has reported wide clear margins  Excision biopsy  Excision means the lesion is cut out and the skin stitched up   Most appropriate treatment for nodular, infiltrative and morphoeic BCCs    Should include 3 to 5 mm margin of normal skin around the tumour    Very large lesions may require flap or skin graft to repair the defect    Pathologist will report deep and lateral margins    Further surgery is recommended for lesions that are incompletely excised    Mohs micrographically controlled excision  Mohs micrographically controlled surgery involves examining carefully marked excised tissue under the microscope, layer by layer, to ensure complete excision   Very high cure rates achieved by trained Mohs surgeons    Used in high-risk areas of the face around eyes, lips and nose    Suitable for ill-defined, morphoeic, infiltrative and recurrent subtypes    Large defects are repaired by flap or skin graft    Superficial skin surgery  Superficial skin surgery comprises shave, curettage, and electrocautery   It is a rapid technique using local anaesthesia and does not require sutures   Suitable for small, well-defined nodular or superficial BCCs    Lesions are usually located on trunk or limbs    Wound is left open to heal by secondary intention    Moist wound dressings lead to healing within a few weeks    Eventual scar quality variable    Cryotherapy  Cryotherapy is the treatment of a superficial skin lesion by freezing it, usually with liquid nitrogen   Suitable for small superficial BCCs on covered areas of trunk and limbs    Best avoided for BCCs on head and neck, and distal to knees    Double freeze-thaw technique    Results in a blister that crusts over and heals within several weeks   Leaves permanent white fabricio    Photodynamic therapy  Photodynamic therapy (PDT) refers to a technique in which [address] is treated with a photosensitising chemical, and exposed to light several hours later   Topical photosensitisers include aminolevulinic acid lotion and methyl aminolevulinate cream    Suitable for low-risk small, superficial BCCs    Best avoided if tumour in site at high risk of recurrence    Results in inflammatory reaction, maximal 3-4 days after procedure    Treatment repeated 7 days after initial treatment    Excellent cosmetic results    Imiquimod cream  Imiquimod is an immune response modifier   Best used for superficial BCCs less than 2 cm diameter    Applied three to five times each week, for 6-16 weeks    Results in a variable inflammatory reaction, maximal at three weeks    Minimal scarring is usual    Fluorouracil cream  5-Fluorouracil cream is a topical cytotoxic agent   Used to treat small superficial basal cell carcinomas    Requires prolonged course, eg twice daily for 6-12 weeks    Causes inflammatory reaction    Has high recurrence rates    Radiotherapy  Radiotherapy or X-ray treatment can be used to treat primary BCCs or as adjunctive treatment if margins are incomplete     Mainly used if surgery is not suitable    Best avoided in young patients and in genetic conditions predisposing to skin cancer    Best cosmetic results achieved using multiple fractions    Typically, patient attends once-weekly for several weeks    Causes inflammatory reaction followed by scar    Risk of radiodermatitis, late recurrence, and new tumours    What is the treatment for advanced or metastatic basal cell carcinoma? Locally advanced primary, recurrent or metastatic BCC requires multidisciplinary consultation  Often a combination of treatments is used   Surgery    Radiotherapy    Targeted therapy  Targeted therapy refers to the hedgehog signalling pathway inhibitors, vismodegib and sonidegib  These drugs have some important risks and side effects  How can basal cell carcinoma be prevented? The most important way to prevent BCC is to avoid sunburn  This is especially important in childhood and early life  Fair skinned individuals and those with a personal or family history of BCC should protect their skin from sun exposure daily, year-round and lifelong   Stay indoors or under the shade in the middle of the day    Wear covering clothing    Apply high protection factor SPF50+ broad-spectrum sunscreens generously to exposed skin if outdoors    Avoid indoor tanning (sun beds, solaria)   Oral nicotinamide (vitamin B3) in a dose of 500 mg twice daily may reduce the number and severity of BCCs  What is the outlook for basal cell carcinoma? Most BCCs are cured by treatment  Cure is most likely if treatment is undertaken when the lesion is small  About 50% of people with BCC develop a second one within 3 years of the first  They are also at increased risk of other skin cancers, especially melanoma  Regular self-skin examinations and long-term annual skin checks by an experienced health professional are recommended  Treatment options for this lesion were reviewed and discussed     It was elected to proceed with Excision  Treatment was scheduled for today    PROCEDURE:  EXCISION WITH INTERMEDIATE LAYERED CLOSURE        POSTOP DISCUSSION DISCUSSION AND INSTRUCTIONS FOR PATIENT      Rationale for Procedure  A skin excision allows the dermatologist to remove a lesion  The procedure involves a local numbing medication and removing the entire lesion  Typically, the lesion is being removed because it is atypical, traumatized, or for significant appearance reasons  The area will be open like a brush burn and allowed to heal    There will be no sutures  Tissue is sent to pathologist who will reconfirm diagnosis and verify completeness of lesion removal     Description of Procedure  We would like to perform a skin excision today  A local anesthetic, similar to the kind that a dentist uses when filling a cavity, will be injected with a very small needle into the skin area to be sampled  The injected skin and tissue underneath should go to sleep and become numbed so that no further pain should be felt  A scalpel will be used to cut around the lesion and tissue will be submitted to pathology for examination  Depending on the diagnosis the lesion will be excised with a certain amount of normal skin to help assure completeness of lesion removal   The physician will discuss in advance the anticipated size and extent of removal    Bleeding will occur, but it will stopped with direct pressure, sutures, and electrocautery  Surgical Vaseline-type ointment will also applied after the procedure to help create a barrier between the wound and the outside world  Risks and Potential Complications  The advantage of a skin excision is that it allows us to remove a problem lesion quickly    Although this usually permits the lesion to heal as soon as possible with the least scarring, there are some risks and potential complications that include but are not limited to the following:  - Some bleeding is normal at time of procedure and some bleeding on gauze is normal  the first few days after surgery  Profuse bleeding and bleeding with swelling and pain should be reported as detailed  below  - Infection is uncommon in skin surgery  Infection should be reported and is indicated by pain, redness, and discharge of purulent material   - Some dull to at time sharp pain could occur initially the day after surgery  Persistent pain or increasing pain days after surgery is not expected and should be reported  - Every effort is made to minimize scar, but location, size, and genetics do play a role in scar appearance  A surgical wound does not achieve its optimal appearance until 6 months  There are several treatments available if scarring would be problematic including scar creams, silicone pad, laser and scar revision   - Skin discoloration can occur especially in people of color  Its important to avoid sun on wound in first 6 months after surgery  Treatment is available if pigment is problematic   - Incomplete removal of the lesion or recurrence of lesion can occur and this would then require further treatment and more surgery   - Nerve Damage/Numbness/Loss of Function is very rare, but is most likely to occur if lesion is large or if it is in a high risk location  - Allergic Reaction to lidocaine is rare  More commonly,  epinephrine is used  with the lidocaine  Occasionally, epinephrine (aka adrenalin) may cause a brief  feeling of anxiety or jitteriness  - The person at the microscope  (pathologist) may provide additional information that was unexpected  This unexpected finding could provoke the need for additional treatment or evaluation  What You Will Need to Do After the Procedure  1  Keep the area clean and dry the first day  Try NOT to remove the bandage for the first day  2  Gently clean the area with soap and water and apply Vaseline ointment (this is over the counter and not a prescription) to the excision  site for up to 2 weeks      3  Apply a clean appropriately sized bandage to area  Gauze and paper tape are recommended for sensitive skin  4  Return for suture removal as instructed (generally 1 week for the face, 2 weeks for the body)  5  Take Acetaminophen (Tylenol) for discomfort, if no contraindications  Do NOT take Ibuprofen or aspirin unless specifically told to do so by your Dermatologist because these medications can make bleeding worse  6  Call our office immediately for signs of infection: fever, chills, increased redness, warmth, tenderness, discomfort/pain, or pus or foul smell coming from the wound  If bleeding is noticed, place a clean cloth over the area and apply firm pressure for thirty minutes  Check the wound ONLY after 30 minutes of direct pressure; do not cheat and sneak a peak, as that does not count  If bleeding persists after 30 minutes of legitimate direct pressure, then try one more round of direct pressure for an additional 10 minutes to the area  Should the bleeding become heavier or not stop after the second attempt, call [facility] directly at [phone] (SKIN) or, if after hours, go to your local Emergency Room/Emergency Department  2  SEBORRHEIC KERATOSIS; NON-INFLAMED    Physical Exam:   Anatomic Location Affected:  Trunk and extremities   Morphological Description:  Flat and raised, waxy, smooth to warty textured, yellow to brownish-grey to dark brown to blackish, discrete, "stuck-on" appearing papules  Additional History of Present Condition:  Patient reports new bumps on the skin  Denies itch, burn, pain, bleeding or ulceration  Present constantly; nothing seems to make it worse or better  No prior treatment        Assessment and Plan:  Based on a thorough discussion of this condition and the management approach to it (including a comprehensive discussion of the known risks, side effects and potential benefits of treatment), the patient (family) agrees to implement the following specific plan:    Seborrheic Keratosis  A seborrheic keratosis is a harmless warty spot that appears during adult life as a common sign of skin aging  Seborrheic keratoses can arise on any area of skin, covered or uncovered, with the usual exception of the palms and soles  They do not arise from mucous membranes  Seborrheic keratoses can have highly variable appearance  Seborrheic keratoses are extremely common  It has been estimated that over 90% of adults over the age of 61 years have one or more of them  They occur in males and females of all races, typically beginning to erupt in the 35s or 45s  They are uncommon under the age of 21 years  The precise cause of seborrhoeic keratoses is not known  Seborrhoeic keratoses are considered degenerative in nature  As time goes by, seborrheic keratoses tend to become more numerous  Some people inherit a tendency to develop a very large number of them; some people may have hundreds of them  The name "seborrheic keratosis" is misleading, because these lesions are not limited to a seborrhoeic distribution (scalp, mid-face, chest, upper back), nor are they formed from sebaceous glands, nor are they associated with sebum -- which is greasy  Seborrheic keratosis may also be called "SK," "Seb K," "basal cell papilloma," "senile wart," or "barnacle "      Researchers have noted:   Eruptive seborrhoeic keratoses can follow sunburn or dermatitis   Skin friction may be the reason they appear in body folds   Viral cause (e g , human papillomavirus) seems unlikely   Stable and clonal mutations or activation of FRFR3, PIK3CA, PAULA, AKT1 and EGFR genes are found in seborrhoeic keratoses   Seborrhoeic keratosis can arise from solar lentigo   FRFR3 mutations also arise in solar lentigines   These mutations are associated with increased age and location on the head and neck, suggesting a role of ultraviolet radiation in these lesions   Seborrheic keratoses do not harbour tumour suppressor gene mutations   Epidermal growth factor receptor inhibitors, which are used to treat some cancers, often result in an increase in verrucal (warty) keratoses  There is no easy way to remove multiple lesions on a single occasion  Unless a specific lesion is "inflamed" and is causing pain or stinging/burning or is bleeding, most insurance companies do not authorize treatment  3  ACTINIC KERATOSIS    Physical Exam:   Anatomic Location Affected: Upper arms, forearms and right mid chest   Morphological Description:  Scaly pink plaques:     Assessment and Plan:  Based on a thorough discussion of this condition and the management approach to it (including a comprehensive discussion of the known risks, side effects and potential benefits of treatment), the patient (family) agrees to implement the following specific plan:     Defer treatment all together, understanding the potential risks of malignant transformation (we counseled against this)   Requests medical treatment with a "field area" agent   Requests Photodynamic Therapy (PDT) to be scheduled  Actinic keratoses are very common on sites repeatedly exposed to the sun, especially the backs of the hands and the face, most often affecting the ears, nose, cheeks, upper lip, vermilion of the lower lip, temples, forehead and balding scalp  In severely chronically sun-damaged individuals, they may also be found on the upper trunk, upper and lower limbs, and dorsum of feet  We discussed the theoretical premalignant (pre-cancerous) nature and etiology of these growths  We discussed the prevailing notion that actinic keratoses are a reflection of abnormal skin cell development due to DNA damage by short wavelength UVB  They are more likely to appear if the immune function is poor, due to aging, recent sun exposure, predisposing disease or certain drugs      We discussed that the main concern is that actinic keratoses may predispose to squamous cell carcinoma  It is rare for a solitary actinic keratosis to evolve to squamous cell carcinoma (SCC), but the risk of SCC occurring at some stage in a patient with more than 10 actinic keratoses is thought to be about 10 to 15%  A tender, thickened, ulcerated or enlarging actinic keratosis is suspicious of SCC  Actinic keratoses may be prevented by strict sun protection  If already present, keratoses may improve with a very high sun protection factor (50+) broad-spectrum sunscreen applied at least daily to affected areas, year-round  We recommend that UPF-rated clothing and hats and sunglasses be worn whenever possible and that a sunscreen-moisturizer combination product such as Neutrogena Daily Defense be applied at least three times a day  We performed a thorough discussion of treatment options and specific risk/benefits/alternatives including but not limited to medical field treatment with medications such as the following:     Topical field area medications such as 5-fluorouracil or Aldara (specifically, the trouble with long-term compliance, blistering and local skin reaction versus the convenience of at-home therapy and that field therapy gets what is not yet seen)   Cryotherapy (specifically, local pain, scarring, dyspigmentation, blistering, possible superinfection, and treats only what we see versus directed treatment today)   Photodynamic therapy (specifically, local pain, scarring, dyspigmentation, blistering, possible superinfection, need to schedule for a later date, and time spent in the office versus field therapy that gets what is not yet seen)       PROCEDURE:  DESTRUCTION OF PRE-MALIGNANT LESIONS  After a thorough discussion of treatment options and risk/benefits/alternatives (including but not limited to local pain, scarring, dyspigmentation, blistering, and possible superinfection), verbal and written consent were obtained and the aforementioned lesions were treated on with cryotherapy using liquid nitrogen x 1 cycle for 5-10 seconds     Liquid nitrogen was applied for 10-12 seconds to the skin lesion and the expected blistering or scabbing reaction explained  Do not pick at the area  Patient reminded to expect hypopigmented scars from the procedure  Return if lesion fails to fully resolve  4  NAGEL ANGIOMAS    Physical Exam:   Anatomic Location Affected:  Trunk and extremities   Morphological Description:  Scattered cherry red, 1-4 mm papules  Assessment and Plan:  Based on a thorough discussion of this condition and the management approach to it (including a comprehensive discussion of the known risks, side effects and potential benefits of treatment), the patient (family) agrees to implement the following specific plan:    Assessment and Plan:    Cherry angioma, also known as Jacquetta Gerber spots, are benign vascular skin lesions  A "cherry angioma" is a firm red, blue or purple papule, 0 1-1 cm in diameter  When thrombosed, they can appear black in colour until evaluated with a dermatoscope when the red or purple colour is more easily seen  Cherry angioma may develop on any part of the body but most often appear on the scalp, face, lips and trunk  An angioma is due to proliferating endothelial cells; these are the cells that line the inside of a blood vessel  Angiomas can arise in early life or later in life; the most common type of angioma is a cherry angioma  Cherry angiomas are very common in males and females of any age or race  They are more noticeable in white skin than in skin of colour  They markedly increase in number from about the age of 36  There may be a family history of similar lesions  Eruptive cherry angiomas have been rarely reported to be associated with internal malignancy  The cause of angiomas is unknown   Genetic analysis of cherry angiomas has shown that they frequently carry specific somatic missense mutations in the GNAQ and GNA11 (Q209H) genes, which are involved in other vascular and melanocytic proliferations  Cherry angioma is usually diagnosed clinically and no investigations are necessary for the majority of lesions  It has a characteristic red-clod or lobular pattern on dermatoscopy (called lacunar pattern using conventional pattern analysis)  When there is uncertainty about the diagnosis, a biopsy may be performed  The angioma is composed of venules in a thickened papillary dermis  Collagen bundles may be prominent between the lobules  Cherry angiomas are harmless, so they do not usually have to be treated  Occasionally, they are removed to exclude a malignant skin lesion such as a nodular melanoma or because they are irritated or bleeding (and a subsequent risk for infection)  To decrease friction over the lesions, we recommend Neutrogena Daily Defense SPF 50+ at least 3 times a day        Sutures to be removed in 2 weeks by [address] [place] [address]

## 2019-08-02 ENCOUNTER — TELEPHONE (OUTPATIENT)
Dept: DERMATOLOGY | Facility: CLINIC | Age: 79
End: 2019-08-02

## 2019-08-02 NOTE — TELEPHONE ENCOUNTER
Follow Up Call     Pt saw: Kan Arce Were you prescribed any medications:No    o If YES: did you pick them up at the pharmacy? no     Did we do a biopsy? Yes (Excision)   o If YES: how does the biopsy site look? Per patient surgical site is healing well, no signs of infection      Would you recommend your family and friends to [facility]?  Yes     How satisfied were you with your visit on a scale of 1-10: 10

## 2019-08-08 DIAGNOSIS — I10 ESSENTIAL HYPERTENSION: ICD-10-CM

## 2019-08-08 RX ORDER — VERAPAMIL HYDROCHLORIDE 120 MG/1
CAPSULE, EXTENDED RELEASE ORAL
Qty: 90 CAPSULE | Refills: 1 | Status: SHIPPED | OUTPATIENT
Start: 2019-08-08 | End: 2020-01-08

## 2019-08-09 NOTE — RESULT ENCOUNTER NOTE
DERMATOPATHOLOGY RESULT NOTE    Accession # or Case # (copied from Path Report):  Case: [id number]     Specimen Letter and Anatomic Location (copied from Path Report):  A  Skin, Left arm, excision:    Histopathological Diagnosis:    - No residual basal cell carcinoma identified; negative for malignancy  -- No perineural or lymphovascular invasion identified   - Scar and changes consistent with prior biopsy site  Russell Pickett of [place]/Condition:  NEGATIVE FOR MALIGNANCY    Provider has personally called patient and relayed results and plan:  yes    Plan:  I called and relayed path results to patient, confirming that the previous basal cell carcinoma has been excised completely  She is healing well and will have her local PCP remove the sutures on Wednesday of next week  She is very happy to hear the news  Additional To-do Steps:  Copy Path Report and paste it below this Result Note for historical record  Route Result Note to any appropriate staff  Click "FILE TO CHART" button so a copy of this note gets included into patient's chart    <PASTE PATH REPORT HERE>        Tissue Exam: [id number]   Order: [id number]   Status:  Final result   Visible to patient:  No (Not Released) Dx:  Basal cell carcinoma (BCC) of left up    Component   Case Report  Surgical Pathology Report                         Case: [id number]                                   Authorizing Provider: Santiago Cagle MD     Collected:           07/31/2019 [id number]              Ordering Location:     [facility]      Received:            07/31/2019 [address] [place] [address]                                                                Pathologist:           Rubin Infante MD                                                             Specimen:    Skin, Other, Left arm                                                                    Final Diagnosis  A   Skin, Left arm, excision:  - No residual basal cell carcinoma identified; negative for malignancy  -- No perineural or lymphovascular invasion identified   - Scar and changes consistent with prior biopsy site        Interpretation performed at [facility], [address] [place] [address]      Electronically signed by Dee Benoit MD on 8/5/2019 at  4:05 PM  Additional Information   All controls performed with the immunohistochemical stains reported above reacted appropriately  These tests were developed and their performance characteristics determined by [facility] [address] [place] [address] or [facility]  They may not be cleared or approved by the [place]  Food and Drug Administration  The FDA has determined that such clearance or approval is not necessary  These tests are used for clinical purposes  They should not be regarded as investigational or for research  This laboratory has been approved by [place] [id number], designated as a high-complexity laboratory and is qualified to perform these tests  Gross Description   A  The specimen is received in formalin, labeled with the patient's name and hospital number, and is designated "left arm skin excision"  The specimen consists of an unoriented tan ellipse of skin measuring 3 8 x 1 8 cm, excised to a maximum depth of 0 8 cm  The skin surface exhibits a centrally located area of scarring measuring 0 9 x 0 8 x 0 1 cm which is located 3 mm from the nearest unoriented peripheral margin  The margin of resection is inked green  The skin surface tips are inked red  The specimen is sectioned revealing  unremarkable yellow fatty cut surfaces  Entirely submitted  Ten cassettes  Sequentially submitted    1:  First tip, cut surface down on a sponge  2-9:  Center, sequentially submitted, 1 piece in each cassette, scar in cassettes 4-7  10:  Opposite tip, cut surface down on a sponge     Note: The estimated total formalin fixation time based upon information provided by the submitting clinician and the standard processing schedule is under 72 hours  MCrites   Clinical Information   Specimen A; Skin; Excision; left arm; [de-identified]year old female with a biopsy proven Basal cell carcinoma (previous accession # [id number]); residual Basal cell carcinoma with scar excised with 0 3 cm margins

## 2019-08-14 ENCOUNTER — OFFICE VISIT (OUTPATIENT)
Dept: FAMILY MEDICINE CLINIC | Facility: CLINIC | Age: 79
End: 2019-08-14
Payer: MEDICARE

## 2019-08-14 VITALS
RESPIRATION RATE: 16 BRPM | HEIGHT: 65 IN | SYSTOLIC BLOOD PRESSURE: 138 MMHG | WEIGHT: 168 LBS | HEART RATE: 89 BPM | DIASTOLIC BLOOD PRESSURE: 84 MMHG | TEMPERATURE: 97.7 F | BODY MASS INDEX: 27.99 KG/M2 | OXYGEN SATURATION: 98 %

## 2019-08-14 DIAGNOSIS — S41.002S WOUND OF LEFT SHOULDER, SEQUELA: Primary | ICD-10-CM

## 2019-08-14 PROCEDURE — 1124F ACP DISCUSS-NO DSCNMKR DOCD: CPT | Performed by: FAMILY MEDICINE

## 2019-08-14 PROCEDURE — 99213 OFFICE O/P EST LOW 20 MIN: CPT | Performed by: FAMILY MEDICINE

## 2019-08-14 NOTE — PROGRESS NOTES
Assessment/Plan:         There are no diagnoses linked to this encounter  Subjective:      Patient ID: Derrick Fothergill is a [de-identified] y o  female  Here for suture removal left shoulder placed by Dermatology approximately 1 week ago  Has been notified of negative pathology  Denies any redness swelling to area, negative drainage tenderness        The following portions of the patient's history were reviewed and updated as appropriate:   She has a past medical history of Arthritis, Basal cell carcinoma, Dry eye syndrome, Foraminal stenosis of cervical region, Macular degeneration, Memory loss, Migraine, Spinal stenosis of lumbar region, Squamous cell skin cancer, Supraventricular tachycardia ([address] ), and Vitamin D deficiency  ,  does not have any pertinent problems on file  ,   has a past surgical history that includes Cataract extraction; Replacement total knee (Right); Appendectomy; Femur fracture surgery; Back surgery; Replacement total knee (Left); pr stereo treat trigeminal nerve (Right, 3/26/2019); and Skin biopsy  ,  family history includes Aortic aneurysm in her mother; Breast cancer in her mother; Hyperlipidemia in her sister; Hypertension in her father, mother, and sister; Squamous cell carcinoma in her sister  ,   reports that she has quit smoking  She has never used smokeless tobacco  She reports that she drinks alcohol  She reports that she does not use drugs  ,  is allergic to dilaudid [hydromorphone] and penicillins         Review of Systems   Skin: Positive for wound (Here for suture removal)  All other systems reviewed and are negative  Objective:  Vitals:    08/14/19 1411   BP: 138/84   Pulse: 89   Resp: 16   Temp: 97 7 °F (36 5 °C)   SpO2: 98%      Physical Exam   Constitutional: She appears well-developed and well-nourished  Suture removal  Date/Time: 8/14/2019 3:08 PM  Performed by: CL Su  Authorized by:  CL Su     Patient location:  Clinic  Other Assisting Provider: No    Consent:     Consent obtained:  Verbal    Consent given by:  Patient    Risks discussed:  Wound separation and bleeding  Location:     Laterality:  Left    Location:  Upper extremity  Procedure details: Tools used:  Suture removal kit    Wound appearance:  No sign(s) of infection, good wound healing and clean  Post-procedure details:     Post-removal:  Steri-Strips applied    Patient tolerance of procedure:   Tolerated well, no immediate complications

## 2019-08-17 DIAGNOSIS — Z12.31 ENCOUNTER FOR SCREENING MAMMOGRAM FOR BREAST CANCER: ICD-10-CM

## 2019-08-20 ENCOUNTER — TELEPHONE (OUTPATIENT)
Dept: PAIN MEDICINE | Facility: MEDICAL CENTER | Age: 79
End: 2019-08-20

## 2019-08-20 NOTE — TELEPHONE ENCOUNTER
Pt contacted Call Center requested refill of their medication  Medication Name:  Gabapentin     Dosage of Med:  300 mg     Frequency of Med:  2 tabs at bedtime     Remaining Medication:  2 weeks left   Pharmacy and Location:  [facility]  Preferred Callback Phone Number:  [phone]    Thank you

## 2019-08-27 NOTE — TELEPHONE ENCOUNTER
S/w pt to discuss RF, pt said she is currently taking Gabapentin 300 mg 2 caps at HS  Pt said CAMEJO said she wanted to increase it further and pt would like to try that  Pt would like to try 700 mg at HS, because she has some 100 mg tabs at home  Pt denies any s/e from it  Told pt will discuss with CAMEJO and call her back

## 2019-08-28 DIAGNOSIS — G50.9 TRIGEMINAL NEUROPATHY: ICD-10-CM

## 2019-08-28 DIAGNOSIS — M54.81 OCCIPITAL NEURALGIA OF RIGHT SIDE: ICD-10-CM

## 2019-08-28 RX ORDER — GABAPENTIN 300 MG/1
CAPSULE ORAL
Qty: 60 CAPSULE | Refills: 1 | Status: SHIPPED | OUTPATIENT
Start: 2019-08-28 | End: 2019-09-27 | Stop reason: SDUPTHER

## 2019-08-28 NOTE — TELEPHONE ENCOUNTER
S/w pt and advised the same  Pt concerned about being drowsy for the day  Advised pt to cb if she experiences side effects and will update HA

## 2019-08-28 NOTE — TELEPHONE ENCOUNTER
I sent a prescription for gabapentin 300 mg 2 capsules at bedtime to the patient's pharmacy on file  She can try to add an additional 100 mg a day however I would not like her to take it with the 600 mg at bedtime  Please just have her take 100 mg extra during the afternoon  If this is helpful she can call the office back in a couple of day and I can send in a prescription for gabapentin 100 mg during the day

## 2019-09-25 ENCOUNTER — TELEPHONE (OUTPATIENT)
Dept: PAIN MEDICINE | Facility: MEDICAL CENTER | Age: 79
End: 2019-09-25

## 2019-09-25 NOTE — TELEPHONE ENCOUNTER
Pt is calling stating she would like her script for this month of Gabapentin to be sent to [facility] instead of [facility] because of the cost       Pt can be reached at [phone]

## 2019-09-26 NOTE — TELEPHONE ENCOUNTER
Please confirm that she is taking 300 mg 2 times daily, prior to sending in any additional prescriptions

## 2019-09-27 DIAGNOSIS — M54.81 OCCIPITAL NEURALGIA OF RIGHT SIDE: ICD-10-CM

## 2019-09-27 DIAGNOSIS — G50.9 TRIGEMINAL NEUROPATHY: ICD-10-CM

## 2019-09-27 RX ORDER — GABAPENTIN 300 MG/1
CAPSULE ORAL
Qty: 60 CAPSULE | Refills: 1 | Status: SHIPPED | OUTPATIENT
Start: 2019-09-27 | End: 2019-12-13 | Stop reason: SDUPTHER

## 2019-10-08 ENCOUNTER — EVALUATION (OUTPATIENT)
Dept: PHYSICAL THERAPY | Facility: CLINIC | Age: 79
End: 2019-10-08
Payer: MEDICARE

## 2019-10-08 DIAGNOSIS — R53.81 PHYSICAL DECONDITIONING: Primary | ICD-10-CM

## 2019-10-08 PROCEDURE — 97110 THERAPEUTIC EXERCISES: CPT | Performed by: PHYSICAL THERAPIST

## 2019-10-08 PROCEDURE — 97162 PT EVAL MOD COMPLEX 30 MIN: CPT | Performed by: PHYSICAL THERAPIST

## 2019-10-08 NOTE — PROGRESS NOTES
PT Evaluation     Today's date: 10/8/2019  Patient name: Cynthia Luis  : 1940  MRN: [id number]  Referring provider: Verónica Luevano, *  Dx:   Encounter Diagnosis     ICD-10-CM    1  Physical deconditioning R53 81 Ambulatory referral to Physical Therapy       Start Time:   Stop Time: 1650  Total time in clinic (min): 35 minutes    Assessment  Assessment details: Pt is an [de-identified] y/o female presenting to physical therapy with chief complaint of balance and gait difficulties  Pt presents with below average scores on the TUG and 5x STS and required UE assistance in each test  Pt had increased sway in conditions without vision on the m-CTSIB, although scores not recorded  Pt's BLE strength is also decreased  Pt would benefit from physical therapy in order to improve BLE strength, static and dynamic balance, gait quality, and overall function in order to decrease risk of falls  Impairments: abnormal gait, abnormal or restricted ROM, activity intolerance, impaired balance, impaired physical strength, lacks appropriate home exercise program, pain with function and poor posture   Functional limitations: walking, stairs, STS  Symptom irritability: moderateUnderstanding of Dx/Px/POC: good   Prognosis: good    Goals  STG: 3 weeks  1  Pt will demonstrate independence with HEP  2  Pt will improve BLE strength by at least 1/2 grade  3  Pt will improve 5x STS and TUG by at least 3s   4  Pt will improve Angulo to at least 41/56 to decrease risk of falls    LT weeks  1  Pt will improve BLE strength to at least 4+/5 to improve balance  2  Pt will improve TUG to 12s or less to decrease risk of falls  3  Pt will improve 5x STS to 15s or less to decrease risk of falls  4  Pt will improve Angulo by MCID to show significant improvement in functional balance  5  Pt will report minimal difficulty with ascending/descending stairs        Plan  Patient would benefit from: skilled physical therapy  Planned modality interventions: cryotherapy and thermotherapy: hydrocollator packs  Planned therapy interventions: therapeutic exercise, therapeutic activities, stretching, strengthening, patient education, neuromuscular re-education, massage, manual therapy, balance, gait training and home exercise program  Frequency: 2x week  Duration in weeks: 6  Treatment plan discussed with: patient        Subjective Evaluation    History of Present Illness  Mechanism of injury: Pt reports she had double knee replacements in 2015, then that summer broke her femur, then about a year later accidentally overdosed on her medication for trigeminal neuralgia and then went to therapy  She was walking with a cane after this, but is now ambulating with a rollator  Pt reports she is not driving, she does not go up and down the stairs, cook, and cleaning  Pt reports she is independent with dressing and bathing  Pt reports she has some difficulty standing up from a chair             Recurrent probem    Quality of life: fair    Pain  Current pain ratin  At best pain ratin  At worst pain ratin  Aggravating factors: stair climbing and walking  Progression: no change    Social Support  Lives in: multiple-level home  Lives with: adult children    Patient Goals  Patient goals for therapy: improved balance, increased strength and independence with ADLs/IADLs          Objective     Strength/Myotome Testing     Left Hip   Planes of Motion   Flexion: 4  Abduction: 4-  External rotation: 4-    Right Hip   Planes of Motion   Flexion: 4  Abduction: 4-  External rotation: 4-    Left Knee   Flexion: 4  Extension: 4    Right Knee   Flexion: 4  Extension: 4    Left Ankle/Foot   Dorsiflexion: 4+    Right Ankle/Foot   Dorsiflexion: 4+    Functional Assessment        Comments  TU 6s with rollator    5x STS: 32 69s with BUE assist    Angulo 56 (see flow sheet)             Precautions: hx of CA, lumbar and cervical stenosis      Manual  10/8 Exercise Diary  10/8            Bike             NBOS firm/foam             Sit to stand             Side stepping             Stepping up             Standing with head turns             Reaching on foam                                                                                                                                                                                          Modalities

## 2019-10-09 ENCOUNTER — OFFICE VISIT (OUTPATIENT)
Dept: PHYSICAL THERAPY | Facility: CLINIC | Age: 79
End: 2019-10-09
Payer: MEDICARE

## 2019-10-09 DIAGNOSIS — R53.81 PHYSICAL DECONDITIONING: Primary | ICD-10-CM

## 2019-10-09 PROCEDURE — 97110 THERAPEUTIC EXERCISES: CPT

## 2019-10-09 PROCEDURE — 97530 THERAPEUTIC ACTIVITIES: CPT

## 2019-10-09 PROCEDURE — 97112 NEUROMUSCULAR REEDUCATION: CPT

## 2019-10-09 NOTE — PROGRESS NOTES
Daily Note     Today's date: 10/9/2019  Patient name: Alethea James  : 1940  MRN: [id number]  Referring provider: Reyna Araujo, *  Dx:   Encounter Diagnosis     ICD-10-CM    1  Physical deconditioning R53 81                   Subjective: Pt states she continues to get neck pain that seems wrap up and around the top of her head  Pt states it comes and goes and she does not having any symptoms today  Pt states she has right knee pain that bother her from time to time  Objective: See treatment diary below      Assessment: Tolerated treatment fair as she is limited by fear  Pt require encourage throughout session to try exercises and challenge her balance  Pt demonstrates pain with sit to  her right knee causing her not to be able to preform any more reps  Pt was limited with standing with head turns as turning her head hurts her neck  Patient would benefit from continued PT      Plan: Continue per plan of care        Precautions: hx of CA, lumbar and cervical stenosis      Manual  10/8                                                                                 Exercise Diary  10/8 10/9           Bike  rocking 5 min           NBOS firm/foam  EO/EC Foam  10 x 10"            Sit to stand   foam 5 x            Side stepping  5 laps @ // bar           Stepping up  4" x 10 ea with UE support           Standing with head turns  5 ea side            Reaching on foam  20x            tandom stance  2 x 10"                                                                                                                                                                            Modalities

## 2019-10-14 ENCOUNTER — OFFICE VISIT (OUTPATIENT)
Dept: PHYSICAL THERAPY | Facility: CLINIC | Age: 79
End: 2019-10-14
Payer: MEDICARE

## 2019-10-14 DIAGNOSIS — R53.81 PHYSICAL DECONDITIONING: Primary | ICD-10-CM

## 2019-10-14 PROCEDURE — 97530 THERAPEUTIC ACTIVITIES: CPT

## 2019-10-14 PROCEDURE — 97112 NEUROMUSCULAR REEDUCATION: CPT

## 2019-10-14 PROCEDURE — 97110 THERAPEUTIC EXERCISES: CPT

## 2019-10-14 NOTE — PROGRESS NOTES
Daily Note     Today's date: 10/14/2019  Patient name: Arias Spencer  : 1940  MRN: [id number]  Referring provider: Mike Griffith, *  Dx:   Encounter Diagnosis     ICD-10-CM    1  Physical deconditioning R53 81                   Subjective: Pt offers no complaints upon arrival to session  Objective: See treatment diary below      Assessment: Loses balance when multitasking on uneven surface  Able to perform sit to stands without UE, however unable to perform more than 5 reps secondary to fatigue  Difficulty reaching across body secondary to LOB  Relies heavily on UE during step ups  Plan: Continue per plan of care        Precautions: hx of CA, lumbar and cervical stenosis      Manual  10/8                                                                                 Exercise Diary  10/8 10/9 10/14          Bike  rocking 5 min 8'          NBOS firm/foam  EO/EC Foam  10 x 10"  EO foam 30"x3          Sit to stand   foam 5 x  Foam x5 no UE          Side stepping  5 laps @ // bar 5 laps @ // bar          Stepping up  4" x 10 ea with UE support 4" x10 ea with UE support          Standing with head turns  5 ea side  x10 ea          Reaching on foam  20x  x20          tandom stance  2 x 10"            minisquat   x10                                                                                                                                                             Modalities

## 2019-10-17 ENCOUNTER — OFFICE VISIT (OUTPATIENT)
Dept: PHYSICAL THERAPY | Facility: CLINIC | Age: 79
End: 2019-10-17
Payer: MEDICARE

## 2019-10-17 DIAGNOSIS — R53.81 PHYSICAL DECONDITIONING: Primary | ICD-10-CM

## 2019-10-17 PROCEDURE — 97530 THERAPEUTIC ACTIVITIES: CPT

## 2019-10-17 PROCEDURE — 97110 THERAPEUTIC EXERCISES: CPT

## 2019-10-17 PROCEDURE — 97112 NEUROMUSCULAR REEDUCATION: CPT

## 2019-10-17 NOTE — PROGRESS NOTES
Daily Note     Today's date: 10/17/2019  Patient name: Kathleen Pulido  : 1940  MRN: [id number]  Referring provider: Kane Steele, *  Dx:   Encounter Diagnosis     ICD-10-CM    1  Physical deconditioning R53 81                   Subjective: Pt reports she accidentally walked from room to room without using rollator  Objective: See treatment diary below      Assessment: Continues to demonstrate LE fatigue when performing functional exercises  Pt became dizzy when performing balance activities on foam, which took approximately 5 min to subside  Pt BP was 127/73  Pt demonstrates decreased hip stability with pelvic drop during SLS  Plan: Continue per plan of care        Precautions: hx of CA, lumbar and cervical stenosis      Manual  10/8                                                                                 Exercise Diary  10/8 10/9 10/14 10/17         Bike  rocking 5 min 8' 8'         NBOS firm/foam  EO/EC Foam  10 x 10"  EO foam 30"x3 EO foam 30"x3         Sit to stand   foam 5 x  Foam x5 no UE Foam x5 no UE         Side stepping  5 laps @ // bar 5 laps @ // bar 5 laps         Stepping up  4" x 10 ea with UE support 4" x10 ea with UE support x10 ea 4" with UE support         Standing with head turns  5 ea side  x10 ea nv - dizzy today         Reaching on foam  20x  x20 x15         tandom stance  2 x 10"            minisquat   x10 nv         Seated clams    rtb x30         Seated abd    rtb 2x10         LAQ    x20         Standing march on foam    x20         Standing hip abd    Foam x15                                                                                           Modalities

## 2019-10-21 ENCOUNTER — OFFICE VISIT (OUTPATIENT)
Dept: PHYSICAL THERAPY | Facility: CLINIC | Age: 79
End: 2019-10-21
Payer: MEDICARE

## 2019-10-21 DIAGNOSIS — R53.81 PHYSICAL DECONDITIONING: Primary | ICD-10-CM

## 2019-10-21 PROCEDURE — 97530 THERAPEUTIC ACTIVITIES: CPT | Performed by: PHYSICAL THERAPIST

## 2019-10-21 PROCEDURE — 97112 NEUROMUSCULAR REEDUCATION: CPT | Performed by: PHYSICAL THERAPIST

## 2019-10-21 NOTE — PROGRESS NOTES
Daily Note     Today's date: 10/21/2019  Patient name: Medardo Hughes  : 1940  MRN: [id number]  Referring provider: Fernanda Bass, *  Dx:   Encounter Diagnosis     ICD-10-CM    1  Physical deconditioning R53 81        Start Time:   Stop Time: 161  Total time in clinic (min): 43 minutes    Subjective: Pt offers no new complaints today  Objective: See treatment diary below      Assessment: Pt had increased difficulty with stepping up today due to decreased strength, unable to perform step ups with 1 UE support  Pt had minimal postural sway with standing on uneven surfaces  Plan: Progress as tolerated       Precautions: hx of CA, lumbar and cervical stenosis      Manual  10/8                                                                                 Exercise Diary  10/8 10/9 10/14 10/17 10/21        Bike  rocking 5 min 8' 8' 8'        NBOS firm/foam  EO/EC Foam  10 x 10"  EO foam 30"x3 EO foam 30"x3 EO foam 30"x3        Sit to stand   foam 5 x  Foam x5 no UE Foam x5 no UE Foam 6x no UE        Side stepping  5 laps @ // bar 5 laps @ // bar 5 laps 3 laps        Stepping up  4" x 10 ea with UE support 4" x10 ea with UE support x10 ea 4" with UE support 4"x5 ea BUE support        Standing with head turns  5 ea side  x10 ea nv - dizzy today         Reaching on foam  20x  x20 x15 3x 3 stickies        tandom stance  2 x 10"            minisquat   x10 nv         Seated clams    rtb x30 GTB 20x        Seated abd    rtb 2x10 GTB 20x        LAQ    x20         Standing march on foam    x20 20x ea        Standing hip abd    Foam x15 Foam 15x                                                                                          Modalities

## 2019-10-24 ENCOUNTER — APPOINTMENT (OUTPATIENT)
Dept: PHYSICAL THERAPY | Facility: CLINIC | Age: 79
End: 2019-10-24
Payer: MEDICARE

## 2019-10-28 ENCOUNTER — OFFICE VISIT (OUTPATIENT)
Dept: PHYSICAL THERAPY | Facility: CLINIC | Age: 79
End: 2019-10-28
Payer: MEDICARE

## 2019-10-28 DIAGNOSIS — R53.81 PHYSICAL DECONDITIONING: Primary | ICD-10-CM

## 2019-10-28 PROCEDURE — 97530 THERAPEUTIC ACTIVITIES: CPT

## 2019-10-28 PROCEDURE — 97112 NEUROMUSCULAR REEDUCATION: CPT

## 2019-10-28 PROCEDURE — 97110 THERAPEUTIC EXERCISES: CPT

## 2019-10-28 NOTE — PROGRESS NOTES
Daily Note     Today's date: 10/28/2019  Patient name: Peyman Dillon  : 1940  MRN: [id number]  Referring provider: Navin Garrido, *  Dx:   Encounter Diagnosis     ICD-10-CM    1  Physical deconditioning R53 81        Start Time: 1430  Stop Time: 1524  Total time in clinic (min): 54 minutes    Subjective: Pt offers no new complaints today  Objective: See treatment diary below    Assessment: Patient able to complete step ups this visit with one UE support  Additionally she progressed a few exercises today  She did however, required a few seated rest breaks  Good progress this visit  Plan: Progress as tolerated       Precautions: hx of CA, lumbar and cervical stenosis    Manual  10/8                                                                               Exercise Diary  10/8 10/9 10/14 10/17 10/21 10/28       Bike  rocking 5 min 8' 8' 8'        NBOS firm/foam  EO/EC Foam  10 x 10"  EO foam 30"x3 EO foam 30"x3 EO foam 30"x3 EO  Foam 30"x3       Sit to stand   foam 5 x  Foam x5 no UE Foam x5 no UE Foam 6x no UE Foam  10x no UE       Side stepping  5 laps @ // bar 5 laps @ // bar 5 laps 3 laps 3 laps       Stepping up  4" x 10 ea with UE support 4" x10 ea with UE support x10 ea 4" with UE support 4"x5 ea BUE support 4"  2x5   One UE support       Standing with head turns  5 ea side  x10 ea nv - dizzy today         Reaching on foam  20x  x20 x15 3x 3 stickies 3x 3  stickies       tandom stance  2 x 10"            minisquat   x10 nv         Seated clams    rtb x30 GTB 20x GTB  20x       Seated abd    rtb 2x10 GTB 20x GTB  20x       LAQ    x20         Standing march on foam    x20 20x ea 20x ea       Standing hip abd    Foam x15 Foam 15x Foam 20x                                                                                       Modalities

## 2019-11-01 ENCOUNTER — APPOINTMENT (OUTPATIENT)
Dept: PHYSICAL THERAPY | Facility: CLINIC | Age: 79
End: 2019-11-01
Payer: MEDICARE

## 2019-11-04 ENCOUNTER — OFFICE VISIT (OUTPATIENT)
Dept: PHYSICAL THERAPY | Facility: CLINIC | Age: 79
End: 2019-11-04
Payer: MEDICARE

## 2019-11-04 DIAGNOSIS — R53.81 PHYSICAL DECONDITIONING: Primary | ICD-10-CM

## 2019-11-04 PROCEDURE — 97110 THERAPEUTIC EXERCISES: CPT

## 2019-11-04 PROCEDURE — 97530 THERAPEUTIC ACTIVITIES: CPT

## 2019-11-04 PROCEDURE — 97112 NEUROMUSCULAR REEDUCATION: CPT

## 2019-11-04 NOTE — PROGRESS NOTES
Daily Note     Today's date: 2019  Patient name: David Guerra  : 1940  MRN: [id number]  Referring provider: Pily Jaffe, *  Dx:   Encounter Diagnosis     ICD-10-CM    1  Physical deconditioning R53 81                   Subjective: Pt reports she feels an improvement in balance  Objective: See treatment diary below      Assessment: Pt demonstrates significant quad weakness b/l and requires use of UE for steps  Unable to perform full bridge secondary to glut weakness  Unable to perform sit to stand from standard chair height  Performed functional standing balance NRE without postural sway  Plan: Continue per plan of care        Precautions: hx of CA, lumbar and cervical stenosis    Manual  10/8                                                                               Exercise Diary  10/8 10/9 10/14 10/17 10/21 10/28 11/4      Bike  rocking 5 min 8' 8' 8'  8'      NBOS firm/foam  EO/EC Foam  10 x 10"  EO foam 30"x3 EO foam 30"x3 EO foam 30"x3 EO  Foam 30"x3       Sit to stand   foam 5 x  Foam x5 no UE Foam x5 no UE Foam 6x no UE Foam  10x no UE Foam x12 no UE      Side stepping  5 laps @ // bar 5 laps @ // bar 5 laps 3 laps 3 laps       Stepping up  4" x 10 ea with UE support 4" x10 ea with UE support x10 ea 4" with UE support 4"x5 ea BUE support 4"  2x5   One UE support 4" x10 lateral      Standing with head turns  5 ea side  x10 ea nv - dizzy today         Reaching on foam  20x  x20 x15 3x 3 stickies 3x 3  stickies cones in cabinet 2x7      tandom stance  2 x 10"            minisquat   x10 nv   2x10      Seated clams    rtb x30 GTB 20x GTB  20x gtb x20      Seated abd    rtb 2x10 GTB 20x GTB  20x gtb x20      LAQ    x20         Standing march on foam    x20 20x ea 20x ea x20      Standing hip abd    Foam x15 Foam 15x Foam 20x Foam x20      SAQ/LAQ       2# 3x10      SLR       3x10      bridges       2x10                                               Modalities

## 2019-11-07 ENCOUNTER — OFFICE VISIT (OUTPATIENT)
Dept: PHYSICAL THERAPY | Facility: CLINIC | Age: 79
End: 2019-11-07
Payer: MEDICARE

## 2019-11-07 DIAGNOSIS — R53.81 PHYSICAL DECONDITIONING: Primary | ICD-10-CM

## 2019-11-07 DIAGNOSIS — G89.29 OTHER CHRONIC PAIN: ICD-10-CM

## 2019-11-07 DIAGNOSIS — F41.8 DEPRESSION WITH ANXIETY: ICD-10-CM

## 2019-11-07 PROCEDURE — 97110 THERAPEUTIC EXERCISES: CPT

## 2019-11-07 PROCEDURE — 97112 NEUROMUSCULAR REEDUCATION: CPT

## 2019-11-07 RX ORDER — DULOXETIN HYDROCHLORIDE 60 MG/1
CAPSULE, DELAYED RELEASE ORAL
Qty: 90 CAPSULE | Refills: 1 | Status: SHIPPED | OUTPATIENT
Start: 2019-11-07 | End: 2020-01-14 | Stop reason: SDUPTHER

## 2019-11-07 NOTE — PROGRESS NOTES
Daily Note     Today's date: 2019  Patient name: Jaci Pinto  : 1940  MRN: [id number]  Referring provider: Robyn Morgan, *  Dx:   Encounter Diagnosis     ICD-10-CM    1  Physical deconditioning R53 81                   Subjective:  Pt reports R groin was tight following last visit  Objective: See treatment diary below      Assessment: Patient had difficulty standing from chair with no UE today secondary to fatigue in LE  Demonstrates lack of full hip extension following sit>stand transfer  Demonstrates lateral trunk lean compensation when standing in SLS for hip abd  Plan: Continue per plan of care        Precautions: hx of CA, lumbar and cervical stenosis    Manual  10/8                                                                               Exercise Diary  10/8 10/9 10/14 10/17 10/21 10/28 11/4 11/7     Bike  rocking 5 min 8' 8' 8'  8' 8'     NBOS firm/foam  EO/EC Foam  10 x 10"  EO foam 30"x3 EO foam 30"x3 EO foam 30"x3 EO  Foam 30"x3       Sit to stand   foam 5 x  Foam x5 no UE Foam x5 no UE Foam 6x no UE Foam  10x no UE Foam x12 no UE Foam x12 no UE     Side stepping  5 laps @ // bar 5 laps @ // bar 5 laps 3 laps 3 laps       Stepping up  4" x 10 ea with UE support 4" x10 ea with UE support x10 ea 4" with UE support 4"x5 ea BUE support 4"  2x5   One UE support 4" x10 lateral 4" x10 lateral     Standing with head turns  5 ea side  x10 ea nv - dizzy today         Reaching on foam  20x  x20 x15 3x 3 stickies 3x 3  stickies cones in cabinet 2x7      tandom stance  2 x 10"            minisquat   x10 nv   2x10 2x10     Seated clams    rtb x30 GTB 20x GTB  20x gtb x20 gtb x20     Seated abd    rtb 2x10 GTB 20x GTB  20x gtb x20 gtb x20     LAQ    x20         Standing march on foam    x20 20x ea 20x ea x20 x20     Standing hip abd    Foam x15 Foam 15x Foam 20x Foam x20 Foam x20     SAQ/LAQ       2# 3x10 4# 3x10     SLR       3x10 3x10     bridges       2x10 2x10 Modalities

## 2019-11-11 ENCOUNTER — OFFICE VISIT (OUTPATIENT)
Dept: PHYSICAL THERAPY | Facility: CLINIC | Age: 79
End: 2019-11-11
Payer: MEDICARE

## 2019-11-11 DIAGNOSIS — R53.81 PHYSICAL DECONDITIONING: Primary | ICD-10-CM

## 2019-11-11 PROCEDURE — 97112 NEUROMUSCULAR REEDUCATION: CPT | Performed by: PHYSICAL THERAPIST

## 2019-11-11 PROCEDURE — 97110 THERAPEUTIC EXERCISES: CPT | Performed by: PHYSICAL THERAPIST

## 2019-11-11 NOTE — PROGRESS NOTES
Daily Note     Today's date: 2019  Patient name: Mele Werner  : 1940  MRN: [id number]  Referring provider: Vee Wills, *  Dx:   Encounter Diagnosis     ICD-10-CM    1  Physical deconditioning R53 81                   Subjective:  Patient without c/o prior to treatment session  Objective: See treatment diary below      Assessment: Patient demonstrated significant difficulty with balance on foam with EC; patient performed remainder of exercises without pain  Plan: Continue per plan of care  Precautions: hx of CA, lumbar and cervical stenosis    Manual  10/8                                                                               Exercise Diary  10/8 10/9 10/14 10/17 10/21 10/28 11/4 11/7 11/11    Bike  rocking 5 min 8' 8' 8'  8' 8' np    NBOS firm/foam  EO/EC Foam  10 x 10"  EO foam 30"x3 EO foam 30"x3 EO foam 30"x3 EO  Foam 30"x3   EC foam 30"x3    Sit to stand   foam 5 x  Foam x5 no UE Foam x5 no UE Foam 6x no UE Foam  10x no UE Foam x12 no UE Foam x12 no UE Foam x 20 no UE    Side stepping  5 laps @ // bar 5 laps @ // bar 5 laps 3 laps 3 laps       Stepping up  4" x 10 ea with UE support 4" x10 ea with UE support x10 ea 4" with UE support 4"x5 ea BUE support 4"  2x5   One UE support 4" x10 lateral 4" x10 lateral 6" x20 lateral    Standing with head turns  5 ea side  x10 ea nv - dizzy today         Reaching on foam  20x  x20 x15 3x 3 stickies 3x 3  stickies cones in cabinet 2x7      tandom stance  2 x 10"            minisquat   x10 nv   2x10 2x10 2x10    Seated clams    rtb x30 GTB 20x GTB  20x gtb x20 gtb x20 btb x20    Seated abd    rtb 2x10 GTB 20x GTB  20x gtb x20 gtb x20 btb    x 20    LAQ    x20         Standing march on foam    x20 20x ea 20x ea x20 x20 x20 ea      Standing hip abd    Foam x15 Foam 15x Foam 20x Foam x20 Foam x20 Foam x20    SAQ/LAQ       2# 3x10 4# 3x10 4# 3x10    SLR       3x10 3x10 3x10    bridges       2x10 2x10 2x10 Modalities

## 2019-11-14 ENCOUNTER — OFFICE VISIT (OUTPATIENT)
Dept: PHYSICAL THERAPY | Facility: CLINIC | Age: 79
End: 2019-11-14
Payer: MEDICARE

## 2019-11-14 DIAGNOSIS — R53.81 PHYSICAL DECONDITIONING: Primary | ICD-10-CM

## 2019-11-14 PROCEDURE — 97110 THERAPEUTIC EXERCISES: CPT

## 2019-11-14 PROCEDURE — 97112 NEUROMUSCULAR REEDUCATION: CPT

## 2019-11-14 NOTE — PROGRESS NOTES
Daily Note     Today's date: 2019  Patient name: Mahin July  : 1940  MRN: [id number]  Referring provider: Bethany Heard, *  Dx:   Encounter Diagnosis     ICD-10-CM    1  Physical deconditioning R53 81        Start Time: 1400  Stop Time: 1502  Total time in clinic (min): 62 minutes    Subjective: States she had some hip pain over the weekend      Objective: See treatment diary below      Assessment: Tolerated treatment well  No pain post session  Challenged by sit to stand, cues for foot placement and weight shift  Difficulty clearing LLE during L hip ABD on foam  Patient would benefit from continued PT      Plan: Continue per plan of care        Precautions: hx of CA, lumbar and cervical stenosis    Manual  10/8                                                                               Exercise Diary  10/8 10/9 10/14 10/17 10/21 10/28 11/4 11/7 11/11 11/14   Bike  rocking 5 min 8' 8' 8'  8' 8' np 8'   NBOS firm/foam  EO/EC Foam  10 x 10"  EO foam 30"x3 EO foam 30"x3 EO foam 30"x3 EO  Foam 30"x3   EC foam 30"x3 EO foam 30''x3   Sit to stand   foam 5 x  Foam x5 no UE Foam x5 no UE Foam 6x no UE Foam  10x no UE Foam x12 no UE Foam x12 no UE Foam x 20 no UE Foam x20 no UE   Side stepping  5 laps @ // bar 5 laps @ // bar 5 laps 3 laps 3 laps    2 laps    Stepping up  4" x 10 ea with UE support 4" x10 ea with UE support x10 ea 4" with UE support 4"x5 ea BUE support 4"  2x5   One UE support 4" x10 lateral 4" x10 lateral 6" x20 lateral 6'' x20 lateral   Standing with head turns  5 ea side  x10 ea nv - dizzy today         Reaching on foam  20x  x20 x15 3x 3 stickies 3x 3  stickies cones in cabinet 2x7      tandom stance  2 x 10"            minisquat   x10 nv   2x10 2x10 2x10 2x10   Seated clams    rtb x30 GTB 20x GTB  20x gtb x20 gtb x20 btb x20 BTB x20   Seated abd    rtb 2x10 GTB 20x GTB  20x gtb x20 gtb x20 btb    x 20 BTB x20    LAQ    x20         Standing march on foam    x20 20x ea 20x ea x20 x20 x20 ea   x20 ea   Standing hip abd    Foam x15 Foam 15x Foam 20x Foam x20 Foam x20 Foam x20 Foam x20    SAQ/LAQ       2# 3x10 4# 3x10 4# 3x10 4# 3x10   SLR       3x10 3x10 3x10 3x10   bridges       2x10 2x10 2x10 2x10                                            Modalities

## 2019-11-18 ENCOUNTER — APPOINTMENT (OUTPATIENT)
Dept: PHYSICAL THERAPY | Facility: CLINIC | Age: 79
End: 2019-11-18
Payer: MEDICARE

## 2019-11-21 ENCOUNTER — OFFICE VISIT (OUTPATIENT)
Dept: PHYSICAL THERAPY | Facility: CLINIC | Age: 79
End: 2019-11-21
Payer: MEDICARE

## 2019-11-21 DIAGNOSIS — R53.81 PHYSICAL DECONDITIONING: Primary | ICD-10-CM

## 2019-11-21 PROCEDURE — 97112 NEUROMUSCULAR REEDUCATION: CPT

## 2019-11-21 PROCEDURE — 97110 THERAPEUTIC EXERCISES: CPT

## 2019-11-21 NOTE — PROGRESS NOTES
Daily Note     Today's date: 2019  Patient name: Geovany Rizzo  : 1940  MRN: [id number]  Referring provider: Willy Nath, *  Dx:   Encounter Diagnosis     ICD-10-CM    1  Physical deconditioning R53 81                   Subjective: Pt reports she has been having a HA for approximately 1 week  Objective: See treatment diary below      Assessment: BP upon arrival to session 139/72  Pt's HA increased with standing activity, however BP was 136/76  Pt felt relief when performing exercises in supine position  Pt continues to present with decreased shana and decreased b/l quad strength  Plan: Continue per plan of care        Precautions: hx of CA, lumbar and cervical stenosis    Manual                                                                                 Exercise Diary  11/21 10/9 10/14 10/17 10/21 10/28 11/4 11/7 11/11 11/14   Bike 8' rocking 5 min 8' 8' 8'  8' 8' np 8'   NBOS firm/foam  EO/EC Foam  10 x 10"  EO foam 30"x3 EO foam 30"x3 EO foam 30"x3 EO  Foam 30"x3   EC foam 30"x3 EO foam 30''x3   Sit to stand   foam 5 x  Foam x5 no UE Foam x5 no UE Foam 6x no UE Foam  10x no UE Foam x12 no UE Foam x12 no UE Foam x 20 no UE Foam x20 no UE   Side stepping ytb 4 laps 5 laps @ // bar 5 laps @ // bar 5 laps 3 laps 3 laps    2 laps    Stepping up 6" X10 lateral 4" x 10 ea with UE support 4" x10 ea with UE support x10 ea 4" with UE support 4"x5 ea BUE support 4"  2x5   One UE support 4" x10 lateral 4" x10 lateral 6" x20 lateral 6'' x20 lateral   Standing with head turns  5 ea side  x10 ea nv - dizzy today         Reaching on foam  20x  x20 x15 3x 3 stickies 3x 3  stickies cones in cabinet 2x7      tandom stance  2 x 10"            minisquat 2X10  x10 nv   2x10 2x10 2x10 2x10   Seated clams    rtb x30 GTB 20x GTB  20x gtb x20 gtb x20 btb x20 BTB x20   Seated abd    rtb 2x10 GTB 20x GTB  20x gtb x20 gtb x20 btb    x 20 BTB x20    LAQ    x20         Standing march on foam    x20 20x ea 20x ea x20 x20 x20 ea   x20 ea   Standing hip abd    Foam x15 Foam 15x Foam 20x Foam x20 Foam x20 Foam x20 Foam x20    SAQ/LAQ 4# 3x10      2# 3x10 4# 3x10 4# 3x10 4# 3x10   SLR 3x10      3x10 3x10 3x10 3x10   bridges 2x10      2x10 2x10 2x10 2x10                                            Modalities

## 2019-11-25 ENCOUNTER — APPOINTMENT (OUTPATIENT)
Dept: PHYSICAL THERAPY | Facility: CLINIC | Age: 79
End: 2019-11-25
Payer: MEDICARE

## 2019-11-26 DIAGNOSIS — M54.81 OCCIPITAL NEURALGIA OF RIGHT SIDE: ICD-10-CM

## 2019-11-26 DIAGNOSIS — G50.9 TRIGEMINAL NEUROPATHY: ICD-10-CM

## 2019-12-04 RX ORDER — GABAPENTIN 300 MG/1
CAPSULE ORAL
Qty: 120 CAPSULE | OUTPATIENT
Start: 2019-12-04

## 2019-12-12 ENCOUNTER — TELEPHONE (OUTPATIENT)
Dept: PAIN MEDICINE | Facility: CLINIC | Age: 79
End: 2019-12-12

## 2019-12-12 DIAGNOSIS — G50.9 TRIGEMINAL NEUROPATHY: ICD-10-CM

## 2019-12-12 DIAGNOSIS — M54.81 OCCIPITAL NEURALGIA OF RIGHT SIDE: ICD-10-CM

## 2019-12-12 NOTE — TELEPHONE ENCOUNTER
Pt contacted Call Center requested refill of their medication  Medication Name: Gabapentin       Dosage of Med: 300 mg      Frequency of Med: Take 2 capsules at bedtime  Remaining Medication: 2-3 day supply       Pharmacy and Location: [facility] pharmacy on file        Pt  Preferred Callback Phone Number: [phone]       Thank you

## 2019-12-13 RX ORDER — GABAPENTIN 300 MG/1
CAPSULE ORAL
Qty: 180 CAPSULE | Refills: 3 | Status: SHIPPED | OUTPATIENT
Start: 2019-12-13 | End: 2020-01-15

## 2019-12-13 NOTE — TELEPHONE ENCOUNTER
I s/w pt and she confirmed the gabapentin 300 mg (2) at bedtime was working ok and she denied any s/e  Pls send refill to [facility] today  Looks like the gabapentin #60 with 1 RF was LP 9/27/19 by CAMEJO  No need to c/b once sent

## 2019-12-18 NOTE — PROGRESS NOTES
PT Discharge    Today's date: 2019  Patient name: Ana Pinzon  : 1940  MRN: [id number]  Referring provider: Peter Hines, *  Dx:   Encounter Diagnosis     ICD-10-CM    1  Physical deconditioning R53 81      Assessment  Assessment details: Pt has not returned to physical therapy since 19 and has not future appointments scheduled  Subjective and objective information and goals unable to be updated at this time  Pt DC from skilled therapy  Impairments: abnormal gait, abnormal or restricted ROM, activity intolerance, impaired balance, impaired physical strength, lacks appropriate home exercise program, pain with function and poor posture   Functional limitations: walking, stairs, STS  Symptom irritability: moderateUnderstanding of Dx/Px/POC: good   Prognosis: good    Goals  STG: 3 weeks - not met  1  Pt will demonstrate independence with HEP  2  Pt will improve BLE strength by at least 1/2 grade  3  Pt will improve 5x STS and TUG by at least 3s   4  Pt will improve Anuglo to at least 41/56 to decrease risk of falls    LT weeks - not met  1  Pt will improve BLE strength to at least 4+/5 to improve balance  2  Pt will improve TUG to 12s or less to decrease risk of falls  3  Pt will improve 5x STS to 15s or less to decrease risk of falls  4  Pt will improve Angulo by MCID to show significant improvement in functional balance  5  Pt will report minimal difficulty with ascending/descending stairs        Plan  Patient would benefit from: skilled physical therapy  Planned modality interventions: cryotherapy and thermotherapy: hydrocollator packs  Planned therapy interventions: therapeutic exercise, therapeutic activities, stretching, strengthening, patient education, neuromuscular re-education, massage, manual therapy, balance, gait training and home exercise program  Treatment plan discussed with: patient        Subjective Evaluation    History of Present Illness  Mechanism of injury: Pt reports she had double knee replacements in 2015, then that summer broke her femur, then about a year later accidentally overdosed on her medication for trigeminal neuralgia and then went to therapy  She was walking with a cane after this, but is now ambulating with a rollator  Pt reports she is not driving, she does not go up and down the stairs, cook, and cleaning  Pt reports she is independent with dressing and bathing  Pt reports she has some difficulty standing up from a chair             Recurrent probem    Quality of life: fair    Pain  Current pain ratin  At best pain ratin  At worst pain ratin  Aggravating factors: stair climbing and walking  Progression: no change    Social Support  Lives in: multiple-level home  Lives with: adult children    Patient Goals  Patient goals for therapy: improved balance, increased strength and independence with ADLs/IADLs          Objective     Strength/Myotome Testing     Left Hip   Planes of Motion   Flexion: 4  Abduction: 4-  External rotation: 4-    Right Hip   Planes of Motion   Flexion: 4  Abduction: 4-  External rotation: 4-    Left Knee   Flexion: 4  Extension: 4    Right Knee   Flexion: 4  Extension: 4    Left Ankle/Foot   Dorsiflexion: 4+    Right Ankle/Foot   Dorsiflexion: 4+    Functional Assessment        Comments  TU 6s with rollator    5x STS: 32 69s with BUE assist    Angulo 56 (see flow sheet)

## 2020-01-06 ENCOUNTER — OFFICE VISIT (OUTPATIENT)
Dept: PAIN MEDICINE | Facility: CLINIC | Age: 80
End: 2020-01-06
Payer: MEDICARE

## 2020-01-06 VITALS
BODY MASS INDEX: 27.99 KG/M2 | HEART RATE: 80 BPM | DIASTOLIC BLOOD PRESSURE: 76 MMHG | HEIGHT: 65 IN | WEIGHT: 168 LBS | RESPIRATION RATE: 18 BRPM | SYSTOLIC BLOOD PRESSURE: 138 MMHG

## 2020-01-06 DIAGNOSIS — G50.9 TRIGEMINAL NEUROPATHY: ICD-10-CM

## 2020-01-06 DIAGNOSIS — G50.0 TRIGEMINAL NEURALGIA: ICD-10-CM

## 2020-01-06 DIAGNOSIS — M54.81 OCCIPITAL NEURALGIA OF RIGHT SIDE: ICD-10-CM

## 2020-01-06 DIAGNOSIS — G89.4 CHRONIC PAIN SYNDROME: Primary | ICD-10-CM

## 2020-01-06 DIAGNOSIS — M48.02 CERVICAL SPINAL STENOSIS: ICD-10-CM

## 2020-01-06 DIAGNOSIS — M54.2 NECK PAIN: ICD-10-CM

## 2020-01-06 PROCEDURE — 99214 OFFICE O/P EST MOD 30 MIN: CPT | Performed by: NURSE PRACTITIONER

## 2020-01-06 NOTE — PROGRESS NOTES
Assessment:  1  Chronic pain syndrome    2  Occipital neuralgia of right side    3  Trigeminal neuralgia    4  Neck pain    5  Cervical spinal stenosis    6  Trigeminal neuropathy        Plan:  Peyman Dillon is a [de-identified] y o  female who presents for a follow up office visit in regards to Headache  The patient has a history of chronic pain syndrome secondary to right occipital neuralgia and right trigeminal neuralgia, neck pain, and cervical stenosis  The patient presents today with ongoing right neck, head, ear, and eye pain  She had undergone a right occipital nerve block in May, which she feels did provide relief this summer  Therefore to help decrease inflammation and pain, we will repeat the right occipital nerve block  The patient does have muscle spasms in the right splenius capitis and trapezius muscles  We discussed starting methocarbamol 500 mg at bedtime, but the patient is not interested in this option due to side effect profile  In regards to the Gabapentin 300 mg I instructed her to stop the medication if it is causing dizziness and providing no pain relief  My impressions and treatment recommendations were discussed in detail with the patient who verbalized understanding and had no further questions  Discharge instructions were provided  I personally saw and examined the patient and I agree with the above discussed plan of care  Orders Placed This Encounter   Procedures    Nerve block     Scheduling Instructions:      Right occipital nerve block     No orders of the defined types were placed in this encounter  History of Present Illness:  Peyman Dillon is a [de-identified] y o  female who presents for a follow up office visit in regards to Headache  The patient has a history of chronic pain syndrome secondary to right occipital neuralgia and right trigeminal neuralgia, neck pain, and cervical stenosis    The patient presents today with ongoing pain that is located on the right side of her neck, which radiates up to the back of her head, right ear, right thigh, and right cheek  She states the jaw pain has decreased  She is not sure if this was because of the glycerol rhizotomy of the trigeminal V1 through V3 she had performed on March 26, 2019 by Dr Jose Manuel De Paz  The pain continues to be constant and occur mostly at night she describes as pressure-like  She is rating her pain a 7/10 on the numeric rating scale  She is currently taking gabapentin 300 mg 1 tab at bedtime  She was previously taking 2 tabs, but decrease to 1 due to dizziness  She is unsure this medication is providing any relief  She also uses medical marijuana at bedtime  She had undergone a right occipital nerve block in May of 2019 with little relief documented  However the patient states that this summer she did not have as much pain, and she contributes this to the injection      I have personally reviewed and/or updated the patient's past medical history, past surgical history, family history, social history, current medications, allergies, and vital signs today  Review of Systems   Respiratory: Negative for shortness of breath  Cardiovascular: Negative for chest pain  Gastrointestinal: Negative for constipation, diarrhea, nausea and vomiting  Musculoskeletal: Positive for gait problem  Negative for arthralgias, joint swelling and myalgias  DROM  Joint Stiffness  Pain   Skin: Negative for rash  Neurological: Positive for dizziness  Negative for seizures and weakness  All other systems reviewed and are negative        Patient Active Problem List   Diagnosis    Chronic nonintractable headache    Trigeminal neuralgia    Occipital neuralgia of right side    Trigeminal neuropathy    Myofascial pain on right side    Depression with anxiety    Other chronic pain    Cervical disc disorder with radiculopathy of eipplmyb-kvxtzle-ciggb region       Past Medical History:   Diagnosis Date    Arthritis     Basal cell carcinoma     Dry eye syndrome     Foraminal stenosis of cervical region     Macular degeneration     Memory loss     Migraine     Spinal stenosis of lumbar region     Squamous cell skin cancer     Supraventricular tachycardia ([address] )     Vitamin D deficiency     last assessed 2/7/17       Past Surgical History:   Procedure Laterality Date    APPENDECTOMY      BACK SURGERY      lower back surgery lumbar disc    CATARACT EXTRACTION      FEMUR FRACTURE SURGERY      DC STEREO TREAT TRIGEMINAL NERVE Right 3/26/2019    Procedure: GLYCEROL RHIZOTOMY TRIGEMINAL NERVES (V1-V3), RIGHT;  Surgeon: Nirali Cary MD;  Location:  MAIN OR;  Service: Neurosurgery    REPLACEMENT TOTAL KNEE Right     REPLACEMENT TOTAL KNEE Left     SKIN BIOPSY         Family History   Problem Relation Age of Onset    Aortic aneurysm Mother         abdominal aortic aneurysm    Hypertension Mother    Lindquist Breast cancer Mother    Lindquist Hypertension Father     Hypertension Sister     Hyperlipidemia Sister     Squamous cell carcinoma Sister        Social History     Occupational History    Occupation: retired   Tobacco Use    Smoking status: Former Smoker    Smokeless tobacco: Never Used   Substance and Sexual Activity    Alcohol use: Yes     Comment: occasional    Drug use: No    Sexual activity: Not on file       Current Outpatient Medications on File Prior to Visit   Medication Sig    Acetaminophen 325 MG CAPS Take 650 mg by mouth every 6 (six) hours as needed    Cholecalciferol (VITAMIN D PO) Take 5,000 Units by mouth Daily      cycloSPORINE (RESTASIS) 0 05 % ophthalmic emulsion Apply 1 drop to eye 2 (two) times a day    DULoxetine (CYMBALTA) 60 mg delayed release capsule TAKE 1 CAPSULE BY MOUTH  DAILY    gabapentin (NEURONTIN) 300 mg capsule Take 2 capsules at bedtime      Multiple Vitamins-Minerals (PRESERVISION AREDS 2+MULTI VIT) CAPS BID    oxyCODONE-acetaminophen (PERCOCET) 5-325 mg per tablet Take 1 tablet by mouth every 4 (four) hours as needed for moderate pain    verapamil (VERELAN PM) 120 MG 24 hr capsule TAKE 1 CAPSULE BY MOUTH AT  NIGHT     No current facility-administered medications on file prior to visit  Allergies   Allergen Reactions    Dilaudid [Hydromorphone] Shortness Of Breath     Other reaction(s): Respiratory Distress  Other reaction(s): HORENESS    Penicillins Rash     Rash       Physical Exam:    /76   Pulse 80   Resp 18   Ht 5' 5" (1 651 m)   Wt 76 2 kg (168 lb)   BMI 27 96 kg/m²     Constitutional:normal, well developed, well nourished, alert, in no distress and non-toxic and no overt pain behavior  Eyes:anicteric  HEENT:grossly intact  Neck:supple, symmetric, trachea midline and no masses   Pulmonary:even and unlabored  Cardiovascular:No edema or pitting edema present  Skin:Normal without rashes or lesions and well hydrated  Psychiatric:Mood and affect appropriate  Neurologic:Cranial Nerves II-XII grossly intact  Musculoskeletal:normal      tenderness right occiput foot with palpable trigger points right splenius capitis and trapezius muscles        Imaging  MRI CERVICAL SPINE WITHOUT CONTRAST     INDICATION: M48 02: Spinal stenosis, cervical region  M47 812: Spondylosis without myelopathy or radiculopathy, cervical region  M50 20: Other cervical disc displacement, unspecified cervical region  Head and neck pain     COMPARISON:  MR 3/4/2016     TECHNIQUE:  Sagittal T1, sagittal T2, sagittal inversion recovery, axial T2, axial  2D merge     IMAGE QUALITY:  Diagnostic     FINDINGS:     ALIGNMENT:  Loss of disc height C5-C6, C6-C7 and C7/T1  Degenerative grade 1 C4 anterolisthesis      MARROW SIGNAL:  Normal marrow signal is identified within the visualized bony structures    No discrete marrow lesion      CERVICAL AND VISUALIZED THORACIC CORD:  Normal signal within the visualized cord      PREVERTEBRAL AND PARASPINAL SOFT TISSUES:  Normal      VISUALIZED POSTERIOR FOSSA:  The visualized posterior fossa demonstrates no abnormal signal      CERVICAL DISC SPACES:     C2-C3: Left facet arthrosis      C3-C4:  Left greater than right facet arthrosis      C4-C5:  Bilateral facet arthrosis, left greater than right, grade 1 anterolisthesis, mild canal and mild left foraminal stenosis  Stable      C5-C6:  Loss of disc height, marginal osteophytes, circumferential bulge  Bilateral facet and uncinate arthrosis with relatively severe foraminal stenosis, correlate for bilateral C6 radiculitis  Findings have progressed slightly         C6-C7:  Loss of disc height, circumferential bulge with marginal osteophytes  Left greater than right uncinate arthrosis  Moderate bilateral facet arthrosis  No critical stenosis      C7-T1:  Advanced bilateral facet arthrosis, grade 1 anterolisthesis, circumferential bulge with prominent anterior osteophytes, no critical stenosis      UPPER THORACIC DISC SPACES:  Minor bulges T1-T2 and T2-T3      IMPRESSION:     Multilevel degenerative changes which are stable or have progressed only minimally since prior study  Mild multilevel canal stenosis  Potentially significant bilateral C5-C6 foraminal stenosis, correlate for bilateral C6 radiculitis

## 2020-01-07 ENCOUNTER — TELEPHONE (OUTPATIENT)
Dept: PAIN MEDICINE | Facility: CLINIC | Age: 80
End: 2020-01-07

## 2020-01-07 NOTE — TELEPHONE ENCOUNTER
Patient   [phone]  Zachariah Sos     Patient son is calling in to get his mother scheduled for a procedure   Please follow up thank you

## 2020-01-07 NOTE — TELEPHONE ENCOUNTER
Patient said that she spoke with Allen Umana and she was suppose to give her a call back  She said that is in a lot of pain  Her pain level a 5-7/10   She would like to be scheduled soon

## 2020-01-08 DIAGNOSIS — I10 ESSENTIAL HYPERTENSION: ICD-10-CM

## 2020-01-08 RX ORDER — VERAPAMIL HYDROCHLORIDE 120 MG/1
CAPSULE, EXTENDED RELEASE ORAL
Qty: 90 CAPSULE | Refills: 0 | Status: SHIPPED | OUTPATIENT
Start: 2020-01-08 | End: 2020-02-05 | Stop reason: SDUPTHER

## 2020-01-08 NOTE — TELEPHONE ENCOUNTER
Dr Shira Barajas, I have tried to find a spot to schedule this patient and am not successful  Is this one that Zahira Toribio may be able to do,or does she have to be scheduled with you? And if yes, can you help me in finding a spot to put her?  Thank you

## 2020-01-14 DIAGNOSIS — F41.8 DEPRESSION WITH ANXIETY: ICD-10-CM

## 2020-01-14 DIAGNOSIS — G89.29 OTHER CHRONIC PAIN: ICD-10-CM

## 2020-01-14 RX ORDER — DULOXETIN HYDROCHLORIDE 60 MG/1
60 CAPSULE, DELAYED RELEASE ORAL DAILY
Qty: 90 CAPSULE | Refills: 1 | Status: SHIPPED | OUTPATIENT
Start: 2020-01-14 | End: 2020-05-12

## 2020-01-15 ENCOUNTER — PROCEDURE VISIT (OUTPATIENT)
Dept: PAIN MEDICINE | Facility: CLINIC | Age: 80
End: 2020-01-15
Payer: MEDICARE

## 2020-01-15 VITALS
HEART RATE: 86 BPM | TEMPERATURE: 97.9 F | BODY MASS INDEX: 27.96 KG/M2 | DIASTOLIC BLOOD PRESSURE: 53 MMHG | SYSTOLIC BLOOD PRESSURE: 130 MMHG | WEIGHT: 168 LBS

## 2020-01-15 DIAGNOSIS — M54.81 OCCIPITAL NEURALGIA OF RIGHT SIDE: Primary | ICD-10-CM

## 2020-01-15 PROCEDURE — 64405 NJX AA&/STRD GR OCPL NRV: CPT | Performed by: ANESTHESIOLOGY

## 2020-01-15 RX ORDER — METHYLPREDNISOLONE ACETATE 40 MG/ML
40 INJECTION, SUSPENSION INTRA-ARTICULAR; INTRALESIONAL; INTRAMUSCULAR; SOFT TISSUE ONCE
Status: COMPLETED | OUTPATIENT
Start: 2020-01-15 | End: 2020-01-15

## 2020-01-15 RX ORDER — BUPIVACAINE HYDROCHLORIDE 2.5 MG/ML
10 INJECTION, SOLUTION EPIDURAL; INFILTRATION; INTRACAUDAL ONCE
Status: COMPLETED | OUTPATIENT
Start: 2020-01-15 | End: 2020-01-15

## 2020-01-15 RX ADMIN — METHYLPREDNISOLONE ACETATE 40 MG: 40 INJECTION, SUSPENSION INTRA-ARTICULAR; INTRALESIONAL; INTRAMUSCULAR; SOFT TISSUE at 12:10

## 2020-01-15 RX ADMIN — BUPIVACAINE HYDROCHLORIDE 10 ML: 2.5 INJECTION, SOLUTION EPIDURAL; INFILTRATION; INTRACAUDAL at 12:09

## 2020-01-15 NOTE — PROGRESS NOTES
Pre-procedure Diagnosis:   1  Occipital neuralgia of right side       Post-procedure Diagnosis:   1  Occipital neuralgia of right side       Operation Title(s):  Right occipital nerve block  Attending Surgeon:   Maco Amezcua MD  Anesthesia:   Local     Indications: The patient is a [de-identified]y o  year-old female with a diagnosis of   1  Occipital neuralgia of right side      The patient's history and physical exam were reviewed  The risks, benefits and alternatives to the procedure were discussed, and all questions were answered to the patient's satisfaction  The patient agreed to proceed, and written informed consent was obtained      Procedure in Detail: The patient was brought into the exam room and placed in the sitting position on the exam room chair with the head flexed on a pillow       The area of the right occiput was palpated and cleansed with alcohol  Then a 1 5 inch 25 guage needle was advanced until bone was contacted  Negative aspiration was confirmed and a solution consisting of 1 mL 0 25% bupivacaine and 0 5 mL Depo-Medrol (40mg/ml) was easily injected      The same procedure was repeated for the right lesser occipital nerve      Disposition: The patient tolerated the procedure well and there were no apparent complications  The patient was given written discharge instructions for the procedure

## 2020-01-21 ENCOUNTER — TELEPHONE (OUTPATIENT)
Dept: PAIN MEDICINE | Facility: MEDICAL CENTER | Age: 80
End: 2020-01-21

## 2020-01-21 DIAGNOSIS — G50.0 TRIGEMINAL NEURALGIA: Primary | ICD-10-CM

## 2020-01-21 NOTE — TELEPHONE ENCOUNTER
Pt called stating that she has a lot of pain in her head and face that she did not have before     Pt can be reached at [phone]

## 2020-01-21 NOTE — TELEPHONE ENCOUNTER
I s/w pt who is s/p Rt occipital NB from 1/15/20 and reports she got 40% relief the day of the procedure and for a few days afterwards  Pt said now her same amt of pain is back especially above the Rt eyebrow, forehead and cheek  She is good if she is laying down or using ice packs  She does take Tylenol  Pt doesn't understand why it only helped for a couple days and what can she do for the pain? Pt said she just mailed her pain diary a few days ago  (pain dairy has not been received yet)    I advised pt to take the tylenol Q 6 hrs around the clock, continue the ice packs as needed and I will forward update to NM on Thurs when she returns to the office for further rec  Pt confirmed she did stop taking the gabapentin as NM instructed as it was not helping and was causing dizziness

## 2020-01-22 NOTE — TELEPHONE ENCOUNTER
When dd she stop the gabapentin? It may have been helping more than she thought, and now that it was stopped the pain increased  She can try just taking 1 tab at night, or we can try a different neuropathic medication  I know she has been through a lot already, and she may have taken all the alternatives  The one I would suggest is Lyrica, (not sure if she tried already)    I know she uses medical marijuana  She may want to see if there is something she can take that she can tolerate during the day

## 2020-01-23 NOTE — TELEPHONE ENCOUNTER
Pt confirmed that she stopped the gabapentin the day of her last ov which was 1/6/20  Pt is not interested in the gabapentin or the lyrica  I confirmed that she is only taking the medical marijuana at night, I told her that NM advised to check with that provider if she can take during the day as well  Pt said she will try taking during the day, that provider has already told her that she could so she will give that a try

## 2020-01-29 RX ORDER — NORTRIPTYLINE HYDROCHLORIDE 25 MG/1
25 CAPSULE ORAL
Qty: 30 CAPSULE | Refills: 1 | Status: SHIPPED | OUTPATIENT
Start: 2020-01-29 | End: 2020-03-23

## 2020-01-29 NOTE — TELEPHONE ENCOUNTER
I s/w pt she is upset about this pain and not getting any relief and at her wits end  She wants Dr Dior Fan help  I asked if she had inc her medical marijuana to more than just in the evening and she said she doesn't like to take during the day b/c it makes her sleepy and she doesn't need to go see that doctor that prescribed it  She is tired of tip toeing around this and wants some relief before she becomes suicidal having to deal with this pain  She is not interested in the gabapentin or lyrica anymore  Told pt I will forward message to  for further rec

## 2020-01-29 NOTE — TELEPHONE ENCOUNTER
Pt called stating she is in a lot of pain  Pain is not being relieved by tylenol  She also took oxycodone and that did not work either  She need's some relief      Pt can be called at [phone]

## 2020-01-29 NOTE — TELEPHONE ENCOUNTER
Pt advised that FQ rec trying nortriptyline 25 mg HS for her neuropathtic pain  Pt told it helps with nerve pain similar to gabapentin and lyrica  Common s/e are drowsiness and dizziness  Can take 3-4 wks to see full effect  Pt willing to try as she said she has no other alternatives  Pls send to [facility] on file  Pt told to c/b in 2 wks with update or sooner with s/e

## 2020-02-05 DIAGNOSIS — I10 ESSENTIAL HYPERTENSION: ICD-10-CM

## 2020-02-05 RX ORDER — VERAPAMIL HYDROCHLORIDE 120 MG/1
120 CAPSULE, EXTENDED RELEASE ORAL
Qty: 90 CAPSULE | Refills: 0 | Status: SHIPPED | OUTPATIENT
Start: 2020-02-05 | End: 2020-02-17 | Stop reason: SDUPTHER

## 2020-02-12 NOTE — TELEPHONE ENCOUNTER
S/w pt, she said she stopped the Nortriptyline 2 days ago because she did not feel like it was working  RN explained that it can take 3-4 weeks to see the effects of the medication  Pt said she was having trouble walking when she would wake up in the middle of the night which she said was because of the medication  Pt said if FQ thinks she should continue it she will

## 2020-02-12 NOTE — TELEPHONE ENCOUNTER
Pt called stating she missed a call  Pt is not taking the medication anymore  Medication did not work and made her feel awful  She has been off it for two days  Stayed on it for at lease two weeks or longer      Pt can be reached at [phone]

## 2020-02-17 DIAGNOSIS — I10 ESSENTIAL HYPERTENSION: ICD-10-CM

## 2020-02-17 RX ORDER — VERAPAMIL HYDROCHLORIDE 120 MG/1
120 CAPSULE, EXTENDED RELEASE ORAL
Qty: 90 CAPSULE | Refills: 1 | Status: SHIPPED | OUTPATIENT
Start: 2020-02-17 | End: 2020-05-15 | Stop reason: SDUPTHER

## 2020-03-03 ENCOUNTER — TELEPHONE (OUTPATIENT)
Dept: FAMILY MEDICINE CLINIC | Facility: CLINIC | Age: 80
End: 2020-03-03

## 2020-03-03 NOTE — TELEPHONE ENCOUNTER
She is requesting labs and a ua ordered before her next visit  She doesn't feel good  She is taking nortriptyline ordered by [facility], she has sores in her mouth and has had a headache for the past 5 days  They want her to stay on it  Most of the time she spends in bed, which is not good

## 2020-03-04 ENCOUNTER — TELEPHONE (OUTPATIENT)
Dept: PAIN MEDICINE | Facility: CLINIC | Age: 80
End: 2020-03-04

## 2020-03-04 DIAGNOSIS — Z13.220 SCREENING, LIPID: ICD-10-CM

## 2020-03-04 DIAGNOSIS — M54.81 OCCIPITAL NEURALGIA OF RIGHT SIDE: Primary | ICD-10-CM

## 2020-03-04 DIAGNOSIS — R53.83 OTHER FATIGUE: Primary | ICD-10-CM

## 2020-03-04 DIAGNOSIS — R69 TAKING MULTIPLE MEDICATIONS FOR CHRONIC DISEASE: ICD-10-CM

## 2020-03-05 ENCOUNTER — APPOINTMENT (OUTPATIENT)
Dept: LAB | Facility: HOSPITAL | Age: 80
End: 2020-03-05
Payer: MEDICARE

## 2020-03-05 DIAGNOSIS — R53.83 OTHER FATIGUE: ICD-10-CM

## 2020-03-05 DIAGNOSIS — Z13.220 SCREENING, LIPID: ICD-10-CM

## 2020-03-05 LAB
ALBUMIN SERPL BCP-MCNC: 3.9 G/DL (ref 3.5–5)
ALP SERPL-CCNC: 75 U/L (ref 46–116)
ALT SERPL W P-5'-P-CCNC: 20 U/L (ref 12–78)
ANION GAP SERPL CALCULATED.3IONS-SCNC: 7 MMOL/L (ref 4–13)
AST SERPL W P-5'-P-CCNC: 18 U/L (ref 5–45)
BACTERIA UR QL AUTO: NORMAL /HPF
BASOPHILS # BLD AUTO: 0.04 THOUSANDS/ΜL (ref 0–0.1)
BASOPHILS NFR BLD AUTO: 1 % (ref 0–1)
BILIRUB SERPL-MCNC: 0.3 MG/DL (ref 0.2–1)
BILIRUB UR QL STRIP: NEGATIVE
BUN SERPL-MCNC: 13 MG/DL (ref 5–25)
CALCIUM SERPL-MCNC: 9.5 MG/DL (ref 8.3–10.1)
CHLORIDE SERPL-SCNC: 104 MMOL/L (ref 100–108)
CHOLEST SERPL-MCNC: 216 MG/DL (ref 50–200)
CLARITY UR: CLEAR
CO2 SERPL-SCNC: 31 MMOL/L (ref 21–32)
COLOR UR: YELLOW
CREAT SERPL-MCNC: 0.7 MG/DL (ref 0.6–1.3)
CREAT UR-MCNC: 33.4 MG/DL
EOSINOPHIL # BLD AUTO: 0.12 THOUSAND/ΜL (ref 0–0.61)
EOSINOPHIL NFR BLD AUTO: 2 % (ref 0–6)
ERYTHROCYTE [DISTWIDTH] IN BLOOD BY AUTOMATED COUNT: 13.5 % (ref 11.6–15.1)
GFR SERPL CREATININE-BSD FRML MDRD: 82 ML/MIN/1.73SQ M
GLUCOSE P FAST SERPL-MCNC: 106 MG/DL (ref 65–99)
GLUCOSE UR STRIP-MCNC: NEGATIVE MG/DL
HCT VFR BLD AUTO: 43.6 % (ref 34.8–46.1)
HDLC SERPL-MCNC: 42 MG/DL
HGB BLD-MCNC: 13.6 G/DL (ref 11.5–15.4)
HGB UR QL STRIP.AUTO: NEGATIVE
IMM GRANULOCYTES # BLD AUTO: 0.03 THOUSAND/UL (ref 0–0.2)
IMM GRANULOCYTES NFR BLD AUTO: 1 % (ref 0–2)
KETONES UR STRIP-MCNC: NEGATIVE MG/DL
LDLC SERPL CALC-MCNC: 120 MG/DL (ref 0–100)
LEUKOCYTE ESTERASE UR QL STRIP: NEGATIVE
LYMPHOCYTES # BLD AUTO: 1.2 THOUSANDS/ΜL (ref 0.6–4.47)
LYMPHOCYTES NFR BLD AUTO: 20 % (ref 14–44)
MCH RBC QN AUTO: 30.6 PG (ref 26.8–34.3)
MCHC RBC AUTO-ENTMCNC: 31.2 G/DL (ref 31.4–37.4)
MCV RBC AUTO: 98 FL (ref 82–98)
MICROALBUMIN UR-MCNC: <5 MG/L (ref 0–20)
MICROALBUMIN/CREAT 24H UR: <15 MG/G CREATININE (ref 0–30)
MONOCYTES # BLD AUTO: 0.58 THOUSAND/ΜL (ref 0.17–1.22)
MONOCYTES NFR BLD AUTO: 10 % (ref 4–12)
NEUTROPHILS # BLD AUTO: 4.07 THOUSANDS/ΜL (ref 1.85–7.62)
NEUTS SEG NFR BLD AUTO: 66 % (ref 43–75)
NITRITE UR QL STRIP: NEGATIVE
NON-SQ EPI CELLS URNS QL MICRO: NORMAL /HPF
NONHDLC SERPL-MCNC: 174 MG/DL
NRBC BLD AUTO-RTO: 0 /100 WBCS
PH UR STRIP.AUTO: 8 [PH]
PLATELET # BLD AUTO: 252 THOUSANDS/UL (ref 149–390)
PMV BLD AUTO: 10 FL (ref 8.9–12.7)
POTASSIUM SERPL-SCNC: 4.4 MMOL/L (ref 3.5–5.3)
PROT SERPL-MCNC: 7.8 G/DL (ref 6.4–8.2)
PROT UR STRIP-MCNC: NEGATIVE MG/DL
RBC # BLD AUTO: 4.44 MILLION/UL (ref 3.81–5.12)
RBC #/AREA URNS AUTO: NORMAL /HPF
SODIUM SERPL-SCNC: 142 MMOL/L (ref 136–145)
SP GR UR STRIP.AUTO: 1.01 (ref 1–1.03)
TRIGL SERPL-MCNC: 268 MG/DL
TSH SERPL DL<=0.05 MIU/L-ACNC: 1.71 UIU/ML (ref 0.36–3.74)
UROBILINOGEN UR QL STRIP.AUTO: 0.2 E.U./DL
WBC # BLD AUTO: 6.04 THOUSAND/UL (ref 4.31–10.16)
WBC #/AREA URNS AUTO: NORMAL /HPF

## 2020-03-05 PROCEDURE — 84443 ASSAY THYROID STIM HORMONE: CPT

## 2020-03-05 PROCEDURE — 80061 LIPID PANEL: CPT

## 2020-03-05 PROCEDURE — 85025 COMPLETE CBC W/AUTO DIFF WBC: CPT

## 2020-03-05 PROCEDURE — 82570 ASSAY OF URINE CREATININE: CPT | Performed by: FAMILY MEDICINE

## 2020-03-05 PROCEDURE — 82043 UR ALBUMIN QUANTITATIVE: CPT | Performed by: FAMILY MEDICINE

## 2020-03-05 PROCEDURE — 36415 COLL VENOUS BLD VENIPUNCTURE: CPT

## 2020-03-05 PROCEDURE — 81001 URINALYSIS AUTO W/SCOPE: CPT | Performed by: FAMILY MEDICINE

## 2020-03-05 PROCEDURE — 80053 COMPREHEN METABOLIC PANEL: CPT

## 2020-03-06 ENCOUNTER — PROCEDURE VISIT (OUTPATIENT)
Dept: PAIN MEDICINE | Facility: CLINIC | Age: 80
End: 2020-03-06
Payer: MEDICARE

## 2020-03-06 DIAGNOSIS — M54.81 OCCIPITAL NEURALGIA OF RIGHT SIDE: Primary | ICD-10-CM

## 2020-03-06 PROCEDURE — 64405 NJX AA&/STRD GR OCPL NRV: CPT

## 2020-03-06 RX ORDER — BUPIVACAINE HYDROCHLORIDE 2.5 MG/ML
10 INJECTION, SOLUTION EPIDURAL; INFILTRATION; INTRACAUDAL ONCE
Status: COMPLETED | OUTPATIENT
Start: 2020-03-06 | End: 2020-03-06

## 2020-03-06 RX ORDER — METHYLPREDNISOLONE ACETATE 40 MG/ML
40 INJECTION, SUSPENSION INTRA-ARTICULAR; INTRALESIONAL; INTRAMUSCULAR; SOFT TISSUE ONCE
Status: COMPLETED | OUTPATIENT
Start: 2020-03-06 | End: 2020-03-06

## 2020-03-06 RX ADMIN — BUPIVACAINE HYDROCHLORIDE 10 ML: 2.5 INJECTION, SOLUTION EPIDURAL; INFILTRATION; INTRACAUDAL at 15:49

## 2020-03-06 RX ADMIN — METHYLPREDNISOLONE ACETATE 40 MG: 40 INJECTION, SUSPENSION INTRA-ARTICULAR; INTRALESIONAL; INTRAMUSCULAR; SOFT TISSUE at 15:49

## 2020-03-06 NOTE — PROGRESS NOTES
Pre-procedure Diagnosis:   1  Occipital neuralgia of right side      Post-procedure Diagnosis:   1  Occipital neuralgia of right side      Operation Title(s):  Right occipital nerve block  Attending Surgeon:   Mk Ferris MD  Anesthesia:   Local    Indications: The patient is a 79y o  year-old female with a diagnosis of   1  Occipital neuralgia of right side      The patient's history and physical exam were reviewed  The risks, benefits and alternatives to the procedure were discussed, and all questions were answered to the patient's satisfaction  The patient agreed to proceed, and written informed consent was obtained  Procedure in Detail: The patient was brought into the exam room and placed in the sitting position on the exam room chair with the head flexed on a pillow  The area of the right occiput was palpated and cleansed with alcohol  Then a 1 5 inch 25 guage needle was advanced until bone was contacted  Negative aspiration was confirmed and a solution consisting of 1 mL 0 25% bupivacaine and 0 5 Depo-Medrol (40mg/ml) was easily injected  The same procedure was repeated for the right lesser occipital nerve    Disposition: The patient tolerated the procedure well and there were no apparent complications  The patient was given written discharge instructions for the procedure

## 2020-03-23 ENCOUNTER — TELEPHONE (OUTPATIENT)
Dept: PAIN MEDICINE | Facility: MEDICAL CENTER | Age: 80
End: 2020-03-23

## 2020-03-23 DIAGNOSIS — M54.81 OCCIPITAL NEURALGIA OF RIGHT SIDE: Primary | ICD-10-CM

## 2020-03-23 RX ORDER — METHYLPREDNISOLONE 4 MG/1
TABLET ORAL
Qty: 1 EACH | Refills: 0 | Status: SHIPPED | OUTPATIENT
Start: 2020-03-23 | End: 2020-04-27

## 2020-04-23 ENCOUNTER — TELEPHONE (OUTPATIENT)
Dept: PAIN MEDICINE | Facility: CLINIC | Age: 80
End: 2020-04-23

## 2020-04-27 ENCOUNTER — TELEMEDICINE (OUTPATIENT)
Dept: PAIN MEDICINE | Facility: CLINIC | Age: 80
End: 2020-04-27
Payer: MEDICARE

## 2020-04-27 DIAGNOSIS — M79.18 MYOFASCIAL PAIN ON RIGHT SIDE: ICD-10-CM

## 2020-04-27 DIAGNOSIS — G50.0 TRIGEMINAL NEURALGIA: ICD-10-CM

## 2020-04-27 DIAGNOSIS — M54.81 OCCIPITAL NEURALGIA OF RIGHT SIDE: ICD-10-CM

## 2020-04-27 DIAGNOSIS — M50.11 CERVICAL DISC DISORDER WITH RADICULOPATHY OF OCCIPITO-ATLANTO-AXIAL REGION: Primary | ICD-10-CM

## 2020-04-27 PROCEDURE — 99214 OFFICE O/P EST MOD 30 MIN: CPT | Performed by: ANESTHESIOLOGY

## 2020-04-27 RX ORDER — OXYCODONE AND ACETAMINOPHEN 7.5; 325 MG/1; MG/1
1 TABLET ORAL 2 TIMES DAILY PRN
Qty: 60 TABLET | Refills: 0 | Status: SHIPPED | OUTPATIENT
Start: 2020-04-27 | End: 2020-05-07 | Stop reason: SDUPTHER

## 2020-04-27 RX ORDER — TIZANIDINE 4 MG/1
4 TABLET ORAL
Qty: 30 TABLET | Refills: 1 | Status: SHIPPED | OUTPATIENT
Start: 2020-04-27 | End: 2020-06-02

## 2020-05-05 ENCOUNTER — TELEMEDICINE (OUTPATIENT)
Dept: FAMILY MEDICINE CLINIC | Facility: CLINIC | Age: 80
End: 2020-05-05
Payer: MEDICARE

## 2020-05-05 VITALS — SYSTOLIC BLOOD PRESSURE: 136 MMHG | HEIGHT: 65 IN | DIASTOLIC BLOOD PRESSURE: 60 MMHG | BODY MASS INDEX: 27.96 KG/M2

## 2020-05-05 DIAGNOSIS — I10 ESSENTIAL HYPERTENSION: ICD-10-CM

## 2020-05-05 DIAGNOSIS — E78.2 MIXED HYPERLIPIDEMIA: ICD-10-CM

## 2020-05-05 DIAGNOSIS — M54.81 OCCIPITAL NEURALGIA OF RIGHT SIDE: ICD-10-CM

## 2020-05-05 DIAGNOSIS — F33.0 MILD RECURRENT MAJOR DEPRESSION (HCC): Primary | ICD-10-CM

## 2020-05-05 PROBLEM — F41.8 DEPRESSION WITH ANXIETY: Status: RESOLVED | Noted: 2019-02-19 | Resolved: 2020-05-05

## 2020-05-05 PROCEDURE — 99442 PR PHYS/QHP TELEPHONE EVALUATION 11-20 MIN: CPT | Performed by: FAMILY MEDICINE

## 2020-05-06 ENCOUNTER — TELEPHONE (OUTPATIENT)
Dept: PAIN MEDICINE | Facility: MEDICAL CENTER | Age: 80
End: 2020-05-06

## 2020-05-06 DIAGNOSIS — G50.0 TRIGEMINAL NEURALGIA: ICD-10-CM

## 2020-05-07 RX ORDER — OXYCODONE AND ACETAMINOPHEN 7.5; 325 MG/1; MG/1
1 TABLET ORAL 3 TIMES DAILY PRN
Qty: 90 TABLET | Refills: 0 | Status: SHIPPED | OUTPATIENT
Start: 2020-05-07 | End: 2020-08-11 | Stop reason: SDUPTHER

## 2020-05-11 DIAGNOSIS — F41.8 DEPRESSION WITH ANXIETY: ICD-10-CM

## 2020-05-11 DIAGNOSIS — G89.29 OTHER CHRONIC PAIN: ICD-10-CM

## 2020-05-12 RX ORDER — DULOXETIN HYDROCHLORIDE 60 MG/1
CAPSULE, DELAYED RELEASE ORAL
Qty: 90 CAPSULE | Refills: 1 | Status: SHIPPED | OUTPATIENT
Start: 2020-05-12 | End: 2020-10-26

## 2020-05-14 ENCOUNTER — TELEPHONE (OUTPATIENT)
Dept: PAIN MEDICINE | Facility: MEDICAL CENTER | Age: 80
End: 2020-05-14

## 2020-05-15 ENCOUNTER — TELEPHONE (OUTPATIENT)
Dept: PAIN MEDICINE | Facility: CLINIC | Age: 80
End: 2020-05-15

## 2020-05-15 DIAGNOSIS — I10 ESSENTIAL HYPERTENSION: ICD-10-CM

## 2020-05-15 RX ORDER — VERAPAMIL HYDROCHLORIDE 120 MG/1
120 CAPSULE, EXTENDED RELEASE ORAL
Qty: 90 CAPSULE | Refills: 1 | Status: SHIPPED | OUTPATIENT
Start: 2020-05-15 | End: 2020-11-27 | Stop reason: SDUPTHER

## 2020-05-28 ENCOUNTER — TELEPHONE (OUTPATIENT)
Dept: RHEUMATOLOGY | Facility: CLINIC | Age: 80
End: 2020-05-28

## 2020-06-02 ENCOUNTER — OFFICE VISIT (OUTPATIENT)
Dept: PAIN MEDICINE | Facility: CLINIC | Age: 80
End: 2020-06-02
Payer: MEDICARE

## 2020-06-02 VITALS
DIASTOLIC BLOOD PRESSURE: 76 MMHG | BODY MASS INDEX: 27.96 KG/M2 | TEMPERATURE: 98.7 F | SYSTOLIC BLOOD PRESSURE: 129 MMHG | WEIGHT: 168 LBS | HEART RATE: 80 BPM

## 2020-06-02 DIAGNOSIS — G50.9 TRIGEMINAL NEUROPATHY: ICD-10-CM

## 2020-06-02 DIAGNOSIS — M54.81 OCCIPITAL NEURALGIA OF RIGHT SIDE: ICD-10-CM

## 2020-06-02 DIAGNOSIS — M50.11 CERVICAL DISC DISORDER WITH RADICULOPATHY OF OCCIPITO-ATLANTO-AXIAL REGION: ICD-10-CM

## 2020-06-02 DIAGNOSIS — G50.0 TRIGEMINAL NEURALGIA: ICD-10-CM

## 2020-06-02 DIAGNOSIS — G89.4 CHRONIC PAIN SYNDROME: Primary | ICD-10-CM

## 2020-06-02 PROCEDURE — 99213 OFFICE O/P EST LOW 20 MIN: CPT | Performed by: NURSE PRACTITIONER

## 2020-06-02 PROCEDURE — 1160F RVW MEDS BY RX/DR IN RCRD: CPT | Performed by: NURSE PRACTITIONER

## 2020-06-02 PROCEDURE — 80305 DRUG TEST PRSMV DIR OPT OBS: CPT | Performed by: NURSE PRACTITIONER

## 2020-06-02 PROCEDURE — 3078F DIAST BP <80 MM HG: CPT | Performed by: NURSE PRACTITIONER

## 2020-06-02 PROCEDURE — 1036F TOBACCO NON-USER: CPT | Performed by: NURSE PRACTITIONER

## 2020-06-02 PROCEDURE — 3074F SYST BP LT 130 MM HG: CPT | Performed by: NURSE PRACTITIONER

## 2020-06-02 PROCEDURE — 4040F PNEUMOC VAC/ADMIN/RCVD: CPT | Performed by: NURSE PRACTITIONER

## 2020-06-02 RX ORDER — ASPIRIN 81 MG/1
81 TABLET ORAL DAILY
COMMUNITY
End: 2021-01-21

## 2020-06-06 LAB
AMPHET SAL QL CFM: NEGATIVE NG/ML
BUPRENORPHINE SAL QL SCN: NEGATIVE NG/ML
CARBOXYTHC SAL QL CFM: NEGATIVE NG/ML
CCP IGG SERPL-ACNC: NEGATIVE
COCAINE SAL QL CFM: NEGATIVE NG/ML
CODEINE SAL QL CFM: NEGATIVE NG/ML
EDDP SAL QL CFM: NEGATIVE NG/ML
HYDROCODONE SAL QL CFM: NEGATIVE NG/ML
HYDROMORPHONE SAL QL CFM: NEGATIVE NG/ML
LEUKEMIA MARKERS BLD-IMP: NEGATIVE NG/ML
M TB TUBERC IGNF/MITOGEN IGNF CONTROL: NEGATIVE NG/ML
METHADONE SAL QL CFM: NEGATIVE NG/ML
METHAMPHET SAL QL SCN: NEGATIVE NG/ML
MORPHINE SAL QL CFM: NEGATIVE NG/ML
OXYCODONE SAL CFM-MCNC: 1.02 NG/ML
OXYMORPHONE SAL QL CFM: NEGATIVE NG/ML
PCP SAL QL CFM: NEGATIVE NG/ML
RESULT ALL_PRESCRIBED MEDS AND SPECIAL INSTRUCTIONS: NORMAL
SL AMB 6-MAM (HEROIN METABOLITE) QUANTIFICATION: NEGATIVE NG/ML
SL AMB ALPRAZOLAM QUANTIFICATION: NEGATIVE NG/ML
SL AMB CARISOPRODOL QUANTIFICATION: NEGATIVE NG/ML
SL AMB CLONAZEPAM QUANTIFICATION: NEGATIVE NG/ML
SL AMB DIAZEPAM QUANTIFICATION: NEGATIVE NG/ML
SL AMB FENTANYL QUANTIFICATION: NEGATIVE NG/ML
SL AMB MDMA QUANTIFICATION: NEGATIVE NG/ML
SL AMB MEPROBAMATE QUANTIFICATION: NEGATIVE NG/ML
SL AMB N-DESMETHYL-TRAMADOL QUANTIFICATION SALIVA: NEGATIVE NG/ML
SL AMB NORBUPRENORPHINE QUANTIFICATION: NEGATIVE NG/ML
SL AMB NORDIAZEPAM QUANTIFICATION: NEGATIVE NG/ML
SL AMB NORFENTANYL QUANTIFICATION: NEGATIVE NG/ML
SL AMB NORHYDROCODONE QUANTIFICATION: NEGATIVE NG/ML
SL AMB NORMEPERIDINE QUANTIFICATION: NEGATIVE NG/ML
SL AMB NOROXYCODONE QUANTIFICATION: NEGATIVE NG/ML
SL AMB O-DESMETHYL-TRAMADOL QUANTIFICATION: NEGATIVE NG/ML
SL AMB OXAZEPAM QUANTIFICATION: NEGATIVE NG/ML
SL AMB RITALINIC ACID QUANTIFICATION: NEGATIVE
SL AMB TEMAZEPAM QUANTIFICATION: NEGATIVE NG/ML
SL AMB TRAMADOL QUANTIFICATION: NEGATIVE NG/ML
SQUAMOUS #/AREA URNS HPF: NEGATIVE NG/ML

## 2020-06-09 ENCOUNTER — HOSPITAL ENCOUNTER (OUTPATIENT)
Dept: RADIOLOGY | Facility: CLINIC | Age: 80
Discharge: HOME | End: 2020-06-09
Attending: ANESTHESIOLOGY | Admitting: ANESTHESIOLOGY
Payer: MEDICARE

## 2020-06-09 VITALS
SYSTOLIC BLOOD PRESSURE: 133 MMHG | HEART RATE: 75 BPM | TEMPERATURE: 98.3 F | RESPIRATION RATE: 20 BRPM | DIASTOLIC BLOOD PRESSURE: 76 MMHG | OXYGEN SATURATION: 97 %

## 2020-06-09 DIAGNOSIS — M50.11 CERVICAL DISC DISORDER WITH RADICULOPATHY OF OCCIPITO-ATLANTO-AXIAL REGION: ICD-10-CM

## 2020-06-09 PROCEDURE — 62321 NJX INTERLAMINAR CRV/THRC: CPT | Performed by: ANESTHESIOLOGY

## 2020-06-09 RX ORDER — METHYLPREDNISOLONE ACETATE 80 MG/ML
80 INJECTION, SUSPENSION INTRA-ARTICULAR; INTRALESIONAL; INTRAMUSCULAR; PARENTERAL; SOFT TISSUE ONCE
Status: COMPLETED | OUTPATIENT
Start: 2020-06-09 | End: 2020-06-09

## 2020-06-09 RX ORDER — LIDOCAINE HYDROCHLORIDE 10 MG/ML
5 INJECTION, SOLUTION EPIDURAL; INFILTRATION; INTRACAUDAL; PERINEURAL ONCE
Status: COMPLETED | OUTPATIENT
Start: 2020-06-09 | End: 2020-06-09

## 2020-06-09 RX ADMIN — IOHEXOL 1 ML: 300 INJECTION, SOLUTION INTRAVENOUS at 13:58

## 2020-06-09 RX ADMIN — LIDOCAINE HYDROCHLORIDE 4 ML: 10 INJECTION, SOLUTION EPIDURAL; INFILTRATION; INTRACAUDAL; PERINEURAL at 13:56

## 2020-06-09 RX ADMIN — METHYLPREDNISOLONE ACETATE 80 MG: 80 INJECTION, SUSPENSION INTRA-ARTICULAR; INTRALESIONAL; INTRAMUSCULAR; PARENTERAL; SOFT TISSUE at 13:59

## 2020-06-16 ENCOUNTER — TELEPHONE (OUTPATIENT)
Dept: PAIN MEDICINE | Facility: CLINIC | Age: 80
End: 2020-06-16

## 2020-06-25 ENCOUNTER — TELEPHONE (OUTPATIENT)
Dept: PAIN MEDICINE | Facility: CLINIC | Age: 80
End: 2020-06-25

## 2020-07-09 ENCOUNTER — TELEPHONE (OUTPATIENT)
Dept: PAIN MEDICINE | Facility: CLINIC | Age: 80
End: 2020-07-09

## 2020-07-09 NOTE — TELEPHONE ENCOUNTER
S/w pt, clarified percocet recommendations as described in previous task  Pt will try taking 0 5 tab percocet during the day  Pt c/o neck stiffness on both sides, she asked if she could be prescribed anything to help with this? She said she has been using ice with no relief, recommended trying heat  Please advise, thank you

## 2020-07-09 NOTE — TELEPHONE ENCOUNTER
Pt called and would like a nurse to call her back  She has 2 questions,  One about her script for percocette-  She is confused about the dosage, the second question is about her neck pain, she has a stiff neck and had a script but does not have it anymore and wants to know if something can be prescribed       Pt can be reached at [phone]

## 2020-07-29 ENCOUNTER — TELEPHONE (OUTPATIENT)
Dept: PAIN MEDICINE | Facility: CLINIC | Age: 80
End: 2020-07-29

## 2020-07-29 NOTE — TELEPHONE ENCOUNTER
S/w pt, office visit scheduled for 8/4 at 2:15  Pt has enough medication remaininig to last until appt

## 2020-07-29 NOTE — TELEPHONE ENCOUNTER
Pt contacted Call Center requested refill of their medication  Medication Name: oxyCODONE-acetaminophen           Dosage of Med: 7 5-325 mg     Frequency of Med: Take 1 tablet by mouth 3 (three) times a day as needed for moderate painMax Daily Amount: 3 tablets      Remaining Medication:  A week supply       Pharmacy and Location:  [address] [place] [address]  [phone]          Pt  Preferred Callback Phone Number: [phone]      Thank you

## 2020-08-07 ENCOUNTER — OFFICE VISIT (OUTPATIENT)
Dept: FAMILY MEDICINE CLINIC | Facility: CLINIC | Age: 80
End: 2020-08-07
Payer: MEDICARE

## 2020-08-07 VITALS
WEIGHT: 184.6 LBS | HEART RATE: 68 BPM | RESPIRATION RATE: 15 BRPM | TEMPERATURE: 96.8 F | HEIGHT: 65 IN | SYSTOLIC BLOOD PRESSURE: 140 MMHG | OXYGEN SATURATION: 95 % | DIASTOLIC BLOOD PRESSURE: 60 MMHG | BODY MASS INDEX: 30.75 KG/M2

## 2020-08-07 DIAGNOSIS — G47.00 INSOMNIA, UNSPECIFIED TYPE: ICD-10-CM

## 2020-08-07 DIAGNOSIS — M54.81 OCCIPITAL NEURALGIA OF RIGHT SIDE: ICD-10-CM

## 2020-08-07 DIAGNOSIS — R35.0 URINARY FREQUENCY: ICD-10-CM

## 2020-08-07 DIAGNOSIS — G89.29 OTHER CHRONIC PAIN: ICD-10-CM

## 2020-08-07 DIAGNOSIS — I10 ESSENTIAL HYPERTENSION: ICD-10-CM

## 2020-08-07 PROCEDURE — 1036F TOBACCO NON-USER: CPT | Performed by: FAMILY MEDICINE

## 2020-08-07 PROCEDURE — 3077F SYST BP >= 140 MM HG: CPT | Performed by: FAMILY MEDICINE

## 2020-08-07 PROCEDURE — 3078F DIAST BP <80 MM HG: CPT | Performed by: FAMILY MEDICINE

## 2020-08-07 PROCEDURE — 1160F RVW MEDS BY RX/DR IN RCRD: CPT | Performed by: FAMILY MEDICINE

## 2020-08-07 PROCEDURE — 3008F BODY MASS INDEX DOCD: CPT | Performed by: FAMILY MEDICINE

## 2020-08-07 PROCEDURE — 4040F PNEUMOC VAC/ADMIN/RCVD: CPT | Performed by: FAMILY MEDICINE

## 2020-08-07 PROCEDURE — 99214 OFFICE O/P EST MOD 30 MIN: CPT | Performed by: FAMILY MEDICINE

## 2020-08-07 RX ORDER — AMITRIPTYLINE HYDROCHLORIDE 10 MG/1
TABLET, FILM COATED ORAL
Qty: 30 TABLET | Refills: 0 | Status: SHIPPED | OUTPATIENT
Start: 2020-08-07 | End: 2020-09-14 | Stop reason: SDUPTHER

## 2020-08-07 NOTE — PATIENT INSTRUCTIONS
Discussed all with patient  Unfortunately I cannot give you anything further for pain however I can treat your nerve pain with amitriptyline which will also help the insomnia  Start with 10 mg about 1-1/2 hour prior to bed  You can go to 20 mg but the max I would like to see you take is 30  Start with 10 mg  When you see your pain management let him know how severe the pain is and if anything else can be done for your nerve pain  Will follow here in 2 months but call and let me know how you did on the amitriptyline at bedtime I will reorder it  Have the urine test done  Obesity   AMBULATORY CARE:   Obesity  is when your body mass index (BMI) is greater than 30  Your healthcare provider will use your height and weight to measure your BMI  The risks of obesity include  many health problems, such as injuries or physical disability  You may need tests to check for the following:  · Diabetes     · High blood pressure or high cholesterol     · Heart disease     · Gallbladder or liver disease     · Cancer of the colon, breast, prostate, liver, or kidney     · Sleep apnea     · Arthritis or gout  Seek care immediately if:   · You have a severe headache, confusion, or difficulty speaking  · You have weakness on one side of your body  · You have chest pain, sweating, or shortness of breath  Contact your healthcare provider if:   · You have symptoms of gallbladder or liver disease, such as pain in your upper abdomen  · You have knee or hip pain and discomfort while walking  · You have symptoms of diabetes, such as intense hunger and thirst, and frequent urination  · You have symptoms of sleep apnea, such as snoring or daytime sleepiness  · You have questions or concerns about your condition or care  Treatment for obesity  focuses on helping you lose weight to improve your health  Even a small decrease in BMI can reduce the risk for many health problems   Your healthcare provider will help you set a weight-loss goal   · Lifestyle changes  are the first step in treating obesity  These include making healthy food choices and getting regular physical activity  Your healthcare provider may suggest a weight-loss program that involves coaching, education, and therapy  · Medicine  may help you lose weight when it is used with a healthy diet and physical activity  · Surgery  can help you lose weight if you are very obese and have other health problems  There are several types of weight-loss surgery  Ask your healthcare provider for more information  Be successful losing weight:   · Set small, realistic goals  An example of a small goal is to walk for 20 minutes 5 days a week  Anther goal is to lose 5% of your body weight  · Tell friends, family members, and coworkers about your goals  and ask for their support  Ask a friend to lose weight with you, or join a weight-loss support group  · Identify foods or triggers that may cause you to overeat , and find ways to avoid them  Remove tempting high-calorie foods from your home and workplace  Place a bowl of fresh fruit on your kitchen counter  If stress causes you to eat, then find other ways to cope with stress  · Keep a diary to track what you eat and drink  Also write down how many minutes of physical activity you do each day  Weigh yourself once a week and record it in your diary  Eating changes: You will need to eat 500 to 1,000 fewer calories each day than you currently eat to lose 1 to 2 pounds a week  The following changes will help you cut calories:  · Eat smaller portions  Use small plates, no larger than 9 inches in diameter  Fill your plate half full of fruits and vegetables  Measure your food using measuring cups until you know what a serving size looks like  · Eat 3 meals and 1 or 2 snacks each day  Plan your meals in advance  [address] and eat at home most of the time  Eat slowly       · Eat fruits and vegetables at every meal  They are low in calories and high in fiber, which makes you feel full  Do not add butter, margarine, or cream sauce to vegetables  Use herbs to season steamed vegetables  · Eat less fat and fewer fried foods  Eat more baked or grilled chicken and fish  These protein sources are lower in calories and fat than red meat  Limit fast food  Dress your salads with olive oil and vinegar instead of bottled dressing  · Limit the amount of sugar you eat  Do not drink sugary beverages  Limit alcohol  Activity changes:  Physical activity is good for your body in many ways  It helps you burn calories and build strong muscles  It decreases stress and depression, and improves your mood  It can also help you sleep better  Talk to your healthcare provider before you begin an exercise program   · Exercise for at least 30 minutes 5 days a week  Start slowly  Set aside time each day for physical activity that you enjoy and that is convenient for you  It is best to do both weight training and an activity that increases your heart rate, such as walking, bicycling, or swimming  · Find ways to be more active  Do yard work and housecleaning  Walk up the stairs instead of using elevators  Spend your leisure time going to events that require walking, such as outdoor festivals or fairs  This extra physical activity can help you lose weight and keep it off  Follow up with your healthcare provider as directed: You may need to meet with a dietitian  Write down your questions so you remember to ask them during your visits  © 2017 [address] Information is for End User's use only and may not be sold, redistributed or otherwise used for commercial purposes  All illustrations and images included in CareNotes® are the copyrighted property of [employer] , [employer]  or Cheng Bailey  The above information is an  only  It is not intended as medical advice for individual conditions or treatments   Talk to your doctor, nurse or pharmacist before following any medical regimen to see if it is safe and effective for you  Weight Management   AMBULATORY CARE:   Why it is important to manage your weight:  Being overweight increases your risk of health conditions such as heart disease, high blood pressure, type 2 diabetes, and certain types of cancer  It can also increase your risk for osteoarthritis, sleep apnea, and other respiratory problems  Aim for a slow, steady weight loss  Even a small amount of weight loss can lower your risk of health problems  How to lose weight safely:  A safe and healthy way to lose weight is to eat fewer calories and get regular exercise  You can lose up about 1 pound a week by decreasing the number of calories you eat by 500 calories each day  You can decrease calories by eating smaller portion sizes or by cutting out high-calorie foods  Read labels to find out how many calories are in the foods you eat  You can also burn calories with exercise such as walking, swimming, or biking  You will be more likely to keep weight off if you make these changes part of your lifestyle  Healthy meal plan for weight management:  A healthy meal plan includes a variety of foods, contains fewer calories, and helps you stay healthy  A healthy meal plan includes the following:  · Eat whole-grain foods more often  A healthy meal plan should contain fiber  Fiber is the part of grains, fruits, and vegetables that is not broken down by your body  Whole-grain foods are healthy and provide extra fiber in your diet  Some examples of whole-grain foods are whole-wheat breads and pastas, oatmeal, brown rice, and bulgur  · Eat a variety of vegetables every day  Include dark, leafy greens such as spinach, kale, rakesh greens, and mustard greens  Eat yellow and orange vegetables such as carrots, sweet potatoes, and winter squash  · Eat a variety of fruits every day    Choose fresh or canned fruit (canned in its own juice or light syrup) instead of juice  Fruit juice has very little or no fiber  · Eat low-fat dairy foods  Drink fat-free (skim) milk or 1% milk  Eat fat-free yogurt and low-fat cottage cheese  Try low-fat cheeses such as mozzarella and other reduced-fat cheeses  · Choose meat and other protein foods that are low in fat  Choose beans or other legumes such as split peas or lentils  Choose fish, skinless poultry (chicken or turkey), or lean cuts of red meat (beef or pork)  Before you cook meat or poultry, cut off any visible fat  · Use less fat and oil  Try baking foods instead of frying them  Add less fat, such as margarine, sour cream, regular salad dressing and mayonnaise to foods  Eat fewer high-fat foods  Some examples of high-fat foods include french fries, doughnuts, ice cream, and cakes  · Eat fewer sweets  Limit foods and drinks that are high in sugar  This includes candy, cookies, regular soda, and sweetened drinks  Ways to decrease calories:   · Eat smaller portions  ¨ Use a small plate with smaller servings  ¨ Do not eat second helpings  ¨ When you eat at a restaurant, ask for a box and place half of your meal in the box before you eat  ¨ Share an entrée with someone else  · Replace high-calorie snacks with healthy, low-calorie snacks  ¨ Choose fresh fruit, vegetables, fat-free rice cakes, or air-popped popcorn instead of potato chips, nuts, or chocolate  ¨ Choose water or calorie-free drinks instead of soda or sweetened drinks  · Eat regular meals  Skipping meals can lead to overeating later in the day  Eat a healthy snack in place of a meal if you do not have time to eat a regular meal      · Do not shop for groceries when you are hungry  You may be more likely to make unhealthy food choices  Take a grocery list of healthy foods and shop after you have eaten  Exercise:  Exercise at least 30 minutes per day on most days of the week   Some examples of exercise include walking, biking, dancing, and swimming  You can also fit in more physical activity by taking the stairs instead of the elevator or parking farther away from stores  Ask your healthcare provider about the best exercise plan for you  Other things to consider as you try to lose weight:   · Be aware of situations that may give you the urge to overeat, such as eating while watching television  Find ways to avoid these situations  For example, read a book, go for a walk, or do crafts  · Meet with a weight loss support group or friends who are also trying to lose weight  This may help you stay motivated to continue working on your weight loss goals  © 2017 [address] Information is for End User's use only and may not be sold, redistributed or otherwise used for commercial purposes  All illustrations and images included in CareNotes® are the copyrighted property of [employer] , [employer]  or Cheng Bailey  The above information is an  only  It is not intended as medical advice for individual conditions or treatments  Talk to your doctor, nurse or pharmacist before following any medical regimen to see if it is safe and effective for you  Low Fat Diet   AMBULATORY CARE:   A low-fat diet  is an eating plan that is low in total fat, unhealthy fat, and cholesterol  You may need to follow a low-fat diet if you have trouble digesting or absorbing fat  You may also need to follow this diet if you have high cholesterol  You can also lower your cholesterol by increasing the amount of fiber in your diet  Soluble fiber is a type of fiber that helps to decrease cholesterol levels  Different types of fat in food:   · Limit unhealthy fats  A diet that is high in cholesterol, saturated fat, and trans fat may cause unhealthy cholesterol levels  Unhealthy cholesterol levels increase your risk of heart disease      ¨ Cholesterol:  Limit intake of cholesterol to less than 200 mg per day  Cholesterol is found in meat, eggs, and dairy  ¨ Saturated fat:  Limit saturated fat to less than 7% of your total daily calories  Ask your dietitian how many calories you need each day  Saturated fat is found in butter, cheese, ice cream, whole milk, and palm oil  Saturated fat is also found in meat, such as beef, pork, chicken skin, and processed meats  Processed meats include sausage, hot dogs, and bologna  ¨ Trans fat:  Avoid trans fat as much as possible  Trans fat is used in fried and baked foods  Foods that say trans fat free on the label may still have up to 0 5 grams of trans fat per serving  · Include healthy fats  Replace foods that are high in saturated and trans fat with foods high in healthy fats  This may help to decrease high cholesterol levels  ¨ Monounsaturated fats: These are found in avocados, nuts, and vegetable oils, such as olive, canola, and sunflower oil  ¨ Polyunsaturated fats: These can be found in vegetable oils, such as soybean or corn oil  Omega-3 fats can help to decrease the risk of heart disease  Omega-3 fats are found in fish, such as salmon, herring, trout, and tuna  Omega-3 fats can also be found in plant foods, such as walnuts, flaxseed, soybeans, and canola oil    Foods to limit or avoid:   · Grains:      ¨ Snacks that are made with partially hydrogenated oils, such as chips, regular crackers, and butter-flavored popcorn    ¨ High-fat baked goods, such as biscuits, croissants, doughnuts, pies, cookies, and pastries    · Dairy:      ¨ Whole milk, 2% milk, and yogurt and ice cream made with whole milk    ¨ Half and half creamer, heavy cream, and whipping cream    ¨ Cheese, cream cheese, and sour cream    · Meats and proteins:      ¨ High-fat cuts of meat (T-bone steak, regular hamburger, and ribs)    ¨ Fried meat, poultry (turkey and chicken), and fish    ¨ Poultry (chicken and turkey) with skin    ¨ Cold cuts (salami or bologna), hot dogs, chow, and sausage    ¨ Whole eggs and egg yolks    · Vegetables and fruits with added fat:      ¨ Fried vegetables or vegetables in butter or high-fat sauces, such as cream or cheese sauces    ¨ Fried fruit or fruit served with butter or cream    · Fats:      ¨ Butter, stick margarine, and shortening    ¨ Coconut, palm oil, and palm kernel oil  Foods to include:   · Grains:      ¨ Whole-grain breads, cereals, pasta, and brown rice    ¨ Low-fat crackers and pretzels    · Vegetables and fruits:      ¨ Fresh, frozen, or canned vegetables (no salt or low-sodium)    ¨ Fresh, frozen, dried, or canned fruit (canned in light syrup or fruit juice)    ¨ Avocado    · Low-fat dairy products:      ¨ Nonfat (skim) or 1% milk    ¨ Nonfat or low-fat cheese, yogurt, and cottage cheese    · Meats and proteins:      ¨ Chicken or turkey with no skin    ¨ Baked or broiled fish    ¨ Lean beef and pork (loin, round, extra lean hamburger)    ¨ Beans and peas, unsalted nuts, soy products    ¨ Egg whites and substitutes    ¨ Seeds and nuts    · Fats:      ¨ Unsaturated oil, such as canola, olive, peanut, soybean, or sunflower oil    ¨ Soft or liquid margarine and vegetable oil spread    ¨ Low-fat salad dressing  Other ways to decrease fat:   · Read food labels before you buy foods  Choose foods that have less than 30% of calories from fat  Choose low-fat or fat-free dairy products  Remember that fat free does not mean calorie free  These foods still contain calories, and too many calories can lead to weight gain  · Trim fat from meat and avoid fried food  Trim all visible fat from meat before you cook it  Remove the skin from poultry  Do not ramirez meat, fish, or poultry  Bake, roast, boil, or broil these foods instead  Avoid fried foods  Eat a baked potato instead of [place] fries  Steam vegetables instead of sautéing them in butter  · Add less fat to foods  Use imitation chow bits on salads and baked potatoes instead of regular chow bits  Use fat-free or low-fat salad dressings instead of regular dressings  Use low-fat or nonfat butter-flavored topping instead of regular butter or margarine on popcorn and other foods  Ways to decrease fat in recipes:  Replace high-fat ingredients with low-fat or nonfat ones  This may cause baked goods to be drier than usual  You may need to use nonfat cooking spray on pans to prevent food from sticking  You also may need to change the amount of other ingredients, such as water, in the recipe  Try the following:  · Use low-fat or light margarine instead of regular margarine or shortening  · Use lean ground turkey breast or chicken, or lean ground beef (less than 5% fat) instead of hamburger  · Add 1 teaspoon of canola oil to 8 ounces of skim milk instead of using cream or half and half  · Use grated zucchini, carrots, or apples in breads instead of coconut  · Use blenderized, low-fat cottage cheese, plain tofu, or low-fat ricotta cheese instead of cream cheese  · Use 1 egg white and 1 teaspoon of canola oil, or use ¼ cup (2 ounces) of fat-free egg substitute instead of a whole egg  · Replace half of the oil that is called for in a recipe with applesauce when you bake  Use 3 tablespoons of cocoa powder and 1 tablespoon of canola oil instead of a square of baking chocolate  How to increase fiber:  Eat enough high-fiber foods to get 20 to 30 grams of fiber every day  Slowly increase your fiber intake to avoid stomach cramps, gas, and other problems  · Eat 3 ounces of whole-grain foods each day  An ounce is about 1 slice of bread  Eat whole-grain breads, such as whole-wheat bread  Whole wheat, whole-wheat flour, or other whole grains should be listed as the first ingredient on the food label  Replace white flour with whole-grain flour or use half of each in recipes  Whole-grain flour is heavier than white flour, so you may have to add more yeast or baking powder       · Eat a high-fiber cereal for breakfast   Bryan Pate is a good source of soluble fiber  Look for cereals that have bran or fiber in the name  Choose whole-grain products, such as brown rice, barley, and whole-wheat pasta  · Eat more beans, peas, and lentils  For example, add beans to soups or salads  Eat at least 5 cups of fruits and vegetables each day  Eat fruits and vegetables with the peel because the peel is high in fiber  © 2017 [address] Information is for End User's use only and may not be sold, redistributed or otherwise used for commercial purposes  All illustrations and images included in CareNotes® are the copyrighted property of A [employer]  or Cheng Bailey  The above information is an  only  It is not intended as medical advice for individual conditions or treatments  Talk to your doctor, nurse or pharmacist before following any medical regimen to see if it is safe and effective for you  Heart Healthy Diet   AMBULATORY CARE:   A heart healthy diet  is an eating plan low in total fat, unhealthy fats, and sodium (salt)  A heart healthy diet helps decrease your risk for heart disease and stroke  Limit the amount of fat you eat to 25% to 35% of your total daily calories  Limit sodium to less than 2,300 mg each day  Healthy fats:  Healthy fats can help improve cholesterol levels  The risk for heart disease is decreased when cholesterol levels are normal  Choose healthy fats, such as the following:  · Unsaturated fat  is found in foods such as soybean, canola, olive, corn, and safflower oils  It is also found in soft tub margarine that is made with liquid vegetable oil  · Omega-3 fat  is found in certain fish, such as salmon, tuna, and trout, and in walnuts and flaxseed  Unhealthy fats:  Unhealthy fats can cause unhealthy cholesterol levels in your blood and increase your risk of heart disease   Limit unhealthy fats, such as the following:  · Cholesterol  is found in animal foods, such as eggs and lobster, and in dairy products made from whole milk  Limit cholesterol to less than 300 milligrams (mg) each day  You may need to limit cholesterol to 200 mg each day if you have heart disease  · Saturated fat  is found in meats, such as chow and hamburger  It is also found in chicken or turkey skin, whole milk, and butter  Limit saturated fat to less than 7% of your total daily calories  Limit saturated fat to less than 6% if you have heart disease or are at increased risk for it  · Trans fat  is found in packaged foods, such as potato chips and cookies  It is also in hard margarine, some fried foods, and shortening  Avoid trans fats as much as possible    Heart healthy foods and drinks to include:  Ask your dietitian or healthcare provider how many servings to have from each of the following food groups:  · Grains:      ¨ Whole-wheat breads, cereals, and pastas, and brown rice    ¨ Low-fat, low-sodium crackers and chips    · Vegetables:      ¨ Broccoli, green beans, green peas, and spinach    ¨ Collards, kale, and lima beans    ¨ Carrots, sweet potatoes, tomatoes, and peppers    ¨ Canned vegetables with no salt added    · Fruits:      ¨ Bananas, peaches, pears, and pineapple    ¨ Grapes, raisins, and dates    ¨ Oranges, tangerines, grapefruit, orange juice, and grapefruit juice    ¨ Apricots, mangoes, melons, and papaya    ¨ Raspberries and strawberries    ¨ Canned fruit with no added sugar    · Low-fat dairy products:      ¨ Nonfat (skim) milk, 1% milk, and low-fat almond, cashew, or soy milks fortified with calcium    ¨ Low-fat cheese, regular or frozen yogurt, and cottage cheese    · Meats and proteins , such as lean cuts of beef and pork (loin, leg, round), skinless chicken and turkey, legumes, soy products, egg whites, and nuts  Foods and drinks to limit or avoid:  Ask your dietitian or healthcare provider about these and other foods that are high in unhealthy fat, sodium, and sugar:  · Snack or packaged foods , such as frozen dinners, cookies, macaroni and cheese, and cereals with more than 300 mg of sodium per serving    · Canned or dry mixes  for cakes, soups, sauces, or gravies    · Vegetables with added sodium , such as instant potatoes, vegetables with added sauces, or regular canned vegetables    · Other foods high in sodium , such as ketchup, barbecue sauce, salad dressing, pickles, olives, soy sauce, and miso    · High-fat dairy foods  such as whole or 2% milk, cream cheese, or sour cream, and cheeses     · High-fat protein foods  such as high-fat cuts of beef (T-bone steaks, ribs), chicken or turkey with skin, and organ meats, such as liver    · Cured or smoked meats , such as hot dogs, chow, and sausage    · Unhealthy fats and oils , such as butter, stick margarine, shortening, and cooking oils such as coconut or palm oil    · Food and drinks high in sugar , such as soft drinks (soda), sports drinks, sweetened tea, candy, cake, cookies, pies, and doughnuts  Other diet guidelines to follow:   · Eat more foods containing omega-3 fats  Eat fish high in omega-3 fats at least 2 times a week  · Limit alcohol  Too much alcohol can damage your heart and raise your blood pressure  Women should limit alcohol to 1 drink a day  Men should limit alcohol to 2 drinks a day  A drink of alcohol is 12 ounces of beer, 5 ounces of wine, or 1½ ounces of liquor  · Choose low-sodium foods  High-sodium foods can lead to high blood pressure  Add little or no salt to food you prepare  Use herbs and spices in place of salt  · Eat more fiber  to help lower cholesterol levels  Eat at least 5 servings of fruits and vegetables each day  Eat 3 ounces of whole-grain foods each day  Legumes (beans) are also a good source of fiber  Lifestyle guidelines:   · Do not smoke  Nicotine and other chemicals in cigarettes and cigars can cause lung and heart damage   Ask your healthcare provider for information if you currently smoke and need help to quit  E-cigarettes or smokeless tobacco still contain nicotine  Talk to your healthcare provider before you use these products  · Exercise regularly  to help you maintain a healthy weight and improve your blood pressure and cholesterol levels  Ask your healthcare provider about the best exercise plan for you  Do not start an exercise program without asking your healthcare provider  Follow up with your healthcare provider as directed:  Write down your questions so you remember to ask them during your visits  © 2017 [address]  Information is for End User's use only and may not be sold, redistributed or otherwise used for commercial purposes  All illustrations and images included in CareNotes® are the copyrighted property of A [employer]  or [employer]  The above information is an  only  It is not intended as medical advice for individual conditions or treatments  Talk to your doctor, nurse or pharmacist before following any medical regimen to see if it is safe and effective for you  Calorie Counting Diet   WHAT YOU NEED TO KNOW:   What is a calorie counting diet? It is a meal plan based on counting calories each day to reach a healthy body weight  You will need to eat fewer calories if you are trying to lose weight  Weight loss may decrease your risk for certain health problems or improve your health if you have health problems  Some of these health problems include heart disease, high blood pressure, and diabetes  What foods should I avoid? Your dietitian will tell you if you need to avoid certain foods based on your body weight and health condition  You may need to avoid high-fat foods if you are at risk for or have heart disease  You may need to eat fewer foods from the breads and starches food group if you have diabetes  How many calories are in foods?   The following is a list of foods and drinks with the approximate number of calories in each  Check the food label to find the exact number of calories  A dietitian can tell you how many calories you should have from each food group each day    · Carbohydrate:      ¨ ½ of a 3-inch bagel, 1 slice of bread, or ½ of a hamburger bun or hot dog bun (80)    ¨ 1 (8-inch) flour tortilla or ½ cup of cooked rice (100)    ¨ 1 (6-inch) corn tortilla (80)    ¨ 1 (6-inch) pancake or 1 cup of bran flakes cereal (110)    ¨ ½ cup of cooked cereal (80)    ¨ ½ cup of cooked pasta (85)    ¨ 1 ounce of pretzels (100)    ¨ 3 cups of air-popped popcorn without butter or oil (80)    · Dairy:      ¨ 1 cup of skim or 1% milk (90)    ¨ 1 cup of 2% milk (120)    ¨ 1 cup of whole milk (160)    ¨ 1 cup of 2% chocolate milk (220)    ¨ 1 ounce of low-fat cheese with 3 grams of fat per ounce (70)    ¨ 1 ounce of cheddar cheese (114)    ¨ ½ cup of 1% fat cottage cheese (80)    ¨ 1 cup of plain or sugar-free, fat-free yogurt (90)    · Protein foods:      ¨ 3 ounces of fish (not breaded or fried) (95)    ¨ 3 ounces of breaded, fried fish (195)    ¨ ¾ cup of tuna canned in water (105)    ¨ 3 ounces of chicken breast without skin (105)    ¨ 1 fried chicken breast with skin (350)    ¨ ¼ cup of fat free egg substitute (40)    ¨ 1 large egg (75)    ¨ 3 ounces of lean beef or pork (165)    ¨ 3 ounces of fried pork chop or ham (185)    ¨ ½ cup of cooked dried beans, such as kidney, souza, lentils, or navy (115)    ¨ 3 ounces of bologna or lunch meat (225)    ¨ 2 links of breakfast sausage (140)    · Vegetables:      ¨ ½ cup of sliced mushrooms (10)    ¨ 1 cup of salad greens, such as lettuce, spinach, or mitch (15)    ¨ ½ cup of steamed asparagus (20)    ¨ ½ cup of cooked summer squash, zucchini squash, or green or wax beans (25)    ¨ 1 cup of broccoli or cauliflower florets, or 1 medium tomato (25)    ¨ 1 large raw carrot or ½ cup of cooked carrots (40)    ¨ ? of a medium cucumber or 1 stalk of celery (5)    ¨ 1 small baked potato (160)    ¨ 1 cup of breaded, fried vegetables (230)    · Fruit:      ¨ 1 (6-inch) banana (55)     ¨ ½ of a 4-inch grapefruit (55)    ¨ 15 grapes (60)    ¨ 1 medium orange or apple (70)    ¨ 1 large peach (65)    ¨ 1 cup of fresh pineapple chunks (75)    ¨ 1 cup of melon cubes (50)    ¨ 1¼ cups of whole strawberries (45)    ¨ ½ cup of fruit canned in juice (55)    ¨ ½ cup of fruit canned in heavy syrup (110)    ¨ ?  cup of raisins (130)    ¨ ½ cup of unsweetened fruit juice (60)    ¨ ½ cup of grape, cranberry, or prune juice (90)    · Fat:      ¨ 10 peanuts or 2 teaspoons of peanut butter (55)    ¨ 2 tablespoons of avocado or 1 tablespoon of regular salad dressing (45)    ¨ 2 slices of chow (90)    ¨ 1 teaspoon of oil, such as safflower, canola, corn, or olive oil (45)    ¨ 2 teaspoons of low-fat margarine, or 1 tablespoon of low-fat mayonnaise (50)    ¨ 1 teaspoon of regular margarine (40)    ¨ 1 tablespoon of regular mayonnaise (135)    ¨ 1 tablespoon of cream cheese or 2 tablespoons of low-fat cream cheese (45)    ¨ 2 tablespoons of vegetable shortening (215)    · Dessert and sweets:      ¨ 8 animal crackers or 5 vanilla wafers (80)    ¨ 1 frozen fruit juice bar (80)    ¨ ½ cup of ice milk or low-fat frozen yogurt (90)    ¨ ½ cup of sherbet or sorbet (125)    ¨ ½ cup of sugar-free pudding or custard (60)    ¨ ½ cup of ice cream (140)    ¨ ½ cup of pudding or custard (175)    ¨ 1 (2-inch) square chocolate brownie (185)    · Combination foods:      ¨ Bean burrito made with an 8-inch tortilla, without cheese (275)    ¨ Chicken breast sandwich with lettuce and tomato (325)    ¨ 1 cup of chicken noodle soup (60)    ¨ 1 beef taco (175)    ¨ Regular hamburger with lettuce and tomato (310)    ¨ Regular cheeseburger with lettuce and tomato (410)     ¨ ¼ of a 12-inch cheese pizza (280)    ¨ Fried fish sandwich with lettuce and tomato (425)    ¨ Hot dog and bun (275)    ¨ 1½ cups of macaroni and cheese (310)    ¨ Taco salad with a fried tortilla shell (870)    · Low-calorie foods:      ¨ 1 tablespoon of ketchup or 1 tablespoon of fat free sour cream (15)    ¨ 1 teaspoon of mustard (5)    ¨ ¼ cup of salsa (20)    ¨ 1 large dill pickle (15)    ¨ 1 tablespoon of fat free salad dressing (10)    ¨ 2 teaspoons of low-sugar, light jam or jelly, or 1 tablespoon of sugar-free syrup (15)    ¨ 1 sugar-free popsicle (15)    ¨ 1 cup of club soda, seltzer water, or diet soda (0)  CARE AGREEMENT:   You have the right to help plan your care  Discuss treatment options with your caregivers to decide what care you want to receive  You always have the right to refuse treatment  The above information is an  only  It is not intended as medical advice for individual conditions or treatments  Talk to your doctor, nurse or pharmacist before following any medical regimen to see if it is safe and effective for you  © 2017 [employer] Information is for End User's use only and may not be sold, redistributed or otherwise used for commercial purposes  All illustrations and images included in CareNotes® are the copyrighted property of [employer]  or [employer]

## 2020-08-07 NOTE — PROGRESS NOTES
Assessment/Plan:     Chronic Problems:  Other chronic pain  Keep the f/u with pain management  Essential hypertension  Home bp's are wnl per pt  Continue the current meds    Insomnia  Will start amitriptylline 10 mg hs  May increase to 20 mg hs if no sleep  Max 30 mg hs  Visit Diagnosis:  Diagnoses and all orders for this visit:    BMI 30 0-30 9,adult    Other chronic pain    Essential hypertension    Insomnia, unspecified type  -     amitriptyline (ELAVIL) 10 mg tablet; Take 1 p o  HS p r n  for sleep and pain may increase to 20 mg  Max dose 30 mg at night  Occipital neuralgia of right side  -     amitriptyline (ELAVIL) 10 mg tablet; Take 1 p o  HS p r n  for sleep and pain may increase to 20 mg  Max dose 30 mg at night  Urinary frequency  Comments:  Pt unable to urinate now  Will give slip  Orders:  -     Urinalysis with microscopic  -     Urine culture; Future          Subjective:    Patient ID: Ace Helton is a 79 y o  female  Pt is here for routine f/u appt  Feels terrible  Following with Dr Yecenia Palacios for pain management for the pain on the right side of her head  Pt has had injections with no relief  Pt is being treated for:    1  Chronic pain syndrome   2  Cervical disc disorder with radiculopathy of xuvuriwt-[id number] region   3  Trigeminal neuralgia   4  Trigeminal neuropathy   5  Occipital neuralgia of right side   Has f/u with the NP from pain management on Tuesday  Pt is concerned re: a rash that she has had for years that is not itchy  Pt does not want to see derm  Pt reports that her home bp's are normal when she has procedures done  Pt had labs done 3 months ago and was told her cholesterol was high  Takes all other meds as directed   No side effects noted      The following portions of the patient's history were reviewed and updated as appropriate: allergies, current medications, past family history, past medical history, past social history, past surgical history and problem list     Review of Systems   Constitutional: Negative for chills, diaphoresis, fatigue and fever  HENT: Negative  Eyes: Positive for visual disturbance (poor vision from macular degeneration in the right eye)  Respiratory: Negative for cough, shortness of breath and wheezing  Cardiovascular: Negative for chest pain and palpitations  Gastrointestinal: Negative for abdominal pain, constipation, diarrhea, nausea and vomiting  Genitourinary: Positive for frequency and urgency  Negative for dysuria  Pt does have urinary incontinence and urgency  Musculoskeletal: Positive for arthralgias (but from the knee surgery  Ambulates with walker  ) and gait problem  Neurological:        Pt states it feels like someone has a vice on her head  Had to take double meds last night  No burning pain  Psychiatric/Behavioral: Positive for sleep disturbance (Unable to sleep r/t the pains  )  Negative for dysphoric mood  The patient is not nervous/anxious  /60 (BP Location: Left arm, Patient Position: Sitting, Cuff Size: Adult)   Pulse 68   Temp (!) 96 8 °F (36 °C)   Resp 15   Ht 5' 5" (1 651 m)   Wt 83 7 kg (184 lb 9 6 oz)   SpO2 95%   BMI 30 72 kg/m²   Social History     Socioeconomic History    Marital status:       Spouse name: Not on file    Number of children: Not on file    Years of education: Not on file    Highest education level: Not on file   Occupational History    Occupation: retired   Social Needs    Financial resource strain: Not on file    Food insecurity     Worry: Not on file     Inability: Not on file   [employer] needs     Medical: Not on file     Non-medical: Not on file   Tobacco Use    Smoking status: Former Smoker    Smokeless tobacco: Never Used   Substance and Sexual Activity    Alcohol use: Yes     Comment: occasional    Drug use: No    Sexual activity: Not on file   Lifestyle    Physical activity     Days per week: Not on file     Minutes per session: Not on file    Stress: Not on file   Relationships    Social connections     Talks on phone: Not on file     Gets together: Not on file     Attends Jain service: Not on file     Active member of club or organization: Not on file     Attends meetings of clubs or organizations: Not on file     Relationship status: Not on file    Intimate partner violence     Fear of current or ex partner: Not on file     Emotionally abused: Not on file     Physically abused: Not on file     Forced sexual activity: Not on file   Other Topics Concern    Not on file   Social History Narrative    Always uses seatbelt     Past Medical History:   Diagnosis Date    Arthritis     Basal cell carcinoma     Dry eye syndrome     Foraminal stenosis of cervical region     Macular degeneration     Memory loss     Migraine     Spinal stenosis of lumbar region     Squamous cell skin cancer     Supraventricular tachycardia ([address] )     Vitamin D deficiency     last assessed 2/7/17     Family History   Problem Relation Age of Onset    Aortic aneurysm Mother         abdominal aortic aneurysm    Hypertension Mother     Breast cancer Mother     Hypertension Father     Hypertension Sister     Hyperlipidemia Sister     Squamous cell carcinoma Sister      Past Surgical History:   Procedure Laterality Date    APPENDECTOMY      BACK SURGERY      lower back surgery lumbar disc    CATARACT EXTRACTION      FEMUR FRACTURE SURGERY      VT STEREO TREAT TRIGEMINAL NERVE Right 3/26/2019    Procedure: GLYCEROL RHIZOTOMY TRIGEMINAL NERVES (V1-V3), RIGHT;  Surgeon: Katrina Colvin MD;  Location:  MAIN OR;  Service: Neurosurgery    REPLACEMENT TOTAL KNEE Right     REPLACEMENT TOTAL KNEE Left     SKIN BIOPSY         Current Outpatient Medications:     Acetaminophen 325 MG CAPS, Take 650 mg by mouth every 6 (six) hours as needed, Disp: , Rfl:     Cholecalciferol (VITAMIN D PO), Take 5,000 Units by mouth Daily , Disp: , Rfl:     cycloSPORINE (RESTASIS) 0 05 % ophthalmic emulsion, Apply 1 drop to eye 2 (two) times a day, Disp: , Rfl:     DULoxetine (CYMBALTA) 60 mg delayed release capsule, TAKE 1 CAPSULE BY MOUTH  DAILY, Disp: 90 capsule, Rfl: 1    oxyCODONE-acetaminophen (PERCOCET) 7 5-325 MG per tablet, Take 1 tablet by mouth 3 (three) times a day as needed for moderate painMax Daily Amount: 3 tablets, Disp: 90 tablet, Rfl: 0    verapamil (VERELAN PM) 120 MG 24 hr capsule, Take 1 capsule (120 mg total) by mouth daily at bedtime, Disp: 90 capsule, Rfl: 1    amitriptyline (ELAVIL) 10 mg tablet, Take 1 p o  HS p r n  for sleep and pain may increase to 20 mg  Max dose 30 mg at night , Disp: 30 tablet, Rfl: 0    aspirin (ECOTRIN LOW STRENGTH) 81 mg EC tablet, Take 81 mg by mouth daily, Disp: , Rfl:     Multiple Vitamins-Minerals (PRESERVISION AREDS 2+MULTI VIT) CAPS, BID, Disp: , Rfl:     Allergies   Allergen Reactions    Dilaudid [Hydromorphone] Shortness Of Breath     Other reaction(s): Respiratory Distress  Other reaction(s): HORENESS    Penicillins Rash     Rash          Lab Review   No visits with results within 2 Month(s) from this visit  Latest known visit with results is:   Orders Only on 06/06/2020   Component Date Value    Phencyclidine Quantifica* 06/06/2020 negative         Imaging: No results found  Objective:     Physical Exam  Vitals signs and nursing note reviewed  Constitutional:       Appearance: Normal appearance  She is obese  She is not diaphoretic  Comments: Appears to be uncomfortable  HENT:      Head: Normocephalic and atraumatic  Right Ear: Tympanic membrane and ear canal normal       Left Ear: Tympanic membrane and ear canal normal       Nose: Nose normal  No congestion or rhinorrhea  Neck:      Musculoskeletal: Normal range of motion and neck supple  Cardiovascular:      Rate and Rhythm: Normal rate and regular rhythm  Heart sounds: No murmur     Pulmonary: Effort: Pulmonary effort is normal  No respiratory distress  Breath sounds: Normal breath sounds  No rales  Musculoskeletal:         General: No deformity  Right lower leg: No edema  Left lower leg: No edema  Comments: Ambulates slowly with a walker  Skin:     General: Skin is warm and dry  Coloration: Skin is pale  Neurological:      General: No focal deficit present  Mental Status: She is alert and oriented to person, place, and time  Cranial Nerves: No cranial nerve deficit  Psychiatric:         Mood and Affect: Mood normal          Behavior: Behavior normal          Thought Content: Thought content normal          Judgment: Judgment normal            Patient Instructions   Discussed all with patient  Unfortunately I cannot give you anything further for pain however I can treat your nerve pain with amitriptyline which will also help the insomnia  Start with 10 mg about 1-1/2 hour prior to bed  You can go to 20 mg but the max I would like to see you take is 30  Start with 10 mg  When you see your pain management let him know how severe the pain is and if anything else can be done for your nerve pain  Will follow here in 2 months but call and let me know how you did on the amitriptyline at bedtime I will reorder it  Have the urine test done  Obesity   AMBULATORY CARE:   Obesity  is when your body mass index (BMI) is greater than 30  Your healthcare provider will use your height and weight to measure your BMI  The risks of obesity include  many health problems, such as injuries or physical disability  You may need tests to check for the following:  · Diabetes     · High blood pressure or high cholesterol     · Heart disease     · Gallbladder or liver disease     · Cancer of the colon, breast, prostate, liver, or kidney     · Sleep apnea     · Arthritis or gout  Seek care immediately if:   · You have a severe headache, confusion, or difficulty speaking       · You have weakness on one side of your body  · You have chest pain, sweating, or shortness of breath  Contact your healthcare provider if:   · You have symptoms of gallbladder or liver disease, such as pain in your upper abdomen  · You have knee or hip pain and discomfort while walking  · You have symptoms of diabetes, such as intense hunger and thirst, and frequent urination  · You have symptoms of sleep apnea, such as snoring or daytime sleepiness  · You have questions or concerns about your condition or care  Treatment for obesity  focuses on helping you lose weight to improve your health  Even a small decrease in BMI can reduce the risk for many health problems  Your healthcare provider will help you set a weight-loss goal   · Lifestyle changes  are the first step in treating obesity  These include making healthy food choices and getting regular physical activity  Your healthcare provider may suggest a weight-loss program that involves coaching, education, and therapy  · Medicine  may help you lose weight when it is used with a healthy diet and physical activity  · Surgery  can help you lose weight if you are very obese and have other health problems  There are several types of weight-loss surgery  Ask your healthcare provider for more information  Be successful losing weight:   · Set small, realistic goals  An example of a small goal is to walk for 20 minutes 5 days a week  Anther goal is to lose 5% of your body weight  · Tell friends, family members, and coworkers about your goals  and ask for their support  Ask a friend to lose weight with you, or join a weight-loss support group  · Identify foods or triggers that may cause you to overeat , and find ways to avoid them  Remove tempting high-calorie foods from your home and workplace  Place a bowl of fresh fruit on your kitchen counter  If stress causes you to eat, then find other ways to cope with stress       · Keep a diary to track what you eat and drink  Also write down how many minutes of physical activity you do each day  Weigh yourself once a week and record it in your diary  Eating changes: You will need to eat 500 to 1,000 fewer calories each day than you currently eat to lose 1 to 2 pounds a week  The following changes will help you cut calories:  · Eat smaller portions  Use small plates, no larger than 9 inches in diameter  Fill your plate half full of fruits and vegetables  Measure your food using measuring cups until you know what a serving size looks like  · Eat 3 meals and 1 or 2 snacks each day  Plan your meals in advance  Ammon Walker and eat at home most of the time  Eat slowly  · Eat fruits and vegetables at every meal   They are low in calories and high in fiber, which makes you feel full  Do not add butter, margarine, or cream sauce to vegetables  Use herbs to season steamed vegetables  · Eat less fat and fewer fried foods  Eat more baked or grilled chicken and fish  These protein sources are lower in calories and fat than red meat  Limit fast food  Dress your salads with olive oil and vinegar instead of bottled dressing  · Limit the amount of sugar you eat  Do not drink sugary beverages  Limit alcohol  Activity changes:  Physical activity is good for your body in many ways  It helps you burn calories and build strong muscles  It decreases stress and depression, and improves your mood  It can also help you sleep better  Talk to your healthcare provider before you begin an exercise program   · Exercise for at least 30 minutes 5 days a week  Start slowly  Set aside time each day for physical activity that you enjoy and that is convenient for you  It is best to do both weight training and an activity that increases your heart rate, such as walking, bicycling, or swimming  · Find ways to be more active  Do yard work and housecleaning  Walk up the stairs instead of using elevators   Spend your leisure time going to events that require walking, such as outdoor festivals or fairs  This extra physical activity can help you lose weight and keep it off  Follow up with your healthcare provider as directed: You may need to meet with a dietitian  Write down your questions so you remember to ask them during your visits  © 2017 [employer] Information is for End User's use only and may not be sold, redistributed or otherwise used for commercial purposes  All illustrations and images included in CareNotes® are the copyrighted property of [employer]  or [employer]  The above information is an  only  It is not intended as medical advice for individual conditions or treatments  Talk to your doctor, nurse or pharmacist before following any medical regimen to see if it is safe and effective for you  Weight Management   AMBULATORY CARE:   Why it is important to manage your weight:  Being overweight increases your risk of health conditions such as heart disease, high blood pressure, type 2 diabetes, and certain types of cancer  It can also increase your risk for osteoarthritis, sleep apnea, and other respiratory problems  Aim for a slow, steady weight loss  Even a small amount of weight loss can lower your risk of health problems  How to lose weight safely:  A safe and healthy way to lose weight is to eat fewer calories and get regular exercise  You can lose up about 1 pound a week by decreasing the number of calories you eat by 500 calories each day  You can decrease calories by eating smaller portion sizes or by cutting out high-calorie foods  Read labels to find out how many calories are in the foods you eat  You can also burn calories with exercise such as walking, swimming, or biking  You will be more likely to keep weight off if you make these changes part of your lifestyle     Healthy meal plan for weight management:  A healthy meal plan includes a variety of foods, contains fewer calories, and helps you stay healthy  A healthy meal plan includes the following:  · Eat whole-grain foods more often  A healthy meal plan should contain fiber  Fiber is the part of grains, fruits, and vegetables that is not broken down by your body  Whole-grain foods are healthy and provide extra fiber in your diet  Some examples of whole-grain foods are whole-wheat breads and pastas, oatmeal, brown rice, and bulgur  · Eat a variety of vegetables every day  Include dark, leafy greens such as spinach, kale, rakesh greens, and mustard greens  Eat yellow and orange vegetables such as carrots, sweet potatoes, and winter squash  · Eat a variety of fruits every day  Choose fresh or canned fruit (canned in its own juice or light syrup) instead of juice  Fruit juice has very little or no fiber  · Eat low-fat dairy foods  Drink fat-free (skim) milk or 1% milk  Eat fat-free yogurt and low-fat cottage cheese  Try low-fat cheeses such as mozzarella and other reduced-fat cheeses  · Choose meat and other protein foods that are low in fat  Choose beans or other legumes such as split peas or lentils  Choose fish, skinless poultry (chicken or turkey), or lean cuts of red meat (beef or pork)  Before you cook meat or poultry, cut off any visible fat  · Use less fat and oil  Try baking foods instead of frying them  Add less fat, such as margarine, sour cream, regular salad dressing and mayonnaise to foods  Eat fewer high-fat foods  Some examples of high-fat foods include french fries, doughnuts, ice cream, and cakes  · Eat fewer sweets  Limit foods and drinks that are high in sugar  This includes candy, cookies, regular soda, and sweetened drinks  Ways to decrease calories:   · Eat smaller portions  ¨ Use a small plate with smaller servings  ¨ Do not eat second helpings  ¨ When you eat at a restaurant, ask for a box and place half of your meal in the box before you eat      ¨ Share an entrée with someone else  · Replace high-calorie snacks with healthy, low-calorie snacks  ¨ Choose fresh fruit, vegetables, fat-free rice cakes, or air-popped popcorn instead of potato chips, nuts, or chocolate  ¨ Choose water or calorie-free drinks instead of soda or sweetened drinks  · Eat regular meals  Skipping meals can lead to overeating later in the day  Eat a healthy snack in place of a meal if you do not have time to eat a regular meal      · Do not shop for groceries when you are hungry  You may be more likely to make unhealthy food choices  Take a grocery list of healthy foods and shop after you have eaten  Exercise:  Exercise at least 30 minutes per day on most days of the week  Some examples of exercise include walking, biking, dancing, and swimming  You can also fit in more physical activity by taking the stairs instead of the elevator or parking farther away from stores  Ask your healthcare provider about the best exercise plan for you  Other things to consider as you try to lose weight:   · Be aware of situations that may give you the urge to overeat, such as eating while watching television  Find ways to avoid these situations  For example, read a book, go for a walk, or do crafts  · Meet with a weight loss support group or friends who are also trying to lose weight  This may help you stay motivated to continue working on your weight loss goals  © 2017 [employer] Information is for End User's use only and may not be sold, redistributed or otherwise used for commercial purposes  All illustrations and images included in CareNotes® are the copyrighted property of [employer]  or [employer]  The above information is an  only  It is not intended as medical advice for individual conditions or treatments  Talk to your doctor, nurse or pharmacist before following any medical regimen to see if it is safe and effective for you      Low Fat Diet   AMBULATORY CARE:   A low-fat diet  is an eating plan that is low in total fat, unhealthy fat, and cholesterol  You may need to follow a low-fat diet if you have trouble digesting or absorbing fat  You may also need to follow this diet if you have high cholesterol  You can also lower your cholesterol by increasing the amount of fiber in your diet  Soluble fiber is a type of fiber that helps to decrease cholesterol levels  Different types of fat in food:   · Limit unhealthy fats  A diet that is high in cholesterol, saturated fat, and trans fat may cause unhealthy cholesterol levels  Unhealthy cholesterol levels increase your risk of heart disease  ¨ Cholesterol:  Limit intake of cholesterol to less than 200 mg per day  Cholesterol is found in meat, eggs, and dairy  ¨ Saturated fat:  Limit saturated fat to less than 7% of your total daily calories  Ask your dietitian how many calories you need each day  Saturated fat is found in butter, cheese, ice cream, whole milk, and palm oil  Saturated fat is also found in meat, such as beef, pork, chicken skin, and processed meats  Processed meats include sausage, hot dogs, and bologna  ¨ Trans fat:  Avoid trans fat as much as possible  Trans fat is used in fried and baked foods  Foods that say trans fat free on the label may still have up to 0 5 grams of trans fat per serving  · Include healthy fats  Replace foods that are high in saturated and trans fat with foods high in healthy fats  This may help to decrease high cholesterol levels  ¨ Monounsaturated fats: These are found in avocados, nuts, and vegetable oils, such as olive, canola, and sunflower oil  ¨ Polyunsaturated fats: These can be found in vegetable oils, such as soybean or corn oil  Omega-3 fats can help to decrease the risk of heart disease  Omega-3 fats are found in fish, such as salmon, herring, trout, and tuna   Omega-3 fats can also be found in plant foods, such as walnuts, flaxseed, soybeans, and canola oil    Foods to limit or avoid:   · Grains:      ¨ Snacks that are made with partially hydrogenated oils, such as chips, regular crackers, and butter-flavored popcorn    ¨ High-fat baked goods, such as biscuits, croissants, doughnuts, pies, cookies, and pastries    · Dairy:      ¨ Whole milk, 2% milk, and yogurt and ice cream made with whole milk    ¨ Half and half creamer, heavy cream, and whipping cream    ¨ Cheese, cream cheese, and sour cream    · Meats and proteins:      ¨ High-fat cuts of meat (T-bone steak, regular hamburger, and ribs)    ¨ Fried meat, poultry (turkey and chicken), and fish    ¨ Poultry (chicken and turkey) with skin    ¨ Cold cuts (salami or bologna), hot dogs, chow, and sausage    ¨ Whole eggs and egg yolks    · Vegetables and fruits with added fat:      ¨ Fried vegetables or vegetables in butter or high-fat sauces, such as cream or cheese sauces    ¨ Fried fruit or fruit served with butter or cream    · Fats:      ¨ Butter, stick margarine, and shortening    ¨ Coconut, palm oil, and palm kernel oil  Foods to include:   · Grains:      ¨ Whole-grain breads, cereals, pasta, and brown rice    ¨ Low-fat crackers and pretzels    · Vegetables and fruits:      ¨ Fresh, frozen, or canned vegetables (no salt or low-sodium)    ¨ Fresh, frozen, dried, or canned fruit (canned in light syrup or fruit juice)    ¨ Avocado    · Low-fat dairy products:      ¨ Nonfat (skim) or 1% milk    ¨ Nonfat or low-fat cheese, yogurt, and cottage cheese    · Meats and proteins:      ¨ Chicken or turkey with no skin    ¨ Baked or broiled fish    ¨ Lean beef and pork (loin, round, extra lean hamburger)    ¨ Beans and peas, unsalted nuts, soy products    ¨ Egg whites and substitutes    ¨ Seeds and nuts    · Fats:      ¨ Unsaturated oil, such as canola, olive, peanut, soybean, or sunflower oil    ¨ Soft or liquid margarine and vegetable oil spread    ¨ Low-fat salad dressing  Other ways to decrease fat:   · Read food labels before you buy foods  Choose foods that have less than 30% of calories from fat  Choose low-fat or fat-free dairy products  Remember that fat free does not mean calorie free  These foods still contain calories, and too many calories can lead to weight gain  · Trim fat from meat and avoid fried food  Trim all visible fat from meat before you cook it  Remove the skin from poultry  Do not ramirez meat, fish, or poultry  Bake, roast, boil, or broil these foods instead  Avoid fried foods  Eat a baked potato instead of [place] fries  Steam vegetables instead of sautéing them in butter  · Add less fat to foods  Use imitation chow bits on salads and baked potatoes instead of regular chow bits  Use fat-free or low-fat salad dressings instead of regular dressings  Use low-fat or nonfat butter-flavored topping instead of regular butter or margarine on popcorn and other foods  Ways to decrease fat in recipes:  Replace high-fat ingredients with low-fat or nonfat ones  This may cause baked goods to be drier than usual  You may need to use nonfat cooking spray on pans to prevent food from sticking  You also may need to change the amount of other ingredients, such as water, in the recipe  Try the following:  · Use low-fat or light margarine instead of regular margarine or shortening  · Use lean ground turkey breast or chicken, or lean ground beef (less than 5% fat) instead of hamburger  · Add 1 teaspoon of canola oil to 8 ounces of skim milk instead of using cream or half and half  · Use grated zucchini, carrots, or apples in breads instead of coconut  · Use blenderized, low-fat cottage cheese, plain tofu, or low-fat ricotta cheese instead of cream cheese  · Use 1 egg white and 1 teaspoon of canola oil, or use ¼ cup (2 ounces) of fat-free egg substitute instead of a whole egg  · Replace half of the oil that is called for in a recipe with applesauce when you bake  Use 3 tablespoons of cocoa powder and 1 tablespoon of canola oil instead of a square of baking chocolate  How to increase fiber:  Eat enough high-fiber foods to get 20 to 30 grams of fiber every day  Slowly increase your fiber intake to avoid stomach cramps, gas, and other problems  · Eat 3 ounces of whole-grain foods each day  An ounce is about 1 slice of bread  Eat whole-grain breads, such as whole-wheat bread  Whole wheat, whole-wheat flour, or other whole grains should be listed as the first ingredient on the food label  Replace white flour with whole-grain flour or use half of each in recipes  Whole-grain flour is heavier than white flour, so you may have to add more yeast or baking powder  · Eat a high-fiber cereal for breakfast   Oatmeal is a good source of soluble fiber  Look for cereals that have bran or fiber in the name  Choose whole-grain products, such as brown rice, barley, and whole-wheat pasta  · Eat more beans, peas, and lentils  For example, add beans to soups or salads  Eat at least 5 cups of fruits and vegetables each day  Eat fruits and vegetables with the peel because the peel is high in fiber  © 2017 [employer] [employer]  Information is for End User's use only and may not be sold, redistributed or otherwise used for commercial purposes  All illustrations and images included in CareNotes® are the copyrighted property of [employer]  or [employer]  The above information is an  only  It is not intended as medical advice for individual conditions or treatments  Talk to your doctor, nurse or pharmacist before following any medical regimen to see if it is safe and effective for you  Heart Healthy Diet   AMBULATORY CARE:   A heart healthy diet  is an eating plan low in total fat, unhealthy fats, and sodium (salt)  A heart healthy diet helps decrease your risk for heart disease and stroke   Limit the amount of fat you eat to 25% to 35% of your total daily calories  Limit sodium to less than 2,300 mg each day  Healthy fats:  Healthy fats can help improve cholesterol levels  The risk for heart disease is decreased when cholesterol levels are normal  Choose healthy fats, such as the following:  · Unsaturated fat  is found in foods such as soybean, canola, olive, corn, and safflower oils  It is also found in soft tub margarine that is made with liquid vegetable oil  · Omega-3 fat  is found in certain fish, such as salmon, tuna, and trout, and in walnuts and flaxseed  Unhealthy fats:  Unhealthy fats can cause unhealthy cholesterol levels in your blood and increase your risk of heart disease  Limit unhealthy fats, such as the following:  · Cholesterol  is found in animal foods, such as eggs and lobster, and in dairy products made from whole milk  Limit cholesterol to less than 300 milligrams (mg) each day  You may need to limit cholesterol to 200 mg each day if you have heart disease  · Saturated fat  is found in meats, such as chow and hamburger  It is also found in chicken or turkey skin, whole milk, and butter  Limit saturated fat to less than 7% of your total daily calories  Limit saturated fat to less than 6% if you have heart disease or are at increased risk for it  · Trans fat  is found in packaged foods, such as potato chips and cookies  It is also in hard margarine, some fried foods, and shortening  Avoid trans fats as much as possible    Heart healthy foods and drinks to include:  Ask your dietitian or healthcare provider how many servings to have from each of the following food groups:  · Grains:      ¨ Whole-wheat breads, cereals, and pastas, and brown rice    ¨ Low-fat, low-sodium crackers and chips    · Vegetables:      ¨ Broccoli, green beans, green peas, and spinach    ¨ Collards, kale, and lima beans    ¨ Carrots, sweet potatoes, tomatoes, and peppers    ¨ Canned vegetables with no salt added    · Fruits:      ¨ Bananas, peaches, pears, and pineapple    ¨ Grapes, raisins, and dates    ¨ Oranges, tangerines, grapefruit, orange juice, and grapefruit juice    ¨ Apricots, mangoes, melons, and papaya    ¨ Raspberries and strawberries    ¨ Canned fruit with no added sugar    · Low-fat dairy products:      ¨ Nonfat (skim) milk, 1% milk, and low-fat almond, cashew, or soy milks fortified with calcium    ¨ Low-fat cheese, regular or frozen yogurt, and cottage cheese    · Meats and proteins , such as lean cuts of beef and pork (loin, leg, round), skinless chicken and turkey, legumes, soy products, egg whites, and nuts  Foods and drinks to limit or avoid:  Ask your dietitian or healthcare provider about these and other foods that are high in unhealthy fat, sodium, and sugar:  · Snack or packaged foods , such as frozen dinners, cookies, macaroni and cheese, and cereals with more than 300 mg of sodium per serving    · Canned or dry mixes  for cakes, soups, sauces, or gravies    · Vegetables with added sodium , such as instant potatoes, vegetables with added sauces, or regular canned vegetables    · Other foods high in sodium , such as ketchup, barbecue sauce, salad dressing, pickles, olives, soy sauce, and miso    · High-fat dairy foods  such as whole or 2% milk, cream cheese, or sour cream, and cheeses     · High-fat protein foods  such as high-fat cuts of beef (T-bone steaks, ribs), chicken or turkey with skin, and organ meats, such as liver    · Cured or smoked meats , such as hot dogs, chow, and sausage    · Unhealthy fats and oils , such as butter, stick margarine, shortening, and cooking oils such as coconut or palm oil    · Food and drinks high in sugar , such as soft drinks (soda), sports drinks, sweetened tea, candy, cake, cookies, pies, and doughnuts  Other diet guidelines to follow:   · Eat more foods containing omega-3 fats  Eat fish high in omega-3 fats at least 2 times a week  · Limit alcohol    Too much alcohol can damage your heart and raise your blood pressure  Women should limit alcohol to 1 drink a day  Men should limit alcohol to 2 drinks a day  A drink of alcohol is 12 ounces of beer, 5 ounces of wine, or 1½ ounces of liquor  · Choose low-sodium foods  High-sodium foods can lead to high blood pressure  Add little or no salt to food you prepare  Use herbs and spices in place of salt  · Eat more fiber  to help lower cholesterol levels  Eat at least 5 servings of fruits and vegetables each day  Eat 3 ounces of whole-grain foods each day  Legumes (beans) are also a good source of fiber  Lifestyle guidelines:   · Do not smoke  Nicotine and other chemicals in cigarettes and cigars can cause lung and heart damage  Ask your healthcare provider for information if you currently smoke and need help to quit  E-cigarettes or smokeless tobacco still contain nicotine  Talk to your healthcare provider before you use these products  · Exercise regularly  to help you maintain a healthy weight and improve your blood pressure and cholesterol levels  Ask your healthcare provider about the best exercise plan for you  Do not start an exercise program without asking your healthcare provider  Follow up with your healthcare provider as directed:  Write down your questions so you remember to ask them during your visits  © 2017 [facility] [facility] Information is for End User's use only and may not be sold, redistributed or otherwise used for commercial purposes  All illustrations and images included in CareNotes® are the copyrighted property of [employer]  or Cheng Bailey  The above information is an  only  It is not intended as medical advice for individual conditions or treatments  Talk to your doctor, nurse or pharmacist before following any medical regimen to see if it is safe and effective for you  Calorie Counting Diet   WHAT YOU NEED TO KNOW:   What is a calorie counting diet?   It is a meal plan based on counting calories each day to reach a healthy body weight  You will need to eat fewer calories if you are trying to lose weight  Weight loss may decrease your risk for certain health problems or improve your health if you have health problems  Some of these health problems include heart disease, high blood pressure, and diabetes  What foods should I avoid? Your dietitian will tell you if you need to avoid certain foods based on your body weight and health condition  You may need to avoid high-fat foods if you are at risk for or have heart disease  You may need to eat fewer foods from the breads and starches food group if you have diabetes  How many calories are in foods? The following is a list of foods and drinks with the approximate number of calories in each  Check the food label to find the exact number of calories  A dietitian can tell you how many calories you should have from each food group each day    · Carbohydrate:      ¨ ½ of a 3-inch bagel, 1 slice of bread, or ½ of a hamburger bun or hot dog bun (80)    ¨ 1 (8-inch) flour tortilla or ½ cup of cooked rice (100)    ¨ 1 (6-inch) corn tortilla (80)    ¨ 1 (6-inch) pancake or 1 cup of bran flakes cereal (110)    ¨ ½ cup of cooked cereal (80)    ¨ ½ cup of cooked pasta (85)    ¨ 1 ounce of pretzels (100)    ¨ 3 cups of air-popped popcorn without butter or oil (80)    · Dairy:      ¨ 1 cup of skim or 1% milk (90)    ¨ 1 cup of 2% milk (120)    ¨ 1 cup of whole milk (160)    ¨ 1 cup of 2% chocolate milk (220)    ¨ 1 ounce of low-fat cheese with 3 grams of fat per ounce (70)    ¨ 1 ounce of cheddar cheese (114)    ¨ ½ cup of 1% fat cottage cheese (80)    ¨ 1 cup of plain or sugar-free, fat-free yogurt (90)    · Protein foods:      ¨ 3 ounces of fish (not breaded or fried) (95)    ¨ 3 ounces of breaded, fried fish (195)    ¨ ¾ cup of tuna canned in water (105)    ¨ 3 ounces of chicken breast without skin (105)    ¨ 1 fried chicken breast with skin (350)    ¨ ¼ cup of fat free egg substitute (40)    ¨ 1 large egg (75)    ¨ 3 ounces of lean beef or pork (165)    ¨ 3 ounces of fried pork chop or ham (185)    ¨ ½ cup of cooked dried beans, such as kidney, souza, lentils, or navy (115)    ¨ 3 ounces of bologna or lunch meat (225)    ¨ 2 links of breakfast sausage (140)    · Vegetables:      ¨ ½ cup of sliced mushrooms (10)    ¨ 1 cup of salad greens, such as lettuce, spinach, or mitch (15)    ¨ ½ cup of steamed asparagus (20)    ¨ ½ cup of cooked summer squash, zucchini squash, or green or wax beans (25)    ¨ 1 cup of broccoli or cauliflower florets, or 1 medium tomato (25)    ¨ 1 large raw carrot or ½ cup of cooked carrots (40)    ¨ ? of a medium cucumber or 1 stalk of celery (5)    ¨ 1 small baked potato (160)    ¨ 1 cup of breaded, fried vegetables (230)    · Fruit:      ¨ 1 (6-inch) banana (55)     ¨ ½ of a 4-inch grapefruit (55)    ¨ 15 grapes (60)    ¨ 1 medium orange or apple (70)    ¨ 1 large peach (65)    ¨ 1 cup of fresh pineapple chunks (75)    ¨ 1 cup of melon cubes (50)    ¨ 1¼ cups of whole strawberries (45)    ¨ ½ cup of fruit canned in juice (55)    ¨ ½ cup of fruit canned in heavy syrup (110)    ¨ ?  cup of raisins (130)    ¨ ½ cup of unsweetened fruit juice (60)    ¨ ½ cup of grape, cranberry, or prune juice (90)    · Fat:      ¨ 10 peanuts or 2 teaspoons of peanut butter (55)    ¨ 2 tablespoons of avocado or 1 tablespoon of regular salad dressing (45)    ¨ 2 slices of chow (90)    ¨ 1 teaspoon of oil, such as safflower, canola, corn, or olive oil (45)    ¨ 2 teaspoons of low-fat margarine, or 1 tablespoon of low-fat mayonnaise (50)    ¨ 1 teaspoon of regular margarine (40)    ¨ 1 tablespoon of regular mayonnaise (135)    ¨ 1 tablespoon of cream cheese or 2 tablespoons of low-fat cream cheese (45)    ¨ 2 tablespoons of vegetable shortening (215)    · Dessert and sweets:      ¨ 8 animal crackers or 5 vanilla wafers (80)    ¨ 1 frozen fruit juice bar (80)    ¨ ½ cup of ice milk or low-fat frozen yogurt (90)    ¨ ½ cup of sherbet or sorbet (125)    ¨ ½ cup of sugar-free pudding or custard (60)    ¨ ½ cup of ice cream (140)    ¨ ½ cup of pudding or custard (175)    ¨ 1 (2-inch) square chocolate brownie (185)    · Combination foods:      ¨ Bean burrito made with an 8-inch tortilla, without cheese (275)    ¨ Chicken breast sandwich with lettuce and tomato (325)    ¨ 1 cup of chicken noodle soup (60)    ¨ 1 beef taco (175)    ¨ Regular hamburger with lettuce and tomato (310)    ¨ Regular cheeseburger with lettuce and tomato (410)     ¨ ¼ of a 12-inch cheese pizza (280)    ¨ Fried fish sandwich with lettuce and tomato (425)    ¨ Hot dog and bun (275)    ¨ 1½ cups of macaroni and cheese (310)    ¨ Taco salad with a fried tortilla shell (870)    · Low-calorie foods:      ¨ 1 tablespoon of ketchup or 1 tablespoon of fat free sour cream (15)    ¨ 1 teaspoon of mustard (5)    ¨ ¼ cup of salsa (20)    ¨ 1 large dill pickle (15)    ¨ 1 tablespoon of fat free salad dressing (10)    ¨ 2 teaspoons of low-sugar, light jam or jelly, or 1 tablespoon of sugar-free syrup (15)    ¨ 1 sugar-free popsicle (15)    ¨ 1 cup of club soda, seltzer water, or diet soda (0)  CARE AGREEMENT:   You have the right to help plan your care  Discuss treatment options with your caregivers to decide what care you want to receive  You always have the right to refuse treatment  The above information is an  only  It is not intended as medical advice for individual conditions or treatments  Talk to your doctor, nurse or pharmacist before following any medical regimen to see if it is safe and effective for you  © 2017 [address] [employer] Information is for End User's use only and may not be sold, redistributed or otherwise used for commercial purposes   All illustrations and images included in CareNotes® are the copyrighted property of [employer]  or [employer]  CL Milner    Portions of the record may have been created with voice recognition software  Occasional wrong word or "sound a like" substitutions may have occurred due to the inherent limitations of voice recognition software  Read the chart carefully and recognize, using context, where substitutions have occurred  BMI Counseling: Body mass index is 30 72 kg/m²  The BMI is above normal  Nutrition recommendations include reducing portion sizes, decreasing overall calorie intake, 3-5 servings of fruits/vegetables daily, reducing fast food intake, consuming healthier snacks, decreasing soda and/or juice intake, moderation in carbohydrate intake, increasing intake of lean protein, reducing intake of saturated fat and trans fat and reducing intake of cholesterol  Exercise recommendations include moderate aerobic physical activity for 150 minutes/week

## 2020-08-10 ENCOUNTER — APPOINTMENT (OUTPATIENT)
Dept: LAB | Facility: HOSPITAL | Age: 80
End: 2020-08-10
Payer: MEDICARE

## 2020-08-10 DIAGNOSIS — R35.0 URINARY FREQUENCY: ICD-10-CM

## 2020-08-10 LAB
BACTERIA UR QL AUTO: ABNORMAL /HPF
BILIRUB UR QL STRIP: NEGATIVE
CLARITY UR: CLEAR
COLOR UR: ABNORMAL
GLUCOSE UR STRIP-MCNC: NEGATIVE MG/DL
HGB UR QL STRIP.AUTO: NEGATIVE
KETONES UR STRIP-MCNC: NEGATIVE MG/DL
LEUKOCYTE ESTERASE UR QL STRIP: NEGATIVE
NITRITE UR QL STRIP: NEGATIVE
NON-SQ EPI CELLS URNS QL MICRO: ABNORMAL /HPF
PH UR STRIP.AUTO: 6.5 [PH]
PROT UR STRIP-MCNC: NEGATIVE MG/DL
RBC #/AREA URNS AUTO: ABNORMAL /HPF
SP GR UR STRIP.AUTO: 1.01 (ref 1–1.03)
UROBILINOGEN UR QL STRIP.AUTO: 0.2 E.U./DL
WBC #/AREA URNS AUTO: ABNORMAL /HPF

## 2020-08-10 PROCEDURE — 81001 URINALYSIS AUTO W/SCOPE: CPT | Performed by: FAMILY MEDICINE

## 2020-08-10 PROCEDURE — 87086 URINE CULTURE/COLONY COUNT: CPT

## 2020-08-11 ENCOUNTER — TRANSCRIBE ORDERS (OUTPATIENT)
Dept: PAIN MEDICINE | Facility: CLINIC | Age: 80
End: 2020-08-11

## 2020-08-11 ENCOUNTER — OFFICE VISIT (OUTPATIENT)
Dept: PAIN MEDICINE | Facility: CLINIC | Age: 80
End: 2020-08-11
Payer: MEDICARE

## 2020-08-11 ENCOUNTER — TELEPHONE (OUTPATIENT)
Dept: FAMILY MEDICINE CLINIC | Facility: CLINIC | Age: 80
End: 2020-08-11

## 2020-08-11 VITALS
BODY MASS INDEX: 30.82 KG/M2 | HEART RATE: 85 BPM | RESPIRATION RATE: 20 BRPM | SYSTOLIC BLOOD PRESSURE: 131 MMHG | TEMPERATURE: 97.6 F | DIASTOLIC BLOOD PRESSURE: 68 MMHG | HEIGHT: 65 IN | WEIGHT: 185 LBS

## 2020-08-11 DIAGNOSIS — M50.11 CERVICAL DISC DISORDER WITH RADICULOPATHY OF OCCIPITO-ATLANTO-AXIAL REGION: ICD-10-CM

## 2020-08-11 DIAGNOSIS — Z79.891 LONG-TERM CURRENT USE OF OPIATE ANALGESIC: ICD-10-CM

## 2020-08-11 DIAGNOSIS — M54.81 OCCIPITAL NEURALGIA OF RIGHT SIDE: ICD-10-CM

## 2020-08-11 DIAGNOSIS — F11.20 UNCOMPLICATED OPIOID DEPENDENCE (HCC): ICD-10-CM

## 2020-08-11 DIAGNOSIS — G89.4 CHRONIC PAIN SYNDROME: Primary | ICD-10-CM

## 2020-08-11 DIAGNOSIS — G50.0 TRIGEMINAL NEURALGIA: ICD-10-CM

## 2020-08-11 LAB — BACTERIA UR CULT: NORMAL

## 2020-08-11 PROCEDURE — 3008F BODY MASS INDEX DOCD: CPT | Performed by: NURSE PRACTITIONER

## 2020-08-11 PROCEDURE — 1036F TOBACCO NON-USER: CPT | Performed by: NURSE PRACTITIONER

## 2020-08-11 PROCEDURE — 4040F PNEUMOC VAC/ADMIN/RCVD: CPT | Performed by: NURSE PRACTITIONER

## 2020-08-11 PROCEDURE — 3078F DIAST BP <80 MM HG: CPT | Performed by: NURSE PRACTITIONER

## 2020-08-11 PROCEDURE — 1160F RVW MEDS BY RX/DR IN RCRD: CPT | Performed by: NURSE PRACTITIONER

## 2020-08-11 PROCEDURE — 3075F SYST BP GE 130 - 139MM HG: CPT | Performed by: NURSE PRACTITIONER

## 2020-08-11 PROCEDURE — 99214 OFFICE O/P EST MOD 30 MIN: CPT | Performed by: NURSE PRACTITIONER

## 2020-08-11 RX ORDER — OXYCODONE AND ACETAMINOPHEN 7.5; 325 MG/1; MG/1
1 TABLET ORAL 3 TIMES DAILY PRN
Qty: 90 TABLET | Refills: 0 | Status: SHIPPED | OUTPATIENT
Start: 2020-08-11 | End: 2021-03-16

## 2020-08-11 NOTE — PROGRESS NOTES
Assessment:  1  Chronic pain syndrome    2  Cervical disc disorder with radiculopathy of fbjqdinr-xrreoln-pejbr region    3  Occipital neuralgia of right side    4  Trigeminal neuralgia    5  Uncomplicated opioid dependence ([address] )    6  Long-term current use of opiate analgesic        Plan:  The patient is a 79 y o  female last seen on 6/9/2020 who presents for a follow up office visit in regards to chronic pain syndrome secondary to cervical disc disorder with radiculopathy of the oqoumsgy-fiiaorn-jjuan region, right occipital neuralgia, and trigeminal neuralgia  I had a thorough conversation with the patient, that although her symptoms are suggestive of right occipital neuralgia pain, she may benefit from following up with neurosurgery in regards to trigeminal neuralgia  The patient became very angry and tearful and shouted, "no one cares or wants to fix my pain!" I discussed with the patient that we could repeat the occipital nerve block, as it did help with her pain symptoms in the past; however, if she continues to experience the facial pain, I would recommend her scheduling an appointment with neurosurgery  The patient was able to de-esculate and agreed with the plan of care  The most common risks of the occipital nerve block were discussed with the patient who would like to proceed  An order was placed for right occipital nerve block  Complete risks and benefits including bleeding, infection, tissue reaction, nerve injury and allergic reaction were discussed  The approach was demonstrated using models and literature was provided  Verbal and written consent was obtained  As the patient's current pain medication regimen continues to provide mild relief of her pain symptoms, I will continue her with the Percocet as prescribed  A prescription for Percocet 7 5/325 mg by mouth up to 3 times daily was electronically sent to the patient's pharmacy on file      [place] Prescription Drug Monitoring Program report was reviewed and was appropriate     There are risks associated with opioid medications, including dependence, addiction and tolerance  The patient understands and agrees to use these medications only as prescribed  Potential side effects of the medications include, but are not limited to, constipation, drowsiness, addiction, impaired judgment and risk of fatal overdose if not taken as prescribed  The patient was warned against driving while taking sedation medications  Sharing medications is a felony  At this point in time, the patient is showing no signs of addiction, abuse, diversion or suicidal ideation  The patient will follow-up after right occipital nerve block and in 6 weeks for medication prescription refill and reevaluation  The patient was advised to contact the office should their symptoms worsen in the interim  The patient was agreeable and verbalized an understanding  History of Present Illness: The patient is a 79 y o  female last seen on 6/9/2020 who presents for a follow up office visit in regards to chronic pain syndrome secondary to cervical disc disorder with radiculopathy of the bigqruzt-ruwlklq-lvjqm region, right occipital neuralgia, and trigeminal neuralgia  The patient currently reports ongoing neck and head pain that is unchanged since last office visit  At the last visit the patient underwent cervical epidural steroid injection which she reports provided some relief to her neck pain symptoms; however, the patient continues with pain that originates at the top of her head and radiates to the right occipital proturbence  The patient reports she also has pain that surrounds the orbit of her right eye and into her right labial fold  The patient describes the pain as constant, and pressure-like  The patient currently rates her pain as a 5/10 on numeric rating scale      Current pain medications includes:  Percocet 7 5/325 mg by mouth 3 times daily, Cymbalta 60 mg by mouth daily, and amitriptyline 10 mg by mouth at bedtime  The patient reports that this regimen is providing 10-20% pain relief  The patient is reporting no side effects from this pain medication regimen  Pain Contract Signed: 6/2/2020  Last Urine Drug Screen: 6/6/2020  Last Dose: 8/11/2020 @ 1:00 AM    I have personally reviewed and/or updated the patient's past medical history, past surgical history, family history, social history, current medications, allergies, and vital signs today  Review of Systems:    Review of Systems   Constitutional: Negative for fever and unexpected weight change  HENT: Negative for trouble swallowing  Eyes: Negative for visual disturbance  Respiratory: Negative for shortness of breath and wheezing  Cardiovascular: Negative for chest pain and palpitations  Gastrointestinal: Negative for constipation, diarrhea, nausea and vomiting  Endocrine: Negative for cold intolerance, heat intolerance and polydipsia  Genitourinary: Negative for difficulty urinating and frequency  Musculoskeletal: Positive for gait problem and joint swelling  Negative for arthralgias and myalgias  R Shoulder & Head   Skin: Negative for rash  Neurological: Positive for weakness  Negative for dizziness, seizures, syncope and headaches  Hematological: Does not bruise/bleed easily  Psychiatric/Behavioral: Negative for dysphoric mood  All other systems reviewed and are negative          Past Medical History:   Diagnosis Date    Arthritis     Basal cell carcinoma     Dry eye syndrome     Foraminal stenosis of cervical region     Macular degeneration     Memory loss     Migraine     Spinal stenosis of lumbar region     Squamous cell skin cancer     Supraventricular tachycardia ([address] )     Vitamin D deficiency     last assessed 2/7/17       Past Surgical History:   Procedure Laterality Date    APPENDECTOMY      BACK SURGERY      lower back surgery lumbar disc    CATARACT EXTRACTION      FEMUR FRACTURE SURGERY      SC STEREO TREAT TRIGEMINAL NERVE Right 3/26/2019    Procedure: GLYCEROL RHIZOTOMY TRIGEMINAL NERVES (V1-V3), RIGHT;  Surgeon: Alma Mills MD;  Location: [facility];  Service: Neurosurgery    REPLACEMENT TOTAL KNEE Right     REPLACEMENT TOTAL KNEE Left     SKIN BIOPSY         Family History   Problem Relation Age of Onset    Aortic aneurysm Mother         abdominal aortic aneurysm    Hypertension Mother    [facility] Breast cancer Mother    [facility]hel[facility] Hypertension Father     Hypertension Sister     Hyperlipidemia Sister     Squamous cell carcinoma Sister        Social History     Occupational History    Occupation: retired   Tobacco Use    Smoking status: Former Smoker    Smokeless tobacco: Never Used   Substance and Sexual Activity    Alcohol use: Yes     Comment: occasional    Drug use: No    Sexual activity: Not on file         Current Outpatient Medications:     Acetaminophen 325 MG CAPS, Take 650 mg by mouth every 6 (six) hours as needed, Disp: , Rfl:     amitriptyline (ELAVIL) 10 mg tablet, Take 1 p o  HS p r n  for sleep and pain may increase to 20 mg    Max dose 30 mg at night , Disp: 30 tablet, Rfl: 0    aspirin (ECOTRIN LOW STRENGTH) 81 mg EC tablet, Take 81 mg by mouth daily, Disp: , Rfl:     Cholecalciferol (VITAMIN D PO), Take 5,000 Units by mouth Daily  , Disp: , Rfl:     cycloSPORINE (RESTASIS) 0 05 % ophthalmic emulsion, Apply 1 drop to eye 2 (two) times a day, Disp: , Rfl:     DULoxetine (CYMBALTA) 60 mg delayed release capsule, TAKE 1 CAPSULE BY MOUTH  DAILY, Disp: 90 capsule, Rfl: 1    Multiple Vitamins-Minerals (PRESERVISION AREDS 2+MULTI VIT) CAPS, BID, Disp: , Rfl:     oxyCODONE-acetaminophen (PERCOCET) 7 5-325 MG per tablet, Take 1 tablet by mouth 3 (three) times a day as needed for moderate painMax Daily Amount: 3 tablets, Disp: 90 tablet, Rfl: 0    verapamil (VERELAN PM) 120 MG 24 hr capsule, Take 1 capsule (120 mg total) by mouth daily at bedtime, Disp: 90 capsule, Rfl: 1    Allergies   Allergen Reactions    Dilaudid [Hydromorphone] Shortness Of Breath     Other reaction(s): Respiratory Distress  Other reaction(s): HORENESS    Penicillins Rash     Rash       Physical Exam:    /68   Pulse 85   Temp 97 6 °F (36 4 °C) (Oral)   Resp 20   Ht 5' 5" (1 651 m)   Wt 83 9 kg (185 lb)   BMI 30 79 kg/m²     Constitutional:normal, well developed, well nourished, alert, in no distress and non-toxic and no overt pain behavior    Eyes:anicteric  HEENT:grossly intact  Neck:supple, symmetric, trachea midline and no masses   Pulmonary:even and unlabored  Cardiovascular:No edema or pitting edema present  Skin:Normal without rashes or lesions and well hydrated  Psychiatric:Mood and affect appropriate  Neurologic:Cranial Nerves II-XII grossly intact  Musculoskeletal:ambulates with rollator     Cervical Spine Exam    Appearance:  Normal lordosis  Palpation/Tenderness:  no tenderness or spasm  tenderness at the right occipital proturbence, on the top of her head and around the right eye and right labial fold  Sensory:  no sensory deficits noted  Range of Motion:  Extension:  Minimally limited  with pain  Imaging  FL spine and pain procedure    (Results Pending)         Orders Placed This Encounter   Procedures    FL spine and pain procedure

## 2020-08-11 NOTE — TELEPHONE ENCOUNTER
----- Message from [address] [place] [address]  sent at 8/11/2020  7:28 AM EDT -----  Urine looks negative for infection

## 2020-08-18 ENCOUNTER — TELEPHONE (OUTPATIENT)
Dept: PAIN MEDICINE | Facility: MEDICAL CENTER | Age: 80
End: 2020-08-18

## 2020-08-18 NOTE — TELEPHONE ENCOUNTER
S/W pt who states she is experiencing pain on the right side of her face and head that seems to be traveling over to her left eyebrow  Is taking Percocet without much relief  Pt is scheduled for ONB on 8/28/2020 with [facility]  Also advised pt she could f/u with neurosurgery  Pt stated she did not know [employer] recommended this but also, she declined this and stated "they won't do anything for me"  Advised pt to continue pain medication and await ONB

## 2020-08-18 NOTE — TELEPHONE ENCOUNTER
Pt called stating that she is having extreme pain and does not know what to do     pt can be reached at [phone]

## 2020-08-28 ENCOUNTER — TRANSCRIBE ORDERS (OUTPATIENT)
Dept: PAIN MEDICINE | Facility: CLINIC | Age: 80
End: 2020-08-28

## 2020-08-28 ENCOUNTER — PROCEDURE VISIT (OUTPATIENT)
Dept: PAIN MEDICINE | Facility: CLINIC | Age: 80
End: 2020-08-28
Payer: MEDICARE

## 2020-08-28 VITALS
SYSTOLIC BLOOD PRESSURE: 128 MMHG | BODY MASS INDEX: 30.79 KG/M2 | WEIGHT: 185 LBS | DIASTOLIC BLOOD PRESSURE: 70 MMHG | TEMPERATURE: 99.1 F | HEART RATE: 78 BPM

## 2020-08-28 DIAGNOSIS — M54.81 OCCIPITAL NEURALGIA OF RIGHT SIDE: Primary | ICD-10-CM

## 2020-08-28 PROCEDURE — 64405 NJX AA&/STRD GR OCPL NRV: CPT | Performed by: ANESTHESIOLOGY

## 2020-08-28 RX ORDER — METHYLPREDNISOLONE ACETATE 40 MG/ML
40 INJECTION, SUSPENSION INTRA-ARTICULAR; INTRALESIONAL; INTRAMUSCULAR; SOFT TISSUE ONCE
Status: COMPLETED | OUTPATIENT
Start: 2020-08-28 | End: 2020-08-28

## 2020-08-28 RX ORDER — BUPIVACAINE HYDROCHLORIDE 2.5 MG/ML
10 INJECTION, SOLUTION EPIDURAL; INFILTRATION; INTRACAUDAL ONCE
Status: COMPLETED | OUTPATIENT
Start: 2020-08-28 | End: 2020-08-28

## 2020-08-28 RX ADMIN — BUPIVACAINE HYDROCHLORIDE 10 ML: 2.5 INJECTION, SOLUTION EPIDURAL; INFILTRATION; INTRACAUDAL at 14:27

## 2020-08-28 RX ADMIN — METHYLPREDNISOLONE ACETATE 40 MG: 40 INJECTION, SUSPENSION INTRA-ARTICULAR; INTRALESIONAL; INTRAMUSCULAR; SOFT TISSUE at 14:27

## 2020-08-28 NOTE — PROGRESS NOTES
Pre-procedure Diagnosis:   1  Occipital neuralgia of right side      Post-procedure Diagnosis:   1  Occipital neuralgia of right side      Operation Title(s):  Right occipital nerve block  Attending Surgeon:   Magnolia Anaya MD  Anesthesia:   Local     Indications: The patient is a 79y o  year-old female with a diagnosis of   1  Occipital neuralgia of right side      The patient's history and physical exam were reviewed  The risks, benefits and alternatives to the procedure were discussed, and all questions were answered to the patient's satisfaction  The patient agreed to proceed, and written informed consent was obtained      Procedure in Detail: The patient was brought into the exam room and placed in the sitting position on the exam room chair with the head flexed on a pillow       The area of the right occiput was palpated and cleansed with alcohol  Then a 1 5 inch 25 guage needle was advanced until bone was contacted  Negative aspiration was confirmed and a solution consisting of 1 mL 0 25% bupivacaine and 0 5 Depo-Medrol (40mg/ml) was easily injected      The same procedure was repeated for the right lesser occipital nerve     Disposition: The patient tolerated the procedure well and there were no apparent complications  The patient was given written discharge instructions for the procedure

## 2020-09-09 ENCOUNTER — TELEPHONE (OUTPATIENT)
Dept: FAMILY MEDICINE CLINIC | Facility: CLINIC | Age: 80
End: 2020-09-09

## 2020-09-11 ENCOUNTER — TELEPHONE (OUTPATIENT)
Dept: FAMILY MEDICINE CLINIC | Facility: CLINIC | Age: 80
End: 2020-09-11

## 2020-09-11 NOTE — TELEPHONE ENCOUNTER
----- Message from [address] [place] [address]  sent at 9/10/2020  3:56 PM EDT -----  Mammo wnl  Repeat yearly

## 2020-09-14 DIAGNOSIS — M54.81 OCCIPITAL NEURALGIA OF RIGHT SIDE: ICD-10-CM

## 2020-09-14 DIAGNOSIS — G47.00 INSOMNIA, UNSPECIFIED TYPE: ICD-10-CM

## 2020-09-17 RX ORDER — AMITRIPTYLINE HYDROCHLORIDE 10 MG/1
TABLET, FILM COATED ORAL
Qty: 90 TABLET | Refills: 3 | Status: SHIPPED | OUTPATIENT
Start: 2020-09-17 | End: 2020-10-08 | Stop reason: ALTCHOICE

## 2020-09-23 ENCOUNTER — OFFICE VISIT (OUTPATIENT)
Dept: PAIN MEDICINE | Facility: CLINIC | Age: 80
End: 2020-09-23
Payer: MEDICARE

## 2020-09-23 VITALS
BODY MASS INDEX: 30.79 KG/M2 | SYSTOLIC BLOOD PRESSURE: 142 MMHG | TEMPERATURE: 97.8 F | WEIGHT: 185 LBS | DIASTOLIC BLOOD PRESSURE: 66 MMHG | HEART RATE: 64 BPM

## 2020-09-23 DIAGNOSIS — G89.4 CHRONIC PAIN SYNDROME: Primary | ICD-10-CM

## 2020-09-23 DIAGNOSIS — F11.20 UNCOMPLICATED OPIOID DEPENDENCE (HCC): ICD-10-CM

## 2020-09-23 DIAGNOSIS — G50.0 TRIGEMINAL NEURALGIA: ICD-10-CM

## 2020-09-23 DIAGNOSIS — Z79.891 LONG-TERM CURRENT USE OF OPIATE ANALGESIC: ICD-10-CM

## 2020-09-23 DIAGNOSIS — M54.81 OCCIPITAL NEURALGIA OF RIGHT SIDE: ICD-10-CM

## 2020-09-23 DIAGNOSIS — M50.11 CERVICAL DISC DISORDER WITH RADICULOPATHY OF OCCIPITO-ATLANTO-AXIAL REGION: ICD-10-CM

## 2020-09-23 PROCEDURE — 99214 OFFICE O/P EST MOD 30 MIN: CPT | Performed by: NURSE PRACTITIONER

## 2020-09-23 NOTE — PATIENT INSTRUCTIONS
Opioid Dependence   WHAT YOU NEED TO KNOW:   What is opioid dependence? Opioids are medicines, such as morphine and codeine, used to treat pain  Dependence happens after you have used opioids regularly for a long period of time  Dependence means that your body gets used to how much medicine you take  Dependence is not the same as addiction  Addiction means that a person uses opioids to get high instead of using them to control pain  What are the signs and symptoms of opioid dependence? · You need more of the opioid to get the same amount of pain relief as you did when you first started taking it  · You have tried to use less opioid medicine but are not able to  · You have withdrawal symptoms when you take less of the opioid  What are the signs and symptoms of opioid withdrawal?  You may have the following signs and symptoms if you suddenly stop taking opioids or if you decrease the amount you normally take:  · Yawning     · Runny nose     · Nausea or vomiting     · Diarrhea     · Chills or goosebumps    · Muscle aches or cramps     · Anxiety  How is opioid dependence diagnosed? Your healthcare provider will do a physical exam  He will ask you questions about your symptoms and your use of opioids  He will also ask about your current and past use of other drugs and any family history of drug abuse  How is opioid dependence treated? You may be treated in a hospital or you may be treated as an outpatient  During detoxification (detox), healthcare providers will slowly decrease your dose of the opioid medicine you are dependent on  They may use another opioid medicine such as methadone to decrease symptoms of withdrawal  You may need to take this or another medicine for some time  Your healthcare provider will also replace the opioid with another pain medicine that is less likely to cause dependence  He may also suggest that you receive counseling and social support during treatment     What are the risks of opioid dependence? There is a risk of overdose during early treatment with methadone  You may become dependent on the medicines used to treat opioid dependence  Without treatment, you may develop other health problems or become addicted to opioids  Your risk of abusing alcohol and other drugs increases  You may also develop risky behaviors that can lead to an overdose, violence, and suicidal thoughts  When should I contact my healthcare provider? · Your speech is slurred  · You have difficulty staying awake  · You have nausea and vomiting  · You are easily upset or cry easily  · You have poor balance  · You have questions or concerns about your condition or care  When should I seek immediate care or call 911? · You feel lightheaded or faint  · You have a fast, slow, or irregular heartbeat  · You have a seizure  CARE AGREEMENT:   You have the right to help plan your care  Learn about your health condition and how it may be treated  Discuss treatment options with your caregivers to decide what care you want to receive  You always have the right to refuse treatment  The above information is an  only  It is not intended as medical advice for individual conditions or treatments  Talk to your doctor, nurse or pharmacist before following any medical regimen to see if it is safe and effective for you  © 2017 [address]  Information is for End User's use only and may not be sold, redistributed or otherwise used for commercial purposes  All illustrations and images included in CareNotes® are the copyrighted property of [employer]  or [employer]

## 2020-09-23 NOTE — PROGRESS NOTES
Assessment:  1  Chronic pain syndrome    2  Cervical disc disorder with radiculopathy of mkbfwfgq-ulhmqrx-uxzzd region    3  Occipital neuralgia of right side    4  Trigeminal neuralgia        Plan:  The patient is a 79 y o  female last seen on 8/31/2020 who presents for a follow up office visit in regards to chronic pain syndrome secondary to cervical disc disorder with radiculopathy, right-sided septum neuralgia, trigeminal neuralgia  I discussed with the patient that since there has been moderate to significant improvement in her neck and head pain symptoms that we will hold off on any repeat injections at this point in time  However, I did explain that if over the next 3-4 months the pain symptoms should return, worsen and/or change, we could consider repeat injection  If after that 3-4 month period of time an outpatient visit will be warranted for re-evaluation  The patient did not bring her prescription pill bottle to this office visit; however, the patient reports the bottle is 3/4 of the way full  The patient stated she did not need a refill her medications at this time  I discussed with the patient would not be able to refill the medication over the phone if needed prior to next office visit  The patient reported she takes the medications sparingly, often only taking 1/2 tab, as stated the medication would last until her next office visit in 8 weeks  No prescription was provided for the patient at this office visit  [place] Prescription Drug Monitoring Program report was reviewed and was appropriate     There are risks associated with opioid medications, including dependence, addiction and tolerance  The patient understands and agrees to use these medications only as prescribed  Potential side effects of the medications include, but are not limited to, constipation, drowsiness, addiction, impaired judgment and risk of fatal overdose if not taken as prescribed   The patient was warned against driving while taking sedation medications  Sharing medications is a felony  At this point in time, the patient is showing no signs of addiction, abuse, diversion or suicidal ideation  An oral drug screen swab was collected at today's office visit  The swab will be sent for confirmatory testing  The drug screen is medically necessary because the patient is either dependent on opioid medication or is being considered for opioid medication therapy and the results could impact ongoing or future treatment  The drug screen is to evaluate for the presences or absence of prescribed, non-prescribed, and/or illicit drugs/substances  The patient was unable to complete the BECKS depression inventory and SOAPP-R today as part of our yearly opioid management program     The patient will follow-up in 8 weeks for medication prescription refill and reevaluation  The patient was advised to contact the office should their symptoms worsen in the interim  The patient was agreeable and verbalized an understanding  History of Present Illness: The patient is a 79 y o  female last seen on 8/31/2020 who presents for a follow up office visit in regards to chronic pain syndrome secondary to cervical disc disorder with radiculopathy, right-sided septum neuralgia, trigeminal neuralgia  The patient currently reports neck and head pain that is improved since last office visit  At the last office visit the patient received a right occipital nerve block which she reports provided significant relief of her neck and headache symptoms  The patient currently rates her pain as 0/10 on numeric rating scale  Current pain medications include Percocet 7 5/325 mg by mouth up to 3 times daily  The patient is also prescribed Cymbalta 60 mg, and interviewing 10 mg by mouth by her PCP  The patient reports this pain medication regimen provides moderate relief of her pain symptoms with no noted side effects at this time      Pain Contract Signed: 6/2/2020  Oral drug screen: 9/23/2020  Last dose of Oxycodone was 9/22/20     I have personally reviewed and/or updated the patient's past medical history, past surgical history, family history, social history, current medications, allergies, and vital signs today  Review of Systems:    Review of Systems   Respiratory: Negative for shortness of breath  Cardiovascular: Negative for chest pain  Gastrointestinal: Negative for constipation, diarrhea, nausea and vomiting  Musculoskeletal: Negative for arthralgias, joint swelling and myalgias  Skin: Negative for rash  Neurological: Negative for dizziness, seizures and weakness  All other systems reviewed and are negative        Past Medical History:   Diagnosis Date    Arthritis     Basal cell carcinoma     Dry eye syndrome     Foraminal stenosis of cervical region     Macular degeneration     Memory loss     Migraine     Spinal stenosis of lumbar region     Squamous cell skin cancer     Supraventricular tachycardia ([address] )     Vitamin D deficiency     last assessed 2/7/17       Past Surgical History:   Procedure Laterality Date    APPENDECTOMY      BACK SURGERY      lower back surgery lumbar disc    CATARACT EXTRACTION      FEMUR FRACTURE SURGERY      [place] STEREO TREAT TRIGEMINAL NERVE Right 3/26/2019    Procedure: GLYCEROL RHIZOTOMY TRIGEMINAL NERVES (V1-V3), RIGHT;  Surgeon: Anjana Livingston MD;  Location: [facility];  Service: Neurosurgery    REPLACEMENT TOTAL KNEE Right     REPLACEMENT TOTAL KNEE Left     SKIN BIOPSY         Family History   Problem Relation Age of Onset    Aortic aneurysm Mother         abdominal aortic aneurysm    Hypertension Mother    [facility] Breast cancer Mother    [facility] Hypertension Father     Hypertension Sister     Hyperlipidemia Sister     Squamous cell carcinoma Sister        Social History     Occupational History    Occupation: retired   Tobacco Use    Smoking status: Former Smoker    Smokeless tobacco: Never Used   Substance and Sexual Activity    Alcohol use: Yes     Comment: occasional    Drug use: No    Sexual activity: Not on file         Current Outpatient Medications:     Acetaminophen 325 MG CAPS, Take 650 mg by mouth every 6 (six) hours as needed, Disp: , Rfl:     amitriptyline (ELAVIL) 10 mg tablet, Take 3 p o  HS p r n  for sleep, Disp: 90 tablet, Rfl: 3    aspirin (ECOTRIN LOW STRENGTH) 81 mg EC tablet, Take 81 mg by mouth daily, Disp: , Rfl:     Cholecalciferol (VITAMIN D PO), Take 5,000 Units by mouth Daily  , Disp: , Rfl:     cycloSPORINE (RESTASIS) 0 05 % ophthalmic emulsion, Apply 1 drop to eye 2 (two) times a day, Disp: , Rfl:     DULoxetine (CYMBALTA) 60 mg delayed release capsule, TAKE 1 CAPSULE BY MOUTH  DAILY, Disp: 90 capsule, Rfl: 1    Multiple Vitamins-Minerals (PRESERVISION AREDS 2+MULTI VIT) CAPS, BID, Disp: , Rfl:     oxyCODONE-acetaminophen (PERCOCET) 7 5-325 MG per tablet, Take 1 tablet by mouth 3 (three) times a day as needed for moderate painMax Daily Amount: 3 tablets, Disp: 90 tablet, Rfl: 0    verapamil (VERELAN PM) 120 MG 24 hr capsule, Take 1 capsule (120 mg total) by mouth daily at bedtime, Disp: 90 capsule, Rfl: 1    Allergies   Allergen Reactions    Dilaudid [Hydromorphone] Shortness Of Breath     Other reaction(s): Respiratory Distress  Other reaction(s): HORENESS    Penicillins Rash     Rash       Physical Exam:    /66   Pulse 64   Temp 97 8 °F (36 6 °C) (Temporal)   Wt 83 9 kg (185 lb)   BMI 30 79 kg/m²     Constitutional:normal, well developed, well nourished, alert, in no distress and non-toxic and no overt pain behavior   and overweight  Eyes:anicteric  HEENT:grossly intact  Neck:supple, symmetric, trachea midline and no masses   Pulmonary:even and unlabored  Cardiovascular:No edema or pitting edema present  Skin:Normal without rashes or lesions and well hydrated  Psychiatric:Mood and affect appropriate  Neurologic:Cranial Nerves II-XII grossly intact  Musculoskeletal:ambulates with rollator      Imaging  No orders to display         No orders of the defined types were placed in this encounter

## 2020-09-25 LAB

## 2020-09-28 ENCOUNTER — TRANSCRIBE ORDERS (OUTPATIENT)
Dept: PAIN MEDICINE | Facility: CLINIC | Age: 80
End: 2020-09-28

## 2020-10-08 ENCOUNTER — OFFICE VISIT (OUTPATIENT)
Dept: FAMILY MEDICINE CLINIC | Facility: CLINIC | Age: 80
End: 2020-10-08
Payer: MEDICARE

## 2020-10-08 VITALS
SYSTOLIC BLOOD PRESSURE: 124 MMHG | HEIGHT: 65 IN | BODY MASS INDEX: 30.35 KG/M2 | WEIGHT: 182.2 LBS | DIASTOLIC BLOOD PRESSURE: 62 MMHG | HEART RATE: 77 BPM | OXYGEN SATURATION: 92 % | TEMPERATURE: 98.7 F

## 2020-10-08 DIAGNOSIS — Z00.00 MEDICARE ANNUAL WELLNESS VISIT, SUBSEQUENT: Primary | ICD-10-CM

## 2020-10-08 DIAGNOSIS — M65.312 TRIGGER FINGER OF LEFT THUMB: ICD-10-CM

## 2020-10-08 DIAGNOSIS — R51.9 CHRONIC NONINTRACTABLE HEADACHE, UNSPECIFIED HEADACHE TYPE: ICD-10-CM

## 2020-10-08 DIAGNOSIS — R51.9 CHRONIC DAILY HEADACHE: ICD-10-CM

## 2020-10-08 DIAGNOSIS — I10 ESSENTIAL HYPERTENSION: ICD-10-CM

## 2020-10-08 DIAGNOSIS — G89.29 CHRONIC NONINTRACTABLE HEADACHE, UNSPECIFIED HEADACHE TYPE: ICD-10-CM

## 2020-10-08 DIAGNOSIS — G47.00 INSOMNIA, UNSPECIFIED TYPE: ICD-10-CM

## 2020-10-08 PROCEDURE — 99214 OFFICE O/P EST MOD 30 MIN: CPT | Performed by: FAMILY MEDICINE

## 2020-10-08 PROCEDURE — G0439 PPPS, SUBSEQ VISIT: HCPCS | Performed by: FAMILY MEDICINE

## 2020-10-15 ENCOUNTER — OFFICE VISIT (OUTPATIENT)
Dept: OBGYN CLINIC | Facility: CLINIC | Age: 80
End: 2020-10-15
Payer: MEDICARE

## 2020-10-15 VITALS
BODY MASS INDEX: 30.32 KG/M2 | SYSTOLIC BLOOD PRESSURE: 129 MMHG | HEART RATE: 77 BPM | WEIGHT: 182 LBS | DIASTOLIC BLOOD PRESSURE: 72 MMHG | HEIGHT: 65 IN

## 2020-10-15 DIAGNOSIS — M65.312 TRIGGER FINGER OF LEFT THUMB: ICD-10-CM

## 2020-10-15 PROCEDURE — 20550 NJX 1 TENDON SHEATH/LIGAMENT: CPT | Performed by: ORTHOPAEDIC SURGERY

## 2020-10-15 PROCEDURE — 99203 OFFICE O/P NEW LOW 30 MIN: CPT | Performed by: ORTHOPAEDIC SURGERY

## 2020-10-15 RX ORDER — TRIAMCINOLONE ACETONIDE 40 MG/ML
20 INJECTION, SUSPENSION INTRA-ARTICULAR; INTRAMUSCULAR
Status: COMPLETED | OUTPATIENT
Start: 2020-10-15 | End: 2020-10-15

## 2020-10-15 RX ORDER — LIDOCAINE HYDROCHLORIDE 10 MG/ML
0.5 INJECTION, SOLUTION INFILTRATION; PERINEURAL
Status: COMPLETED | OUTPATIENT
Start: 2020-10-15 | End: 2020-10-15

## 2020-10-15 RX ADMIN — LIDOCAINE HYDROCHLORIDE 0.5 ML: 10 INJECTION, SOLUTION INFILTRATION; PERINEURAL at 14:12

## 2020-10-15 RX ADMIN — TRIAMCINOLONE ACETONIDE 20 MG: 40 INJECTION, SUSPENSION INTRA-ARTICULAR; INTRAMUSCULAR at 14:12

## 2020-10-24 DIAGNOSIS — F41.8 DEPRESSION WITH ANXIETY: ICD-10-CM

## 2020-10-24 DIAGNOSIS — G89.29 OTHER CHRONIC PAIN: ICD-10-CM

## 2020-10-26 RX ORDER — DULOXETIN HYDROCHLORIDE 60 MG/1
CAPSULE, DELAYED RELEASE ORAL
Qty: 90 CAPSULE | Refills: 3 | Status: SHIPPED | OUTPATIENT
Start: 2020-10-26 | End: 2021-02-05

## 2020-11-18 ENCOUNTER — OFFICE VISIT (OUTPATIENT)
Dept: PAIN MEDICINE | Facility: CLINIC | Age: 80
End: 2020-11-18
Payer: MEDICARE

## 2020-11-18 VITALS
HEART RATE: 74 BPM | TEMPERATURE: 98.2 F | SYSTOLIC BLOOD PRESSURE: 146 MMHG | BODY MASS INDEX: 30.29 KG/M2 | DIASTOLIC BLOOD PRESSURE: 74 MMHG | HEIGHT: 65 IN

## 2020-11-18 DIAGNOSIS — M54.81 BILATERAL OCCIPITAL NEURALGIA: ICD-10-CM

## 2020-11-18 DIAGNOSIS — M50.11 CERVICAL DISC DISORDER WITH RADICULOPATHY OF OCCIPITO-ATLANTO-AXIAL REGION: ICD-10-CM

## 2020-11-18 DIAGNOSIS — M47.22 OTHER SPONDYLOSIS WITH RADICULOPATHY, CERVICAL REGION: ICD-10-CM

## 2020-11-18 DIAGNOSIS — M46.82 OTHER SPECIFIED INFLAMMATORY SPONDYLOPATHIES, CERVICAL REGION (HCC): ICD-10-CM

## 2020-11-18 DIAGNOSIS — G89.4 CHRONIC PAIN SYNDROME: Primary | ICD-10-CM

## 2020-11-18 PROCEDURE — 99214 OFFICE O/P EST MOD 30 MIN: CPT | Performed by: NURSE PRACTITIONER

## 2020-11-23 ENCOUNTER — TRANSCRIBE ORDERS (OUTPATIENT)
Dept: PAIN MEDICINE | Facility: CLINIC | Age: 80
End: 2020-11-23

## 2020-11-23 ENCOUNTER — PROCEDURE VISIT (OUTPATIENT)
Dept: PAIN MEDICINE | Facility: CLINIC | Age: 80
End: 2020-11-23
Payer: MEDICARE

## 2020-11-23 VITALS
WEIGHT: 182 LBS | TEMPERATURE: 98.6 F | HEART RATE: 80 BPM | BODY MASS INDEX: 30.29 KG/M2 | SYSTOLIC BLOOD PRESSURE: 145 MMHG | DIASTOLIC BLOOD PRESSURE: 76 MMHG

## 2020-11-23 DIAGNOSIS — M54.81 BILATERAL OCCIPITAL NEURALGIA: Primary | ICD-10-CM

## 2020-11-23 PROCEDURE — 64405 NJX AA&/STRD GR OCPL NRV: CPT | Performed by: ANESTHESIOLOGY

## 2020-11-23 RX ORDER — METHYLPREDNISOLONE ACETATE 40 MG/ML
40 INJECTION, SUSPENSION INTRA-ARTICULAR; INTRALESIONAL; INTRAMUSCULAR; SOFT TISSUE ONCE
Status: COMPLETED | OUTPATIENT
Start: 2020-11-23 | End: 2020-11-23

## 2020-11-23 RX ORDER — BUPIVACAINE HYDROCHLORIDE 2.5 MG/ML
10 INJECTION, SOLUTION EPIDURAL; INFILTRATION; INTRACAUDAL ONCE
Status: COMPLETED | OUTPATIENT
Start: 2020-11-23 | End: 2020-11-23

## 2020-11-23 RX ORDER — METHYLPREDNISOLONE ACETATE 40 MG/ML
40 INJECTION, SUSPENSION INTRA-ARTICULAR; INTRALESIONAL; INTRAMUSCULAR; SOFT TISSUE ONCE
Status: CANCELLED | OUTPATIENT
Start: 2020-11-23 | End: 2020-11-23

## 2020-11-23 RX ADMIN — BUPIVACAINE HYDROCHLORIDE 10 ML: 2.5 INJECTION, SOLUTION EPIDURAL; INFILTRATION; INTRACAUDAL at 12:38

## 2020-11-23 RX ADMIN — METHYLPREDNISOLONE ACETATE 40 MG: 40 INJECTION, SUSPENSION INTRA-ARTICULAR; INTRALESIONAL; INTRAMUSCULAR; SOFT TISSUE at 12:37

## 2020-11-27 DIAGNOSIS — I10 ESSENTIAL HYPERTENSION: ICD-10-CM

## 2020-11-27 RX ORDER — VERAPAMIL HYDROCHLORIDE 120 MG/1
120 CAPSULE, EXTENDED RELEASE ORAL
Qty: 90 CAPSULE | Refills: 1 | Status: SHIPPED | OUTPATIENT
Start: 2020-11-27 | End: 2021-06-14 | Stop reason: SDUPTHER

## 2020-11-30 ENCOUNTER — TELEPHONE (OUTPATIENT)
Dept: PAIN MEDICINE | Facility: CLINIC | Age: 80
End: 2020-11-30

## 2020-12-08 ENCOUNTER — TRANSCRIBE ORDERS (OUTPATIENT)
Dept: PAIN MEDICINE | Facility: CLINIC | Age: 80
End: 2020-12-08

## 2020-12-22 ENCOUNTER — TELEMEDICINE (OUTPATIENT)
Dept: PAIN MEDICINE | Facility: CLINIC | Age: 80
End: 2020-12-22
Payer: MEDICARE

## 2020-12-22 DIAGNOSIS — G89.4 CHRONIC PAIN SYNDROME: Primary | ICD-10-CM

## 2020-12-22 DIAGNOSIS — M54.81 OCCIPITAL NEURALGIA OF RIGHT SIDE: ICD-10-CM

## 2020-12-22 DIAGNOSIS — M50.11 CERVICAL DISC DISORDER WITH RADICULOPATHY OF OCCIPITO-ATLANTO-AXIAL REGION: ICD-10-CM

## 2020-12-22 PROCEDURE — 99442 PR PHYS/QHP TELEPHONE EVALUATION 11-20 MIN: CPT | Performed by: NURSE PRACTITIONER

## 2020-12-31 ENCOUNTER — TELEPHONE (OUTPATIENT)
Dept: PAIN MEDICINE | Facility: CLINIC | Age: 80
End: 2020-12-31

## 2021-01-15 ENCOUNTER — OFFICE VISIT (OUTPATIENT)
Dept: PAIN MEDICINE | Facility: CLINIC | Age: 81
End: 2021-01-15
Payer: MEDICARE

## 2021-01-15 VITALS — HEART RATE: 64 BPM | SYSTOLIC BLOOD PRESSURE: 118 MMHG | DIASTOLIC BLOOD PRESSURE: 74 MMHG

## 2021-01-15 DIAGNOSIS — G50.0 TRIGEMINAL NEURALGIA: ICD-10-CM

## 2021-01-15 DIAGNOSIS — M54.81 OCCIPITAL NEURALGIA OF RIGHT SIDE: ICD-10-CM

## 2021-01-15 DIAGNOSIS — M50.11 CERVICAL DISC DISORDER WITH RADICULOPATHY OF OCCIPITO-ATLANTO-AXIAL REGION: ICD-10-CM

## 2021-01-15 DIAGNOSIS — G89.4 CHRONIC PAIN SYNDROME: Primary | ICD-10-CM

## 2021-01-15 PROCEDURE — 99214 OFFICE O/P EST MOD 30 MIN: CPT | Performed by: NURSE PRACTITIONER

## 2021-01-15 RX ORDER — DULOXETIN HYDROCHLORIDE 30 MG/1
30 CAPSULE, DELAYED RELEASE ORAL DAILY
Qty: 30 CAPSULE | Refills: 0 | Status: SHIPPED | OUTPATIENT
Start: 2021-01-15 | End: 2021-02-05

## 2021-01-15 NOTE — PATIENT INSTRUCTIONS
Trigeminal Neuralgia   WHAT YOU NEED TO KNOW:   What is trigeminal neuralgia? Trigeminal neuralgia (TN) is a problem with your trigeminal nerve that causes severe facial pain  You have a trigeminal nerve on each side of your face  The nerves allow you to feel pain, touch, and temperature changes in different areas of your face  What causes trigeminal neuralgia? The exact cause of your TN may not be known  The following can cause the nerve to send pain messages to your brain:  · Pressure from blood vessels in the head    · Pressure from a tumor or cyst    · Injury to the nerve from head trauma, surgery, or a stroke    · Damage from other diseases, such as multiple sclerosis (MS)    What are the signs and symptoms of trigeminal neuralgia? TN causes sudden, sharp, or burning pain  It normally occurs on one side of your face but can occur on both sides  · Pain attacks that last from 1 second up to 2 minutes and repeat every few minutes to hours    · Pain attacks that get worse over time    · Pain that is so severe you cannot eat, drink, or speak    · Spasms in your facial muscles during your pain attack    · Days to years without attacks    What can trigger a pain attack? Most TN pain attacks are brought on by touching a trigger area on your face:  · Eating or drinking    · Smiling, yawning, or talking    · Shaving or washing your face    · Putting on makeup or combing your hair    · Wind or temperature changes    · Noise or lights    How is trigeminal neuralgia diagnosed? Your healthcare provider will ask about your symptoms and when they started  Tell him if you have any close family members with TN  Your healthcare provider will look at your head, neck, mouth, jaws, and teeth  You may also need any of the following tests:  · CT scan: This is also called a CAT scan  An x-ray machine uses a computer to take pictures of your head  It may be used to look at bones, muscles, and blood vessels   You may be given dye through an IV to help your healthcare provider see the pictures better  Tell the healthcare provider if you are allergic to iodine or seafood  You may also be allergic to the dye  · MRI:  Pictures are taken of your head so healthcare providers can examine your trigeminal nerve  You will need to lie still during an MRI  Never enter the MRI room with any metal objects  This can cause serious injury  · MRA:  MRA is a test used to look at the blood vessels in your brain  This test may show if a blood vessel is pressing on your trigeminal nerve  An MRA may be done with an MRI to help healthcare providers better understand your TN  How is trigeminal neuralgia treated? Your TN may go away on its own without treatment  If your TN is caused by another condition, your healthcare provider will also treat that condition  · Medicines:      ? Anticonvulsants: These control seizures, help prevent pain attacks, and decrease symptoms  ? Antidepressants: These decrease pain and help prevent depression  ? Muscle relaxers:  When your facial muscles are relaxed, you may be less likely to have pain attacks  ? Pain medicines: You may be given pain medicines if your facial pain is severe  · Procedures: You may need a procedure or surgery to treat your TN if it does not get better with medicines  A procedure or surgery may also be needed if you cannot take the medicines used to treat TN  ? Nerve block: This is an injection of medicine that makes you lose feeling in an area of your body  You may need a nerve block if your pain is not going away, or is getting worse  A nerve block may also be used to make you lose feeling in an area before a procedure is done  ? Microvascular decompression: This is surgery to separate your trigeminal nerve from the blood vessel pressing on it  ? Percutaneous procedures:   These procedures help control your pain by destroying an area deep in your skull where nerve branches come together  Healthcare providers will insert a needle and tube through the base of your skull to reach this area  ? Peripheral techniques:  Peripheral techniques are done to block the nerve impulses that cause your pain attacks  Your healthcare provider may destroy the nerve with alcohol (medicine) injections, or he may freeze the nerve  He may also use surgery to remove part of the nerve  ? Stereotactic radiosurgery: This is also called Gamma Knife surgery  Radiation beams are used to remove a blood vessel that is pressing on the nerve  You may have some pain relief right away after radiosurgery  It may take at least 1 month before you have decreased pain  What are the risks of trigeminal neuralgia? · Medicines used to treat your TN can cause organ damage  Medicines may not help your TN  Over time, some medicines may stop working  Procedures and surgeries to treat TN can cause facial pain or damage  You may have changes in your vision, hearing, memory, speech, or sense of smell or taste  You may have trouble chewing or get mouth sores  You may get an infection, or have brain and nerve damage, seizures, a stroke, or even death  After treatment with a procedure or surgery, your TN may come back, or you may have new TN pain  · If your TN is not treated, your pain attacks may get worse and occur more often  If a growth or other medical problem is causing your TN pain, you may not get proper treatment  You may begin to have a dull, constant ache in areas of your face  Your TN pain, and fear of an attack, may be so bad that you stop brushing your teeth, eating, and drinking  This may lead to dental problems, poor nutrition, weight loss, and dehydration  You may feel tired, anxious, or depressed  How can I help manage my trigeminal neuralgia? · Keep your medicines nearby:  Even if you have not had TN symptoms for a long time, keep your medicine nearby   If your symptoms return, contact your healthcare provider before you start taking your medicines again  · Take your medicines as directed:  Do not stop taking your medicines without talking with your healthcare provider first  Onel Seaman may have a bad reaction if you stop suddenly  Where can I find more information? · Trigeminal Neural[employer] Association  [address]  Phone: 5- 620 - [phone]  Web Address: Sharmila Herrera  [facility]    When should I contact my healthcare provider? · The medicines you are taking are not decreasing your TN pain  · You feel worried or depressed and find it hard to do your daily activities  · You have headaches, mouth sores, an upset stomach, or diarrhea  · Your TN pain feels worse, different, or moves to another area of your face  When should I seek immediate care or call 911? · You have a fever, stiff neck, or develop a rash or peeling skin  · You have clear or yellow fluid leaking from your procedure or surgery site  · You are feeling so depressed you want to harm yourself  · You are confused and cannot think clearly  · You are not eating or drinking, and you are losing weight  · You have eye pain, eye numbness, or sudden vision or hearing changes  · You have sudden dizziness, or problems with movement, weakness, or numbness in your face  CARE AGREEMENT:   You have the right to help plan your care  Learn about your health condition and how it may be treated  Discuss treatment options with your healthcare providers to decide what care you want to receive  You always have the right to refuse treatment  The above information is an  only  It is not intended as medical advice for individual conditions or treatments  Talk to your doctor, nurse or pharmacist before following any medical regimen to see if it is safe and effective for you    © Copyright [employer] Information is for End User's use only and may not be sold, redistributed or otherwise used for commercial purposes   All illustrations and images included in CareNotes® are the copyrighted property of A D A M , Inc  or [facility] Rosana Bermeo

## 2021-01-15 NOTE — PROGRESS NOTES
Assessment:  1  Chronic pain syndrome    2  Cervical disc disorder with radiculopathy of nhcfzkht-[id number]-ohsmf region    3  Trigeminal neuralgia    4  Occipital neuralgia of right side        Plan:  Anjana Abrams is a 79 y o  female who was last seen 12/22/2020 via telemedicine  presents for a follow up office visit in regards to chronic pain syndrome secondary to cervical radiculopathy, occipital neuralgia, and trigeminal neuralgia  I discussed with the patient and her son that we do not prescribe Tegretol through [facility] for the treatment of trigeminal neuralgia  The patient's son is adamant that the patient not continue with opioid medications, and that her pain symptoms are appropriately addressed by someone who can treat her trigeminal neuralgia  I discussed with the patient and her son, that as she has experienced some relief from glycerol rhizotomy in the past to treat her trigeminal neuralgia, that we could refer her to Neurosurgery for evaluation  The patient was agreeable, an order was placed for a neurosurgery consult with Dr Yehuda Sorenson  I also discussed with the patient repeating the occipital nerve block  I also discussed with the patient increase in the duloxetine to better help with her neuropathic pain symptoms  The patient is currently on 60 mg of duloxetine daily  I will increase the duloxetine to 90 mg daily  A prescription for 30 mg tablets were electronically sent to the patient's pharmacy on file to taking conjunction with the 60 mg tablet  I advised the patient that if she experiences any side effects or issues with the changes in her medication regimen, she should give our office a call to discuss  I also advised the patient not to drive or operate machinery until she sees how the changes in her medication regimen affects her  The patient was agreeable and verbalized understanding      The patient will follow-up to schedule repeat occipital nerve blocks, as needed and after evaluation by neurosurgery and in 4 weeks for med check or sooner if symptoms worsen in the interim  The patient was agreeable and verbalized understanding        My impressions and treatment recommendations were discussed in detail with the patient who verbalized understanding and had no further questions  Discharge instructions were provided  I personally saw and examined the patient and I agree with the above discussed plan of care  Orders Placed This Encounter   Procedures    Ambulatory referral to Neurosurgery     Standing Status:   Future     Standing Expiration Date:   1/15/2022     Referral Priority:   Routine     Referral Type:   Consult - AMB     Referral Reason:   Specialty Services Required     Referred to Provider:   Debbie Johns MD     Requested Specialty:   Neurosurgery     Number of Visits Requested:   1     Expiration Date:   1/15/2022     New Medications Ordered This Visit   Medications    DULoxetine (CYMBALTA) 30 mg delayed release capsule     Sig: Take 1 capsule (30 mg total) by mouth daily     Dispense:  30 capsule     Refill:  0     The patient will take in conjunction with the 60 mg cap for a total of 90 mg daily       History of Present Illness:  Mahin Bird is a 79 y o  female who was last seen 12/22/2020 via telemedicine  presents for a follow up office visit in regards to chronic pain syndrome secondary to cervical radiculopathy, occipital neuralgia, and trigeminal neuralgia  The patients current symptoms include Shoulder Pain (bilateral shoulder), Facial Pain (right side of forehead and cheek bone), and Headache (right side)  The patient previously underwent bilateral occipital nerve blocks on 11/23/2020 which provided approximately 50% relief of her pain symptoms  At this time, the patient reports severe pain in the right orbital area, above her eyebrow and along her right cheek bone that radiates to her right ear    The patient has a history of trigeminal neuralgia, which was treated with glycerol rhizotomy trigeminal nerves  V1-V3 on 3/26/2019, which is noted to have improved her pain symptoms  The patient describes her pain as constant morning pain that is sharp, throbbing, pressure-like pain  The patient's son is interested in the patient using Tegretol to help with her pain symptoms, an does not feel as if it is appropriate to continue his mother on opioid medications for her pain symptoms without directly addressing her pain  The patient currently reports her pain as 4/10 on numeric rating scale  I have personally reviewed and/or updated the patient's past medical history, past surgical history, family history, social history, current medications, allergies, and vital signs today  Review of Systems   Respiratory: Negative for shortness of breath  Cardiovascular: Negative for chest pain  Gastrointestinal: Positive for constipation  Negative for diarrhea, nausea and vomiting  Musculoskeletal: Positive for arthralgias and myalgias  Negative for gait problem and joint swelling  Skin: Negative for rash  Neurological: Negative for dizziness, seizures and weakness  All other systems reviewed and are negative        Patient Active Problem List   Diagnosis    Chronic nonintractable headache    Trigeminal neuralgia    Occipital neuralgia of right side    Trigeminal neuropathy    Myofascial pain on right side    Other chronic pain    Cervical disc disorder with radiculopathy of kzjuiggi-dtzxkox-cnjml region    Mild recurrent major depression ([address] )    Essential hypertension    Mixed hyperlipidemia    Insomnia       Past Medical History:   Diagnosis Date    Arthritis     Basal cell carcinoma     Dry eye syndrome     Foraminal stenosis of cervical region     Macular degeneration     Memory loss     Migraine     Neck pain     Spinal stenosis of lumbar region     Squamous cell skin cancer     Supraventricular tachycardia (HCC)     Vitamin D deficiency last assessed 2/7/17       Past Surgical History:   Procedure Laterality Date    APPENDECTOMY      BACK SURGERY      lower back surgery lumbar disc    CATARACT EXTRACTION      FEMUR FRACTURE SURGERY      TX STEREO TREAT TRIGEMINAL NERVE Right 3/26/2019    Procedure: GLYCEROL RHIZOTOMY TRIGEMINAL NERVES (V1-V3), RIGHT;  Surgeon: Tejinder Goldman MD;  Location: [facility];  Service: Neurosurgery    REPLACEMENT TOTAL KNEE Right     REPLACEMENT TOTAL KNEE Left     SKIN BIOPSY         Family History   Problem Relation Age of Onset    Aortic aneurysm Mother         abdominal aortic aneurysm    Hypertension Mother    Sean Mensah Breast cancer Mother    Sean Mensah Hypertension Father     Hypertension Sister     Hyperlipidemia Sister     Squamous cell carcinoma Sister        Social History     Occupational History    Occupation: retired   Tobacco Use    Smoking status: Former Smoker    Smokeless tobacco: Never Used   Substance and Sexual Activity    Alcohol use: Yes     Comment: occasional    Drug use: No    Sexual activity: Not on file       Current Outpatient Medications on File Prior to Visit   Medication Sig    Acetaminophen 325 MG CAPS Take 650 mg by mouth every 6 (six) hours as needed    aspirin (ECOTRIN LOW STRENGTH) 81 mg EC tablet Take 81 mg by mouth daily    Cholecalciferol (VITAMIN D PO) Take 5,000 Units by mouth Daily      cycloSPORINE (RESTASIS) 0 05 % ophthalmic emulsion Apply 1 drop to eye 2 (two) times a day    DULoxetine (CYMBALTA) 60 mg delayed release capsule TAKE 1 CAPSULE BY MOUTH  DAILY    Multiple Vitamins-Minerals (PRESERVISION AREDS 2+MULTI VIT) CAPS BID    verapamil (VERELAN PM) 120 MG 24 hr capsule Take 1 capsule (120 mg total) by mouth daily at bedtime    oxyCODONE-acetaminophen (PERCOCET) 7 5-325 MG per tablet Take 1 tablet by mouth 3 (three) times a day as needed for moderate painMax Daily Amount: 3 tablets (Patient not taking: Reported on 1/15/2021)     No current facility-administered medications on file prior to visit  Allergies   Allergen Reactions    Dilaudid [Hydromorphone] Shortness Of Breath     Other reaction(s): Respiratory Distress  Other reaction(s): HORENESS    Penicillins Rash     Rash       Physical Exam:    /74   Pulse 64     Constitutional:normal, well developed, well nourished, alert, in no distress and non-toxic and no overt pain behavior  and overweight  Eyes:anicteric  HEENT:grossly intact  Neck:supple, symmetric, trachea midline and no masses   Pulmonary:even and unlabored  Cardiovascular:No edema or pitting edema present  Skin:Normal without rashes or lesions and well hydrated  Psychiatric:Mood and affect appropriate  Neurologic:Cranial Nerves II-XII grossly intact  Musculoskeletal:in wheelchair   Tenderness to the right orbital above the eyebrow and right cheek bone      Imaging

## 2021-01-19 ENCOUNTER — TELEPHONE (OUTPATIENT)
Dept: NEUROSURGERY | Facility: CLINIC | Age: 81
End: 2021-01-19

## 2021-01-19 ENCOUNTER — TRANSCRIBE ORDERS (OUTPATIENT)
Dept: PAIN MEDICINE | Facility: CLINIC | Age: 81
End: 2021-01-19

## 2021-01-19 NOTE — TELEPHONE ENCOUNTER
Returned call to son reports exacerbation of TN symptoms right face as well as occipital neuralgia   Onset several weeks ago    Severity 9/10   Pain is so severe she is bed bound , nothing is relieving the pain it is relentless  Status post sx GLYCEROL RHIZOTOMY TRIGEMINAL NERVES (V1-V3), RIGHT (Right Face)--3/26/19     DR Tristin Robbins has treated ccipital neuralgia      Appointment scheduled for Thursday 1/21/21 @ 1400

## 2021-01-21 ENCOUNTER — OFFICE VISIT (OUTPATIENT)
Dept: NEUROSURGERY | Facility: CLINIC | Age: 81
End: 2021-01-21
Payer: MEDICARE

## 2021-01-21 VITALS
HEART RATE: 88 BPM | SYSTOLIC BLOOD PRESSURE: 134 MMHG | BODY MASS INDEX: 30.29 KG/M2 | DIASTOLIC BLOOD PRESSURE: 88 MMHG | HEIGHT: 65 IN | TEMPERATURE: 99 F

## 2021-01-21 DIAGNOSIS — G50.0 TRIGEMINAL NEURALGIA: ICD-10-CM

## 2021-01-21 PROCEDURE — 99215 OFFICE O/P EST HI 40 MIN: CPT | Performed by: NEUROLOGICAL SURGERY

## 2021-01-21 RX ORDER — CHLORHEXIDINE GLUCONATE 0.12 MG/ML
15 RINSE ORAL ONCE
Status: CANCELLED | OUTPATIENT
Start: 2021-01-21 | End: 2021-01-21

## 2021-01-21 RX ORDER — VANCOMYCIN HYDROCHLORIDE 1 G/200ML
1000 INJECTION, SOLUTION INTRAVENOUS ONCE
Status: CANCELLED | OUTPATIENT
Start: 2021-01-21 | End: 2021-01-21

## 2021-01-21 NOTE — PROGRESS NOTES
Assessment/Plan:    No problem-specific Assessment & Plan notes found for this encounter  Patient is stable  Symptoms, as detailed in HPI, continue to significantly impact of patient's quality of life in daily activities  After carefully considering presentation, investigations, functional status and co-morbidities, the risk/benefit profile of surgical intervention is favorable  History, physical examination and diagnostic tests were reviewed and questions answered  Diagnosis, care plan and treatment options were discussed  The patient understand instructions and will follow up as directed  Patient with recurrent facial pain on the right  Will redo glycerol rhizotomy     Expected postoperative course, including activity restrictions, expected pain and postoperative medication were reviewed  Patient provided verbal consent to surgical procedure and signed consent form: Yes    We also discussed the risks and benefits of the procedure the risks being including: infection (~2%), neurologic injury (<1%), new pain, revisions surgery, failure to relieve pain, increased pain  The benefits including relief of pain  The patient stated understanding of the risks and benefits and agreed to proceed  Diagnoses and all orders for this visit:    Trigeminal neuralgia  -     Ambulatory referral to Neurosurgery  -     Case request operating room: [address], RIGHT; Standing  -     Case request operating room: [address], RIGHT    Other orders  -     Diet NPO; Sips with meds; Standing  -     Nursing Communication [facility] Interventions Implemented; Standing  -     Nursing Communication CHG bath, have staff wash entire body (neck down) per pre-op bathing protocol  Routine, evening prior to, and day of surgery ; Standing  -     Nursing Communication Swab both nares with Povidone-Iodine solution, EXCLUDE if patient has shellfish/Iodine allergy   Routine, day of surgery, on call to OR; Standing  -     chlorhexidine (PERIDEX) 0 12 % oral rinse 15 mL  -     Void on call to OR; Standing  -     Insert peripheral IV; Standing  -     vancomycin (VANCOCIN) IVPB (premix in dextrose) 1,000 mg 200 mL        I have spent 40 minutes with Patient and family today in which greater than 50% of this time was spent in counseling/coordination of care regarding Diagnostic results, Prognosis, Risks and benefits of tx options, Intructions for management, Patient and family education, Importance of tx compliance, Risk factor reductions and Impressions  Subjective:      Patient ID: Mercy Hilliard is a 79 y o  female  Patient is a 79year old female with symptoms of right facial pain  Pain is severe and sharp in quality  Pain distribution is right face and bilateral posterior scalp    Pain is improved by prior glycerol rhizotomy  The pain has been present for several years  Pain has associated distress  Patient takes oral medications without relief  The following portions of the patient's history were reviewed and updated as appropriate: She  has a past medical history of Arthritis, Basal cell carcinoma, Dry eye syndrome, Foraminal stenosis of cervical region, Macular degeneration, Memory loss, Migraine, Neck pain, Spinal stenosis of lumbar region, Squamous cell skin cancer, Supraventricular tachycardia ([address] ), and Vitamin D deficiency    She   Patient Active Problem List    Diagnosis Date Noted    Insomnia 08/07/2020    Mild recurrent major depression ([address] ) 05/05/2020    Essential hypertension 05/05/2020    Mixed hyperlipidemia 05/05/2020    Cervical disc disorder with radiculopathy of xfdrhhpd-gpupfof-iydiq region     Other chronic pain 02/21/2019    Trigeminal neuralgia 09/13/2018    Occipital neuralgia of right side 09/13/2018    Trigeminal neuropathy 09/13/2018    Myofascial pain on right side 09/13/2018    Chronic nonintractable headache 08/20/2018     She  has a past surgical history that includes Cataract extraction; Replacement total knee (Right); Appendectomy; Femur fracture surgery; Back surgery; Replacement total knee (Left); pr stereo treat trigeminal nerve (Right, 3/26/2019); and Skin biopsy  Her family history includes Aortic aneurysm in her mother; Breast cancer in her mother; Hyperlipidemia in her sister; Hypertension in her father, mother, and sister; Squamous cell carcinoma in her sister  She  reports that she has quit smoking  She has never used smokeless tobacco  She reports current alcohol use  She reports that she does not use drugs  Current Outpatient Medications   Medication Sig Dispense Refill    Acetaminophen 325 MG CAPS Take 650 mg by mouth every 6 (six) hours as needed      Cholecalciferol (VITAMIN D PO) Take 5,000 Units by mouth Daily        cycloSPORINE (RESTASIS) 0 05 % ophthalmic emulsion Apply 1 drop to eye 2 (two) times a day      DULoxetine (CYMBALTA) 30 mg delayed release capsule Take 1 capsule (30 mg total) by mouth daily 30 capsule 0    DULoxetine (CYMBALTA) 60 mg delayed release capsule TAKE 1 CAPSULE BY MOUTH  DAILY 90 capsule 3    Multiple Vitamins-Minerals (PRESERVISION AREDS 2+MULTI VIT) CAPS BID      verapamil (VERELAN PM) 120 MG 24 hr capsule Take 1 capsule (120 mg total) by mouth daily at bedtime 90 capsule 1    oxyCODONE-acetaminophen (PERCOCET) 7 5-325 MG per tablet Take 1 tablet by mouth 3 (three) times a day as needed for moderate painMax Daily Amount: 3 tablets (Patient not taking: Reported on 1/15/2021) 90 tablet 0     No current facility-administered medications for this visit        Current Outpatient Medications on File Prior to Visit   Medication Sig    Acetaminophen 325 MG CAPS Take 650 mg by mouth every 6 (six) hours as needed    Cholecalciferol (VITAMIN D PO) Take 5,000 Units by mouth Daily      cycloSPORINE (RESTASIS) 0 05 % ophthalmic emulsion Apply 1 drop to eye 2 (two) times a day    DULoxetine (CYMBALTA) 30 mg delayed release capsule Take 1 capsule (30 mg total) by mouth daily    DULoxetine (CYMBALTA) 60 mg delayed release capsule TAKE 1 CAPSULE BY MOUTH  DAILY    Multiple Vitamins-Minerals (PRESERVISION AREDS 2+MULTI VIT) CAPS BID    verapamil (VERELAN PM) 120 MG 24 hr capsule Take 1 capsule (120 mg total) by mouth daily at bedtime    [DISCONTINUED] aspirin (ECOTRIN LOW STRENGTH) 81 mg EC tablet Take 81 mg by mouth daily    oxyCODONE-acetaminophen (PERCOCET) 7 5-325 MG per tablet Take 1 tablet by mouth 3 (three) times a day as needed for moderate painMax Daily Amount: 3 tablets (Patient not taking: Reported on 1/15/2021)     No current facility-administered medications on file prior to visit  She is allergic to dilaudid [hydromorphone] and penicillins       Review of Systems   Constitutional: Negative  HENT: Negative  Eyes: Negative  Respiratory: Negative  Cardiovascular: Negative  Gastrointestinal: Negative  Endocrine: Negative  Genitourinary: Negative  Musculoskeletal: Positive for neck pain (back of neck )  Skin: Negative  Allergic/Immunologic: Negative  Neurological:        Neck and head pain  Bilateral,constant   Hematological: Negative  Psychiatric/Behavioral: Negative  I have personally reviewed all aspects of the review of systems as documented    Objective:      /88 (BP Location: Left arm)   Pulse 88   Temp 99 °F (37 2 °C) (Tympanic)   Ht 5' 5" (1 651 m)   BMI 30 29 kg/m²          Physical Exam  Constitutional:       General: She is not in acute distress  Appearance: Normal appearance  She is normal weight  She is not ill-appearing  HENT:      Head: Normocephalic and atraumatic  Eyes:      General:         Right eye: No discharge  Left eye: No discharge  Extraocular Movements: Extraocular movements intact  Conjunctiva/sclera: Conjunctivae normal       Pupils: Pupils are equal, round, and reactive to light     Cardiovascular: Rate and Rhythm: Normal rate  Pulmonary:      Effort: Pulmonary effort is normal  No respiratory distress  Skin:     General: Skin is warm and dry  Neurological:      General: No focal deficit present  Mental Status: She is alert and oriented to person, place, and time  Mental status is at baseline  Psychiatric:         Mood and Affect: Mood normal          Thought Content:  Thought content normal

## 2021-02-04 ENCOUNTER — TELEPHONE (OUTPATIENT)
Dept: PAIN MEDICINE | Facility: CLINIC | Age: 81
End: 2021-02-04

## 2021-02-04 ENCOUNTER — TELEPHONE (OUTPATIENT)
Dept: PAIN MEDICINE | Facility: MEDICAL CENTER | Age: 81
End: 2021-02-04

## 2021-02-04 DIAGNOSIS — F41.8 DEPRESSION WITH ANXIETY: ICD-10-CM

## 2021-02-04 DIAGNOSIS — G89.29 OTHER CHRONIC PAIN: ICD-10-CM

## 2021-02-04 NOTE — TELEPHONE ENCOUNTER
Pt contacted Call Center requested refill of their medication          Medication Name:  Duloxetine     Dosage of Med:  30 mg     Frequency of Med:    1 tab daily   Remaining Medication:  1 week     Pharmacy and Location:    [facility]

## 2021-02-05 RX ORDER — DULOXETIN HYDROCHLORIDE 60 MG/1
CAPSULE, DELAYED RELEASE ORAL
Qty: 120 CAPSULE | Refills: 1 | Status: SHIPPED | OUTPATIENT
Start: 2021-02-05 | End: 2021-02-19 | Stop reason: SDUPTHER

## 2021-02-05 NOTE — TELEPHONE ENCOUNTER
I s/w pt and made her aware of the same as stated in Jennifer's task  Pt understands that she will be inc the duloxetine to 06 mg (2) caps daily  Pt told to finish using up her old script until it's done  Pt aware new script sent to her [facility]

## 2021-02-05 NOTE — TELEPHONE ENCOUNTER
I s/w pt and her son was in the back ground, they need a Rf for the duloxetine 30 mg strength sent to her [facility], she has 1 week supply left  I asked pt how the new combination was working the duloxetine 60 mg daily along with the 30 mg daily for a total of 90 mg per day  Pt said the 90 mg daily is working very well but she is asking for it to be increased further  She said that possibility was discussed by [facility]  Pt denies any s/e from this dosing  Told pt I will forward update to [facility] and ask for her if it can be increased further at their request   Uses [facility]

## 2021-02-05 NOTE — TELEPHONE ENCOUNTER
The patient may increase to duloxetine 120 mg daily  I sent a new Rx over for duloxetine 60 mg, 2 caps daily  The patient may continue to use the current Rx until it is done  Thanks!

## 2021-02-15 ENCOUNTER — TELEPHONE (OUTPATIENT)
Dept: PAIN MEDICINE | Facility: CLINIC | Age: 81
End: 2021-02-15

## 2021-02-16 ENCOUNTER — TELEPHONE (OUTPATIENT)
Dept: PAIN MEDICINE | Facility: CLINIC | Age: 81
End: 2021-02-16

## 2021-02-16 NOTE — TELEPHONE ENCOUNTER
I have called patient multiple times over the past 15 minutes to the number listed in her chart [phone]  The line has been busy since 2:30 pm   I left a message with her son Zane Flores, to please call to let me know a better number to reach his mother  The second number listed in her chart  is an incorrect number  If patient does not return call before 3:30 pm please reschedule the appointment

## 2021-02-16 NOTE — TELEPHONE ENCOUNTER
Pt rescheduled for 2/19 telephone virtual appmt- pt stated that she has someone coming to see her at her home shortly        [phone]

## 2021-02-19 ENCOUNTER — TELEMEDICINE (OUTPATIENT)
Dept: PAIN MEDICINE | Facility: CLINIC | Age: 81
End: 2021-02-19
Payer: MEDICARE

## 2021-02-19 DIAGNOSIS — F41.8 DEPRESSION WITH ANXIETY: ICD-10-CM

## 2021-02-19 DIAGNOSIS — G89.29 OTHER CHRONIC PAIN: ICD-10-CM

## 2021-02-19 DIAGNOSIS — G89.4 CHRONIC PAIN SYNDROME: Primary | ICD-10-CM

## 2021-02-19 DIAGNOSIS — M54.81 BILATERAL OCCIPITAL NEURALGIA: ICD-10-CM

## 2021-02-19 DIAGNOSIS — M50.11 CERVICAL DISC DISORDER WITH RADICULOPATHY OF OCCIPITO-ATLANTO-AXIAL REGION: ICD-10-CM

## 2021-02-19 DIAGNOSIS — M54.81 OCCIPITAL NEURALGIA OF RIGHT SIDE: ICD-10-CM

## 2021-02-19 DIAGNOSIS — G50.0 TRIGEMINAL NEURALGIA: ICD-10-CM

## 2021-02-19 PROCEDURE — 99442 PR PHYS/QHP TELEPHONE EVALUATION 11-20 MIN: CPT | Performed by: NURSE PRACTITIONER

## 2021-02-19 RX ORDER — DULOXETIN HYDROCHLORIDE 60 MG/1
CAPSULE, DELAYED RELEASE ORAL
Qty: 120 CAPSULE | Refills: 2 | Status: SHIPPED | OUTPATIENT
Start: 2021-02-19 | End: 2021-06-14 | Stop reason: SDUPTHER

## 2021-02-19 NOTE — PROGRESS NOTES
Virtual Brief Visit    Assessment/Plan:  Cherri Maddox is a 79 y o  female who was last seen 1/15/2021 in regards to chronic pain syndrome secondary to cervical disc disorder with radiculopathy, occipital neuralgia, and trigeminal neuralgia  I discussed with the patient that we could move forward with the bilateral occipital nerve blocks as she did experience at least 50% relief of her pain symptoms  The most common risks of the procedure were reviewed with the patient, who would like to proceed  An order was placed for bilateral occipital nerve blocks under ultrasound guidance  Complete risk and benefits including bleeding, infection, tissue reaction, nerve injury, and allergic reaction were discussed  The patient will continue with duloxetine 120 mg by mouth daily as prescribed  A prescription was electronically sent to the patient's pharmacy on file  The patient will follow-up after bilateral occipital nerve blocks or sooner if symptoms worsen in the interim  The patient was agreeable and verbalized understanding    Problem List Items Addressed This Visit        Nervous and Auditory    Trigeminal neuralgia    Cervical disc disorder with radiculopathy of inxfslcv-igusxmp-ucmzq region       Other    Occipital neuralgia of right side      Other Visit Diagnoses     Chronic pain syndrome    -  Primary                Reason for visit is   Chief Complaint   Patient presents with    Virtual Brief Visit        Encounter provider CL Vargas    Provider located at [address] [place] [id number]    Recent Visits  Date Type Provider Dept   02/16/21 Telephone Tiffanie Sloan, [address]   02/15/21 Telephone [facility]   Showing recent visits within past 7 days and meeting all other requirements     Today's Visits  Date Type Provider Dept   02/19/21 Telemedicine Ever Peals [facility]ana today's visits and meeting all other requirements     Future Appointments  No visits were found meeting these conditions  Showing future appointments within next 150 days and meeting all other requirements        After connecting through telephone, the patient was identified by name and date of birth  Jayden Anne was informed that this is a telemedicine visit and that the visit is being conducted through telephone  My office door was closed  No one else was in the room  She acknowledged consent and understanding of privacy and security of the platform  The patient has agreed to participate and understands she can discontinue the visit at any time  It was my intent to perform this visit via video technology but the patient was not able to do a video connection so the visit was completed via audio telephone only  Patient is aware this is a billable service  Subjective    Jayden Anne is a 79 y o  female who was last seen 1/15/2021 in regards to chronic pain syndrome secondary to cervical disc disorder with radiculopathy, occipital neuralgia, and trigeminal neuralgia  Since the last office visit, the patient was seen by Dr Abigail Us who recommended repeating the glycerol rhizotomy to help with the patient's pain symptoms; however, the patient reports she does not want to move forward with the procedure as she is uncertain as to whether the procedure will be beneficial , in the long run, with helping her pain symptoms  The patient reports improvement in her pain symptoms with the increase of duloxetine to 120 mg daily  The patient reports she would like to repeat the bilateral occipital nerve blocks as she feels this is the most prominent source of her pain    HPI     Past Medical History:   Diagnosis Date    Arthritis     Basal cell carcinoma     Dry eye syndrome     Foraminal stenosis of cervical region     Macular degeneration     Memory loss     Migraine     Neck pain     Spinal stenosis of lumbar region     Squamous cell skin cancer     Supraventricular tachycardia ([address] )     Vitamin D deficiency     last assessed 2/7/17       Past Surgical History:   Procedure Laterality Date    APPENDECTOMY      BACK SURGERY      lower back surgery lumbar disc    CATARACT EXTRACTION      FEMUR FRACTURE SURGERY      [place] STEREO TREAT TRIGEMINAL NERVE Right 3/26/2019    Procedure: GLYCEROL RHIZOTOMY TRIGEMINAL NERVES (V1-V3), RIGHT;  Surgeon: Elizabeth Machado MD;  Location: [facility];  Service: Neurosurgery    REPLACEMENT TOTAL KNEE Right     REPLACEMENT TOTAL KNEE Left     SKIN BIOPSY         Current Outpatient Medications   Medication Sig Dispense Refill    Acetaminophen 325 MG CAPS Take 650 mg by mouth every 6 (six) hours as needed      Cholecalciferol (VITAMIN D PO) Take 5,000 Units by mouth Daily        cycloSPORINE (RESTASIS) 0 05 % ophthalmic emulsion Apply 1 drop to eye 2 (two) times a day      DULoxetine (CYMBALTA) 60 mg delayed release capsule Take 2 caps by mouth daily 120 capsule 1    Multiple Vitamins-Minerals (PRESERVISION AREDS 2+MULTI VIT) CAPS BID      oxyCODONE-acetaminophen (PERCOCET) 7 5-325 MG per tablet Take 1 tablet by mouth 3 (three) times a day as needed for moderate painMax Daily Amount: 3 tablets (Patient not taking: Reported on 1/15/2021) 90 tablet 0    verapamil (VERELAN PM) 120 MG 24 hr capsule Take 1 capsule (120 mg total) by mouth daily at bedtime 90 capsule 1     No current facility-administered medications for this visit  Allergies   Allergen Reactions    Dilaudid [Hydromorphone] Shortness Of Breath     Other reaction(s): Respiratory Distress  Other reaction(s): HORENESS    Penicillins Rash     Rash       Review of Systems    There were no vitals filed for this visit        I spent 12 minutes with patient today in which greater than 50% of the time was spent in counseling/coordination of care regarding treatment planning and medication management    VIRTUAL VISIT DISCLAIMER    Geovany Rizzo acknowledges that she has consented to an online visit or consultation  She understands that the online visit is based solely on information provided by her, and that, in the absence of a face-to-face physical evaluation by the physician, the diagnosis she receives is both limited and provisional in terms of accuracy and completeness  This is not intended to replace a full medical face-to-face evaluation by the physician  Geovany Rizzo understands and accepts these terms

## 2021-02-19 NOTE — PATIENT INSTRUCTIONS
Sympathetic Nerve Block   AMBULATORY CARE:   What you need to know about a sympathetic nerve block:  A sympathetic nerve block is an injection of anesthesia medicine around nerves near your spine  You may need a sympathetic nerve block to locate an area of pain or to relieve nerve pain  It may be used to help your healthcare provider guide your treatment  You may need a series of injections if your pain or condition improves after the first  If you have a clotting disorder, you may not be able to receive a nerve block  How to prepare for a sympathetic nerve block:  Tell your healthcare provider if you or anyone in your family has ever had problems with anesthesia  You may need to arrange to have someone drive you home after you have the nerve block  Your healthcare provider will talk to you about how to prepare  He may tell you not to eat or drink anything after midnight on the day of your nerve block  Tell him about all medicines you are taking, including vitamins and herbs  He will tell you which medicines to take or not take on the day of your nerve block  You may need to stop taking certain medicines several days before your nerve block  What will happen during a sympathetic nerve block: Your healthcare provider will ask you to rate your symptoms before the procedure  You will remain awake and alert during the procedure so that you can answer questions  Your healthcare provider will find the area of the nerve group to be treated  He may use fluoroscopy (an x-ray that produces moving images), CT scans, or an ultrasound  He will inject the anesthesia into the nerve group area  He will again ask you to rate your symptoms  He will also ask you to keep a record of your symptoms and activities  He will also ask you to write down when your symptoms return  What will happen after a sympathetic nerve block: You may be able to go home after feeling returns in the numbed area   Your healthcare provider may tell you to rest the day after the nerve block and then return to your normal activities  Risks of a sympathetic nerve block: You may have an allergic reaction, numbness, weakness, or difficulty walking  Your blood pressure may get too high or too low  You may have anxiety, dizziness, or seizures if the anesthesia gets into your blood vessels  You may have bleeding, or a blood clot may form if the needle goes into a blood vessel  The needle may also go into other parts of your body and cause damage  If the needle goes into a nerve, you may feel burning, tingling, prickling, or severe pain  Call 911 if:   · You have a seizure  · You have trouble breathing  Seek care immediately if:   · You develop hives or swelling  · You have severe pain  Contact your healthcare provider if:   · You are dizzy or have ringing in your ears  · You feel numbness or tingling around your mouth  · You have numbness or weakness in a limb 8 hours or more after your procedure  · You continue to bleed at the site of the needle insertion  · You have questions or concerns about sympathetic nerve blocks  Self-care:   · Decrease your activity the day of your procedure  Do not return to work until your healthcare provider says it is okay  · Be careful if your leg is numb  Your leg may not be able to support your weight  Do not drive until numbness is gone  Follow up with your healthcare provider as directed:  Bring your symptom record to your follow-up visits  Write down your questions so you remember to ask them during your visits  © Copyright [address] Information is for End User's use only and may not be sold, redistributed or otherwise used for commercial purposes  All illustrations and images included in CareNotes® are the copyrighted property of [employer]  or [facility] Rosana Cuevas   The above information is an  only   It is not intended as medical advice for individual conditions or treatments  Talk to your doctor, nurse or pharmacist before following any medical regimen to see if it is safe and effective for you

## 2021-03-01 ENCOUNTER — PROCEDURE VISIT (OUTPATIENT)
Dept: PAIN MEDICINE | Facility: CLINIC | Age: 81
End: 2021-03-01
Payer: MEDICARE

## 2021-03-01 ENCOUNTER — TRANSCRIBE ORDERS (OUTPATIENT)
Dept: PAIN MEDICINE | Facility: CLINIC | Age: 81
End: 2021-03-01

## 2021-03-01 DIAGNOSIS — M54.81 BILATERAL OCCIPITAL NEURALGIA: Primary | ICD-10-CM

## 2021-03-01 PROCEDURE — 64405 NJX AA&/STRD GR OCPL NRV: CPT | Performed by: ANESTHESIOLOGY

## 2021-03-01 RX ORDER — METHYLPREDNISOLONE ACETATE 40 MG/ML
40 INJECTION, SUSPENSION INTRA-ARTICULAR; INTRALESIONAL; INTRAMUSCULAR; SOFT TISSUE ONCE
Status: COMPLETED | OUTPATIENT
Start: 2021-03-01 | End: 2021-03-01

## 2021-03-01 RX ORDER — BUPIVACAINE HYDROCHLORIDE 2.5 MG/ML
10 INJECTION, SOLUTION EPIDURAL; INFILTRATION; INTRACAUDAL ONCE
Status: COMPLETED | OUTPATIENT
Start: 2021-03-01 | End: 2021-03-01

## 2021-03-01 RX ADMIN — BUPIVACAINE HYDROCHLORIDE 10 ML: 2.5 INJECTION, SOLUTION EPIDURAL; INFILTRATION; INTRACAUDAL at 14:10

## 2021-03-01 RX ADMIN — METHYLPREDNISOLONE ACETATE 40 MG: 40 INJECTION, SUSPENSION INTRA-ARTICULAR; INTRALESIONAL; INTRAMUSCULAR; SOFT TISSUE at 14:09

## 2021-03-01 NOTE — PROGRESS NOTES
Pre-procedure Diagnosis:   1  Bilateral occipital neuralgia      Post-procedure Diagnosis:   1  Bilateral occipital neuralgia      Operation Title(s):  Right occipital nerve block  Attending Surgeon:   Nelson Marquez MD  Anesthesia:   Local    Indications: The patient is a 79y o  year-old female with a diagnosis of   1  Bilateral occipital neuralgia      The patient's history and physical exam were reviewed  The risks, benefits and alternatives to the procedure were discussed, and all questions were answered to the patient's satisfaction  The patient agreed to proceed, and written informed consent was obtained  Procedure in Detail: The patient was brought into the exam room and placed in the sitting position on the exam room chair with the head flexed on a pillow  The area of the right occiput was palpated and cleansed with alcohol  Then a 1 5 inch 25 guage needle was advanced until bone was contacted  Negative aspiration was confirmed and a solution consisting of  1 mL 0 25% bupivacaine and 0 5 mL Depo-Medrol (40mg/ml) was easily injected  The same procedure was repeated for the right lesser occipital nerve    Disposition: The patient tolerated the procedure well and there were no apparent complications  The patient was given written discharge instructions for the procedure

## 2021-03-02 ENCOUNTER — TELEPHONE (OUTPATIENT)
Dept: PAIN MEDICINE | Facility: CLINIC | Age: 81
End: 2021-03-02

## 2021-03-02 NOTE — TELEPHONE ENCOUNTER
[facility]  [phone]  Dr Wen Crowder    Patent son is calling in stating that his mother is in a lot of pain  She had an injection yesterday   Pain level is a 9/10

## 2021-03-02 NOTE — TELEPHONE ENCOUNTER
Per communication consent, s/w pt's son Sonny Adam  Made aware of the same  Verbalized understanding

## 2021-03-02 NOTE — TELEPHONE ENCOUNTER
S/w pt, states she is having an "unacceptable" amount of pain following ONB on 3/1  Pt said she has pain in forehead above the eyebrow and at temple, pt has been using ice  Pt states she is not taking anything for pain, only using medical marijuana which is not helping  Pt requesting further recommendations, pt made aware it is not uncommon to have a change or increase in pain following procedure and can take time for full effect of procedure  Please advise, thank you

## 2021-03-16 ENCOUNTER — TRANSCRIBE ORDERS (OUTPATIENT)
Dept: PAIN MEDICINE | Facility: CLINIC | Age: 81
End: 2021-03-16

## 2021-03-16 ENCOUNTER — OFFICE VISIT (OUTPATIENT)
Dept: PAIN MEDICINE | Facility: CLINIC | Age: 81
End: 2021-03-16
Payer: MEDICARE

## 2021-03-16 VITALS
SYSTOLIC BLOOD PRESSURE: 142 MMHG | HEART RATE: 79 BPM | RESPIRATION RATE: 16 BRPM | BODY MASS INDEX: 30.29 KG/M2 | HEIGHT: 65 IN | DIASTOLIC BLOOD PRESSURE: 63 MMHG

## 2021-03-16 DIAGNOSIS — M50.11 CERVICAL DISC DISORDER WITH RADICULOPATHY OF OCCIPITO-ATLANTO-AXIAL REGION: ICD-10-CM

## 2021-03-16 DIAGNOSIS — G50.0 TRIGEMINAL NEURALGIA: ICD-10-CM

## 2021-03-16 DIAGNOSIS — M54.81 BILATERAL OCCIPITAL NEURALGIA: ICD-10-CM

## 2021-03-16 DIAGNOSIS — G89.4 CHRONIC PAIN SYNDROME: Primary | ICD-10-CM

## 2021-03-16 PROCEDURE — 99214 OFFICE O/P EST MOD 30 MIN: CPT | Performed by: NURSE PRACTITIONER

## 2021-03-16 NOTE — PROGRESS NOTES
Assessment:  1  Chronic pain syndrome    2  Cervical disc disorder with radiculopathy of towwkrht-cwkbeou-lnkar region    3  Trigeminal neuralgia    4  Bilateral occipital neuralgia        Plan:  The patient is a 79 y o  female last seen on 3/5/2021 who presents for a follow up office visit in regards to chronic pain secondary to  cervical disc disorder with radiculopathy, trigeminal neuralgia, and bilateral occipital neuralgia  I discussed with the patient, based on her reported pain symptoms, she may benefit from the glycerol rhizotomy, as Dr Dari Galvez felt she would be a candidate for the procedure  The patient stated she is not interested in having the procedure done  The patient also is uninterested in any using opioid medications to help with her pain symptoms  The patient's son inquired again about the use of Tegretol to treat trigeminal neuralgia, as he found information on the Internet regarding this treatment modality  I discussed with the patient, we do no prescribe Tegretol through [facility], and suggested the patient f/u with her PCP or neurology to discuss tegretol as a treatment option  The patient was agreeable and verbalized understanding  The patient will continue with duloxetine 120 mg by mouth daily  The patient reports she does not need refills of this medication at this time  The patient was instructed to call for refills of this medication as needed  The patient will follow-up as needed  The patient was advised to contact the office should their symptoms worsen in the interim  The patient was agreeable and verbalized an understanding  History of Present Illness: The patient is a 79 y o  female last seen on 3/5/2021 who presents for a follow up office visit in regards to chronic pain secondary to  cervical disc disorder with radiculopathy, trigeminal neuralgia, and bilateral occipital neuralgia  The patient currently reports ongoing headaches and facial pain    At the last visit, the patient underwent bilateral occipital nerve blocks, which she reports provided minimal to no relief of her pain symptoms  The patient reports continued pain to the orbital of her right eye and right cheekbone  The patient describes the pain as constant, sharp, throbbing, pressure-like, shooting pain that is worse in the morning and in the evening  The patient states she has frequent headaches and facial pain  The patient currently rates her pain as 5/10 on the numeric rating scale  The patient continues with the use of medical marijuana and duloxetine 120 mg PO daily  The patient is uninterested in resuming opioid medications  Pain Contract Signed: 3/16/2021  Last Urine Drug Screen: 9/23/2020  Last Dose: 3 months ago    I have personally reviewed and/or updated the patient's past medical history, past surgical history, family history, social history, current medications, allergies, and vital signs today  Review of Systems:    Review of Systems   Respiratory: Negative for shortness of breath  Cardiovascular: Negative for chest pain  Gastrointestinal: Positive for constipation  Negative for diarrhea, nausea and vomiting  Musculoskeletal: Positive for gait problem, joint swelling and neck pain  Negative for arthralgias and myalgias  Skin: Negative for rash  Neurological: Negative for dizziness, seizures and weakness  Psychiatric/Behavioral: Positive for decreased concentration  All other systems reviewed and are negative          Past Medical History:   Diagnosis Date    Arthritis     Basal cell carcinoma     Dry eye syndrome     Foraminal stenosis of cervical region     Macular degeneration     Memory loss     Migraine     Neck pain     Spinal stenosis of lumbar region     Squamous cell skin cancer     Supraventricular tachycardia ([facility] [address] )     Vitamin D deficiency     last assessed 2/7/17       Past Surgical History:   Procedure Laterality Date    APPENDECTOMY      BACK SURGERY      lower back surgery lumbar disc    CATARACT EXTRACTION      FEMUR FRACTURE SURGERY      NV STEREO TREAT TRIGEMINAL NERVE Right 3/26/2019    Procedure: GLYCEROL RHIZOTOMY TRIGEMINAL NERVES (V1-V3), RIGHT;  Surgeon: Salvador Jeffery MD;  Location: [facility];  Service: Neurosurgery    REPLACEMENT TOTAL KNEE Right     REPLACEMENT TOTAL KNEE Left     SKIN BIOPSY         Family History   Problem Relation Age of Onset    Aortic aneurysm Mother         abdominal aortic aneurysm    Hypertension Mother    Yovanny Blanco Breast cancer Mother    Yovanny Balnco Hypertension Father     Hypertension Sister     Hyperlipidemia Sister     Squamous cell carcinoma Sister        Social History     Occupational History    Occupation: retired   Tobacco Use    Smoking status: Former Smoker    Smokeless tobacco: Never Used   Substance and Sexual Activity    Alcohol use: Yes     Comment: occasional    Drug use: No    Sexual activity: Not on file         Current Outpatient Medications:     Acetaminophen 325 MG CAPS, Take 650 mg by mouth every 6 (six) hours as needed, Disp: , Rfl:     Cholecalciferol (VITAMIN D PO), Take 5,000 Units by mouth Daily  , Disp: , Rfl:     cycloSPORINE (RESTASIS) 0 05 % ophthalmic emulsion, Apply 1 drop to eye 2 (two) times a day, Disp: , Rfl:     DULoxetine (CYMBALTA) 60 mg delayed release capsule, Take 2 caps by mouth daily, Disp: 120 capsule, Rfl: 2    Multiple Vitamins-Minerals (PRESERVISION AREDS 2+MULTI VIT) CAPS, BID, Disp: , Rfl:     verapamil (VERELAN PM) 120 MG 24 hr capsule, Take 1 capsule (120 mg total) by mouth daily at bedtime, Disp: 90 capsule, Rfl: 1    Allergies   Allergen Reactions    Dilaudid [Hydromorphone] Shortness Of Breath     Other reaction(s): Respiratory Distress  Other reaction(s): HORENESS    Penicillins Rash     Rash       Physical Exam:    /63   Pulse 79   Resp 16   Ht 5' 5" (1 651 m)   BMI 30 29 kg/m²     Constitutional:normal, well developed, well nourished, alert, in no distress and non-toxic and no overt pain behavior  and overweight  Eyes:anicteric  HEENT:grossly intact  Neck:supple, symmetric, trachea midline and no masses   Pulmonary:even and unlabored  Cardiovascular:No edema or pitting edema present  Skin:Normal without rashes or lesions and well hydrated  Psychiatric:Mood and affect appropriate  Neurologic:Cranial Nerves II-XII grossly intact  Musculoskeletal:ambulates with rollator      Imaging  No orders to display         No orders of the defined types were placed in this encounter

## 2021-03-18 NOTE — PATIENT INSTRUCTIONS
Trigeminal Neuralgia   WHAT YOU NEED TO KNOW:   Trigeminal neuralgia (TN) is a problem with your trigeminal nerve that causes severe facial pain  You have a trigeminal nerve on each side of your face  The nerves allow you to feel pain, touch, and temperature changes in different areas of your face  DISCHARGE INSTRUCTIONS:   Medicines:  Do not stop taking your medicines without talking with your healthcare provider first  Onel Jurgen can have a bad reaction if you stop your TN medicines suddenly  You may need any of the following:  · Anticonvulsants: These control seizures, help prevent pain attacks, and decrease symptoms  · Antidepressants: These decrease pain and help prevent depression  · Muscle relaxers:  When your facial muscles are relaxed, you may be less likely to have pain attacks  · Pain medicines: You may be given pain medicines if your facial pain is severe  · Take your medicine as directed  Contact your healthcare provider if you think your medicine is not helping or if you have side effects  Tell him or her if you are allergic to any medicine  Keep a list of the medicines, vitamins, and herbs you take  Include the amounts, and when and why you take them  Bring the list or the pill bottles to follow-up visits  Carry your medicine list with you in case of an emergency  Follow up with your healthcare provider as directed: You may need to have blood tests to check your blood levels of certain medicines  Ask how often you need to return to have these blood tests  Write down your questions so you remember to ask them during your visits  Manage your trigeminal neuralgia:  Even if you have not had symptoms for a long time, keep your medicines nearby  If your symptoms return, contact your healthcare provider before you start to take your medicines again  Contact your healthcare provider if:   · The medicines you are taking are not decreasing your pain      · You feel worried and depressed and find it hard to do your daily activities  · You have headaches, mouth sores, an upset stomach, or diarrhea  · Your pain feels worse, different, or moves to another area of your face  · You have questions or concerns about your condition or care  Return to the emergency department if:   · You have a fever, stiff neck, or you develop a skin rash or peeling skin  · You have clear or yellow fluid leaking from your procedure or surgery site  · You are feeling so depressed you want to harm yourself  · You are confused and cannot think clearly  · You are not eating or drinking, and you are losing weight  · You have eye pain, eye numbness, or sudden vision or hearing changes  · You have sudden dizziness, problems with movement, weakness, or numbness in your face  © Copyright [address] Information is for End User's use only and may not be sold, redistributed or otherwise used for commercial purposes  All illustrations and images included in CareNotes® are the copyrighted property of [employer]  or [facility] [employer]ana Cuevas   The above information is an  only  It is not intended as medical advice for individual conditions or treatments  Talk to your doctor, nurse or pharmacist before following any medical regimen to see if it is safe and effective for you

## 2021-03-19 ENCOUNTER — OFFICE VISIT (OUTPATIENT)
Dept: FAMILY MEDICINE CLINIC | Facility: CLINIC | Age: 81
End: 2021-03-19
Payer: MEDICARE

## 2021-03-19 VITALS
DIASTOLIC BLOOD PRESSURE: 68 MMHG | SYSTOLIC BLOOD PRESSURE: 122 MMHG | TEMPERATURE: 97.1 F | HEART RATE: 78 BPM | OXYGEN SATURATION: 97 %

## 2021-03-19 DIAGNOSIS — G50.0 TRIGEMINAL NEURALGIA: Primary | ICD-10-CM

## 2021-03-19 PROCEDURE — 99213 OFFICE O/P EST LOW 20 MIN: CPT | Performed by: NURSE PRACTITIONER

## 2021-03-19 RX ORDER — GABAPENTIN 300 MG/1
300 CAPSULE ORAL 3 TIMES DAILY
Qty: 90 CAPSULE | Refills: 3 | Status: SHIPPED | OUTPATIENT
Start: 2021-03-19 | End: 2021-06-14 | Stop reason: SDUPTHER

## 2021-03-19 NOTE — PATIENT INSTRUCTIONS
Follow up with neurology  Take gabapentin 3 times a day, may skip middle dose    Trigeminal Neuralgia   WHAT YOU NEED TO KNOW:   Trigeminal neuralgia (TN) is a problem with your trigeminal nerve that causes severe facial pain  You have a trigeminal nerve on each side of your face  The nerves allow you to feel pain, touch, and temperature changes in different areas of your face  DISCHARGE INSTRUCTIONS:   Medicines:  Do not stop taking your medicines without talking with your healthcare provider first  Ildahelgacasey Lynne can have a bad reaction if you stop your TN medicines suddenly  You may need any of the following:  · Anticonvulsants: These control seizures, help prevent pain attacks, and decrease symptoms  · Antidepressants: These decrease pain and help prevent depression  · Muscle relaxers:  When your facial muscles are relaxed, you may be less likely to have pain attacks  · Pain medicines: You may be given pain medicines if your facial pain is severe  · Take your medicine as directed  Contact your healthcare provider if you think your medicine is not helping or if you have side effects  Tell him or her if you are allergic to any medicine  Keep a list of the medicines, vitamins, and herbs you take  Include the amounts, and when and why you take them  Bring the list or the pill bottles to follow-up visits  Carry your medicine list with you in case of an emergency  Follow up with your healthcare provider as directed: You may need to have blood tests to check your blood levels of certain medicines  Ask how often you need to return to have these blood tests  Write down your questions so you remember to ask them during your visits  Manage your trigeminal neuralgia:  Even if you have not had symptoms for a long time, keep your medicines nearby  If your symptoms return, contact your healthcare provider before you start to take your medicines again    Contact your healthcare provider if:   · The medicines you are taking are not decreasing your pain  · You feel worried and depressed and find it hard to do your daily activities  · You have headaches, mouth sores, an upset stomach, or diarrhea  · Your pain feels worse, different, or moves to another area of your face  · You have questions or concerns about your condition or care  Return to the emergency department if:   · You have a fever, stiff neck, or you develop a skin rash or peeling skin  · You have clear or yellow fluid leaking from your procedure or surgery site  · You are feeling so depressed you want to harm yourself  · You are confused and cannot think clearly  · You are not eating or drinking, and you are losing weight  · You have eye pain, eye numbness, or sudden vision or hearing changes  · You have sudden dizziness, problems with movement, weakness, or numbness in your face  © Copyright [address] Information is for End User's use only and may not be sold, redistributed or otherwise used for commercial purposes  All illustrations and images included in CareNotes® are the copyrighted property of [employer]  or [facility] Rosana Cuevas   The above information is an  only  It is not intended as medical advice for individual conditions or treatments  Talk to your doctor, nurse or pharmacist before following any medical regimen to see if it is safe and effective for you

## 2021-03-19 NOTE — PROGRESS NOTES
Assessment/Plan:  BMI Counseling: There is no height or weight on file to calculate BMI  The BMI is above normal  Nutrition recommendations include decreasing portion sizes, encouraging healthy choices of fruits and vegetables, decreasing fast food intake, consuming healthier snacks, limiting drinks that contain sugar, moderation in carbohydrate intake, increasing intake of lean protein, reducing intake of saturated and trans fat and reducing intake of cholesterol  Exercise recommendations include strength training exercises  No pharmacotherapy was ordered  Trigeminal neuralgia   Referral made to neurologist   Discussed with patient and son that primary care does not prescribed Tegretol  Will restart gabapentin at 300 mg 3 times a day   Until seen by neurologist   May  Not take middle dose if   It is too sedating  Problem List Items Addressed This Visit        Nervous and Auditory    Trigeminal neuralgia - Primary      Referral made to neurologist   Discussed with patient and son that primary care does not prescribed Tegretol  Will restart gabapentin at 300 mg 3 times a day   Until seen by neurologist   May  Not take middle dose if   It is too sedating  Relevant Medications    gabapentin (NEURONTIN) 300 mg capsule    Other Relevant Orders    Ambulatory referral to Neurology            Subjective:      Patient ID: Claudetta Daunt is a 79 y o  female  Patient is here with son  Was told to come to PCP to  get Tegretol  Was seen by dr Carol Harris in  pain management  Patient has trigeminal neurology  Patient has  History of cervical disc disorder, chronic pain  Has been following with Neurosurgery  Has not seen Neurology since 2018        The following portions of the patient's history were reviewed and updated as appropriate: allergies, current medications, past family history, past medical history, past social history, past surgical history and problem list     Review of Systems Constitutional: Negative  HENT: Negative  Eyes: Negative  Respiratory: Negative  Cardiovascular: Negative  Gastrointestinal: Negative  Endocrine: Negative  Genitourinary: Negative  Musculoskeletal: Positive for arthralgias  Allergic/Immunologic: Negative  Neurological: Negative  Psychiatric/Behavioral: Negative  Objective:      /68 (BP Location: Right arm)   Pulse 78   Temp (!) 97 1 °F (36 2 °C) (Tympanic)   SpO2 97%          Physical Exam  Constitutional:       Appearance: She is ill-appearing  Neurological:      Cranial Nerves: Cranial nerve deficit present  Motor: Weakness present  Coordination: Coordination abnormal       Gait: Gait abnormal    Psychiatric:         Attention and Perception: Attention and perception normal          Mood and Affect: Mood normal  Affect is flat  Behavior: Behavior normal          Thought Content:  Thought content normal          Cognition and Memory: Cognition and memory normal          Judgment: Judgment normal            Labs:    Lab Results   Component Value Date    WBC 6 04 03/05/2020    HGB 13 6 03/05/2020    HCT 43 6 03/05/2020    MCV 98 03/05/2020     03/05/2020     Lab Results   Component Value Date    K 4 4 03/05/2020     03/05/2020    CO2 31 03/05/2020    BUN 13 03/05/2020    CREATININE 0 70 03/05/2020    GLUF 106 (H) 03/05/2020    CALCIUM 9 5 03/05/2020    AST 18 03/05/2020    ALT 20 03/05/2020    ALKPHOS 75 03/05/2020    EGFR 82 03/05/2020     Lab Results   Component Value Date    CALCIUM 9 5 03/05/2020    K 4 4 03/05/2020    CO2 31 03/05/2020     03/05/2020    BUN 13 03/05/2020    CREATININE 0 70 03/05/2020

## 2021-03-19 NOTE — ASSESSMENT & PLAN NOTE
Referral made to neurologist   Discussed with patient and son that primary care does not prescribed Tegretol  Will restart gabapentin at 300 mg 3 times a day   Until seen by neurologist   May  Not take middle dose if   It is too sedating

## 2021-03-23 ENCOUNTER — TRANSCRIBE ORDERS (OUTPATIENT)
Dept: PAIN MEDICINE | Facility: CLINIC | Age: 81
End: 2021-03-23

## 2021-04-09 ENCOUNTER — OFFICE VISIT (OUTPATIENT)
Dept: NEUROLOGY | Facility: CLINIC | Age: 81
End: 2021-04-09
Payer: MEDICARE

## 2021-04-09 VITALS
HEART RATE: 82 BPM | BODY MASS INDEX: 40.7 KG/M2 | HEIGHT: 65 IN | SYSTOLIC BLOOD PRESSURE: 121 MMHG | DIASTOLIC BLOOD PRESSURE: 56 MMHG | WEIGHT: 244.3 LBS

## 2021-04-09 DIAGNOSIS — M79.18 MYOFASCIAL PAIN DYSFUNCTION SYNDROME: ICD-10-CM

## 2021-04-09 DIAGNOSIS — G50.0 TRIGEMINAL NEURALGIA: Primary | ICD-10-CM

## 2021-04-09 DIAGNOSIS — R26.2 AMBULATORY DYSFUNCTION: ICD-10-CM

## 2021-04-09 DIAGNOSIS — G31.84 MCI (MILD COGNITIVE IMPAIRMENT): ICD-10-CM

## 2021-04-09 PROCEDURE — 99215 OFFICE O/P EST HI 40 MIN: CPT | Performed by: PSYCHIATRY & NEUROLOGY

## 2021-04-09 NOTE — PROGRESS NOTES
[facility]  PATIENT:  Xochitl Gordon  MRN:  [id number]  :  1940  DATE OF SERVICE:  2021    Assessment/Plan:           Problem List Items Addressed This Visit        Nervous and Auditory    Trigeminal neuralgia - Primary    Relevant Orders    Ambulatory referral to Physical Medicine Rehab    Ambulatory referral to Geriatrics       Musculoskeletal and Integument    Myofascial pain dysfunction syndrome    Relevant Orders    Ambulatory referral to Physical Medicine Rehab    Ambulatory referral to Geriatrics       Other    Ambulatory dysfunction    Relevant Orders    Ambulatory referral to Geriatrics    MCI (mild cognitive impairment)    Relevant Orders    Ambulatory referral to Geriatrics           Mrs Azucena Hinton has presented to [facility] multiple [address] for follow-up on dermal neurology  Patient was last seen in 2018 and she was lost to follow-up since that time  Today patient described having intermittent in in right supraorbital region which is on low to gabapentin 300 mg  Patient recalls she had retroauricular pain several years back which is no longer concerning  Main concern today was patient worsening depression with cognitive decline, as result patient is less ambulatory over the last 12 months  Patient was advised to establish her care with geriatric team to address multiple aspects of the patient health and life  Patient lives with her son who provides excellent care to the patient  We also discussed results of brain MRI completed in 2019, patient has no acute or chronic ischemic or hemorrhagic changes, no signs of neoplasm or CNS demyelination  Patient has 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 and age-related brain volume loss  Patient has full strength involving upper and lower extremity, patient relates she has knee pain and this is the reason she is not able to ambulate, as she was in wheelchair today    Patient had completed MRI of the cervical region as well with no severe spinal stenosis appreciated but multilevel degenerative changes has been noted  No significant concern for cervicalgia, a still would agreed patient has right-sided trigeminal neuralgia  Patient has macular degeneration as well as a hearing loss, she is not using hearing aid during this visit, most pf the history was taken from patient's son Laura Dennison  Follow-up with [address] [place] [address] sclerosis Center is on as-needed basis patient will be transitioned to senior care going forward  Patient is to initiated home physical exercise program while sitting in a chair  Patient is to continue practicing safety measures during the novel coronovirus public health emergency  Subjective:neck pain, neck stiffness, balance issue, headaches, sleep disturbance    HPI  Mrs Edu Montelongo is a 78 yo female who has presented to  [address] [place] [address] for follow up on trigeminal neuralgia started at her right V1- V2 distribution  LOV with me was 10/31/2018  Patient presented with her son Laura Dennison  Patient described no significant changes since her last visit to half years ago, she is in wheelchair today  Patient has macular degeneration as well as a hearing loss with no hearing aids brought in to this office visit    Patient previously was started on Depakote  mg HS with no significant improvement reported  Patient was offered to follow with headache team as she was not interested in following with Headache team at the moment despite appointment is available on 11/13/18  She has scheduled appointment with Dr Kreg Spatz - she is looking forward to meet with physician and she has high expectation in her pain control  Depakote  mg HS will be re-started again and blood level will be checked in 2 weeks after re-initiation of the medication       Retroauricular pain may continue to V2 area and at times V1, with no pain in right upper neck, mild tenderness at occipital nerve exit  Patient has no pain on today examination in periauricular and occipital region  MRI brain 2/6/2018- 3/5/2019: No acute disease  No pathology of the trigeminal nerve      Diffuse generalized volume loss and small vessel disease stable since prior study one year earlier  MRI C-spine 3/2016-6/2019: Multilevel degenerative changes which are stable or have progressed only minimally since prior study  Mild multilevel canal stenosis  Potentially significant bilateral C5-C6 foraminal stenosis, correlate for bilateral C6 radiculitis  The following portions of the patient's history were reviewed and updated as appropriate:   She  has a past medical history of Arthritis, Basal cell carcinoma, Dry eye syndrome, Foraminal stenosis of cervical region, Macular degeneration, Memory loss, Migraine, Neck pain, Spinal stenosis of lumbar region, Squamous cell skin cancer, Supraventricular tachycardia ([address] ), and Vitamin D deficiency  She   Patient Active Problem List    Diagnosis Date Noted    Ambulatory dysfunction 04/09/2021    MCI (mild cognitive impairment) 04/09/2021    Insomnia 08/07/2020    Mild recurrent major depression ([address] ) 05/05/2020    Essential hypertension 05/05/2020    Mixed hyperlipidemia 05/05/2020    Cervical disc disorder with radiculopathy of [id number] region     Other chronic pain 02/21/2019    Trigeminal neuralgia 09/13/2018    Occipital neuralgia of right side 09/13/2018    Trigeminal neuropathy 09/13/2018    Myofascial pain dysfunction syndrome 09/13/2018    Chronic nonintractable headache 08/20/2018     She  has a past surgical history that includes Cataract extraction; Replacement total knee (Right); Appendectomy; Femur fracture surgery; Back surgery; Replacement total knee (Left); pr stereo treat trigeminal nerve (Right, 3/26/2019); and Skin biopsy    Her family history includes Aortic aneurysm in her mother; Breast cancer in her mother; Hyperlipidemia in her sister; Hypertension in her father, mother, and sister; Squamous cell carcinoma in her sister  She  reports that she has quit smoking  She has never used smokeless tobacco  She reports current alcohol use  She reports that she does not use drugs  Current Outpatient Medications   Medication Sig Dispense Refill    Acetaminophen 325 MG CAPS Take 650 mg by mouth every 6 (six) hours as needed      Cholecalciferol (VITAMIN D PO) Take 5,000 Units by mouth Daily        cycloSPORINE (RESTASIS) 0 05 % ophthalmic emulsion Apply 1 drop to eye 2 (two) times a day      DULoxetine (CYMBALTA) 60 mg delayed release capsule Take 2 caps by mouth daily 120 capsule 2    gabapentin (NEURONTIN) 300 mg capsule Take 1 capsule (300 mg total) by mouth 3 (three) times a day 90 capsule 3    Multiple Vitamins-Minerals (PRESERVISION AREDS 2+MULTI VIT) CAPS BID      verapamil (VERELAN PM) 120 MG 24 hr capsule Take 1 capsule (120 mg total) by mouth daily at bedtime 90 capsule 1     No current facility-administered medications for this visit  Current Outpatient Medications on File Prior to Visit   Medication Sig    Acetaminophen 325 MG CAPS Take 650 mg by mouth every 6 (six) hours as needed    Cholecalciferol (VITAMIN D PO) Take 5,000 Units by mouth Daily      cycloSPORINE (RESTASIS) 0 05 % ophthalmic emulsion Apply 1 drop to eye 2 (two) times a day    DULoxetine (CYMBALTA) 60 mg delayed release capsule Take 2 caps by mouth daily    gabapentin (NEURONTIN) 300 mg capsule Take 1 capsule (300 mg total) by mouth 3 (three) times a day    Multiple Vitamins-Minerals (PRESERVISION AREDS 2+MULTI VIT) CAPS BID    verapamil (VERELAN PM) 120 MG 24 hr capsule Take 1 capsule (120 mg total) by mouth daily at bedtime     No current facility-administered medications on file prior to visit  She is allergic to dilaudid [hydromorphone] and penicillins            Objective:    Blood pressure 121/56, pulse 82, height 5' 5" (1 651 m), weight 111 kg (244 lb 4 8 oz)  Physical Exam    Neurological Exam    CONSTITUTIONAL: NAD, pleasant  NECK: supple, no lymphadenopathy, no thyromegaly, no JVD  CARDIOVASCULAR: RRR, normal S1S2, no murmurs, no rubs  RESP: clear to auscultation bilaterally, no wheezes/rhonchi/rales  ABDOMEN: soft, non tender, non distended  SKIN: no rash or skin lesions  EXTREMITIES: no edema, pulses 2+bilaterally  PSYCH: appropriate mood and affect  NEUROLOGIC COMPREHENSIVE EXAM: Patient is oriented to person, place and time, NAD; appropriate affect  CN II, III, IV, V, VI, VII,VIII,IX,X,XI-XII intact with EOMI, PERRLA, OKN intact, VF grossly intact, fundi poorly visualized secondary to pupillary constriction; symmetric face noted  Motor: 5/5 UE/LE bilateral symmetric; Sensory: intact to light touch and pinprick bilaterally; normal vibration sensation feet bilaterally; Coordination within normal limits on FTN and PETERSON testing; DTR: 2/4 through, no Babinski, no clonus  Tandem gait is abnormal, poor endurance, unable to stay on her own due to knee pain  Romberg: negative  ROS:  12 points of review of system was reviewed with the patient and was unremarkable with exception: see HPI  Review of Systems   Constitutional: Negative  Negative for appetite change and fever  HENT: Negative  Negative for hearing loss, tinnitus, trouble swallowing and voice change  Eyes: Negative  Negative for photophobia and pain  Respiratory: Negative  Negative for shortness of breath  Cardiovascular: Negative  Negative for palpitations  Gastrointestinal: Negative  Negative for nausea and vomiting  Endocrine: Negative  Negative for cold intolerance  Genitourinary: Negative  Negative for dysuria, frequency and urgency  Musculoskeletal: Positive for gait problem, neck pain and neck stiffness  Negative for myalgias  Skin: Negative  Negative for rash  Allergic/Immunologic: Negative      Neurological: Positive for headaches  Negative for dizziness, tremors, seizures, syncope, facial asymmetry, speech difficulty, weakness, light-headedness and numbness  Hematological: Negative  Does not bruise/bleed easily  Psychiatric/Behavioral: Positive for sleep disturbance  Negative for confusion and hallucinations

## 2021-04-16 ENCOUNTER — TELEPHONE (OUTPATIENT)
Dept: GERIATRICS | Age: 81
End: 2021-04-16

## 2021-04-16 NOTE — TELEPHONE ENCOUNTER
[facility]  [address] [place] [address]  [phone]    Telephone Intake: Geriatric Assessment     Referral source: DR Silvia Cancino                                          Caller/ (relationship to patient):  Cori Dandy - son  's phone number: [phone]              Is there a POA in place/If so, who has POA? Yes son Cori Dandy    Reason for referral: Family member concerns regarding memory concerns  What is the goal of visit? another option regarding memory and balance, medication rec     Has the patient been seen by a Neurologist or Geriatrician? Yes -Neurology  Has the patient ever been diagnosed with dementia? Yes -MCI      Preferred language? english  Does the patient wear glasses? Readers- doesn't read any more due to vision issues  Does the patient use hearing aids? Yes      Does the patient and/or caregiver have access for a virtual visit? yes    Caller was informed: Please make sure the patient is accompanied by someone who knows them well / a caregiver / family member to participate in this appointment  If a patient plans to attend the assessment alone, please route a message to the assigned provider  Office packet mailed out?  yes

## 2021-05-25 ENCOUNTER — OFFICE VISIT (OUTPATIENT)
Dept: PAIN MEDICINE | Facility: CLINIC | Age: 81
End: 2021-05-25
Payer: MEDICARE

## 2021-05-25 ENCOUNTER — TELEPHONE (OUTPATIENT)
Dept: GERIATRICS | Age: 81
End: 2021-05-25

## 2021-05-25 ENCOUNTER — TRANSCRIBE ORDERS (OUTPATIENT)
Dept: PAIN MEDICINE | Facility: CLINIC | Age: 81
End: 2021-05-25

## 2021-05-25 VITALS
HEART RATE: 77 BPM | SYSTOLIC BLOOD PRESSURE: 135 MMHG | BODY MASS INDEX: 40.65 KG/M2 | HEIGHT: 65 IN | DIASTOLIC BLOOD PRESSURE: 66 MMHG | RESPIRATION RATE: 16 BRPM

## 2021-05-25 DIAGNOSIS — G89.4 CHRONIC PAIN SYNDROME: Primary | ICD-10-CM

## 2021-05-25 DIAGNOSIS — M54.81 OCCIPITAL NEURALGIA OF RIGHT SIDE: ICD-10-CM

## 2021-05-25 PROCEDURE — 99214 OFFICE O/P EST MOD 30 MIN: CPT | Performed by: NURSE PRACTITIONER

## 2021-05-25 RX ORDER — GABAPENTIN 100 MG/1
100 CAPSULE ORAL DAILY
Qty: 30 CAPSULE | Refills: 0 | Status: SHIPPED | OUTPATIENT
Start: 2021-05-25 | End: 2021-06-14 | Stop reason: SDUPTHER

## 2021-05-25 NOTE — TELEPHONE ENCOUNTER
Patient's son, Gerhardt Caper, contacted office inquiring what can be done for patient; patient has a scheduled July Anum Assessment and August Care conference; referred to us by Neuro  Attempted to explain general overview of practice, basic assessment information; relayed that some diagnoses affects the whole family and we are here to provide resources for that purpose  Caller concerned that patient will not want to come to appointment and still not certain why patient would need to come to appointment stating nothing can be done  I recommended MSW give a call to son, who as a professional, would be able to explain assessment process and care conference greater detail  Caller receptive    Please call Gerhardt Caper at [phone]

## 2021-05-25 NOTE — PATIENT INSTRUCTIONS
Gabapentin (By mouth)   Gabapentin (eloise-a-PEN-tin)  Treats seizures and pain caused by shingles  Brand Name(s): FusePaq Fanatrex, Gralise, Neurontin   There may be other brand names for this medicine  When This Medicine Should Not Be Used: This medicine is not right for everyone  Do not use it if you had an allergic reaction to gabapentin  How to Use This Medicine:   Capsule, Liquid, Tablet  · Take your medicine as directed  Your dose may need to be changed several times to find what works best for you  If you have epilepsy, do not allow more than 12 hours to pass between doses  · Capsule: Swallow the capsule whole with plenty of water  Do not open, crush, or chew it  · Gralise® tablet: Swallow the tablet whole   Do not crush, break, or chew it  · Neurontin® tablet: If you break a tablet into 2 pieces, use the second half as your next dose  Do not use the half-tablet if the whole tablet has been cut or broken after 28 days  · Oral liquid: Measure the oral liquid medicine with a marked measuring spoon, oral syringe, or medicine cup  · This medicine should come with a Medication Guide  Ask your pharmacist for a copy if you do not have one  · Missed dose: Take a dose as soon as you remember  If it is almost time for your next dose, wait until then and take a regular dose  Do not take extra medicine to make up for a missed dose  · Store the medicine in a closed container at room temperature, away from heat, moisture, and direct light  Store the Neurontin® oral liquid in the refrigerator  Do not freeze  Drugs and Foods to Avoid:   Ask your doctor or pharmacist before using any other medicine, including over-the-counter medicines, vitamins, and herbal products  · Some medicines can affect how gabapentin works  Tell your doctor if you also using hydrocodone or morphine  · If you take an antacid, wait at least 2 hours before you take gabapentin    · Do not drink alcohol while you are using this medicine  · Tell your doctor if you use anything else that makes you sleepy  Some examples are allergy medicine, narcotic pain medicine, and alcohol  Tell your doctor if you are also using lorazepam, oxycodone, or zolpidem  Warnings While Using This Medicine:   · Tell your doctor if you are pregnant or breastfeeding, or if you have kidney problems (including patients receiving dialysis) or lung problems  Tell your doctor if you have a history of depression or mental health problems  · This medicine may cause the following problems:  ? Drug reaction with eosinophilia and systemic symptoms (DRESS) or multiorgan hypersensitivity, which may damage the liver, kidney, blood, heart, or muscles  ? Changes in mood or behavior, including suicidal thoughts or behavior  ? Respiratory depression (serious breathing problem that can be life-threatening), when used with narcotic pain medicines  · Do not stop using this medicine suddenly  Your doctor will need to slowly decrease your dose before you stop it completely  · This medicine may make you dizzy or drowsy  Do not drive or do anything else that could be dangerous until you know how this medicine affects you  · Tell any doctor or dentist who treats you that you are using this medicine  This medicine may affect certain medical test results  · Your doctor will check your progress and the effects of this medicine at regular visits  Keep all appointments  · Keep all medicine out of the reach of children  Never share your medicine with anyone    Possible Side Effects While Using This Medicine:   Call your doctor right away if you notice any of these side effects:  · Allergic reaction: Itching or hives, swelling in your face or hands, swelling or tingling in your mouth or throat, chest tightness, trouble breathing  · Behavior problems, aggression, restlessness, trouble concentrating, moodiness (especially in children)  · Blistering, peeling, red skin rash  · Blue lips, fingernails, or skin, chest pain, fast heartbeat, trouble breathing  · Change in how much or how often you urinate, bloody or cloudy urine  · Dark urine or pale stools, nausea, vomiting, loss of appetite, stomach pain, yellow skin or eyes  · Fever, chills, cough, sore throat, body aches  · Problems with coordination, shakiness, unsteadiness, unusual eye movement  · Rapid weight gain, swelling in your hands, ankles, or feet  · Rash, swollen or tender glands in the neck, armpit, or groin  · Unusual moods or behaviors, thoughts of hurting yourself, feeling depressed  If you notice these less serious side effects, talk with your doctor:   · Dizziness, drowsiness, sleepiness, tiredness  If you notice other side effects that you think are caused by this medicine, tell your doctor  Call your doctor for medical advice about side effects  You may report side effects to FDA at 7-341-FDA-9446  © Copyright [address] 2021 Information is for End User's use only and may not be sold, redistributed or otherwise used for commercial purposes  The above information is an  only  It is not intended as medical advice for individual conditions or treatments  Talk to your doctor, nurse or pharmacist before following any medical regimen to see if it is safe and effective for you

## 2021-05-25 NOTE — PROGRESS NOTES
Assessment:  1  Chronic pain syndrome    2  Occipital neuralgia of right side        Plan:  Tali Vázquez is a 79 y o  female who was last seen 3/16/2021 presents for a follow up office visit in regards to chronic pain syndrome secondary to cervical disc disorder with radiculopathy, trigeminal neuralgia, and bilateral occipital neuralgia  I discussed with the patient that we could repeat the right occipital nerve block to help with her pain symptoms  The most common risk of the procedure were reviewed with the patient, and an order was placed for right occipital nerve block  Complete risks and benefits including bleeding, infection, tissue reaction, nerve injury and allergic reaction were discussed  The approach was demonstrated using models and literature was provided  Verbal and written consent was obtained  I will also add a 100 mg gabapentin for the patient to take during the daytime  The patient was previously prescribed gabapentin 300 mg by mouth 3 times daily; however, the patient reports the day and evening doses were causing increased drowsiness  The patient will continue with gabapentin 600 mg at bedtime in addition to the 100 mg in the morning  The patient was instructed to call to let me know how she is doing with the increase in dose with the gabapentin  The patient will follow-up after right occipital nerve block or sooner if symptoms worsen in the interim  The patient was agreeable and verbalized understanding  My impressions and treatment recommendations were discussed in detail with the patient who verbalized understanding and had no further questions  Discharge instructions were provided  I personally saw and examined the patient and I agree with the above discussed plan of care      Orders Placed This Encounter   Procedures    Nerve block     Scheduling Instructions:      Right occipital nerve block     New Medications Ordered This Visit   Medications    gabapentin (NEURONTIN) 100 mg capsule     Sig: Take 1 capsule (100 mg total) by mouth daily     Dispense:  30 capsule     Refill:  0     The patient will take in the morning and will continue 600 mg dose at bedtime       History of Present Illness:  Michael Snyder is a 79 y o  female who was last seen 3/16/2021 presents for a follow up office visit in regards to chronic pain syndrome secondary to cervical disc disorder with radiculopathy, trigeminal neuralgia, and bilateral occipital neuralgia  The patients current symptoms include Neck Pain  The patient currently reports ongoing neck and head pain that is unchanged since the last office visit  The patient was seen by neurology on 4/9/2021 with the recommendation for home physical exercise program while sitting in a chair and transition of care to senior care  The patient is interested in repeating the right occipital nerve block at this time  The patient describes her pain as intermittent, sharp, throbbing, shooting pain that is worse in the morning  The patient reports some muscle weakness or bowel or bladder dysfunction and is able to complete ADLs with minimal difficulty  The patient currently rates her pain as 6/10 on the numeric rating scale  Current pain medications include duloxetine 120 mg by mouth daily  The patient reports she is also taking gabapentin 600 mg by mouth at bedtime and continues with the use of medical marijuana  I have personally reviewed and/or updated the patient's past medical history, past surgical history, family history, social history, current medications, allergies, and vital signs today  Review of Systems   Respiratory: Negative for shortness of breath  Cardiovascular: Negative for chest pain  Gastrointestinal: Positive for diarrhea  Negative for constipation, nausea and vomiting  Musculoskeletal: Positive for gait problem, joint swelling and neck pain  Negative for arthralgias and myalgias  Skin: Negative for rash  Neurological: Negative for dizziness, seizures and weakness  Psychiatric/Behavioral: Positive for decreased concentration  All other systems reviewed and are negative        Patient Active Problem List   Diagnosis    Chronic nonintractable headache    Trigeminal neuralgia    Occipital neuralgia of right side    Trigeminal neuropathy    Myofascial pain dysfunction syndrome    Other chronic pain    Cervical disc disorder with radiculopathy of qzxjmwwh-hrevwin-skcpw region    Mild recurrent major depression ([address] )    Essential hypertension    Mixed hyperlipidemia    Insomnia    Ambulatory dysfunction    MCI (mild cognitive impairment)       Past Medical History:   Diagnosis Date    Arthritis     Basal cell carcinoma     Dry eye syndrome     Foraminal stenosis of cervical region     Macular degeneration     Memory loss     Migraine     Neck pain     Spinal stenosis of lumbar region     Squamous cell skin cancer     Supraventricular tachycardia ([address] )     Vitamin D deficiency     last assessed 2/7/17       Past Surgical History:   Procedure Laterality Date    APPENDECTOMY      BACK SURGERY      lower back surgery lumbar disc    CATARACT EXTRACTION      FEMUR FRACTURE SURGERY      VT STEREO TREAT TRIGEMINAL NERVE Right 3/26/2019    Procedure: GLYCEROL RHIZOTOMY TRIGEMINAL NERVES (V1-V3), RIGHT;  Surgeon: Ellen Lennon MD;  Location: [facility] OR;  Service: Neurosurgery    REPLACEMENT TOTAL KNEE Right     REPLACEMENT TOTAL KNEE Left     SKIN BIOPSY         Family History   Problem Relation Age of Onset    Aortic aneurysm Mother         abdominal aortic aneurysm    Hypertension Mother    [facility] Breast cancer Mother     Hypertension Father     Hypertension Sister     Hyperlipidemia Sister     Squamous cell carcinoma Sister        Social History     Occupational History    Occupation: retired   Tobacco Use    Smoking status: Former Smoker    Smokeless tobacco: Never Used   Substance and Sexual Activity    Alcohol use: Yes     Comment: occasional    Drug use: No    Sexual activity: Not on file       Current Outpatient Medications on File Prior to Visit   Medication Sig    Acetaminophen 325 MG CAPS Take 650 mg by mouth every 6 (six) hours as needed    Cholecalciferol (VITAMIN D PO) Take 5,000 Units by mouth Daily      cycloSPORINE (RESTASIS) 0 05 % ophthalmic emulsion Apply 1 drop to eye 2 (two) times a day    DULoxetine (CYMBALTA) 60 mg delayed release capsule Take 2 caps by mouth daily    gabapentin (NEURONTIN) 300 mg capsule Take 1 capsule (300 mg total) by mouth 3 (three) times a day    Multiple Vitamins-Minerals (PRESERVISION AREDS 2+MULTI VIT) CAPS BID    verapamil (VERELAN PM) 120 MG 24 hr capsule Take 1 capsule (120 mg total) by mouth daily at bedtime     No current facility-administered medications on file prior to visit  Allergies   Allergen Reactions    Dilaudid [Hydromorphone] Shortness Of Breath     Other reaction(s): Respiratory Distress  Other reaction(s): HORENESS    Penicillins Rash     Rash       Physical Exam:    /66   Pulse 77   Resp 16   Ht 5' 5" (1 651 m)   BMI 40 65 kg/m²     Constitutional:normal, well developed, well nourished, alert, in no distress and non-toxic and no overt pain behavior   and overweight  Eyes:anicteric  HEENT:grossly intact  Neck:supple, symmetric, trachea midline and no masses   Pulmonary:even and unlabored  Cardiovascular:No edema or pitting edema present  Skin:Normal without rashes or lesions and well hydrated  Psychiatric:Mood and affect appropriate  Neurologic:Cranial Nerves II-XII grossly intact  Musculoskeletal:ambulates with rollator     Cervical Spine Exam    Appearance:  Normal lordosis  Palpation/Tenderness:  right occipital protuberance tenderness  Range of Motion:  Flexion:  No limitation  without pain  Extension:  Minimally limited  with pain  Lateral Flexion - Left:  Minimally limited  with pain  Lateral Flexion - Right:   Minimally limited  with pain  Rotation - Left:  Minimally limited  with pain  Rotation - Right:  Minimally limited  with pain  Motor Strength:  Left Arm Flexion  5/5  Left Arm Extension  5/5  Right Arm Flexion  5/5  Right Arm Extension  5/5  Left Wrist Flexion  5/5  Left Wrist Extension  5/5  Left    5/5  Right   5/5    Imaging

## 2021-05-28 ENCOUNTER — TELEPHONE (OUTPATIENT)
Dept: PAIN MEDICINE | Facility: CLINIC | Age: 81
End: 2021-05-28

## 2021-05-28 NOTE — TELEPHONE ENCOUNTER
Lois Crespo, see message from pt's son  Sounds like they want pt to get inj you ordered at the [facility] office and that you said that would be ok  Did you discuss with either Dr Yolanda Darnell or Marina Wakefield and are they wiling to perform?

## 2021-05-28 NOTE — TELEPHONE ENCOUNTER
Please try and reach pt's son Jadon [place] on Tues 6/1/21  RN tried calling son at # provided but no answer and no answering machine to leave message

## 2021-05-28 NOTE — TELEPHONE ENCOUNTER
LCSW contacted Abelardo Parker to review his questions/concerns  [place] Elena reports that his primary concern is Jessica's "grumpy attitude " He expressed concerns that she does not like to "feel stupid" and he worries this memory test will be insulting to her  He describes that she has been having a cognitive decline, has a hard time completing sentences, constantly has headaches, and is depressed and anxious  Jerry Zepeda is aware of the appointment and that she will be seeing a geriatrician  He reports she is frustrated by her memory and will "break down and cry" at times but is "stubborn" about "admitting" any memory issues  Abelardo Parker would like to know what can be done for dementia  LEORAW noted that after our assessment, recommendations are reviewed including medication changes, suggestions for social/safety reasons, dietary and lifestyles changes  LCSW did offer to reach out to Jerry Zepeda to further discuss this appointment, if desired  Abelardo Parker declined  LCSW did recommend reaching out to our office with any other questions or concerns prior to the appointment  Abelardo Parker expressed understanding and agreed to this plan; appointment will be kept at this point for July 30th

## 2021-05-28 NOTE — TELEPHONE ENCOUNTER
Please let the patient and her son know that I will send a message to Dr Yoli Renner to see if he is willing to assume her as a patient and will let her know by Thursday next week

## 2021-05-28 NOTE — TELEPHONE ENCOUNTER
Patients son calling to schedule nerve block requesting procedure to be done in [place], per patient NP said this was ok he didn't know she worked out of [place] this location is much closer      Please advise if this is ok    Thank you     [phone]

## 2021-06-01 NOTE — TELEPHONE ENCOUNTER
Attempted to contact pt's son Susanna Andrews, no answer and unable to leave voicemail  Please try to reach Susanna Andrews at another time

## 2021-06-02 NOTE — TELEPHONE ENCOUNTER
Dr Sangeetha Ng,    Would you be willing to accept transfer of care for this patient? She is currently being treated for bilateral occipital neuralgia with occipital nerve blocks  An order was placed at the [facility] to have this procedure repeated  Would you want to see her in office first before the procedure?

## 2021-06-02 NOTE — TELEPHONE ENCOUNTER
Pls see message from Dr Kwasi Trimble about scheduling inj for pt of Makimi's up at [facility] office

## 2021-06-02 NOTE — TELEPHONE ENCOUNTER
RN s/w pt and also left detailed vm for son Kathe Lyon on his cell that [facility] is going to reach out to Dr Zane Pradhan at the [facility] office to see if he would be willing to assume Maddy Rocha as his pt at the [facility] office and she should know something by [employer]  Pt understood

## 2021-06-03 NOTE — TELEPHONE ENCOUNTER
S/w pt's son and scheduled occipital nerve block w/Dr Verito Saldivar on 6/18/21 315 pm arrival  Gave pre procedure instructions, masking//vaccine policy

## 2021-06-14 ENCOUNTER — TELEPHONE (OUTPATIENT)
Dept: PAIN MEDICINE | Facility: CLINIC | Age: 81
End: 2021-06-14

## 2021-06-14 DIAGNOSIS — M54.81 OCCIPITAL NEURALGIA OF RIGHT SIDE: ICD-10-CM

## 2021-06-14 DIAGNOSIS — G50.0 TRIGEMINAL NEURALGIA: ICD-10-CM

## 2021-06-14 DIAGNOSIS — G89.29 OTHER CHRONIC PAIN: ICD-10-CM

## 2021-06-14 DIAGNOSIS — F41.8 DEPRESSION WITH ANXIETY: ICD-10-CM

## 2021-06-14 DIAGNOSIS — I10 ESSENTIAL HYPERTENSION: ICD-10-CM

## 2021-06-14 RX ORDER — DULOXETIN HYDROCHLORIDE 60 MG/1
CAPSULE, DELAYED RELEASE ORAL
Qty: 120 CAPSULE | Refills: 2 | Status: SHIPPED | OUTPATIENT
Start: 2021-06-14 | End: 2022-04-28 | Stop reason: SDUPTHER

## 2021-06-14 RX ORDER — GABAPENTIN 300 MG/1
300 CAPSULE ORAL 3 TIMES DAILY
Qty: 90 CAPSULE | Refills: 3 | Status: SHIPPED | OUTPATIENT
Start: 2021-06-14 | End: 2021-08-02 | Stop reason: SDUPTHER

## 2021-06-14 RX ORDER — GABAPENTIN 100 MG/1
100 CAPSULE ORAL DAILY
Qty: 30 CAPSULE | Refills: 3 | Status: SHIPPED | OUTPATIENT
Start: 2021-06-14 | End: 2021-07-12 | Stop reason: SDUPTHER

## 2021-06-14 RX ORDER — VERAPAMIL HYDROCHLORIDE 120 MG/1
120 CAPSULE, EXTENDED RELEASE ORAL
Qty: 90 CAPSULE | Refills: 1 | Status: SHIPPED | OUTPATIENT
Start: 2021-06-14 | End: 2021-12-24

## 2021-06-14 NOTE — TELEPHONE ENCOUNTER
Pt contacted Call Center requested refill of their medication  Medication Name:  duluxatione  Gabapentin  Gabapentin  Dosage of Med:  60 mg  300 mg  100 mg  Frequency of Med:  2 x a day  2 x day  1 x a day  Remaining Medication:  12  10  8  Pharmacy and Location:   [facility] on [facility] [address]  Preferred Callback Phone Number:  [phone]    Thank you    Please do 3 refills

## 2021-06-14 NOTE — TELEPHONE ENCOUNTER
Gabapentin- 100mg- pill  Gabapentin- 300 mg 2 pills ( the gabapentin changed some the original was too much from 3xs a day to 2xs a day for the pt  Shelley Zapata knows this)    Duloxetine- 1 x a day       Pt would like to speak with Shelley Zapata and pt also has questions about her procedure on 6/18/21

## 2021-06-14 NOTE — TELEPHONE ENCOUNTER
Pt needs to speak with a nurse about the procedure she is having   Please call her back    Pt # [phone]

## 2021-06-14 NOTE — TELEPHONE ENCOUNTER
Prescriptions sent to patient's pharmacy on file  Can you update the patient's procedure to bilateral OCN? I believe she may have only been scheduled for the right side  She is now having bilateral pain  Thanks!

## 2021-06-14 NOTE — TELEPHONE ENCOUNTER
Pt needs refills for gabapentin 100 mg once a day, gabapentin 300 mg twice a day and duloxetine 60 mg twice a day  She wants 3 refills on each  Pt requesting to s/w you  RN s/w pt already an hour ago but she wants to s/w you

## 2021-06-14 NOTE — TELEPHONE ENCOUNTER
RN s/w pt, I need to clarify how many times a day pt takes the gabapentin 300 mg, is it twice a day or 3 times a day  Phone message states twice a day but last script says 3 times a day  Pt unsure as her son handles her meds  Son not home at the moment but pt will have him c/b when he returns home  Await son's c/b for clarification and then refill request for for both doses of gabapentin and duloxetine to [facility]  Pt want 3 refills on all scripts  Pt had no question about the procedure she is having on Friday, just asking what number does she call when she has a question  RN explained the same number she called today, [phone]

## 2021-06-18 ENCOUNTER — PROCEDURE VISIT (OUTPATIENT)
Dept: PAIN MEDICINE | Facility: CLINIC | Age: 81
End: 2021-06-18
Payer: MEDICARE

## 2021-06-18 VITALS
HEART RATE: 78 BPM | BODY MASS INDEX: 40.65 KG/M2 | DIASTOLIC BLOOD PRESSURE: 72 MMHG | SYSTOLIC BLOOD PRESSURE: 127 MMHG | RESPIRATION RATE: 15 BRPM | HEIGHT: 65 IN

## 2021-06-18 DIAGNOSIS — M54.81 BILATERAL OCCIPITAL NEURALGIA: Primary | ICD-10-CM

## 2021-06-18 PROCEDURE — 64405 NJX AA&/STRD GR OCPL NRV: CPT | Performed by: STUDENT IN AN ORGANIZED HEALTH CARE EDUCATION/TRAINING PROGRAM

## 2021-06-18 PROCEDURE — 64450 NJX AA&/STRD OTHER PN/BRANCH: CPT | Performed by: STUDENT IN AN ORGANIZED HEALTH CARE EDUCATION/TRAINING PROGRAM

## 2021-06-18 RX ORDER — BUPIVACAINE HYDROCHLORIDE 2.5 MG/ML
3 INJECTION, SOLUTION EPIDURAL; INFILTRATION; INTRACAUDAL ONCE
Status: COMPLETED | OUTPATIENT
Start: 2021-06-18 | End: 2021-06-18

## 2021-06-18 RX ORDER — METHYLPREDNISOLONE ACETATE 40 MG/ML
40 INJECTION, SUSPENSION INTRA-ARTICULAR; INTRALESIONAL; INTRAMUSCULAR; SOFT TISSUE ONCE
Status: COMPLETED | OUTPATIENT
Start: 2021-06-18 | End: 2021-06-18

## 2021-06-18 RX ADMIN — BUPIVACAINE HYDROCHLORIDE 3 ML: 2.5 INJECTION, SOLUTION EPIDURAL; INFILTRATION; INTRACAUDAL at 16:03

## 2021-06-18 RX ADMIN — METHYLPREDNISOLONE ACETATE 40 MG: 40 INJECTION, SUSPENSION INTRA-ARTICULAR; INTRALESIONAL; INTRAMUSCULAR; SOFT TISSUE at 16:04

## 2021-06-18 NOTE — PROGRESS NOTES
Nerve block    Date/Time: 6/18/2021 3:50 PM  Performed by: Rodrigo Phillips MD  Authorized by: Shara Carrizales[address]     Patient location:  Bedside  [place] Protocol:  Consent: Verbal consent obtained  Written consent obtained  Risks and benefits: risks, benefits and alternatives were discussed  Consent given by: patient  Timeout called at: 6/18/2021 3:50 PM   Patient understanding: patient states understanding of the procedure being performed  Patient consent: the patient's understanding of the procedure matches consent given  Procedure consent: procedure consent matches procedure scheduled  Relevant documents: relevant documents present and verified  Site marked: the operative site was marked  Radiology Images displayed and confirmed  If images not available, report reviewed: imaging studies available  Required items: required blood products, implants, devices, and special equipment available  Patient identity confirmed: verbally with patient      Indications:     Indications:  Pain relief  Location:     Body area:  Head    Head nerve:  Greater occipital and lesser occipital    Nerve type:  Peripheral    Laterality:  Bilateral  Pre-procedure details:     Skin preparation:  2% chlorhexidine  Procedure details (see MAR for exact dosages): Block needle gauge:  27 G    Anesthetic injected:  Bupivacaine 0 25% w/o epi    Steroid injected:  Methylprednisolone    Additive injected:  None  Post-procedure details:     Dressing:  Sterile dressing    Outcome:  Pain improved    Patient tolerance of procedure: Tolerated well, no immediate complications  Comments:      PATIENT NAME:  Nadya Belcher    MR#:  [id number]    YOB: 1940    DATE OF PROCEDURE:  06/18/21    PROCEDURE:  Bilateral greater and lesser occipital nerve blocks using landmark technique      ATTENDING PHYSICIAN:  Rodrigo Phillips    PRE-PROCEDURE DIAGNOSIS:  Bilateral occipital neuralgia    POST-PROCEDURE DIAGNOSIS:  Diagnosis unchanged    ANESTHESIA:  None    ESTIMATED BLOOD LOSS:  Minimal    COMPLICATIONS:  None       CONSENT:  Today's procedure, its benefits, risks, alternatives were discussed in detail with the patient  Specific risks discussed included, but were not limited to bleeding, infection, nerve damage, damage to adjacent structures, hematoma, abscess, failure of the pain to improve, worsening of the pain, and reaction to medications administered  The patient verbalized understanding  Written informed consent was obtained  DESCRIPTION OF PROCEDURE:  After informed consent was obtained, the patient was placed in a seated position on the exam table  The area of the bilateral greater and lesser occipital nerves were palpated  The points of maximum tenderness were marked  The areas were then prepped with antiseptic solution  Using strict aseptic technique, a 4 mL injectate was drawn up which consisted of 3 mL of 0 25% bupivacaine and 1 mL of Depo-Medrol 40 mg/mL  Attention was first directed to the area of the right lesser occipital nerve  Using a 27-gauge 1 inch needle, the skin was entered in a perpendicular fashion over the point of maximum tenderness and advanced until os was contacted  After negative aspiration, 0 5 mL of the bupivacaine/depo-medrol mixture was injected  The needle was fanned laterally and medially to deposit the remaining 0 5 ml  Attention was then directed to the area of the right greater occipital nerve  Using a 27-gauge 1 inch needle, the skin was entered in a perpendicular fashion over the point of maximum tenderness and advanced until os was contacted  After negative aspiration, 0 5 mL of the bupivacaine/depo-medrol mixture was injected  The needle was fanned laterally and medially to deposit the remaining 0 5 ml  Attention was then directed to the area of the left greater occipital nerve    Using a 27-gauge 1 inch needle, the skin was entered in a perpendicular fashion over the point of maximum tenderness and advanced until os was contacted  After negative aspiration, 0 5 mL of the bupivacaine/depo-medrol mixture was injected  The needle was fanned laterally and medially to deposit the remaining 0 5 ml  Attention was then directed to the area of the left lesser occipital nerve  Using a 27-gauge 1 inch needle, the skin was entered in a perpendicular fashion over the point of maximum tenderness and advanced until os was contacted  After negative aspiration, 0 5 mL of the bupivacaine/depo-medrol mixture was injected  The needle was fanned laterally and medially to deposit the remaining 0 5 ml  All needles were with withdrawn with tip intact  Hemostasis was maintained  No paresthesias or complications were noted  The skin was wiped clean and a band aid was placed as appropriate  The patient was monitored in the exam room following the procedure, during which time she remained neurologically and hemodynamically stable  Ultimately,  Ms Edu Montelongo was discharged home with instructions to call the office in 7 days with an update or sooner should symptoms warrant

## 2021-06-21 ENCOUNTER — TELEPHONE (OUTPATIENT)
Dept: PAIN MEDICINE | Facility: CLINIC | Age: 81
End: 2021-06-21

## 2021-06-21 NOTE — TELEPHONE ENCOUNTER
Son calling to confirm all other medication was refilled except this one       Med refill   Name of medication:gabapentin (NEURONTIN) 100 mg capsule       Frequency:Take 1 capsule (100 mg total) by mouth daily  How many tablets left:1    Pharmacy: [facility]  [id number], [address] call back # [phone]

## 2021-06-21 NOTE — TELEPHONE ENCOUNTER
S/w pt's son, he said the pharmacy is telling him there is an issue with the Gabapentin 100 mg rx, they can not fill it  Will call [facility] to find out issue

## 2021-06-21 NOTE — TELEPHONE ENCOUNTER
S/w [facility], they said the Gabapentin 100 mg went through with a co-pay of $2, pt's son said pharmacy will notify him when ready in previous call

## 2021-06-21 NOTE — TELEPHONE ENCOUNTER
Attempted to reach pt's son to inform him that Gabapentin 300 mg and 600 mg were sent to [facility] on 6/14 with 3 refills via e-scribe, phone rang with no answer

## 2021-07-12 ENCOUNTER — TELEPHONE (OUTPATIENT)
Dept: PAIN MEDICINE | Facility: CLINIC | Age: 81
End: 2021-07-12

## 2021-07-12 DIAGNOSIS — M54.81 OCCIPITAL NEURALGIA OF RIGHT SIDE: ICD-10-CM

## 2021-07-12 RX ORDER — GABAPENTIN 100 MG/1
200 CAPSULE ORAL DAILY
Qty: 180 CAPSULE | Refills: 1 | Status: SHIPPED | OUTPATIENT
Start: 2021-07-12 | End: 2022-01-04 | Stop reason: SDUPTHER

## 2021-07-12 NOTE — TELEPHONE ENCOUNTER
Rx for gabapentin 100 mg 2 caps in am sent to patient's pharmacy on file  Please let the patient know  Thanks!

## 2021-07-12 NOTE — TELEPHONE ENCOUNTER
Pt contacted Call Center requested refill of their medication  Medication Name:  gabapentin    Dosage of Med:  200 mg this time    Frequency of Med:  1 x in am    Remaining Medication:  0 left took doubles because she had some left    Pharmacy and Location:    [facility] on file okay    Pt  Preferred Callback Phone Number:  [phone]    Thank you

## 2021-07-12 NOTE — TELEPHONE ENCOUNTER
S/w pt, she was instructed at her last OV to take 100 mg in the AM and 600 mg at HS   Pt said she increased to 200 mg in the AM and is asking if [facility] will send an updated rx for the 100 mg caps to her pharmacy to read that she is taking 2 in the AM

## 2021-07-13 ENCOUNTER — TELEPHONE (OUTPATIENT)
Dept: FAMILY MEDICINE CLINIC | Facility: CLINIC | Age: 81
End: 2021-07-13

## 2021-07-13 ENCOUNTER — APPOINTMENT (OUTPATIENT)
Dept: LAB | Facility: HOSPITAL | Age: 81
End: 2021-07-13
Payer: MEDICARE

## 2021-07-13 DIAGNOSIS — R30.0 DYSURIA: ICD-10-CM

## 2021-07-13 PROCEDURE — 87086 URINE CULTURE/COLONY COUNT: CPT

## 2021-07-13 NOTE — TELEPHONE ENCOUNTER
Patient is requesting a lab script for a urine test to get done at the lab  She has been having frequency to use the bathroom

## 2021-07-14 LAB — BACTERIA UR CULT: NORMAL

## 2021-07-15 ENCOUNTER — TELEPHONE (OUTPATIENT)
Dept: FAMILY MEDICINE CLINIC | Facility: CLINIC | Age: 81
End: 2021-07-15

## 2021-07-15 NOTE — TELEPHONE ENCOUNTER
Patients sugar was high and is requesting lab work ordered to have checked for next week  Patient also wants routine blood work ordered  Please advise

## 2021-07-19 ENCOUNTER — TELEPHONE (OUTPATIENT)
Dept: FAMILY MEDICINE CLINIC | Facility: CLINIC | Age: 81
End: 2021-07-19

## 2021-07-20 ENCOUNTER — TELEPHONE (OUTPATIENT)
Dept: PAIN MEDICINE | Facility: CLINIC | Age: 81
End: 2021-07-20

## 2021-07-20 NOTE — TELEPHONE ENCOUNTER
Patient   [phone]  Tawanda Ramirezfeli      Patient called in stating that she would like to speak with [facility]  The next slot is 8/10/21, which she said that she doesn't want to come in  Then said she can can't come in due to her pain  Pt is requesting an [facility] appt on 8/10/21 if it is still open  Please follow up with pt when [facility] gives an answer      Pain level is 5/10

## 2021-07-21 ENCOUNTER — APPOINTMENT (OUTPATIENT)
Dept: LAB | Facility: HOSPITAL | Age: 81
End: 2021-07-21
Payer: MEDICARE

## 2021-07-21 DIAGNOSIS — Z13.220 SCREENING, LIPID: ICD-10-CM

## 2021-07-21 DIAGNOSIS — R73.01 IFG (IMPAIRED FASTING GLUCOSE): ICD-10-CM

## 2021-07-21 LAB
ALBUMIN SERPL BCP-MCNC: 3.8 G/DL (ref 3.5–5)
ALP SERPL-CCNC: 73 U/L (ref 46–116)
ALT SERPL W P-5'-P-CCNC: 16 U/L (ref 12–78)
ANION GAP SERPL CALCULATED.3IONS-SCNC: 8 MMOL/L (ref 4–13)
AST SERPL W P-5'-P-CCNC: 17 U/L (ref 5–45)
BILIRUB SERPL-MCNC: 0.5 MG/DL (ref 0.2–1)
BUN SERPL-MCNC: 17 MG/DL (ref 5–25)
CALCIUM SERPL-MCNC: 9 MG/DL (ref 8.3–10.1)
CHLORIDE SERPL-SCNC: 106 MMOL/L (ref 100–108)
CHOLEST SERPL-MCNC: 212 MG/DL (ref 50–200)
CO2 SERPL-SCNC: 31 MMOL/L (ref 21–32)
CREAT SERPL-MCNC: 0.74 MG/DL (ref 0.6–1.3)
EST. AVERAGE GLUCOSE BLD GHB EST-MCNC: 114 MG/DL
GFR SERPL CREATININE-BSD FRML MDRD: 76 ML/MIN/1.73SQ M
GLUCOSE P FAST SERPL-MCNC: 86 MG/DL (ref 65–99)
HBA1C MFR BLD: 5.6 %
HDLC SERPL-MCNC: 49 MG/DL
LDLC SERPL CALC-MCNC: 134 MG/DL (ref 0–100)
NONHDLC SERPL-MCNC: 163 MG/DL
POTASSIUM SERPL-SCNC: 4.1 MMOL/L (ref 3.5–5.3)
PROT SERPL-MCNC: 7.4 G/DL (ref 6.4–8.2)
SODIUM SERPL-SCNC: 145 MMOL/L (ref 136–145)
TRIGL SERPL-MCNC: 146 MG/DL

## 2021-07-21 PROCEDURE — 80061 LIPID PANEL: CPT

## 2021-07-21 PROCEDURE — 80053 COMPREHEN METABOLIC PANEL: CPT

## 2021-07-21 PROCEDURE — 36415 COLL VENOUS BLD VENIPUNCTURE: CPT

## 2021-07-21 PROCEDURE — 83036 HEMOGLOBIN GLYCOSYLATED A1C: CPT

## 2021-07-21 NOTE — TELEPHONE ENCOUNTER
Sahra Richardson with [facility] on 8/10 or whenever patient can be scheduled  If the patient still needs to talk with me personally, please let me know Thanks!

## 2021-07-23 ENCOUNTER — TELEPHONE (OUTPATIENT)
Dept: PAIN MEDICINE | Facility: CLINIC | Age: 81
End: 2021-07-23

## 2021-07-30 ENCOUNTER — CONSULT (OUTPATIENT)
Dept: GERIATRICS | Age: 81
End: 2021-07-30
Payer: MEDICARE

## 2021-07-30 VITALS
SYSTOLIC BLOOD PRESSURE: 122 MMHG | HEART RATE: 74 BPM | DIASTOLIC BLOOD PRESSURE: 58 MMHG | TEMPERATURE: 97.4 F | OXYGEN SATURATION: 96 %

## 2021-07-30 DIAGNOSIS — R32 URINARY INCONTINENCE, UNSPECIFIED TYPE: ICD-10-CM

## 2021-07-30 DIAGNOSIS — F33.1 MODERATE EPISODE OF RECURRENT MAJOR DEPRESSIVE DISORDER (HCC): ICD-10-CM

## 2021-07-30 DIAGNOSIS — I10 ESSENTIAL HYPERTENSION: ICD-10-CM

## 2021-07-30 DIAGNOSIS — G89.29 OTHER CHRONIC PAIN: ICD-10-CM

## 2021-07-30 DIAGNOSIS — Z79.899 ENCOUNTER FOR MEDICATION REVIEW: ICD-10-CM

## 2021-07-30 DIAGNOSIS — H91.90 HEARING LOSS, UNSPECIFIED HEARING LOSS TYPE, UNSPECIFIED LATERALITY: ICD-10-CM

## 2021-07-30 DIAGNOSIS — R26.2 AMBULATORY DYSFUNCTION: ICD-10-CM

## 2021-07-30 DIAGNOSIS — F33.0 MILD RECURRENT MAJOR DEPRESSION (HCC): ICD-10-CM

## 2021-07-30 DIAGNOSIS — H35.30 MACULAR DEGENERATION, UNSPECIFIED LATERALITY, UNSPECIFIED TYPE: ICD-10-CM

## 2021-07-30 DIAGNOSIS — F03.90 DEMENTIA WITHOUT BEHAVIORAL DISTURBANCE, UNSPECIFIED DEMENTIA TYPE (HCC): Primary | ICD-10-CM

## 2021-07-30 PROCEDURE — 99205 OFFICE O/P NEW HI 60 MIN: CPT | Performed by: STUDENT IN AN ORGANIZED HEALTH CARE EDUCATION/TRAINING PROGRAM

## 2021-07-30 NOTE — ASSESSMENT & PLAN NOTE
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), GDS 7/15, TUGT 20sec  Significant deficits noted in memory, recall, attention, language, abstraction and orientation domains   Given history, physical exam and above neurocognitive screening, this places the patient at a level of moderate to severe dementia   Etiology likely multifactorial:  Depression vs  Medications side effect vs  Vascular copathology vs Alzheimer's disease  Will order TSH with reflex T4, B12, folate   Will order MRI neuro quant of the brain to assess structure I will and volumetric analysis   Currently this patient meets criteria for placement into a higher level of care such as a memory care unit which would facilitate provision of meals, supervision of medications and the ability to remain active mentally, physically and socially  If the patient is to remain at home, I would recommend she have 24/7 supervised care at home     If she also remains at home, would recommend enrolling into a senior  program for positive socialization, cognitive and physical activities  Will refer to speech therapy for cognitive rehabilitation  Will refer to Psychiatry for treatment of depression  Will refer to physical therapy for gait training, balance and strengthening  Recommend the Mediterranean diet which has shown to decrease risk of memory loss and CVA   Recommend a falls Alert device as a safety precaution  Reorientation redirection as needed  Manage chronic conditions  Maintain Falls precautions   Encourage patient to remain active mentally, physically and socially  Patient should participate in cognitively challenging exercises as able  Will discuss pharmacotherapy options at care plan conference

## 2021-07-30 NOTE — ASSESSMENT & PLAN NOTE
TUGT 20sec   maintain Falls precautions   Recommend a falls Alert device  Will refer to physical therapy for gait training, balance and strengthening

## 2021-07-30 NOTE — ASSESSMENT & PLAN NOTE
GDS 7/15  No HI/SI   Will refer to Psychiatry   Patient currently on Cymbalta 60 mg daily   Continue social support by family and friends

## 2021-07-30 NOTE — ASSESSMENT & PLAN NOTE
Patient continues on Cymbalta 60 mg daily   Continues on gabapentin  Reviewed side effects of gabapentin in older patients with cognitive deficits   Will discuss further care plan conference   Given risk versus benefits, patient can continue on current therapy  Follow-up with pain management as scheduled

## 2021-07-30 NOTE — ASSESSMENT & PLAN NOTE
Side effects of gabapentin include dizziness, somnolence, fatigue, dysarthria, amnesia and depression   Will discuss further at care plan conference

## 2021-07-30 NOTE — PROGRESS NOTES
This note was not shared with the patient due to reasonable likelihood of causing patient harm    Assessment & Plan:   Christopher Quinn was seen today for austyn assessment  Diagnoses and all orders for this visit:    Dementia without behavioral disturbance, unspecified dementia type ([facility] )  -     MRI brain NeuroQuant wo contrast; Future  -     TSH, 3rd generation with T4 reflex; Future  -     Vitamin B12; Future  -     Folate; Future    Ambulatory dysfunction  -     Ambulatory referral to Geriatrics    Mild recurrent major depression ([facility] )    Essential hypertension    Moderate episode of recurrent major depressive disorder ([place] [address] )  -     Ambulatory referral to Psychiatry;  Future    Other chronic pain    Encounter for medication review    Urinary incontinence, unspecified type    Hearing loss, unspecified hearing loss type, unspecified laterality    Macular degeneration, unspecified laterality, unspecified type      Ambulatory dysfunction  TUGT 20sec   maintain Falls precautions   Recommend a falls Alert device  Will refer to physical therapy for gait training, balance and strengthening    Essential hypertension   /58   Continue aspirin 81 mg daily  Patient continues on verapamil 120 mg daily  Recommend adherence to a heart healthy    Moderate episode of recurrent major depressive disorder ([place] [address] )   GDS 7/15  No HI/SI   Will refer to Psychiatry   Patient currently on Cymbalta 60 mg daily   Continue social support by family and friends      Other chronic pain   Patient continues on Cymbalta 60 mg daily   Continues on gabapentin  Reviewed side effects of gabapentin in older patients with cognitive deficits   Will discuss further care plan conference   Given risk versus benefits, patient can continue on current therapy  Follow-up with pain management as scheduled    Dementia without behavioral disturbance ([facility] )  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), GDS 7/15, TUGT 20sec  Significant deficits noted in memory, recall, attention, language, abstraction and orientation domains   Given history, physical exam and above neurocognitive screening, this places the patient at a level of moderate to severe dementia   Etiology likely multifactorial:  Depression vs  Medications side effect vs  Vascular copathology vs Alzheimer's disease  Will order TSH with reflex T4, B12, folate   Will order MRI neuro quant of the brain to assess structure I will and volumetric analysis   Currently this patient meets criteria for placement into a higher level of care such as a memory care unit which would facilitate provision of meals, supervision of medications and the ability to remain active mentally, physically and socially  If the patient is to remain at home, I would recommend she have 24/7 supervised care at home     If she also remains at home, would recommend enrolling into a senior  program for positive socialization, cognitive and physical activities  Will refer to speech therapy for cognitive rehabilitation  Will refer to Psychiatry for treatment of depression  Will refer to physical therapy for gait training, balance and strengthening  Recommend the Mediterranean diet which has shown to decrease risk of memory loss and CVA   Recommend a falls Alert device as a safety precaution  Reorientation redirection as needed  Manage chronic conditions  Maintain Falls precautions   Encourage patient to remain active mentally, physically and socially  Patient should participate in cognitively challenging exercises as able  Will discuss pharmacotherapy options at care plan conference      Encounter for medication review   Side effects of gabapentin include dizziness, somnolence, fatigue, dysarthria, amnesia and depression   Will discuss further at care plan conference    Urinary incontinence   Recommend scheduled toileting every 2 hours once awake  Avoid fluid intake 2 hours prior to bedtime to avoid nocturnal incontinence    Hearing loss   Patient compliant with use of 1 hearing aid   Recommend facing patient directly and standing in close proximity when communicating to avoid confusion   Maintain Falls precautions  Follow-up with audiology as scheduled    Macular degeneration  Continue preservision Areds  Maintain falls precautions  Follow up with ophthalmology as scheduled          HPI:  We had the pleasure of evaluating Luz Elena Cid who is a 79 y o  female   in Geriatric consultation today  She lives with her son  Ms Pina Hernandez is in the office with her  son        Memory Issues noticed since over 1  year  Memory affected:short term memory loss and long term memory loss    Symptoms started: gradual  Over time the memory has:  worsened  Memory issue(s) were noted by: family   Patient has difficulties with recalling short term events    She has some problems operating household appliance such as TV remote, kitchen appliances, computer  This is an 26-year-old female with hearing loss, macular degeneration, chronic pain including trigeminal and occipital neuralgia, depression, urinary incontinence, HTN and ambulatory dysfunction amongst multiple other medical conditions who presents for her initial comprehensive geriatric assessment  The patient currently resides in a 3 kamini home however occupies the 1st floor for living  Her son from [place] has moved in with her for the past 6 years after she had bilateral TKRs and sustained a subsequent femur fracture  The patient completed nursing school and 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, 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  She did not participate in any volunteer work thereafter  Son explains the patient has had progressively worsening short-term memory loss  Her worsening vision impairment and hearing loss have also contributed to her overall cognitive deficits  He describes the patient as spending the majority of the day in a dark room and remaining in bed   She does ambulate using a rolling walker and will help with some of the house chores and laundry in an attempt to stay busy  However, often the patient will remain in bed for weeks and get out of bed only for breakfast and dinner  She has been following with pain management and Neurology for a history of chronic pain, trigeminal and occipital neuralgia s/p nerve block injections by pain management  Son also explains that the patient was previously on oxycodone which she felt did affect her mental capacity as she was 'zombied' out  She has successfully been weaned off this medication and currently is maintained on gabapentin with no obvious untoward side effects  The patient currently has a home health aide coming in for 1 hour weekly  She no longer cooks and has stopped driving 3 years ago  Son manages the finances as the patient did have difficulty writing checks due to her worsening vision impairment as well as forgetfulness when it came to paying her bills  No safety concerns at home with the stove, burning food or water faucets  The patient does have difficulty using the microwave and TV remote as a result of her vision impairment  Son explains that the patient has had "disdain for doctors' she has seen  The patient was previously treated for depression with Zoloft and is currently is on Cymbalta  She uses a marijuana pen  The patient has urinary and  occasional stool incontinence  Son typically will place her medications into a pill container which she takes although she occasionally will forget to take a pill  Son also describes the patient as having chronic anxiety as she constantly worries about both him along with other issues  The patient has refused referral to Psychiatry in the past   She currently ambulates using a rolling walker and has not had any falls in the past 6 months  She continues to shower once weekly with no concerns about hygiene per son  Son admits to having caregiver burnout which was notable in the office today    He is unsure of his mother's assets and feasibility of qualifying for additional resources at home  She has difficulty finding the right word while speaking: Yes  Patient requires repeat information or ask the same question repeatedly: Yes  Do you drive: No       Have you had any recent accidents, citations or getting lost in familiar places :No  Do you handle your own financial affairs such as balancing your checkbook, paying bills, investments: No  Do have any difficulties with handling your financial affairs: Yes  Have you or your family noted any change in your mood or personality:Yes  Are you currently or have you been treated in the past for depression or anxiety: Yes  Have you noticed any gait or balance disorder: Yes  Uses :Walker: rolling  Any hallucination or delusion: No  Fluctuation in alertness: No  Sleep Issues: Yes  Urinary/Stool Incontinence: Yes  Hearing and vision issue: Yes, wears one aid  Do you have POA:Yes  Do you have a Living will Yes  Past Medical, surgical, social, medication and allergy history and patients previous records reviewed  Family Review of Behavior [facility]:    pacing  No    agressive/combative behavior  Yes, occasionally    agitated  No   wandering  No   resistance to care  No   hoarding/hiding objects  No    suspicious  No  Withdrawn: yes  inappropriate sexual behaviorNo  rummaging/pillaging  No    misplacing/losing objects No  personal hygiene problems  No  forgetfulness of actions No   temper outbursts  No     throwing items No      Family member with dementia and what type? no  Have you had any head trauma No  Does patient have history of alcohol abuse No      ROS: Review of Systems   Unable to perform ROS: Dementia       Allergies:    Allergies   Allergen Reactions    Dilaudid [Hydromorphone] Shortness Of Breath     Other reaction(s): Respiratory Distress  Other reaction(s): HORENESS    Penicillins Rash     Rash       Medications:      Current Outpatient Medications:    Acetaminophen 325 MG CAPS, Take 650 mg by mouth every 6 (six) hours as needed, Disp: , Rfl:     aspirin (ECOTRIN LOW STRENGTH) 81 mg EC tablet, Take 81 mg by mouth daily, Disp: , Rfl:     Cholecalciferol (VITAMIN D PO), Take 5,000 Units by mouth Daily  , Disp: , Rfl:     cycloSPORINE (RESTASIS) 0 05 % ophthalmic emulsion, Apply 1 drop to eye 2 (two) times a day, Disp: , Rfl:     DULoxetine (CYMBALTA) 60 mg delayed release capsule, Take 2 caps by mouth daily, Disp: 120 capsule, Rfl: 2    gabapentin (NEURONTIN) 100 mg capsule, Take 2 capsules (200 mg total) by mouth daily, Disp: 180 capsule, Rfl: 1    gabapentin (NEURONTIN) 300 mg capsule, Take 1 capsule (300 mg total) by mouth 3 (three) times a day, Disp: 90 capsule, Rfl: 3    Multiple Vitamins-Minerals (PRESERVISION AREDS 2+MULTI VIT) CAPS, BID, Disp: , Rfl:     verapamil (VERELAN PM) 120 MG 24 hr capsule, Take 1 capsule (120 mg total) by mouth daily at bedtime, Disp: 90 capsule, Rfl: 1    Vitals:  Vitals:    07/30/21 1253   BP: 122/58   Pulse: 74   Temp: (!) 97 4 °F (36 3 °C)   SpO2: 96%       History:  Past Medical History:   Diagnosis Date    Arthritis     Basal cell carcinoma     Dry eye syndrome     Foraminal stenosis of cervical region     Macular degeneration     Memory loss     Migraine     Neck pain     Spinal stenosis of lumbar region     Squamous cell skin cancer     Supraventricular tachycardia (HCC)     Vitamin D deficiency     last assessed 2/7/17     Past Surgical History:   Procedure Laterality Date    APPENDECTOMY      BACK SURGERY      lower back surgery lumbar disc    CATARACT EXTRACTION      FEMUR FRACTURE SURGERY      [place] STEREO TREAT TRIGEMINAL NERVE Right 3/26/2019    Procedure: GLYCEROL RHIZOTOMY TRIGEMINAL NERVES (V1-V3), RIGHT;  Surgeon: Preston Bean MD;  Location:  MAIN OR;  Service: Neurosurgery    REPLACEMENT TOTAL KNEE Right     REPLACEMENT TOTAL KNEE Left     SKIN BIOPSY       Family History   Problem Relation Age of Onset    Aortic aneurysm Mother         abdominal aortic aneurysm    Hypertension Mother    Learta January Breast cancer Mother    Learta January Hypertension Father     Hypertension Sister     Hyperlipidemia Sister     Squamous cell carcinoma Sister      Social History     Socioeconomic History    Marital status:      Spouse name: Not on file    Number of children: Not on file    Years of education: Not on file    Highest education level: Not on file   Occupational History    Occupation: retired   Tobacco Use    Smoking status: Former Smoker    Smokeless tobacco: Never Used   Vaping Use    Vaping Use: Never used   Substance and Sexual Activity    Alcohol use: Yes     Comment: occasional    Drug use: No    Sexual activity: Not on file   Other Topics Concern    Not on file   Social History Narrative    Always uses seatbelt     Social Determinants of Health     Financial Resource Strain:     Difficulty of Paying Living Expenses:    Food Insecurity:     Worried About [address] in the Last Year:     [address] in the Last Year:    Transportation Needs:     Lack of Transportation (Medical):      Lack of Transportation (Non-Medical):    Physical Activity:     Days of Exercise per Week:     Minutes of Exercise per Session:    Stress:     Feeling of Stress :    Social Connections:     Frequency of Communication with Friends and Family:     Frequency of Social Gatherings with Friends and Family:     Attends Gnosticism Services:     Active Member of Clubs or Organizations:     Attends Club or Organization Meetings:     Marital Status:    Intimate Partner Violence:     Fear of Current or Ex-Partner:     Emotionally Abused:     Physically Abused:     Sexually Abused:      Past Surgical History:   Procedure Laterality Date    APPENDECTOMY      BACK SURGERY      lower back surgery lumbar disc    CATARACT EXTRACTION      FEMUR FRACTURE SURGERY      [place] STEREO TREAT TRIGEMINAL NERVE Right 3/26/2019    Procedure: GLYCEROL RHIZOTOMY TRIGEMINAL NERVES (V1-V3), RIGHT;  Surgeon: Naomy Julian MD;  Location:  MAIN OR;  Service: Neurosurgery    REPLACEMENT TOTAL KNEE Right     REPLACEMENT TOTAL KNEE Left     SKIN BIOPSY           Physical Exam:   Physical Exam  Vitals reviewed  Constitutional:       General: She is not in acute distress  Appearance: Normal appearance  She is well-developed  She is not ill-appearing or diaphoretic  Comments:  Elderly female sitting comfortably in chair in no obvious cardiorespiratory or painful distress   HENT:      Head: Normocephalic and atraumatic  Right Ear: External ear normal       Left Ear: External ear normal       Ears:      Comments:  1 hearing aid in situ     Nose: Nose normal       Mouth/Throat:      Mouth: Mucous membranes are moist       Pharynx: Oropharynx is clear  No oropharyngeal exudate  Eyes:      General: No scleral icterus  Right eye: No discharge  Left eye: No discharge  Conjunctiva/sclera: Conjunctivae normal    Neck:      Vascular: No JVD  Cardiovascular:      Rate and Rhythm: Normal rate and regular rhythm  Heart sounds: Murmur heard  No friction rub  No gallop  Pulmonary:      Effort: Pulmonary effort is normal  No respiratory distress  Breath sounds: Normal breath sounds  No wheezing or rales  Abdominal:      General: Bowel sounds are normal  There is no distension  Palpations: Abdomen is soft  Tenderness: There is no abdominal tenderness  There is no guarding  Musculoskeletal:         General: No tenderness or deformity  Normal range of motion  Cervical back: Normal range of motion and neck supple  Right lower leg: No edema  Left lower leg: No edema  Skin:     General: Skin is warm  Coloration: Skin is not pale  Findings: No erythema or rash  Neurological:      General: No focal deficit present  Mental Status: She is alert   Mental status is at baseline  She is disoriented  Cranial Nerves: No cranial nerve deficit  Motor: No weakness  Gait: Gait abnormal       Deep Tendon Reflexes: Reflexes are normal and symmetric     Psychiatric:      Comments:  Pleasantly confused at baseline

## 2021-07-30 NOTE — PROGRESS NOTES
This note was not shared with the patient due to privacy exception: note includes other individuals  This note includes caregiver observations and information to be further discussed at care conference, scheduled for 8/20/2021  [address]  [phone]  Social Work [address] [facility] presents today for a geriatric assessment, accompanied by her son, Alicia Wilkins  Caregiver main concern(s): Alicia Claudette is hoping to review Jessica's mental capacity and physical decline  LCSW also reviewed caregiver burnout with Alicia Wilkins and noted availability of caregiver support groups, online forums, and educational courses for dementia caregivers  Information to be provided/discussed further at care conference  SOCIAL HISTORY  Patient: Oscar Speaker  Current Living Situation: Naresh Swann and Alicia Wilkins live together in a residential home  Is respite needed for caregiver(s)? Yes - brief respite would certainly be beneficial (home care services could be increased and/or enrollment with an adult day service)  Who is available to provide care in case of illness or crisis (please specify relation to patient / level of care able to provide)? One family friend might be able to assist (if needed)    FAMILY BACKGROUND  Marital status:    Does patient have children? Yes    Family members assisting patient at home: Alicia Wilkins assists by providing meals, overseeing medicines, and doing the cleaning  They do have a   Her son also provides her with transportation  Naresh Swann does make breakfast on her own  EMPLOYMENT & EDUCATION  Education: Highest Level of Education: Greater than high school  Currently Employed: No  Retired: Yes, around age 71  Type of employment: Nurse    RELATIONSHIPS  Are any relationships causing problems right now: Yes - Alicia Wilkins does state that Naresh Swann talks about how her friends don't call her   She and her sister also have political disagreements, though they do get along  [address] [employer] None reported - staying home, sleeping a great deal  Does hang out with one friend once every month or two  Major life events in past two years (ex: FPC, death in family, major illness etc ): Increase in pain, mobility has decreased significantly over last 5-6 years (refused physical therapy, per son)  Jessica's daughter did pass away years ago which Gatito Guevara reports was the start of her depression  Does the patient currently drive: No    If not, date stopped driving: At least two years ago    [address] which have helped with shopping, meals, bathing, etc : Currently receiving one hour of home care each week for hygiene assistance    [facility]: No       [place] Benefits (if applicable): N/A    FINANCIAL  Meet current needs? Yes   Funds available for home care? Somewhat - just one hour currently  Funds available for nursing home? Likely not - unsure  Do you rent or own your home? Own     Gatito Guevara reports that he believes Kong Sam had long term care insurance, but cancelled this policy about 5 years ago  LEGAL  Advanced directives: has an advanced directive - a copy has been provided  POA and living will both in place; documents on file, received 2015  SAFETY ASSESSMENT     Kong Sam and her son Gatito Guevara live in a three story home  She stays on one floor and cannot go up and down the stairs  There is a ramp to enter the home  FIRE HAZARDS  Does the residence have smoke alarm? Yes     Does the residence have a fire escape? Yes   Are any of the following present in the residence? Frayed Wires       No   Clutter        No   Incorrect use of open flame     No - uses stove rarely, recently boiled eggs    FALL HAZARDS  Do any of the following exist in the residence?    Poor lighting       No   Loose Rugs       No   Obstacles       No   Uneven floors       No   Slippery floors       No   Unsafe stairs       No  Does the patient use a fall alert? No   If yes, which one? Has one, keeps it tied to her walker  Sadie Olivares does encourage Elvira Urban to wear this correctly  HEALTH HAZARDS   Does the residence have any of the following? Adequate plumbing / heat / ventilation    Yes   Are there signs of neglect such as the following? Unkempt house      No   Old food in refrigerator     No   Infestation       No    EMERGENCY HEALTH PLAN   Is there a phone that is accessible to the patient or caregiver? Yes - landline, though she cannot see to dial it  Is the number to police, physician, and 911 easily accessible? No  Would the patient be able to call 911 in an emergency? Possibly could dial - more likely would need to use her fall alert button    ENVIRONMENT APPROPRIATENESS  Please note if each is available and accessible to the patient:   Kelsi Joshi / Sanju Morton / Henry Sauce / Living Room    Yes     Is the patient able to climb stairs if necessary? No  Does the patient use any assistant devices for ambulation? Yes    If yes, please list which device required   Walker for short distance, wheelchair for longer distances    Does the bathroom have any of the following? Handrails in tub or toilet     No - showers with aide assistance, has a bath seat   Raised toilet seat      Yes    Rubber tub mat      Yes   Hot water       Yes     Can the patient independently do the following? Shower       No - showers once a week with aide   Toilet        Yes    Dress them selves      Yes     Pick appropriate clothing     Yes    Eat        Yes    Drink        Yes     OTHER  Are there any weapons in the home? Yes - guns are upstairs and Sadie Olivares reports Elvira Urban cannot access them due to limited mobility  Is there a system in place for medication management? Son manages pillboxes, has packed them weekly for 6-8 months  Does provide oversight during the week; sometimes she will miss a pill per Sadie Olivares        Jovani Cognitive Assessment (MoCA) Version 8 1  Education: >12 years    Points Earned POSSIBLE Points   Attention   Digit Span 2 2   Vigilance (letters) 1 1   Serial 7 subtraction 0 3   Language   Sentence Repetition 1 2   Verbal fluency 0 1   Abstraction   Abstraction (word pairings) 1 2   Delayed recall   Delayed recall 0 5   Memory index score: 7/15   Orientation   Orientation 1 6   TOTAL SCORE: 6/22  (Normal ?26/30)   Additional notes: Converted to MoCA-Blind due to macular degeneration  Geriatric Depression Scale (GDS) completed today, 7/15

## 2021-07-30 NOTE — ASSESSMENT & PLAN NOTE
/58   Continue aspirin 81 mg daily  Patient continues on verapamil 120 mg daily  Recommend adherence to a heart healthy

## 2021-07-30 NOTE — ASSESSMENT & PLAN NOTE
Patient compliant with use of 1 hearing aid   Recommend facing patient directly and standing in close proximity when communicating to avoid confusion   Maintain Falls precautions  Follow-up with audiology as scheduled

## 2021-07-31 PROBLEM — H35.30 MACULAR DEGENERATION: Status: ACTIVE | Noted: 2021-07-31

## 2021-08-02 ENCOUNTER — OFFICE VISIT (OUTPATIENT)
Dept: PAIN MEDICINE | Facility: CLINIC | Age: 81
End: 2021-08-02
Payer: MEDICARE

## 2021-08-02 VITALS
HEIGHT: 65 IN | HEART RATE: 88 BPM | SYSTOLIC BLOOD PRESSURE: 132 MMHG | DIASTOLIC BLOOD PRESSURE: 67 MMHG | BODY MASS INDEX: 40.65 KG/M2

## 2021-08-02 DIAGNOSIS — M50.11 CERVICAL DISC DISORDER WITH RADICULOPATHY OF OCCIPITO-ATLANTO-AXIAL REGION: ICD-10-CM

## 2021-08-02 DIAGNOSIS — M79.18 MYOFASCIAL PAIN DYSFUNCTION SYNDROME: ICD-10-CM

## 2021-08-02 DIAGNOSIS — G50.0 TRIGEMINAL NEURALGIA: ICD-10-CM

## 2021-08-02 DIAGNOSIS — M54.81 OCCIPITAL NEURALGIA OF RIGHT SIDE: ICD-10-CM

## 2021-08-02 DIAGNOSIS — G89.4 CHRONIC PAIN SYNDROME: Primary | ICD-10-CM

## 2021-08-02 PROCEDURE — 99214 OFFICE O/P EST MOD 30 MIN: CPT | Performed by: NURSE PRACTITIONER

## 2021-08-02 RX ORDER — GABAPENTIN 300 MG/1
900 CAPSULE ORAL
Qty: 90 CAPSULE | Refills: 3 | Status: SHIPPED | OUTPATIENT
Start: 2021-08-02 | End: 2022-01-31 | Stop reason: SDUPTHER

## 2021-08-02 NOTE — PROGRESS NOTES
Pain Medicine Follow-Up Note    Assessment:  1  Chronic pain syndrome    2  Myofascial pain dysfunction syndrome    3  Cervical disc disorder with radiculopathy of tsckikxd-fdxxnhk-[id number] region    4  Trigeminal neuralgia    5  Occipital neuralgia of right side        Plan:  No orders of the defined types were placed in this encounter  New Medications Ordered This Visit   Medications    gabapentin (NEURONTIN) 300 mg capsule     Sig: Take 3 capsules (900 mg total) by mouth daily at bedtime     Dispense:  90 capsule     Refill:  3     The patient reports she continues with facial and periorbital pain, but reports she is uninterested in any treatment options for trigeminal neuralgia  The patient reports she would like to repeat the occipital nerve blocks  I discussed with the patient the soonest the nerve blocks could be repeated would be September  The patient verbalized understanding  The patient is requesting to increase gabapentin to 3 times daily  I reminded the patient in the past, she was unable to tolerate the midday dose due to increased drowsiness  I will increase to 900 mg at bedtime and the patient will continue with 200 mg in the morning  A new Rx was sent to the patient's pharmacy on file reflecting the increase in dose  The patient will call to let me know how she is doing with the increase  Follow-up is planned in 8 weeks time or sooner as warranted  Discharge instructions were provided  I personally saw and examined the patient and I agree with the above discussed plan of care  My impressions and treatment recommendations were discussed in detail with the patient who verbalized understanding and had no further questions  History of Present Illness:    Luz Elena Cid is a 79 y o  female who presents to [facility] for interval re-evaluation of the above stated pain complaints   The patient has a past medical and chronic pain history as outlined in the assessment section  She was last seen on 6/18/2021 in regards to  Chronic pain syndrome secondary to cervical disc disorder with radiculopathy, trigeminal neuralgia, and bilateral occipital neuralgia  The patient currently reports ongoing pain that is unchanged since last office visit  At the last visit, the patient received a cervical nerve blocks which she reports provided mild to moderate relief of her pain symptoms  At this time, the patient reports intermittent, dull aching pain that is worse in the morning  The patient describes her pain as periorbital, facial pain, but reports she is uninterested in any type of treatment for trigeminal neuralgia  The patient reports that she will be starting physical therapy soon  Current pain medications include gabapentin 200 mg in the morning, and 600 mg at bedtime  Other than as stated above, the patient denies any interval changes in medications, medical condition, mental condition, symptoms, or allergies since the last office visit  Review of Systems:    Review of Systems   Respiratory: Negative for shortness of breath  Cardiovascular: Negative for chest pain  Gastrointestinal: Positive for constipation  Negative for diarrhea, nausea and vomiting  Musculoskeletal: Positive for gait problem  Negative for arthralgias, joint swelling and myalgias  Decreased range of motion  Joint stiffness   Skin: Negative for rash  Neurological: Positive for dizziness  Negative for seizures and weakness  Psychiatric/Behavioral:        Memory loss  All other systems reviewed and are negative          Patient Active Problem List   Diagnosis    Chronic nonintractable headache    Trigeminal neuralgia    Occipital neuralgia of right side    Trigeminal neuropathy    Myofascial pain dysfunction syndrome    Other chronic pain    Cervical disc disorder with radiculopathy of efxhxpsk-cbrvgby-yldib region    Essential hypertension    Mixed hyperlipidemia    Insomnia    Ambulatory dysfunction    Dementia without behavioral disturbance (HCC)    Moderate episode of recurrent major depressive disorder ([address] )    Encounter for medication review    Urinary incontinence    Hearing loss    Macular degeneration       Past Medical History:   Diagnosis Date    Arthritis     Basal cell carcinoma     Dry eye syndrome     Foraminal stenosis of cervical region     Macular degeneration     Memory loss     Migraine     Neck pain     Spinal stenosis of lumbar region     Squamous cell skin cancer     Supraventricular tachycardia ([address] )     Vitamin D deficiency     last assessed 2/7/17       Past Surgical History:   Procedure Laterality Date    APPENDECTOMY      BACK SURGERY      lower back surgery lumbar disc    CATARACT EXTRACTION      FEMUR FRACTURE SURGERY      DE STEREO TREAT TRIGEMINAL NERVE Right 3/26/2019    Procedure: GLYCEROL RHIZOTOMY TRIGEMINAL NERVES (V1-V3), RIGHT;  Surgeon: Jose Mcginnis MD;  Location:  MAIN OR;  Service: Neurosurgery    REPLACEMENT TOTAL KNEE Right     REPLACEMENT TOTAL KNEE Left     SKIN BIOPSY         Family History   Problem Relation Age of Onset    Aortic aneurysm Mother         abdominal aortic aneurysm    Hypertension Mother    Annabelle Acevedo Breast cancer Mother    Annabelle Acevedo Hypertension Father     Hypertension Sister     Hyperlipidemia Sister     Squamous cell carcinoma Sister        Social History     Occupational History    Occupation: retired   Tobacco Use    Smoking status: Former Smoker    Smokeless tobacco: Never Used   Vaping Use    Vaping Use: Never used   Substance and Sexual Activity    Alcohol use: Yes     Comment: occasional    Drug use: No    Sexual activity: Not Currently     Partners: Male         Current Outpatient Medications:     Acetaminophen 325 MG CAPS, Take 650 mg by mouth every 6 (six) hours as needed, Disp: , Rfl:     aspirin (ECOTRIN LOW STRENGTH) 81 mg EC tablet, Take 81 mg by mouth daily, Disp: , Rfl:   Cholecalciferol (VITAMIN D PO), Take 5,000 Units by mouth Daily  , Disp: , Rfl:     cycloSPORINE (RESTASIS) 0 05 % ophthalmic emulsion, Apply 1 drop to eye 2 (two) times a day, Disp: , Rfl:     DULoxetine (CYMBALTA) 60 mg delayed release capsule, Take 2 caps by mouth daily, Disp: 120 capsule, Rfl: 2    gabapentin (NEURONTIN) 100 mg capsule, Take 2 capsules (200 mg total) by mouth daily, Disp: 180 capsule, Rfl: 1    gabapentin (NEURONTIN) 300 mg capsule, Take 3 capsules (900 mg total) by mouth daily at bedtime, Disp: 90 capsule, Rfl: 3    Multiple Vitamins-Minerals (PRESERVISION AREDS 2+MULTI VIT) CAPS, BID, Disp: , Rfl:     verapamil (VERELAN PM) 120 MG 24 hr capsule, Take 1 capsule (120 mg total) by mouth daily at bedtime, Disp: 90 capsule, Rfl: 1    Allergies   Allergen Reactions    Dilaudid [Hydromorphone] Shortness Of Breath     Other reaction(s): Respiratory Distress  Other reaction(s): HORENESS    Penicillins Rash     Rash       Physical Exam:    /67   Pulse 88   Ht 5' 5" (1 651 m)   Breastfeeding No   BMI 40 65 kg/m²     Constitutional:normal, well developed, well nourished, alert, in no distress and non-toxic and no overt pain behavior  and obese  Eyes:anicteric  HEENT:grossly intact  Neck:supple, symmetric, trachea midline and no masses   Pulmonary:even and unlabored  Cardiovascular:No edema or pitting edema present  Skin:Normal without rashes or lesions and well hydrated  Psychiatric:Mood and affect appropriate  Neurologic:Cranial Nerves II-XII grossly intact  Musculoskeletal:ambulates with wheeled walker      Imaging  No orders to display         No orders of the defined types were placed in this encounter

## 2021-08-02 NOTE — PATIENT INSTRUCTIONS
Gabapentin (By mouth)   Gabapentin (eloise-a-PEN-tin)  Treats seizures and pain caused by shingles  Brand Name(s): FusePaq Fanatrex, Gralise, Neurontin   There may be other brand names for this medicine  When This Medicine Should Not Be Used: This medicine is not right for everyone  Do not use it if you had an allergic reaction to gabapentin  How to Use This Medicine:   Capsule, Liquid, Tablet  · Take your medicine as directed  Your dose may need to be changed several times to find what works best for you  If you have epilepsy, do not allow more than 12 hours to pass between doses  · Capsule: Swallow the capsule whole with plenty of water  Do not open, crush, or chew it  · Gralise® tablet: Swallow the tablet whole   Do not crush, break, or chew it  · Neurontin® tablet: If you break a tablet into 2 pieces, use the second half as your next dose  Do not use the half-tablet if the whole tablet has been cut or broken after 28 days  · Oral liquid: Measure the oral liquid medicine with a marked measuring spoon, oral syringe, or medicine cup  · This medicine should come with a Medication Guide  Ask your pharmacist for a copy if you do not have one  · Missed dose: Take a dose as soon as you remember  If it is almost time for your next dose, wait until then and take a regular dose  Do not take extra medicine to make up for a missed dose  · Store the medicine in a closed container at room temperature, away from heat, moisture, and direct light  Store the Neurontin® oral liquid in the refrigerator  Do not freeze  Drugs and Foods to Avoid:   Ask your doctor or pharmacist before using any other medicine, including over-the-counter medicines, vitamins, and herbal products  · Some medicines can affect how gabapentin works  Tell your doctor if you also using hydrocodone or morphine  · If you take an antacid, wait at least 2 hours before you take gabapentin    · Do not drink alcohol while you are using this medicine  · Tell your doctor if you use anything else that makes you sleepy  Some examples are allergy medicine, narcotic pain medicine, and alcohol  Tell your doctor if you are also using lorazepam, oxycodone, or zolpidem  Warnings While Using This Medicine:   · Tell your doctor if you are pregnant or breastfeeding, or if you have kidney problems (including patients receiving dialysis) or lung problems  Tell your doctor if you have a history of depression or mental health problems  · This medicine may cause the following problems:  ? Drug reaction with eosinophilia and systemic symptoms (DRESS) or multiorgan hypersensitivity, which may damage the liver, kidney, blood, heart, or muscles  ? Changes in mood or behavior, including suicidal thoughts or behavior  ? Respiratory depression (serious breathing problem that can be life-threatening), when used with narcotic pain medicines  · Do not stop using this medicine suddenly  Your doctor will need to slowly decrease your dose before you stop it completely  · This medicine may make you dizzy or drowsy  Do not drive or do anything else that could be dangerous until you know how this medicine affects you  · Tell any doctor or dentist who treats you that you are using this medicine  This medicine may affect certain medical test results  · Your doctor will check your progress and the effects of this medicine at regular visits  Keep all appointments  · Keep all medicine out of the reach of children  Never share your medicine with anyone    Possible Side Effects While Using This Medicine:   Call your doctor right away if you notice any of these side effects:  · Allergic reaction: Itching or hives, swelling in your face or hands, swelling or tingling in your mouth or throat, chest tightness, trouble breathing  · Behavior problems, aggression, restlessness, trouble concentrating, moodiness (especially in children)  · Blistering, peeling, red skin rash  · Blue lips, fingernails, or skin, chest pain, fast heartbeat, trouble breathing  · Change in how much or how often you urinate, bloody or cloudy urine  · Dark urine or pale stools, nausea, vomiting, loss of appetite, stomach pain, yellow skin or eyes  · Fever, chills, cough, sore throat, body aches  · Problems with coordination, shakiness, unsteadiness, unusual eye movement  · Rapid weight gain, swelling in your hands, ankles, or feet  · Rash, swollen or tender glands in the neck, armpit, or groin  · Unusual moods or behaviors, thoughts of hurting yourself, feeling depressed  If you notice these less serious side effects, talk with your doctor:   · Dizziness, drowsiness, sleepiness, tiredness  If you notice other side effects that you think are caused by this medicine, tell your doctor  Call your doctor for medical advice about side effects  You may report side effects to FDA at 3-615-FDA-3046  © Copyright [employer] 2021 Information is for End User's use only and may not be sold, redistributed or otherwise used for commercial purposes  The above information is an  only  It is not intended as medical advice for individual conditions or treatments  Talk to your doctor, nurse or pharmacist before following any medical regimen to see if it is safe and effective for you

## 2021-08-05 ENCOUNTER — APPOINTMENT (OUTPATIENT)
Dept: LAB | Facility: HOSPITAL | Age: 81
End: 2021-08-05
Payer: MEDICARE

## 2021-08-05 DIAGNOSIS — F03.90 DEMENTIA WITHOUT BEHAVIORAL DISTURBANCE, UNSPECIFIED DEMENTIA TYPE (HCC): ICD-10-CM

## 2021-08-05 LAB
FOLATE SERPL-MCNC: >20 NG/ML (ref 3.1–17.5)
TSH SERPL DL<=0.05 MIU/L-ACNC: 2.04 UIU/ML (ref 0.36–3.74)
VIT B12 SERPL-MCNC: 150 PG/ML (ref 100–900)

## 2021-08-05 PROCEDURE — 82746 ASSAY OF FOLIC ACID SERUM: CPT

## 2021-08-05 PROCEDURE — 36415 COLL VENOUS BLD VENIPUNCTURE: CPT

## 2021-08-05 PROCEDURE — 84443 ASSAY THYROID STIM HORMONE: CPT

## 2021-08-05 PROCEDURE — 82607 VITAMIN B-12: CPT

## 2021-08-12 ENCOUNTER — OFFICE VISIT (OUTPATIENT)
Dept: FAMILY MEDICINE CLINIC | Facility: CLINIC | Age: 81
End: 2021-08-12
Payer: MEDICARE

## 2021-08-12 VITALS
OXYGEN SATURATION: 94 % | BODY MASS INDEX: 30.02 KG/M2 | SYSTOLIC BLOOD PRESSURE: 118 MMHG | DIASTOLIC BLOOD PRESSURE: 68 MMHG | HEIGHT: 65 IN | TEMPERATURE: 98 F | RESPIRATION RATE: 18 BRPM | WEIGHT: 180.2 LBS | HEART RATE: 81 BPM

## 2021-08-12 DIAGNOSIS — K13.70 ORAL LESION: ICD-10-CM

## 2021-08-12 DIAGNOSIS — R29.898 MUSCULAR DECONDITIONING: ICD-10-CM

## 2021-08-12 DIAGNOSIS — I10 ESSENTIAL HYPERTENSION: ICD-10-CM

## 2021-08-12 DIAGNOSIS — E53.8 VITAMIN B12 DEFICIENCY: ICD-10-CM

## 2021-08-12 DIAGNOSIS — Z12.31 ENCOUNTER FOR SCREENING MAMMOGRAM FOR MALIGNANT NEOPLASM OF BREAST: ICD-10-CM

## 2021-08-12 DIAGNOSIS — L98.9 SKIN LESION: ICD-10-CM

## 2021-08-12 DIAGNOSIS — F03.90 DEMENTIA WITHOUT BEHAVIORAL DISTURBANCE, UNSPECIFIED DEMENTIA TYPE (HCC): Primary | ICD-10-CM

## 2021-08-12 DIAGNOSIS — E78.2 MIXED HYPERLIPIDEMIA: ICD-10-CM

## 2021-08-12 DIAGNOSIS — N39.46 MIXED INCONTINENCE, URGE AND STRESS (MALE) (FEMALE): ICD-10-CM

## 2021-08-12 PROCEDURE — 99214 OFFICE O/P EST MOD 30 MIN: CPT | Performed by: FAMILY MEDICINE

## 2021-08-12 NOTE — ASSESSMENT & PLAN NOTE
Advised to keep the follow-up appointment with geriatrician but at least start 250 mcg a B12 daily until you follow-up with the geriatrician

## 2021-08-12 NOTE — PATIENT INSTRUCTIONS
Reviewed all with patient and her son  She is now following with a geriatrician who should be making recommendations regarding her medications  Please discuss with them your mixed incontinence of urine  Stress incontinence is when you cough and sneeze and wet  Urge incontinence is you have to go now or you will wet  We do have a pill for urge incontinence however it is caution in the elderly and will not help stress incontinence  I also would like you to speak to the geriatrician about the lesions on your lips as I have absolutely seen that before on duloxetine  They may want to change her antidepressant  Make your appointment for the skin check [facility]  Make your appointment with physical therapy for reconditioning  Start vitamin B12 over-the-counter 250 mcg daily until you see the geriatrician as even though your B12 level is normal it is a low normal and I believe it needs to be replaced  Please make follow-up appointment in November for Medicare wellness also

## 2021-08-12 NOTE — ASSESSMENT & PLAN NOTE
Discussed with patient and her son    I will defer to geriatrician however I do not believe that any of the current medications will help completely with this and they can cause more sedation and more loss of memory possibly

## 2021-08-12 NOTE — PROGRESS NOTES
Assessment/Plan:     Chronic Problems:  Dementia without behavioral disturbance ([facility] [address] )  Pt is now following with geriatrician and has mri scheduled and f/u appt  [address] [place] 6/22    Essential hypertension  Blood pressure is at goal   Continue current medications  Mixed hyperlipidemia    I do not see a reason to treat the cholesterol at age 79 but will defer to the geriatrician  Vitamin B12 deficiency    Advised to keep the follow-up appointment with geriatrician but at least start 250 mcg a B12 daily until you follow-up with the geriatrician  Mixed incontinence, 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) (female)    Discussed with patient and her son  I will defer to geriatrician however I do not believe that any of the current medications will help completely with this and they can cause more sedation and more loss of memory possibly      Visit Diagnosis:  Diagnoses and all orders for this visit:    Dementia without behavioral disturbance, unspecified dementia type (HCC)    Essential hypertension    Mixed hyperlipidemia    Vitamin B12 deficiency  -     vitamin B-12 (CYANOCOBALAMIN) 250 MCG tablet; Take 1 tablet (250 mcg total) by mouth daily    Encounter for screening mammogram for malignant neoplasm of breast  -     Mammo screening bilateral w 3d & cad; Future    Muscular deconditioning  Comments: Will refer to physical therapy  Orders:  -     Ambulatory referral to Physical Therapy; Future    Lesion on lips  Comments:  STrongly feel this is r/t the cymbalta  Advised to speak with geriatrician re: change from cymbalta  Mixed incontinence, 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) (female)    Skin lesion  -     Ambulatory referral to Dermatology; Future          Subjective:    Patient ID: Priscilla Burk is a 79 y o  female  Pt is here ac by her son with multiple complaints and a list  Had recent labs by geriatrician and son looked at the labs and feels her b12 is low and that may be attributing to the bumps in her mouth and some other issues  Tongue is painful and feels it is burning  Has very dry skin with multiple lesions  Needs referrals for physical therapy and mammo  Pt is very deconditioned  Using a walker  Pt also has an itchy bump on the right hip for a week  Did not use anything for this  Pt has had urinary incontinence, mixed  Due for mri ordered by geriatrician that is scheduled for 2 days prior to her f/u appt  Takes all other meds as directed  No side effects noted      The following portions of the patient's history were reviewed and updated as appropriate: allergies, current medications, past family history, past medical history, past social history, past surgical history and problem list     Review of Systems   Constitutional: Positive for fatigue  Negative for chills, diaphoresis and fever  HENT: Negative  Eyes: Positive for visual disturbance (Feels she is giving up on her vision)  Respiratory: Negative for cough, shortness of breath and wheezing  Hyperventilates a lot  Family thinks from anxiety   Cardiovascular: Negative for chest pain, palpitations and leg swelling  Gastrointestinal: Negative for abdominal pain, constipation, diarrhea, nausea and vomiting  Genitourinary: Positive for frequency and urgency  Negative for dysuria  Mixed incontinence  Musculoskeletal: Negative for arthralgias and myalgias  Neurological: Positive for headaches  Negative for dizziness and light-headedness  Psychiatric/Behavioral: Positive for dysphoric mood  The patient is nervous/anxious  Has f/u with geriatrician  Feels her memory is terrible  /68   Pulse 81   Temp 98 °F (36 7 °C) (Tympanic)   Resp 18   Ht 5' 5" (1 651 m)   Wt 81 7 kg (180 lb 3 2 oz)   SpO2 94%   BMI 29 99 kg/m²   Social History     Socioeconomic History    Marital status:       Spouse name: Not on file    Number of children: Not on file    Years of education: Not on file    Highest education level: Not on file Occupational History    Occupation: retired   Tobacco Use    Smoking status: Former Smoker    Smokeless tobacco: Never Used   Vaping Use    Vaping Use: Never used   Substance and Sexual Activity    Alcohol use: Yes     Comment: occasional    Drug use: No    Sexual activity: Not Currently     Partners: Male   Other Topics Concern    Not on file   Social History Narrative    Always uses seatbelt     Social Determinants of Health     Financial Resource Strain:     Difficulty of Paying Living Expenses:    Food Insecurity:     Worried About Running Out of Food in the Last Year:     Ran Out of Food in the Last Year:    Transportation Needs:     Lack of Transportation (Medical):      Lack of Transportation (Non-Medical):    Physical Activity:     Days of Exercise per Week:     Minutes of Exercise per Session:    Stress:     Feeling of Stress :    Social Connections:     Frequency of Communication with Friends and Family:     Frequency of Social Gatherings with Friends and Family:     Attends [employer] Services:     Active Member of Clubs or Organizations:     Attends Club or Organization Meetings:     Marital Status:    Intimate Partner Violence:     Fear of Current or Ex-Partner:     Emotionally Abused:     Physically Abused:     Sexually Abused:      Past Medical History:   Diagnosis Date    Arthritis     Basal cell carcinoma     Dry eye syndrome     Foraminal stenosis of cervical region     Macular degeneration     Memory loss     Migraine     Neck pain     Spinal stenosis of lumbar region     Squamous cell skin cancer     Supraventricular tachycardia ([facility] [address]     Vitamin D deficiency     last assessed 2/7/17     Family History   Problem Relation Age of Onset    Aortic aneurysm Mother         abdominal aortic aneurysm    Hypertension Mother     Breast cancer Mother     Hypertension Father     Hypertension Sister     Hyperlipidemia Sister     Squamous cell carcinoma Sister Past Surgical History:   Procedure Laterality Date    APPENDECTOMY      BACK SURGERY      lower back surgery lumbar disc    CATARACT EXTRACTION      FEMUR FRACTURE SURGERY      IL STEREO TREAT TRIGEMINAL NERVE Right 3/26/2019    Procedure: GLYCEROL RHIZOTOMY TRIGEMINAL NERVES (V1-V3), RIGHT;  Surgeon: Travis Bacon MD;  Location: [facility];  Service: Neurosurgery    REPLACEMENT TOTAL KNEE Right     REPLACEMENT TOTAL KNEE Left     SKIN BIOPSY         Current Outpatient Medications:     Acetaminophen 325 MG CAPS, Take 650 mg by mouth every 6 (six) hours as needed, Disp: , Rfl:     Cholecalciferol (VITAMIN D PO), Take 5,000 Units by mouth Daily  , Disp: , Rfl:     DULoxetine (CYMBALTA) 60 mg delayed release capsule, Take 2 caps by mouth daily, Disp: 120 capsule, Rfl: 2    gabapentin (NEURONTIN) 100 mg capsule, Take 2 capsules (200 mg total) by mouth daily, Disp: 180 capsule, Rfl: 1    gabapentin (NEURONTIN) 300 mg capsule, Take 3 capsules (900 mg total) by mouth daily at bedtime, Disp: 90 capsule, Rfl: 3    Multiple Vitamins-Minerals (PRESERVISION AREDS 2+MULTI VIT) CAPS, BID, Disp: , Rfl:     verapamil (VERELAN PM) 120 MG 24 hr capsule, Take 1 capsule (120 mg total) by mouth daily at bedtime, Disp: 90 capsule, Rfl: 1    vitamin B-12 (CYANOCOBALAMIN) 250 MCG tablet, Take 1 tablet (250 mcg total) by mouth daily, Disp: 90 tablet, Rfl: 0    Allergies   Allergen Reactions    Dilaudid [Hydromorphone] Shortness Of Breath     Other reaction(s): Respiratory Distress  Other reaction(s): HORENESS    Penicillins Rash     Rash          Lab Review   Appointment on 08/05/2021   Component Date Value    TSH 3RD GENERATON 08/05/2021 2 044     Vitamin B-12 08/05/2021 150     Folate 08/05/2021 >20 0*   Appointment on 07/21/2021   Component Date Value    Sodium 07/21/2021 145     Potassium 07/21/2021 4 1     Chloride 07/21/2021 106     CO2 07/21/2021 31     ANION GAP 07/21/2021 8     BUN 07/21/2021 17     Creatinine 07/21/2021 0 74     Glucose, Fasting 07/21/2021 86     Calcium 07/21/2021 9 0     AST 07/21/2021 17     ALT 07/21/2021 16     Alkaline Phosphatase 07/21/2021 73     Total Protein 07/21/2021 7 4     Albumin 07/21/2021 3 8     Total Bilirubin 07/21/2021 0 50     eGFR 07/21/2021 76     Cholesterol 07/21/2021 212*    Triglycerides 07/21/2021 146     HDL, Direct 07/21/2021 49     LDL Calculated 07/21/2021 134*    Non-HDL-Chol (CHOL-HDL) 07/21/2021 163     Hemoglobin A1C 07/21/2021 5 6     EAG 07/21/2021 114    Appointment on 07/13/2021   Component Date Value    Urine Culture 07/13/2021 20,000-29,000 cfu/ml     Orders Only on 07/13/2021   Component Date Value    Clarity, UA 07/13/2021 Clear     Color, UA 07/13/2021 Yellow     Specific Gravity, UA 07/13/2021 1 010     pH, UA 07/13/2021 5 5     Glucose, UA 07/13/2021 Negative     Ketones, UA 07/13/2021 Trace*    Blood, UA 07/13/2021 Negative     Protein, UA 07/13/2021 Negative     Nitrite, UA 07/13/2021 Negative     Bilirubin, UA 07/13/2021 Negative     Urobilinogen, UA 07/13/2021 0 2     Leukocytes, UA 07/13/2021 Trace*    WBC, UA 07/13/2021 1-2*    RBC, UA 07/13/2021 None Seen     Bacteria, UA 07/13/2021 None Seen     Epithelial Cells 07/13/2021 Occasional     MUCUS THREADS 07/13/2021 Occasional*        Imaging: No results found  Objective:     Physical Exam  Constitutional:       General: She is not in acute distress  Appearance: Normal appearance  She is not ill-appearing, toxic-appearing or diaphoretic  HENT:      Head: Normocephalic and atraumatic  Right Ear: Tympanic membrane, ear canal and external ear normal  There is no impacted cerumen  Left Ear: Tympanic membrane, ear canal and external ear normal  There is no impacted cerumen  Ears:      Comments: Hearing aide left ear        Nose: Nose normal       Mouth/Throat:      Mouth: Mucous membranes are moist       Pharynx: No oropharyngeal exudate or posterior oropharyngeal erythema (to tongue)  Eyes:      General:         Right eye: No discharge  Left eye: No discharge  Extraocular Movements: Extraocular movements intact  Pupils: Pupils are equal, round, and reactive to light  Cardiovascular:      Rate and Rhythm: Normal rate and regular rhythm  Heart sounds: No murmur heard  Pulmonary:      Effort: Pulmonary effort is normal  No respiratory distress  Breath sounds: Normal breath sounds  Musculoskeletal:         General: No swelling or tenderness  Cervical back: Normal range of motion and neck supple  Comments: Ambulates very slowly with walker  Skin:     General: Skin is warm and dry  Comments: Raised lighter than skin colored lesions on inner lipsVEry dry scaly skin  Neurological:      Mental Status: She is alert  Motor: Weakness present  Gait: Gait abnormal       Comments: Is A & O x 2  Psychiatric:         Mood and Affect: Mood normal          Behavior: Behavior normal          Thought Content: Thought content normal          Judgment: Judgment normal            Patient Instructions    Reviewed all with patient and her son  She is now following with a geriatrician who should be making recommendations regarding her medications  Please discuss with them your mixed incontinence of urine  Stress incontinence is when you cough and sneeze and wet  Urge incontinence is you have to go now or you will wet  We do have a pill for urge incontinence however it is caution in the elderly and will not help stress incontinence  I also would like you to speak to the geriatrician about the lesions on your lips as I have absolutely seen that before on duloxetine  They may want to change her antidepressant  Make your appointment for the skin check [facility]  Make your appointment with physical therapy for reconditioning    Start vitamin B12 over-the-counter 250 mcg daily until you see the geriatrician as even though your B12 level is normal it is a low normal and I believe it needs to be replaced  Please make follow-up appointment in November for Medicare wellness also  CL Milner    Portions of the record may have been created with voice recognition software  Occasional wrong word or "sound a like" substitutions may have occurred due to the inherent limitations of voice recognition software  Read the chart carefully and recognize, using context, where substitutions have occurred

## 2021-08-12 NOTE — ASSESSMENT & PLAN NOTE
Pt is now following with geriatrician and has mri scheduled and f/u appt   [facility] 6/22

## 2021-08-18 ENCOUNTER — HOSPITAL ENCOUNTER (OUTPATIENT)
Dept: MRI IMAGING | Facility: CLINIC | Age: 81
Discharge: HOME | End: 2021-08-18
Payer: MEDICARE

## 2021-08-18 DIAGNOSIS — F03.90 DEMENTIA WITHOUT BEHAVIORAL DISTURBANCE, UNSPECIFIED DEMENTIA TYPE (HCC): ICD-10-CM

## 2021-08-18 PROCEDURE — 70551 MRI BRAIN STEM W/O DYE: CPT

## 2021-08-26 ENCOUNTER — EVALUATION (OUTPATIENT)
Dept: PHYSICAL THERAPY | Facility: CLINIC | Age: 81
End: 2021-08-26
Payer: MEDICARE

## 2021-08-26 DIAGNOSIS — R29.898 MUSCULAR DECONDITIONING: Primary | ICD-10-CM

## 2021-08-26 PROCEDURE — 97110 THERAPEUTIC EXERCISES: CPT

## 2021-08-26 PROCEDURE — 97162 PT EVAL MOD COMPLEX 30 MIN: CPT

## 2021-08-26 NOTE — PROGRESS NOTES
PT Evaluation     Today's date: 2021  Patient name: Sury Javier  : 1940  MRN: [id number]  Referring provider: Claude Deed, *  Dx:   Encounter Diagnosis     ICD-10-CM    1  Muscular deconditioning  R29 898                   Assessment  Assessment details: Sury Javier is a 79 y o  female who was referred to outpatient physical therapy with the chief complaint of deconditioning, weakness, and difficulty walking  She currently uses a RW or rollator for all mobility in and out of the home  She denies any recent falls, but feels unsteady and worried about falling  PT examination findings include: abnormal gait deviations such as flat foot contact, decreased step length, and flexed posture in standing; slow gait speed; decreased LE strength and power shown by her 5x Sit to Stand (STS) time; and impaired balance demonstrated via her Timed Up and Go (TUG) time  These findings classify her as an increased falls risk and decreases her safety with functional mobility  She was provided with an initial seated HEP and instructed to gradually increase her activity level at home by walking more throughout the day  She and her son verbalized understanding  The patient would benefit from skilled physical therapy to provide exercises, neuromuscular reeducation, gait training, manual techniques, and modalities as deemed necessary in order to help her achieve her goals and maximize her safety and independence with functional mobility  Impairments: abnormal gait, activity intolerance, impaired balance, impaired physical strength, lacks appropriate home exercise program, safety issue and poor posture     Goals  STGs in 4 weeks  1  Patient with assist from family will be independent with HEP  2  Patient will improve her 5x STS time to no more than 45 seconds for improved LE strength and power    3  Patient will decreased TUG time to under 60 seconds with appropriate AD for improved household ambulation  LTGs in 8 weeks  1  Patient will achieve a gait speed of at least  5 m/s with appropriate AD for improved household and community ambulation  2  Patient will improve her 5x STS time to no more than 35 seconds for improved strength  3  Patient will achieve a TUG time of no more than 50 seconds for increased balance and improved functional mobility  Plan  Patient would benefit from: skilled physical therapy  Planned modality interventions: cryotherapy and thermotherapy: hydrocollator packs  Planned therapy interventions: abdominal trunk stabilization, joint mobilization, manual therapy, balance, neuromuscular re-education, patient education, postural training, body mechanics training, strengthening, stretching, therapeutic activities, therapeutic exercise, gait training and home exercise program  Frequency: 2x week  Duration in weeks: 8  Plan of Care beginning date: 11/4/2021  Treatment plan discussed with: patient and family        Subjective Evaluation    History of Present Illness  Mechanism of injury: Guicho Hutton presents with her son, Mary Alfredo, who assisted with history  She notes that she has been having trouble walking as she has been very inactive  She was a previous patient at this facility working on strengthening and making progress  She has difficulty getting in and out of the chair  She is no longer able to do stairs on the back porch and now has to use the ramp  The stairs do have bilateral handrails that she can use  Her son would like her to be able to do the stairs if possible as this would be easier to get in and out of the house in the winter  She uses the RW for all mobility in and out of the house  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 as her vision can be very blurry at times  Denies any falls at home and no near falls  Her son recalls a fall about 2 years ago, but nothing more recently  She denies any issues getting in or out of bed   A home health agency comes and helps her bathe 1x/week  They assist her with safely getting in/out of the tub shower  The shower does have a bench that she uses  She does have a history of bilateral knee replacement  She does not think one leg is weaker than the other     Pain  No pain reported  Location: general body    Social Support  Lives with: adult children    Patient Goals  Patient goals for therapy: increased strength  Patient goal: walk more; maybe stairs        Objective    Balance Test    Gait Speed (m/s):  IE 8/26: 10m/35 6s =  28 m/s   5x Sit To Stand (s):  IE 8/26: 51 9 (BUE use; flexed hips and knees in stance)   TUG (s):  IE 8/26: 63 6 with RW (slow STS and turns)         Manual Muscle Testing - Hip Left Right   Flexion 4 3+   Abduction 4 4     Manual Muscle Testing - Knee Left Right   Flexion 4 4   Extension 4 4     Manual Muscle Testing - Ankle Left Right   Doriflexion 4+ 4+   Plantarflexion 4 4        Gait Assessment:    Flat foot contact; step to gait pattern with intermittent partial step through; knees and hips flexed throughout         Precautions: FALLS RISK; memory loss      Manuals 8/26                                                                Neuro Re-Ed             STS x5                                                                                          Ther Ex             Bike              Seated march x10 B            LAQ x10 B            HR/TR seated x10                                                                Ther Activity                                       Gait Training                                       Modalities             edu on POC; increase walk activity DF

## 2021-09-01 ENCOUNTER — APPOINTMENT (OUTPATIENT)
Dept: PHYSICAL THERAPY | Facility: CLINIC | Age: 81
End: 2021-09-01
Payer: MEDICARE

## 2021-09-02 ENCOUNTER — OFFICE VISIT (OUTPATIENT)
Dept: PHYSICAL THERAPY | Facility: CLINIC | Age: 81
End: 2021-09-02
Payer: MEDICARE

## 2021-09-02 DIAGNOSIS — R29.898 MUSCULAR DECONDITIONING: Primary | ICD-10-CM

## 2021-09-02 PROCEDURE — 97110 THERAPEUTIC EXERCISES: CPT

## 2021-09-02 NOTE — PROGRESS NOTES
Daily Note     Today's date: 2021  Patient name: Jose Strickland  : 1940  MRN: [id number]  Referring provider: Sindy Lovett, *  Dx:   Encounter Diagnosis     ICD-10-CM    1  Muscular deconditioning  R29 898                   Subjective: Cortney Vogel states that she is doing her exercises every day  She notes that she started having L shoulder blade pain and is unsure why  Objective: See treatment diary below      Assessment: Cortney Vogel tolerated treatment well  She was able to achieve good muscle activation with more difficulty noted with R LE compared to L LE  Increased challenge by addition of a short hold with each exercise  Muscular fatigue is evident as her range gradually decreased with repetition and muscle quivering was observed  She was able to achieve increased step length and foot clearance during ambulation at end of session  She would benefit from continued skilled PT  Plan: Continue per plan of care  Progress treatment as tolerated         Precautions: FALLS RISK; memory loss      Manuals                                                                Neuro Re-Ed             STS x5                                                                                          Ther Ex             Bike   4 min           Seated march x10 B x10 no weight; x10 1 5#            LAQ x10 B 2x10 B 1 5#/SAQ 2x10 B 1 5#           HR/TR seated x10 seated x20           SLR flex  1 5# 2x10 B           Hip add magaly  2x10           Bridge   2x10           Supine hip abd  HL MRE 2x10           Ther Activity                                       Gait Training                                       Modalities             edu on POC; increase walk activity DF

## 2021-09-07 ENCOUNTER — OFFICE VISIT (OUTPATIENT)
Dept: PHYSICAL THERAPY | Facility: CLINIC | Age: 81
End: 2021-09-07
Payer: MEDICARE

## 2021-09-07 DIAGNOSIS — R29.898 MUSCULAR DECONDITIONING: Primary | ICD-10-CM

## 2021-09-07 PROCEDURE — 97112 NEUROMUSCULAR REEDUCATION: CPT

## 2021-09-07 PROCEDURE — 97110 THERAPEUTIC EXERCISES: CPT

## 2021-09-07 NOTE — PROGRESS NOTES
Daily Note     Today's date: 2021  Patient name: Geetha Carmona  : 1940  MRN: [id number]  Referring provider: Sapphire Brambila, *  Dx:   Encounter Diagnosis     ICD-10-CM    1  Muscular deconditioning  R29 898                   Subjective: Marilia Espinosa reports feeling good today and doing her exercises on most days  She notes that her son told her that she is walking better  She reports being a bit sore after last session  Objective: See treatment diary below      Assessment: Marilia Espinosa tolerated treatment well with proper form of all exercises  She was able to tolerate an increase in resistance compared to last session without a significant increase in soreness or fatigue observed throughout session  She is most challenged by bridges secondary to weakness in this muscle group  She reached technical failure of bridges after 7 repetitions today  She needed verbal cues for "nose over toes" to get enough forward movement to be able to complete the transition easier  She would benefit from continued skilled PT  Plan: Continue per plan of care  Progress treatment as tolerated         Precautions: FALLS RISK; memory loss      Manuals                                                               Neuro Re-Ed             STS x5  On foam x5; vc          Toe taps                                                                              Ther Ex             Bike   4 min           Seated march x10 B x10 no weight; x10 1 5#  2x10 2#           LAQ x10 B 2x10 B 1 5#/SAQ 2x10 B 1 5# LAQ/KASSI 2x10 B 2#          HR/TR seated x10 seated x20 seated x30          SLR flex  1 5# 2x10 B 2# 2x10            Hip add magaly  2x10 2x10          Bridge   2x10 3x7          Supine hip abd  HL MRE 2x10 HL RTB 2x10          Ther Activity                                       Gait Training                                       Modalities             edu on POC; increase walk activity DF

## 2021-09-08 ENCOUNTER — OFFICE VISIT (OUTPATIENT)
Dept: PHYSICAL THERAPY | Facility: CLINIC | Age: 81
End: 2021-09-08
Payer: MEDICARE

## 2021-09-08 DIAGNOSIS — R29.898 MUSCULAR DECONDITIONING: Primary | ICD-10-CM

## 2021-09-08 PROCEDURE — 97110 THERAPEUTIC EXERCISES: CPT

## 2021-09-08 NOTE — PROGRESS NOTES
Daily Note     Today's date: 2021  Patient name: Sury Javier  : 1940  MRN: [id number]  Referring provider: Claude Deed, *  Dx:   Encounter Diagnosis     ICD-10-CM    1  Muscular deconditioning  R29 898                   Subjective: Kylie Casey states that her whole body is aching and bothering her today  Especially her R leg  She is not sure from what  She notes that she felt fine after yesterday  Objective: See treatment diary below      Assessment: Kylie Casey tolerated treatment well with improved ambulation by end of session  Given her feeling more off today, introduction of standing exercises was held until she is feeling better  She continues to have more difficulty with R LE compared to L LE likely due to weakness or discomfort present in the limb  Continued to encourage regular walking and exercising at home  She verbalized understanding  Plan to gradually incorporate standing exercises over the next few sessions as able  Plan: Continue per plan of care  Progress treatment as tolerated         Precautions: FALLS RISK; memory loss      Manuals                                                              Neuro Re-Ed             STS x5  On foam x5; vc          Toe taps                                                                              Ther Ex             Bike   4 min  4 min rocking         Seated march x10 B x10 no weight; x10 1 5#  2x10 2#  HL 3x10 2#         LAQ x10 B 2x10 B 1 5#/SAQ 2x10 B 1 5# LAQ/SAQ 2x10 B 2# LAQ/SAQ 3x10 B 2#         HR/TR seated x10 seated x20 seated x30          SLR flex  1 5# 2x10 B 2# 2x10   2# 3x10         Hip add magaly  2x10 2x10          Bridge   2x10 3x7 GS 3"x20         Supine hip abd  HL MRE 2x10 HL RTB 2x10          Ther Activity                                       Gait Training                                       Modalities             edu on POC; increase walk activity DF

## 2021-09-13 ENCOUNTER — OFFICE VISIT (OUTPATIENT)
Dept: PHYSICAL THERAPY | Facility: CLINIC | Age: 81
End: 2021-09-13
Payer: MEDICARE

## 2021-09-13 DIAGNOSIS — R29.898 MUSCULAR DECONDITIONING: Primary | ICD-10-CM

## 2021-09-13 PROCEDURE — 97110 THERAPEUTIC EXERCISES: CPT

## 2021-09-13 PROCEDURE — 97112 NEUROMUSCULAR REEDUCATION: CPT

## 2021-09-13 NOTE — PROGRESS NOTES
Daily Note     Today's date: 2021  Patient name: Joe Pitts  : 1940  MRN: [id number]  Referring provider: Isaac Villalpando, *  Dx:   Encounter Diagnosis     ICD-10-CM    1  Muscular deconditioning  R29 898                   Subjective: Willow Olivas states that she feels about the same today  She notes that she was mostly consistent with exercises at home, missing a day  Objective: See treatment diary below      Assessment: Standing LE strengthening exercises were introduced today and tolerated well overall  She demonstrated good form of all exercises with no obvious trunk compensations  Minor postural sway was observed throughout session secondary to weakness and difficulty with stability on one leg  She was given regular seated rests breaks in order to monitor soreness and fatigue  She was significantly challenged with step ups today with hesitation for first few repetitions  She will monitor her response and report back next session  She would benefit from continued skilled PT  Plan: Continue per plan of care  Progress treatment as tolerated         Precautions: FALLS RISK; memory loss      Manuals                                                             Neuro Re-Ed             STS x5  On foam x5; vc  2x5; vc        Toe taps     6" x10 ea        Step ups      4" x5 fwd CGA                                                            Ther Ex             Bike   4 min  4 min rocking         Seated march x10 B x10 no weight; x10 1 5#  2x10 2#  HL 3x10 2#         LAQ x10 B 2x10 B 1 5#/SAQ 2x10 B 1 5# LAQ/SAQ 2x10 B 2# LAQ/SAQ 3x10 B 2# LAQ 2x15 B        HR/TR seated x10 seated x20 seated x30  seated x20        SLR flex  1 5# 2x10 B 2# 2x10   2# 3x10         Hip add magaly  2x10 2x10          Bridge   2x10 3x7 GS 3"x20         Supine hip abd  HL MRE 2x10 HL RTB 2x10          Stand hip abd/ext     2x10 ea        Stand march     2x10 ea        Stand hamstring curl     2x10 ea Ther Activity                                       Gait Training                                       Modalities             edu on POC; increase walk activity DF

## 2021-09-14 ENCOUNTER — OFFICE VISIT (OUTPATIENT)
Dept: GERIATRICS | Age: 81
End: 2021-09-14
Payer: MEDICARE

## 2021-09-14 VITALS
OXYGEN SATURATION: 94 % | BODY MASS INDEX: 30.72 KG/M2 | DIASTOLIC BLOOD PRESSURE: 62 MMHG | RESPIRATION RATE: 18 BRPM | SYSTOLIC BLOOD PRESSURE: 124 MMHG | HEIGHT: 65 IN | WEIGHT: 184.4 LBS | TEMPERATURE: 97.4 F | HEART RATE: 71 BPM

## 2021-09-14 DIAGNOSIS — H35.30 MACULAR DEGENERATION, UNSPECIFIED LATERALITY, UNSPECIFIED TYPE: ICD-10-CM

## 2021-09-14 DIAGNOSIS — H91.90 HEARING LOSS, UNSPECIFIED HEARING LOSS TYPE, UNSPECIFIED LATERALITY: ICD-10-CM

## 2021-09-14 DIAGNOSIS — N39.46 MIXED INCONTINENCE, URGE AND STRESS (MALE) (FEMALE): ICD-10-CM

## 2021-09-14 DIAGNOSIS — F33.1 MODERATE EPISODE OF RECURRENT MAJOR DEPRESSIVE DISORDER (HCC): Primary | ICD-10-CM

## 2021-09-14 DIAGNOSIS — E78.2 MIXED HYPERLIPIDEMIA: ICD-10-CM

## 2021-09-14 DIAGNOSIS — R26.2 AMBULATORY DYSFUNCTION: ICD-10-CM

## 2021-09-14 DIAGNOSIS — G89.29 OTHER CHRONIC PAIN: ICD-10-CM

## 2021-09-14 DIAGNOSIS — R41.89 COGNITIVE DEFICITS: ICD-10-CM

## 2021-09-14 DIAGNOSIS — G50.0 TRIGEMINAL NEURALGIA: ICD-10-CM

## 2021-09-14 DIAGNOSIS — I10 ESSENTIAL HYPERTENSION: ICD-10-CM

## 2021-09-14 PROCEDURE — 99215 OFFICE O/P EST HI 40 MIN: CPT | Performed by: STUDENT IN AN ORGANIZED HEALTH CARE EDUCATION/TRAINING PROGRAM

## 2021-09-15 ENCOUNTER — OFFICE VISIT (OUTPATIENT)
Dept: PHYSICAL THERAPY | Facility: CLINIC | Age: 81
End: 2021-09-15
Payer: MEDICARE

## 2021-09-15 DIAGNOSIS — R29.898 MUSCULAR DECONDITIONING: Primary | ICD-10-CM

## 2021-09-15 PROBLEM — R41.89 COGNITIVE DEFICITS: Status: ACTIVE | Noted: 2021-07-30

## 2021-09-15 PROCEDURE — 97112 NEUROMUSCULAR REEDUCATION: CPT

## 2021-09-15 PROCEDURE — 97110 THERAPEUTIC EXERCISES: CPT

## 2021-09-15 NOTE — PROGRESS NOTES
Daily Note     Today's date: 9/15/2021  Patient name: Rasheed Mary  : 1940  MRN: [id number]  Referring provider: Spencer Talbot, *  Dx:   Encounter Diagnosis     ICD-10-CM    1  Muscular deconditioning  R29 [id number]                   Subjective: Friday fernando reports that she was really fatigued and sore following last session  Notes that she has no other new complaints since last session  Objective: See treatment diary below      Assessment: Pt arrived 20 min late to session with accommodations made  Held on any progressions this session to avoid increased fatigue or soreness  CGA was needed with most standing exercises to ensure balance  She was challenged with STS exercise as well with no hands used but min Ax1 needed towards end reps  She would benefit from continued skilled PT  Plan: Continue per plan of care  Progress treatment as tolerated         Precautions: FALLS RISK; memory loss      Manuals 8/26 9/2 9/7 9/8 9/13 9/15                                                           Neuro Re-Ed             STS x5  On foam x5; vc  2x5; vc 2x5 vc       Toe taps     6" x10 ea 6" x10 ea       Step ups      4" x5 fwd CGA 4" x5 fwd CGA                                                           Ther Ex             Bike   4 min  4 min rocking         Seated march x10 B x10 no weight; x10 1 5#  2x10 2#  HL 3x10 2#  seated 2# 2x10 ea       LAQ x10 B 2x10 B 1 5#/SAQ 2x10 B 1 5# LAQ/SAQ 2x10 B 2# LAQ/SAQ 3x10 B 2# LAQ 2x15 B LAQ 2x15 B 2#       HR/TR seated x10 seated x20 seated x30  seated x20 seated x20       SLR flex  1 5# 2x10 B 2# 2x10   2# 3x10         Hip add magaly  2x10 2x10          Bridge   2x10 3x7 GS 3"x20         Supine hip abd  HL MRE 2x10 HL RTB 2x10          Stand hip abd/ext     2x10 ea 2x10 ea       Stand march     2x10 ea 2x10 ea       Stand hamstring curl     2x10 ea 2x10 ea       Ther Activity                                       Gait Training Modalities             edu on POC; increase walk activity DF

## 2021-09-15 NOTE — PROGRESS NOTES
[facility]  [address], [address] [facility], [address] [place] [address]    NEUROCOGNITIVE ASSESSMENT FOLLOW-UP   CARE PLAN CONFERENCE      NAME: Kofi Ritter Desouza  AGE: 79 y o  SEX: female    DATE OF ENCOUNTER: 9/14/2021    This note was not shared with the patient due to reasonable likelihood of causing patient harm    Assessment and Plan     Problem List Items Addressed This Visit        Cardiovascular and Mediastinum    Essential hypertension       Nervous and Auditory    Trigeminal neuralgia    Hearing loss       Other    Other chronic pain    Mixed hyperlipidemia    Ambulatory dysfunction    Cognitive deficits    Moderate episode of recurrent major depressive disorder ([address] ) - Primary    Relevant Orders    Ambulatory referral to behavioral health therapists    Mixed incontinence, 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) (female)    Macular degeneration        See detailed care plan conference for assessment and plan as outlined below      - Counseling Documentation: patient was counseled regarding: diagnostic results, instructions for management, risk factor reductions, prognosis, patient and family education, impressions, risks and benefits of treatment options and importance of compliance with treatment    Chief Complaint     Chief Complaint   Patient presents with    care conference       History of Present Illness     HPI    Patient presents for her care plan conference after initial comprehensive geriatric assessment  She is accompanied by her son with whom she lives  Today patient is notably in a much better mood than at intake and participating fully in question responses  Conversation was very appropriate today with myself and staff in contrast to intake visit where the patient was notably upset to be present  At prior visit, patient's MOCA score was 6/22 on the blind version however pt who at that time was in pain and questioned the validity of the referral for an assessment   Pt noted to be less repetitive today with better recall and much more interactive during the visit  I had a detailed conversation discussing overall results of Hall which showed significant deficits  Findings of a stable left insular subcortical gliosis/ encephalomalacia 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 on the patient's MRI, mild-to-moderate microangiopathy with findings not supportive of a mesial temporal lobe focused neuro degenerative process  Along with her chronic and active history of depression , all of which may have contributed to her very poor performance on the Hall, in the future, if patient is cooperative and agreeablet, I would recommend she have neurotrax testing in addition to repeat Hall for more complete testing if vision deficits permit  Based on interaction today, this places the patient at a level of having significant cognitive deficits likely vascular in etiology as well as her mood disorder with depression/ anxiety being a significant contributor  Son stating that PCP requested I discontinue the patient's Cymbalta which was started by pain management due to mouth irritation  Patient stating Cymbalta has been ineffective chronically for her pain and mood  Recommended decreasing Cymbalta to 60 mg before discontinuing completely  Oral lesions have been chronic apparently and not acute, with no obvious symptoms of Valle-Rogers syndrome actively at this time  Therefore relayed to patient and son to follow-up with prescribing provider about their concerns and switching to an alternative therapy  Should symptoms worsen, certainly medication can be discontinued however this has been ongoing chronically  The patient continues to tolerate oral intake, continues in physical therapy and  Mood has remained overall unchanged  His son stating patient with notable depression since 1994 after the passing of her daughter  Patient stating mood symptoms significantly worsened in the past 2 years    Patient in agreement for referral to behavioral therapy for CBT and to Psychiatry for medication management  The following portions of the patient's history were reviewed and updated as appropriate: allergies, current medications, past family history, past medical history, past social history, past surgical history and problem list     Review of Systems     Review of Systems   Constitutional: Positive for activity change (Chronic) and fatigue (Chronic)  Negative for chills and fever  HENT: Positive for hearing loss (Wears hearing aids)  Negative for congestion, ear pain, rhinorrhea and sore throat  Eyes: Negative for discharge  Respiratory: Negative for cough, chest tightness, shortness of breath, wheezing and stridor  Cardiovascular: Negative for chest pain, palpitations and leg swelling  Gastrointestinal: Negative for abdominal pain, constipation, diarrhea, nausea and vomiting  Genitourinary: Negative for dysuria  Chronic  Urinary incontinence   Musculoskeletal: Positive for arthralgias (Chronic), back pain, gait problem (Uses wheelchair) and neck pain (Chronic)  Skin: Negative for color change, pallor, rash and wound  Neurological: Positive for weakness (Chronic)  Negative for dizziness  Psychiatric/Behavioral: Positive for decreased concentration and dysphoric mood  Negative for confusion, hallucinations and sleep disturbance  The patient is nervous/anxious          Active Problem List     Patient Active Problem List   Diagnosis    Chronic nonintractable headache    Trigeminal neuralgia    Occipital neuralgia of right side    Trigeminal neuropathy    Myofascial pain dysfunction syndrome    Other chronic pain    Cervical disc disorder with radiculopathy of [id number]-[id number]-[id number] region    Essential hypertension    Mixed hyperlipidemia    Insomnia    Ambulatory dysfunction    Cognitive deficits    Moderate episode of recurrent major depressive disorder ([facility] [address] )    Encounter for medication review    Mixed incontinence, 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) (female)    Hearing loss    Macular degeneration    Vitamin B12 deficiency       Objective     /62 (BP Location: Left arm, Patient Position: Sitting, Cuff Size: Adult)   Pulse 71   Temp (!) 97 4 °F (36 3 °C) (Temporal)   Resp 18   Ht 5' 5" (1 651 m)   Wt 83 6 kg (184 lb 6 4 oz)   SpO2 94%   BMI 30 69 kg/m²     Physical Exam  Vitals reviewed  Constitutional:       General: She is not in acute distress  Appearance: Normal appearance  She is well-developed  She is not ill-appearing or diaphoretic  Comments: Elderly female sitting comfortably in chair in no obvious cardiorespiratory or painful distress   HENT:      Head: Normocephalic and atraumatic  Right Ear: Tympanic membrane, ear canal and external ear normal       Left Ear: Tympanic membrane, ear canal and external ear normal       Nose: Nose normal  No congestion  Mouth/Throat:      Mouth: Mucous membranes are moist       Pharynx: Oropharynx is clear  No oropharyngeal exudate  Comments: Minimal superficial irritation  No obvious signs of Valle Rogers's  Eyes:      General: No scleral icterus  Right eye: No discharge  Left eye: No discharge  Conjunctiva/sclera: Conjunctivae normal    Neck:      Vascular: No JVD  Cardiovascular:      Rate and Rhythm: Normal rate and regular rhythm  Heart sounds: Normal heart sounds  No murmur heard  No friction rub  No gallop  Pulmonary:      Effort: Pulmonary effort is normal  No respiratory distress  Breath sounds: Normal breath sounds  No wheezing or rales  Abdominal:      General: Bowel sounds are normal  There is no distension  Palpations: Abdomen is soft  Tenderness: There is no abdominal tenderness  There is no guarding  Musculoskeletal:         General: No swelling, tenderness or deformity  Normal range of motion        Cervical back: Normal range of motion and neck supple  Skin:     General: Skin is warm  Coloration: Skin is not pale  Findings: No erythema or rash  Neurological:      General: No focal deficit present  Mental Status: She is alert and oriented to person, place, and time  Mental status is at baseline  Cranial Nerves: No cranial nerve deficit  Gait: Gait abnormal       Deep Tendon Reflexes: Reflexes are normal and symmetric  Psychiatric:      Comments: Dysphoric  mood         Pertinent Laboratory/Diagnostic Studies:  Reviewed labs:  TSH normal, B12 within normal range but on lower side  Started on B complex supplementation by PCP already    Folate stable   MRI neuro quant: Findings of a stable left insular subcortical gliosis/ encephalomalacia 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 on the patient's MRI, mild-to-moderate microangiopathy with findings not supportive of a mesial temporal lobe focused neuro degenerative process    Current Medications     Current Outpatient Medications:     Acetaminophen 325 MG CAPS, Take 650 mg by mouth every 6 (six) hours as needed, Disp: , Rfl:     Cholecalciferol (VITAMIN D PO), Take 5,000 Units by mouth Daily  , Disp: , Rfl:     DULoxetine (CYMBALTA) 60 mg delayed release capsule, Take 2 caps by mouth daily, Disp: 120 capsule, Rfl: 2    gabapentin (NEURONTIN) 100 mg capsule, Take 2 capsules (200 mg total) by mouth daily, Disp: 180 capsule, Rfl: 1    gabapentin (NEURONTIN) 300 mg capsule, Take 3 capsules (900 mg total) by mouth daily at bedtime, Disp: 90 capsule, Rfl: 3    Multiple Vitamins-Minerals (PRESERVISION AREDS 2+MULTI VIT) CAPS, BID, Disp: , Rfl:     verapamil (VERELAN PM) 120 MG 24 hr capsule, Take 1 capsule (120 mg total) by mouth daily at bedtime, Disp: 90 capsule, Rfl: 1    vitamin B-12 (CYANOCOBALAMIN) 250 MCG tablet, Take 1 tablet (250 mcg total) by mouth daily, Disp: 90 tablet, Rfl: 0    Health Maintenance     Health Maintenance   Topic Date Due    Influenza Vaccine (1) 09/01/2021    Medicare Annual Wellness Visit (AWV)  10/08/2021    PT PLAN OF CARE  09/25/2021    BMI: Followup Plan  03/19/2022    Depression Remission PHQ  08/12/2022    Fall Risk  08/26/2022    BMI: Adult  09/14/2022    DTaP,Tdap,and Td Vaccines (2 - Td or Tdap) 09/07/2027    Pneumococcal Vaccine: 65+ Years  Completed    COVID-19 Vaccine  Completed    HIB Vaccine  Aged Out    Hepatitis B Vaccine  Aged Out    IPV Vaccine  Aged Out    Hepatitis A Vaccine  Aged Out    Meningococcal ACWY Vaccine  Aged Out    HPV Vaccine  Aged Out     Immunization History   Administered Date(s) Administered    H1N1, All Formulations 01/19/2010    INFLUENZA 10/27/2008, 10/20/2009, 10/09/2012, 09/01/2013, 09/02/2014    Influenza Split High Dose Preservative Free IM 09/07/2017, 09/27/2019    Influenza, high dose seasonal 0 7 mL 08/20/2018    Pneumococcal Conjugate 13-Valent 02/07/2017    Pneumococcal Polysaccharide PPV23 01/01/1998, 09/07/2017    SARS-CoV-2 / COVID-19 mRNA IM (Ana Laurahe Moritz) 01/13/2021, 02/10/2021    Td (adult), adsorbed 03/01/1996    Tdap 09/07/2017    Zoster 01/01/2009, 04/16/2009           CARE PLAN CONFERENCE    NAME:  Adrian Desouza ASSESSMENT DATE:  07/30/2021   YOB: 1940 CONFERENCE DATE:  09/14/2021   MRN:  [id number] Primary Care Provider (PCP): CL Myers   FAMILY PRESENT:   Keya Cartagenao (son)   STAFF PRESENT:   Shelly Dickerson MD, Zarina COREYW   Patient and Family Goals of Care:  Review cognitive decline and associated symptoms, discuss treatment options and care planning      Medical Concerns- Current/Historical:  Essential hypertension, moderate episode of recurrent major depressive disorder, other chronic pain, macular degeneration         Geriatric Syndromes   Cognitive Deficits, moderate    Ambulatory dysfunction   Urinary incontinence   Hearing loss    FINDINGS:  Neuropsychological   Moderate cognitive deficits, etiology likely multifactorial: Vascular co-pathology vs depression  o Jovani Cognitive Assessment (MoCA-Blind): 6/22 (likely situational given pt's mood at intake)   Depression Screen  o Geriatric Depression Scale: 7/15  o Will refer to behavioral therapist for cognitive behavioral therapy given chronicity of patient's mood disorder  o Will also refer to Psychiatry for medication management    Discussed in detail with patient and son, patient in agreement with referral    Recommendations/Interventions  o Remain active physically, mentally and socially*  o Pharmaceutical and non-pharmaceutical interventions discussed   o Imperative to manage vascular risk factors given remote history of infarct on MRI  o Recommend Mediterranean diet, shown to decrease risk of memory loss and CVA  o Utilize reorientation and redirection as needed (dependent on situation)  o Participate in cognitively challenging exercises as able  o Referral to speech therapy for cognitive rehabilitation with Clint Recio  o Referral to Psychiatry and behavioral therapy for treatment of depression which is a significant contributor to patient's overall cognition and has not been managed chronically  o Maintain chronic conditions under control  o Follow up as needed    Diagnostic Studies   Review of bloodwork (TSH with reflex T4, B12, folate): PCP started B supplement, agree   Review of MRI NeuroQuant:MRI neuro quant: Findings of a stable left insular subcortical gliosis/ encephalomalacia 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 on the patient's MRI, mild-to-moderate microangiopathy with findings not supportive of a mesial temporal lobe focused neuro degenerative process    Social/Safety Concerns   Fall Risk   Socialization   Assistance/Supervision   Medication Management    Recommendations/Interventions  o Criteria met for placement into a higher level of care such as an assisted living facility which would facilitate provision of meals, supervision of medications and the ability to remain active mentally, physically and socially  o If remaining at home:  - Recommend increased supervised care given pysical disability  - Recommend enrollment into an adult day center for positive socialization, cognitive and physical activities  - Recommend use of fall alert device as a safety precaution  - [address] on Aging for possible eligible programs such as OPTIONS, Caregiver Support program, or WAIVER   - Consider assistance for medication administration such as blister packaging or an automated pill dispenser; continue daily family oversight    Long-Term Care Issues   Caregiver Stress/Concern    o Consider Alzheimers Association caregiver support group  o Educational information provided    Physical Findings Impacting Function   Fall Risk Assessment:  High (Timed Up and Go Test: 20 seconds)    Activities of Daily Living:  Somewhat Dependent   Instrumental Activities of Daily Living:  Dependent    Recommendations/Interventions  o Enrolled in physical therapy for gait training, balance and strengthening  o Encourage appropriate footwear at all times  o Review fall risk prevention tips and adjust within the home environment as needed     Medications Reviewed  Recommendations/Interventions  o Side effects of gabapentin include dizziness, somnolence, fatigue, dysarthria, amnesia and depression  o Check with PCP before using over the counter (OTC) medications  o Avoid OTC medications that can affect cognition (e g , Benadryl, Tylenol PM)  o Avoid NSAIDs due to risk of GI bleed and renal impairment    Other Findings   BMI 29 95 kg/m2  o Maintain well-balanced diet; continue following with primary care physician regularly   Essential hypertension  o Continue aspirin 81 mg daily, verapamil 120 mg daily  o Recommend adherence to a heart healthy diet   Moderate episode of current major depressive disorder  o Referral to Psychiatry, Behavioral health  o Currently on Cymbalta 120 mg daily, may wean due to chronic oral irritation  No signs of Jarad Mukherjee  F/U with prescribing provider  o Continue social support by family and friends   Other chronic pain  o Continue on Cymbalta 120 mg daily, gabapentin  o Reviewed side effects of gabapentin in older patients with cognitive deficits; given risks versus benefits, can continue on current therapy if effective   o Follow up with pain management as scheduled   Urinary incontinence  o Recommend scheduled toileting every two hours once awake  o Avoid fluid intake two hours prior to bedtime to avoid nocturnal incontinence  o Discussed medication management however given anticholinergic side effects would avoid in this patient with gait instability and cognitive deficits   Hearing loss  o Compliant with use of one hearing aid  o Recommend facing Ms  Desouza directly and standing in close proximity when communicating to avoid confusion  o Maintain falls precautions and follow up with audiology as scheduled   Macular degeneration  o Continue preservision Areds; follow up with ophthalmology as scheduled    Recommended Health Maintenance  Immunizations, if not contraindicated:  Yearly Influenza vaccine, Pneumococcal vaccine every 5 years (65 years and over), Shingles vaccine, COVID-19 vaccine    *Based on current recommendations by the CDC due to COVID-19, please maintain social distancing at this time         Angle Garcia MD  Geriatrics   9/15/2021 4:26 AM

## 2021-09-17 ENCOUNTER — TELEPHONE (OUTPATIENT)
Dept: GERIATRICS | Age: 81
End: 2021-09-17

## 2021-09-17 NOTE — TELEPHONE ENCOUNTER
Received call from Malia  Relayed that Orion Sims is the integration therapist at PCP office  If Kan Reyes is interested in individual therapy, she can contact her PCP office to discuss this further  Malia also inquired about psychiatry referral  New patient intake line, [phone] provided  No further questions at this time; LCSW to remain available as needed

## 2021-09-17 NOTE — TELEPHONE ENCOUNTER
Following 9/14 care conference, YULIYA coordinated with integration therapist Kofi Ritchie at our office location to determine whether there may be a therapist closer to [place] for [place]  Dr Yeni Narayan recommended consideration of therapy at conference  Nikolai Boards does note that at PCP office location, Eder Godinez is the integration therapist  YULIYA called Zuleyma Miller to relay this information, should Friday [facility] decide to try therapy  Zuleyma Miller requests a call back later today as he is not currently available

## 2021-09-20 ENCOUNTER — OFFICE VISIT (OUTPATIENT)
Dept: PHYSICAL THERAPY | Facility: CLINIC | Age: 81
End: 2021-09-20
Payer: MEDICARE

## 2021-09-20 DIAGNOSIS — R29.898 MUSCULAR DECONDITIONING: Primary | ICD-10-CM

## 2021-09-20 PROCEDURE — 97112 NEUROMUSCULAR REEDUCATION: CPT

## 2021-09-20 PROCEDURE — 97110 THERAPEUTIC EXERCISES: CPT

## 2021-09-20 NOTE — PROGRESS NOTES
Daily Note     Today's date: 2021  Patient name: Iliana Vale  : 1940  MRN: [id number]  Referring provider: Jairon Mariano, *  Dx:   Encounter Diagnosis     ICD-10-CM    1  Muscular deconditioning  R29 898                   Subjective: Stevie Rivera states that she has some good and bad moments, mobility-wise  She is finding that she is starting to have more good days than she used to  She wants to avoid the bike as it bothers her knee  She recalls being sore after last time, but does not remember how long it lasted  She knows it was not as bad as the first time doing the standing exercises  Objective: See treatment diary below      Assessment: Stevie Rivera tolerated treatment well  Only minimal progression was performed today due to continued reported soreness with current program  She did well with lateral step ups, challenged by ensuring enough room for the other foot to finish the exercise  She continues to heavily rely on her UEs for all standing exercises with only close supervision needed for safety  Observationally, she has improved carry over of increased foot clearance pre- and post- session  Plan: Continue per plan of care  Progress treatment as tolerated         Precautions: FALLS RISK; memory loss      Manuals  9/2 9/7 9/8 9/13 9/15 9/20                                                          Neuro Re-Ed             STS x5  On foam x5; vc  2x5; vc 2x5 vc x10 vc      Toe taps     6" x10 ea 6" x10 ea cones x10 ea      Step ups      4" x5 fwd CGA 4" x5 fwd CGA 4" x5 ea CGA      Side stepping       NV                                             Ther Ex             Bike   4 min  4 min rocking   Walk 255 ft (~6 min)      Seated march x10 B x10 no weight; x10 1 5#  2x10 2#  HL 3x10 2#  seated 2# 2x10 ea seated 2 5# 2x10 ea      LAQ x10 B 2x10 B 1 5#/SAQ 2x10 B 1 5# LAQ/SAQ 2x10 B 2# LAQ/SAQ 3x10 B 2# LAQ 2x15 B LAQ 2x15 B 2# LAQ 2x15 B 2 5#      HR/TR seated x10 seated x20 seated x30 seated x20 seated x20 seated x20      SLR flex  1 5# 2x10 B 2# 2x10   2# 3x10         Hip add magaly  2x10 2x10          Bridge   2x10 3x7 GS 3"x20         Supine hip abd  HL MRE 2x10 HL RTB 2x10          Stand hip abd/ext     2x10 ea 2x10 ea 2x10 ea      Stand march     2x10 ea 2x10 ea 2x10 ea      Stand hamstring curl     2x10 ea 2x10 ea 2x10 ea      Ther Activity                                       Gait Training                                       Modalities             edu on POC; increase walk activity DF

## 2021-09-21 ENCOUNTER — TELEPHONE (OUTPATIENT)
Dept: GERIATRICS | Age: 81
End: 2021-09-21

## 2021-09-21 DIAGNOSIS — R41.89 COGNITIVE DEFICITS: Primary | ICD-10-CM

## 2021-09-21 NOTE — TELEPHONE ENCOUNTER
Patient currently receiving outpatient physical therapy at [facility] ; unable to receive speech therapy in the home due to this    Can receive outpatient speech therapy; order created for Provider to review and sign if appropriate

## 2021-09-22 ENCOUNTER — OFFICE VISIT (OUTPATIENT)
Dept: PHYSICAL THERAPY | Facility: CLINIC | Age: 81
End: 2021-09-22
Payer: MEDICARE

## 2021-09-22 DIAGNOSIS — R29.898 MUSCULAR DECONDITIONING: Primary | ICD-10-CM

## 2021-09-22 PROCEDURE — 97112 NEUROMUSCULAR REEDUCATION: CPT

## 2021-09-22 PROCEDURE — 97110 THERAPEUTIC EXERCISES: CPT

## 2021-09-22 NOTE — PROGRESS NOTES
Daily Note     Today's date: 2021  Patient name: Jose Godfrey  : 1940  MRN: [id number]  Referring provider: Kobe Kwon, *  Dx:   Encounter Diagnosis     ICD-10-CM    1  Muscular deconditioning  R29 898                   Subjective: Adolfo Go states that she was feeling ok after yesterday  She notes that she felt better but not great  She feels tired today, but unsure reason why  Objective: See treatment diary below      Assessment: Adolfo Go demonstrated more difficulty with ambulation and exercises today compared to last session  This may be related to fatigue reported at the start of session  She was significantly challenged with step ups, needing regular cues to achieve knee extension and to complete the motion  Unable to complete as many exercises as in previous sessions due to fatigue  Plan to attempt addition of light ankle weights next session as able and tolerated  Plan: Continue per plan of care  Progress treatment as tolerated         Precautions: FALLS RISK; memory loss      Manuals 8/26 9/2 9/7 9/8 9/13 9/15 9/20 9/22                                                         Neuro Re-Ed             STS x5  On foam x5; vc  2x5; vc 2x5 vc x10 vc x10 vc     Toe taps     6" x10 ea 6" x10 ea cones x10 ea      Step ups      4" x5 fwd CGA 4" x5 fwd CGA 4" x5 ea CGA 4" x5 fwd CGA     Side stepping       NV 10ft @ rail x4                                            Ther Ex             Bike   4 min  4 min rocking   Walk 255 ft (~6 min) Walk 200 ft      Seated march x10 B x10 no weight; x10 1 5#  2x10 2#  HL 3x10 2#  seated 2# 2x10 ea seated 2 5# 2x10 ea 3x10 ea     LAQ x10 B 2x10 B 1 5#/SAQ 2x10 B 1 5# LAQ/SAQ 2x10 B 2# LAQ/SAQ 3x10 B 2# LAQ 2x15 B LAQ 2x15 B 2# LAQ 2x15 B 2 5#      HR/TR seated x10 seated x20 seated x30  seated x20 seated x20 seated x20 seated x20     SLR flex  1 5# 2x10 B 2# 2x10   2# 3x10         Hip add magaly  2x10 2x10          Bridge   2x10 3x7 GS 3"x20         Supine hip abd  HL MRE 2x10 HL RTB 2x10          Stand hip abd/ext     2x10 ea 2x10 ea 2x10 ea 2x10 ea     Stand march     2x10 ea 2x10 ea 2x10 ea 3x10 ea     Stand hamstring curl     2x10 ea 2x10 ea 2x10 ea 2x10 ea     Ther Activity                                       Gait Training                                       Modalities             edu on POC; increase walk activity DF

## 2021-09-27 ENCOUNTER — EVALUATION (OUTPATIENT)
Dept: PHYSICAL THERAPY | Facility: CLINIC | Age: 81
End: 2021-09-27
Payer: MEDICARE

## 2021-09-27 DIAGNOSIS — R29.898 MUSCULAR DECONDITIONING: Primary | ICD-10-CM

## 2021-09-27 PROCEDURE — 97112 NEUROMUSCULAR REEDUCATION: CPT

## 2021-09-27 PROCEDURE — 97110 THERAPEUTIC EXERCISES: CPT

## 2021-09-27 NOTE — PROGRESS NOTES
Progress Note     Today's date: 2021  Patient name: Karyna Mccarthy  : 1940  MRN: [id number]  Referring provider: Arabella Hernandez, *  Dx:   Encounter Diagnosis     ICD-10-CM    1  Muscular deconditioning  R29 898                   Subjective: Friday fernando states that she feels PT and exercises are helping  She finds that her legs are getting stronger as it is easier for her to get in and out of the house than it used to be  She still has difficulty getting in and out of the car  She thinks that she is walking around the house better  She notes that she was very busy today, putting things away around the house  She admits that this is something she would not have attempted a few weeks ago  Objective: See treatment diary below       Balance Test     Gait Speed (m/s):  IE : 10m/35 6s =  28 m/s (w/ RW)   RE : 10m/30 3s =  33 m/s (w/ RW)   5x Sit To Stand (s):  IE : 51 9 (BUE use; flexed hips and knees in stance)   RE : 42 8 (BUE use; flexed hips and knees in stance)   TUG (s):  IE : 63 6 with RW (slow STS and turns)   RE : 58 4 with RW (slow STS and turns)            Assessment: Friday fernando initially presented with the chief complaint of deconditioning, weakness, and difficulty walking  Since starting skilled PT, she has demonstrated an increase in gait speed, LE strength/power, and balance as shown by improvements in above functional outcome measures  This has translated into improved mobility around the house as she reports increased ease of entering and exiting the house  She has begun to do more household chores around the house, which she would not have attempted a few weeks ago  This indicates that her confidence in her balance is improving as well  Continued to encourage regular exercise performance at home as well as continuing to walk and try to do more around the house that she is safe to do  She verbalized understanding   At this point, she has met all the STGs and making slow steady progress towards LTGs  She would benefit from continued skilled PT to progress strength and mobility  Goals  STGs in 4 weeks  1  Patient with assist from family will be independent with HEP  - MET  2  Patient will improve her 5x STS time to no more than 45 seconds for improved LE strength and power  - MET  3  Patient will decreased TUG time to under 60 seconds with appropriate AD for improved household ambulation  - MET    LTGs in 8 weeks  1  Patient will achieve a gait speed of at least  5 m/s with appropriate AD for improved household and community ambulation  -ONGOING  2  Patient will improve her 5x STS time to no more than 35 seconds for improved strength  -ONGOING  3  Patient will achieve a TUG time of no more than 50 seconds for increased balance and improved functional mobility  -ONGOING      Plan: Continue per plan of care  Progress treatment as tolerated         Planned therapy interventions: abdominal trunk stabilization, joint mobilization, manual therapy, balance, neuromuscular re-education, patient education, postural training, body mechanics training, strengthening, stretching, therapeutic activities, therapeutic exercise, gait training and home exercise program  Frequency: 2x week  Duration in weeks: 4    Plan of Care beginning date: 11/4/2021       Precautions: FALLS RISK; memory loss      Manuals 8/26 9/2 9/7 9/8 9/13 9/15 9/20 9/22 9/27                                                        Neuro Re-Ed             STS x5  On foam x5; vc  2x5; vc 2x5 vc x10 vc x10 vc     Toe taps     6" x10 ea 6" x10 ea cones x10 ea      Step ups      4" x5 fwd CGA 4" x5 fwd CGA 4" x5 ea CGA 4" x5 fwd CGA     Side stepping       NV 10ft @ rail x4                                            Ther Ex             Bike   4 min  4 min rocking   Walk 255 ft (~6 min) Walk 200 ft      Seated march x10 B x10 no weight; x10 1 5#  2x10 2#  HL 3x10 2#  seated 2# 2x10 ea seated 2 5# 2x10 ea 3x10 ea     LAQ x10 B 2x10 B 1 5#/SAQ 2x10 B 1 5# LAQ/SAQ 2x10 B 2# LAQ/SAQ 3x10 B 2# LAQ 2x15 B LAQ 2x15 B 2# LAQ 2x15 B 2 5#  LAQ 2x10 B 1 5#    HR/TR seated x10 seated x20 seated x30  seated x20 seated x20 seated x20 seated x20     SLR flex  1 5# 2x10 B 2# 2x10   2# 3x10         Hip add magaly  2x10 2x10          Bridge   2x10 3x7 GS 3"x20         Supine hip abd  HL MRE 2x10 HL RTB 2x10          Stand hip abd/ext     2x10 ea 2x10 ea 2x10 ea 2x10 ea 2x10 ea 1 5#    Stand march     2x10 ea 2x10 ea 2x10 ea 3x10 ea 2x10 1 5#    Stand hamstring curl     2x10 ea 2x10 ea 2x10 ea 2x10 ea 2x10 ea 1 5#    Seated hamstring stretch         3x30" ea    TKE         2x10 ea    Ther Activity                                       Gait Training                                       Modalities             edu on POC; increase walk activity DF

## 2021-09-29 ENCOUNTER — APPOINTMENT (OUTPATIENT)
Dept: PHYSICAL THERAPY | Facility: CLINIC | Age: 81
End: 2021-09-29
Payer: MEDICARE

## 2021-10-04 ENCOUNTER — APPOINTMENT (OUTPATIENT)
Dept: PHYSICAL THERAPY | Facility: CLINIC | Age: 81
End: 2021-10-04
Payer: MEDICARE

## 2021-10-06 ENCOUNTER — OFFICE VISIT (OUTPATIENT)
Dept: PAIN MEDICINE | Facility: CLINIC | Age: 81
End: 2021-10-06
Payer: MEDICARE

## 2021-10-06 ENCOUNTER — APPOINTMENT (OUTPATIENT)
Dept: PHYSICAL THERAPY | Facility: CLINIC | Age: 81
End: 2021-10-06
Payer: MEDICARE

## 2021-10-06 VITALS
HEIGHT: 65 IN | SYSTOLIC BLOOD PRESSURE: 122 MMHG | DIASTOLIC BLOOD PRESSURE: 68 MMHG | HEART RATE: 78 BPM | BODY MASS INDEX: 30.69 KG/M2

## 2021-10-06 DIAGNOSIS — M54.81 OCCIPITAL NEURALGIA OF RIGHT SIDE: Primary | ICD-10-CM

## 2021-10-06 DIAGNOSIS — G50.0 TRIGEMINAL NEURALGIA: ICD-10-CM

## 2021-10-06 DIAGNOSIS — G89.4 CHRONIC PAIN SYNDROME: ICD-10-CM

## 2021-10-06 DIAGNOSIS — M50.11 CERVICAL DISC DISORDER WITH RADICULOPATHY OF OCCIPITO-ATLANTO-AXIAL REGION: ICD-10-CM

## 2021-10-06 PROCEDURE — 99214 OFFICE O/P EST MOD 30 MIN: CPT | Performed by: STUDENT IN AN ORGANIZED HEALTH CARE EDUCATION/TRAINING PROGRAM

## 2021-10-07 ENCOUNTER — APPOINTMENT (OUTPATIENT)
Dept: PHYSICAL THERAPY | Facility: CLINIC | Age: 81
End: 2021-10-07
Payer: MEDICARE

## 2021-10-08 ENCOUNTER — TELEPHONE (OUTPATIENT)
Dept: RADIOLOGY | Facility: CLINIC | Age: 81
End: 2021-10-08

## 2021-10-11 ENCOUNTER — APPOINTMENT (OUTPATIENT)
Dept: PHYSICAL THERAPY | Facility: CLINIC | Age: 81
End: 2021-10-11
Payer: MEDICARE

## 2021-10-13 ENCOUNTER — APPOINTMENT (OUTPATIENT)
Dept: PHYSICAL THERAPY | Facility: CLINIC | Age: 81
End: 2021-10-13
Payer: MEDICARE

## 2021-10-14 ENCOUNTER — APPOINTMENT (OUTPATIENT)
Dept: PHYSICAL THERAPY | Facility: CLINIC | Age: 81
End: 2021-10-14
Payer: MEDICARE

## 2021-10-18 ENCOUNTER — PROCEDURE VISIT (OUTPATIENT)
Dept: PAIN MEDICINE | Facility: CLINIC | Age: 81
End: 2021-10-18
Payer: MEDICARE

## 2021-10-18 ENCOUNTER — APPOINTMENT (OUTPATIENT)
Dept: PHYSICAL THERAPY | Facility: CLINIC | Age: 81
End: 2021-10-18
Payer: MEDICARE

## 2021-10-18 DIAGNOSIS — F11.20 UNCOMPLICATED OPIOID DEPENDENCE (HCC): ICD-10-CM

## 2021-10-18 DIAGNOSIS — Z79.891 LONG-TERM CURRENT USE OF OPIATE ANALGESIC: ICD-10-CM

## 2021-10-18 DIAGNOSIS — G89.4 CHRONIC PAIN SYNDROME: ICD-10-CM

## 2021-10-18 DIAGNOSIS — M54.81 OCCIPITAL NEURALGIA OF RIGHT SIDE: Primary | ICD-10-CM

## 2021-10-18 PROCEDURE — 64450 NJX AA&/STRD OTHER PN/BRANCH: CPT | Performed by: STUDENT IN AN ORGANIZED HEALTH CARE EDUCATION/TRAINING PROGRAM

## 2021-10-18 RX ORDER — METHYLPREDNISOLONE ACETATE 40 MG/ML
40 INJECTION, SUSPENSION INTRA-ARTICULAR; INTRALESIONAL; INTRAMUSCULAR; SOFT TISSUE ONCE
Status: COMPLETED | OUTPATIENT
Start: 2021-10-18 | End: 2021-11-02

## 2021-10-18 RX ORDER — BUPIVACAINE HYDROCHLORIDE 2.5 MG/ML
1 INJECTION, SOLUTION EPIDURAL; INFILTRATION; INTRACAUDAL ONCE
Status: COMPLETED | OUTPATIENT
Start: 2021-10-18 | End: 2021-11-02

## 2021-10-18 RX ORDER — BUPIVACAINE HYDROCHLORIDE 2.5 MG/ML
10 INJECTION, SOLUTION EPIDURAL; INFILTRATION; INTRACAUDAL ONCE
Status: DISCONTINUED | OUTPATIENT
Start: 2021-10-18 | End: 2021-10-18

## 2021-10-20 ENCOUNTER — APPOINTMENT (OUTPATIENT)
Dept: PHYSICAL THERAPY | Facility: CLINIC | Age: 81
End: 2021-10-20
Payer: MEDICARE

## 2021-10-21 ENCOUNTER — APPOINTMENT (OUTPATIENT)
Dept: PHYSICAL THERAPY | Facility: CLINIC | Age: 81
End: 2021-10-21
Payer: MEDICARE

## 2021-10-25 ENCOUNTER — OFFICE VISIT (OUTPATIENT)
Dept: PHYSICAL THERAPY | Facility: CLINIC | Age: 81
End: 2021-10-25
Payer: MEDICARE

## 2021-10-25 DIAGNOSIS — R29.898 MUSCULAR DECONDITIONING: Primary | ICD-10-CM

## 2021-10-25 PROCEDURE — 97164 PT RE-EVAL EST PLAN CARE: CPT

## 2021-10-25 PROCEDURE — 97110 THERAPEUTIC EXERCISES: CPT

## 2021-10-27 ENCOUNTER — OFFICE VISIT (OUTPATIENT)
Dept: PHYSICAL THERAPY | Facility: CLINIC | Age: 81
End: 2021-10-27
Payer: MEDICARE

## 2021-10-27 DIAGNOSIS — R29.898 MUSCULAR DECONDITIONING: Primary | ICD-10-CM

## 2021-10-27 PROCEDURE — 97110 THERAPEUTIC EXERCISES: CPT

## 2021-10-27 PROCEDURE — 97112 NEUROMUSCULAR REEDUCATION: CPT

## 2021-10-28 ENCOUNTER — APPOINTMENT (OUTPATIENT)
Dept: PHYSICAL THERAPY | Facility: CLINIC | Age: 81
End: 2021-10-28
Payer: MEDICARE

## 2021-10-29 ENCOUNTER — TELEPHONE (OUTPATIENT)
Dept: PAIN MEDICINE | Facility: CLINIC | Age: 81
End: 2021-10-29

## 2021-11-02 RX ADMIN — METHYLPREDNISOLONE ACETATE 40 MG: 40 INJECTION, SUSPENSION INTRA-ARTICULAR; INTRALESIONAL; INTRAMUSCULAR; SOFT TISSUE at 12:23

## 2021-11-02 RX ADMIN — BUPIVACAINE HYDROCHLORIDE 1 ML: 2.5 INJECTION, SOLUTION EPIDURAL; INFILTRATION; INTRACAUDAL at 12:22

## 2021-11-03 ENCOUNTER — OFFICE VISIT (OUTPATIENT)
Dept: PHYSICAL THERAPY | Facility: CLINIC | Age: 81
End: 2021-11-03
Payer: MEDICARE

## 2021-11-03 DIAGNOSIS — R29.898 MUSCULAR DECONDITIONING: Primary | ICD-10-CM

## 2021-11-03 PROCEDURE — 97110 THERAPEUTIC EXERCISES: CPT | Performed by: PHYSICAL THERAPIST

## 2021-11-03 PROCEDURE — 97112 NEUROMUSCULAR REEDUCATION: CPT | Performed by: PHYSICAL THERAPIST

## 2021-11-04 ENCOUNTER — APPOINTMENT (OUTPATIENT)
Dept: PHYSICAL THERAPY | Facility: CLINIC | Age: 81
End: 2021-11-04
Payer: MEDICARE

## 2021-11-08 ENCOUNTER — OFFICE VISIT (OUTPATIENT)
Dept: PHYSICAL THERAPY | Facility: CLINIC | Age: 81
End: 2021-11-08
Payer: MEDICARE

## 2021-11-08 DIAGNOSIS — R29.898 MUSCULAR DECONDITIONING: Primary | ICD-10-CM

## 2021-11-08 PROCEDURE — 97110 THERAPEUTIC EXERCISES: CPT

## 2021-11-08 PROCEDURE — 97112 NEUROMUSCULAR REEDUCATION: CPT

## 2021-11-10 ENCOUNTER — OFFICE VISIT (OUTPATIENT)
Dept: PHYSICAL THERAPY | Facility: CLINIC | Age: 81
End: 2021-11-10
Payer: MEDICARE

## 2021-11-10 DIAGNOSIS — R29.898 MUSCULAR DECONDITIONING: Primary | ICD-10-CM

## 2021-11-10 PROCEDURE — 97112 NEUROMUSCULAR REEDUCATION: CPT

## 2021-11-10 PROCEDURE — 97110 THERAPEUTIC EXERCISES: CPT

## 2021-11-11 ENCOUNTER — APPOINTMENT (OUTPATIENT)
Dept: PHYSICAL THERAPY | Facility: CLINIC | Age: 81
End: 2021-11-11
Payer: MEDICARE

## 2021-11-12 ENCOUNTER — OFFICE VISIT (OUTPATIENT)
Dept: FAMILY MEDICINE CLINIC | Facility: CLINIC | Age: 81
End: 2021-11-12
Payer: MEDICARE

## 2021-11-12 VITALS
SYSTOLIC BLOOD PRESSURE: 128 MMHG | HEART RATE: 78 BPM | OXYGEN SATURATION: 96 % | HEIGHT: 65 IN | RESPIRATION RATE: 18 BRPM | WEIGHT: 187.2 LBS | BODY MASS INDEX: 31.19 KG/M2 | TEMPERATURE: 98.3 F | DIASTOLIC BLOOD PRESSURE: 68 MMHG

## 2021-11-12 DIAGNOSIS — Z00.00 MEDICARE ANNUAL WELLNESS VISIT, SUBSEQUENT: Primary | ICD-10-CM

## 2021-11-12 DIAGNOSIS — Z23 ENCOUNTER FOR IMMUNIZATION: ICD-10-CM

## 2021-11-12 PROCEDURE — G0439 PPPS, SUBSEQ VISIT: HCPCS | Performed by: FAMILY MEDICINE

## 2021-11-12 PROCEDURE — 1123F ACP DISCUSS/DSCN MKR DOCD: CPT | Performed by: FAMILY MEDICINE

## 2021-11-12 RX ORDER — AMMONIUM LACTATE 12 G/100G
LOTION TOPICAL
COMMUNITY
Start: 2021-08-25 | End: 2022-04-25 | Stop reason: ALTCHOICE

## 2021-11-12 RX ORDER — ZOSTER VACCINE RECOMBINANT, ADJUVANTED 50 MCG/0.5
KIT INTRAMUSCULAR
Qty: 1 EACH | Refills: 1 | Status: SHIPPED | OUTPATIENT
Start: 2021-11-12 | End: 2022-03-15 | Stop reason: ALTCHOICE

## 2021-11-15 ENCOUNTER — OFFICE VISIT (OUTPATIENT)
Dept: PHYSICAL THERAPY | Facility: CLINIC | Age: 81
End: 2021-11-15
Payer: MEDICARE

## 2021-11-15 DIAGNOSIS — R29.898 MUSCULAR DECONDITIONING: Primary | ICD-10-CM

## 2021-11-15 PROCEDURE — 97112 NEUROMUSCULAR REEDUCATION: CPT

## 2021-11-15 PROCEDURE — 97110 THERAPEUTIC EXERCISES: CPT

## 2021-11-17 ENCOUNTER — OFFICE VISIT (OUTPATIENT)
Dept: PHYSICAL THERAPY | Facility: CLINIC | Age: 81
End: 2021-11-17
Payer: MEDICARE

## 2021-11-17 DIAGNOSIS — R29.898 MUSCULAR DECONDITIONING: Primary | ICD-10-CM

## 2021-11-17 PROCEDURE — 97110 THERAPEUTIC EXERCISES: CPT

## 2021-11-18 ENCOUNTER — APPOINTMENT (OUTPATIENT)
Dept: PHYSICAL THERAPY | Facility: CLINIC | Age: 81
End: 2021-11-18
Payer: MEDICARE

## 2021-11-29 ENCOUNTER — APPOINTMENT (OUTPATIENT)
Dept: PHYSICAL THERAPY | Facility: CLINIC | Age: 81
End: 2021-11-29
Payer: MEDICARE

## 2021-11-29 ENCOUNTER — TELEPHONE (OUTPATIENT)
Dept: PAIN MEDICINE | Facility: CLINIC | Age: 81
End: 2021-11-29

## 2021-11-29 NOTE — TELEPHONE ENCOUNTER
Pt would like to repeat 10/18 occipital nerve block. Pt states pain has returned to the right side. Pt states benefit of last procedure has worn off but it did provide 75-80% improvement. Ok to schedule repeat right occipital nerve block?

## 2021-12-01 ENCOUNTER — APPOINTMENT (OUTPATIENT)
Dept: PHYSICAL THERAPY | Facility: CLINIC | Age: 81
End: 2021-12-01
Payer: MEDICARE

## 2021-12-03 ENCOUNTER — PROCEDURE VISIT (OUTPATIENT)
Dept: PAIN MEDICINE | Facility: CLINIC | Age: 81
End: 2021-12-03
Payer: MEDICARE

## 2021-12-03 DIAGNOSIS — M54.81 BILATERAL OCCIPITAL NEURALGIA: Primary | ICD-10-CM

## 2021-12-03 PROCEDURE — 64450 NJX AA&/STRD OTHER PN/BRANCH: CPT | Performed by: STUDENT IN AN ORGANIZED HEALTH CARE EDUCATION/TRAINING PROGRAM

## 2021-12-03 RX ORDER — BUPIVACAINE HYDROCHLORIDE 2.5 MG/ML
1 INJECTION, SOLUTION EPIDURAL; INFILTRATION; INTRACAUDAL ONCE
Status: COMPLETED | OUTPATIENT
Start: 2021-12-03 | End: 2021-12-03

## 2021-12-03 RX ORDER — METHYLPREDNISOLONE ACETATE 40 MG/ML
40 INJECTION, SUSPENSION INTRA-ARTICULAR; INTRALESIONAL; INTRAMUSCULAR; SOFT TISSUE ONCE
Status: COMPLETED | OUTPATIENT
Start: 2021-12-03 | End: 2021-12-03

## 2021-12-03 RX ADMIN — METHYLPREDNISOLONE ACETATE 40 MG: 40 INJECTION, SUSPENSION INTRA-ARTICULAR; INTRALESIONAL; INTRAMUSCULAR; SOFT TISSUE at 11:55

## 2021-12-03 RX ADMIN — BUPIVACAINE HYDROCHLORIDE 1 ML: 2.5 INJECTION, SOLUTION EPIDURAL; INFILTRATION; INTRACAUDAL at 11:54

## 2021-12-06 ENCOUNTER — APPOINTMENT (OUTPATIENT)
Dept: PHYSICAL THERAPY | Facility: CLINIC | Age: 81
End: 2021-12-06
Payer: MEDICARE

## 2021-12-06 NOTE — PROGRESS NOTES
Progress Note     Today's date: 2021  Patient name: Melissa Lopez  : 1940  MRN: [id number]  Referring provider: Candida Bland, *  Dx:   No diagnosis found  Subjective: Nay Groves states that she has been dealing with a couple of other health issues--toe issues (nail removal with poor healing) and injections to her head for occipital neuralgia  This is why she has not been able to come to PT for the past month or so  She admits to not being as active at home and not doing any exercises over the past month due to these other issues  She does not feel that she is back to square one and has maintained some of the progress made prior to stop attending  She wants to try to come back to PT more regularly and see if it helps more  Objective: See treatment diary below       Balance Test     Gait Speed (m/s):  IE : 10m/35 6s =  28 m/s (w/ RW)   RE : 10m/30 3s =  33 m/s (w/ RW)   RE 10/25: 10m/47 0s =  21 m/s (w/ RW)   RE : 10m/***s = *** m/s (w/ RW)   5x Sit To Stand (s):  IE : 51 9 (BUE use; flexed hips and knees in stance)   RE : 42 8 (BUE use; flexed hips and knees in stance)   RE 10/25: 45 6 (BUE use; flexed hips and knees in stance; shortened range)   RE : *** (BUE use; flexed hips and knees in stance; shortened range)   TUG (s):  IE : 63 6 with RW (slow STS and turns)   RE : 58 4 with RW (slow STS and turns)   RE 10/25: 69 2 with RW (slow STS and turns)   RE : *** with RW (slow STS and turns)            Assessment: Nay Groves initially presented with the chief complaint of deconditioning, weakness, and difficulty walking  Re-evaluation was performed today as she has not attended PT since 2021 for a variety of personal and other medical reasons  Since her last progress update at her last session, decline was noted in gait speed, LE strength/power given her 5x STS time, and balance shown by increase in time to complete a TUG   Gait speed and TUG are slower than they were on initial evaluation, continuing to classify her as an increased falls risk  This decline is likely a result of her other medical complications that caused her to decrease her activity level at home, not perform exercises, and be unable to attend PT  Time was spent discussing the importance of increasing her activity level, reintroducing her exercises, and being able to consistently attend PT as this will limit any further progress  She verbalized understanding  HEP was reviewed with proper form demonstrated  She would benefit from continued skilled PT to increase LE strength, activity tolerance, and safety with functional mobility  Goals  STGs in 4 weeks  1  Patient with assist from family will be independent with HEP  - MET  2  Patient will improve her 5x STS time to no more than 45 seconds for improved LE strength and power  - MET  3  Patient will decreased TUG time to under 60 seconds with appropriate AD for improved household ambulation  - MET    LTGs in 8 weeks  1  Patient will achieve a gait speed of at least  5 m/s with appropriate AD for improved household and community ambulation  -ONGOING  2  Patient will improve her 5x STS time to no more than 35 seconds for improved strength  -ONGOING  3  Patient will achieve a TUG time of no more than 50 seconds for increased balance and improved functional mobility  -ONGOING      Plan: Continue per plan of care  Progress treatment as tolerated         Planned therapy interventions: abdominal trunk stabilization, joint mobilization, manual therapy, balance, neuromuscular re-education, patient education, postural training, body mechanics training, strengthening, stretching, therapeutic activities, therapeutic exercise, gait training and home exercise program  Frequency: 2x week  Duration in weeks: 4    Plan of Care beginning date: 10/25/2021  Plan of Care expiration date: 11/29/2021       Precautions: FALLS RISK; memory loss       Manuals 10/27 11/3 11/8 11/10 11/15 11/17 11/29                                                          Neuro Re-Ed             STS x5; x5 seated on foam No foam  2x5 No foam 2x6 No foam 2x8 No foam x10 No foam x8        Toe taps 2 cones x10 ea 2 cones x10 ea cones 1 5# 2x10 ea          Step ups  4" x5 ea CGA/HHA 4" 2x5 ea CGA/  HHA 4" x8 ea CGA 4" x8 fwd; x10 lat CGA Lat 6" x8; fwd 4" x8        Side stepping 15ft @ rail x6 15ft at rail x6 15 ft at rail x6 1 5#                                                 Ther Ex             Bike              Seated march Alt 2x10 ea 1 5# Alt 2x10 ea 1 5# Alt 2x15 ea 1 5#   3x10 B 3#       LAQ 2x10 B 1 5# 2x10 B 1 5# 2x15 B 1 5# 2x15 B 1 5# 2x15 B 1 5# 3x10 B 3#       HR/TR Seated x20 1 5# Seated x20 1 5# seated x20 1 5# Seated x30 1 5# Seated x30 1 5# seated x30 3#       Stand hip abd/ext  Abd/Ext 2x10 ea 2x12 ea 1 5# 2x15 ea 1 5# Foam abd 2x12 1 5  #; no foam ext 2x12 1 5#        Stand march  2x10 Issa  2x15 B 1 5# 2x15 B 1 5#        Stand hamstring curl   2x12 B 1 5#  2x12 B 1 5# Seated RTB 3x10 ea       Seated hamstring stretch             TKE YTB 2x10 ea YTB 2x10 ea YTB x15 ea          Seated TA press    x20         Seated hip add    x20 2" x20        Seated hip ER      RTB 3x10       Ther Activity                                       Gait Training                                       Modalities             edu on POC; increase walk activity

## 2021-12-08 ENCOUNTER — APPOINTMENT (OUTPATIENT)
Dept: PHYSICAL THERAPY | Facility: CLINIC | Age: 81
End: 2021-12-08
Payer: MEDICARE

## 2021-12-13 ENCOUNTER — OFFICE VISIT (OUTPATIENT)
Dept: PHYSICAL THERAPY | Facility: CLINIC | Age: 81
End: 2021-12-13
Payer: MEDICARE

## 2021-12-13 DIAGNOSIS — R29.898 MUSCULAR DECONDITIONING: Primary | ICD-10-CM

## 2021-12-13 PROCEDURE — 97110 THERAPEUTIC EXERCISES: CPT

## 2021-12-15 ENCOUNTER — APPOINTMENT (OUTPATIENT)
Dept: PHYSICAL THERAPY | Facility: CLINIC | Age: 81
End: 2021-12-15
Payer: MEDICARE

## 2021-12-15 NOTE — TELEPHONE ENCOUNTER
Pt has seen neurologist Dr Massiel Albrecht several times with the last visit being in April 21  Has also seen Dr Mariama Parr for [address], RIGHT in the past (Last done 3/19)  Last saw Dr Mariama Parr in Jan 2021 and a repeat was suggested but did not go through with it.     Please advise

## 2021-12-15 NOTE — TELEPHONE ENCOUNTER
One thing we have not looked into is whether her pain is coming from the upper joints of her neck. She is candidate for right C2-4 MBB#1. If has improvement we can eventually perform ablation for long lasting relief.  She can discuss with me in office if she would like or we can send her documentation

## 2021-12-15 NOTE — TELEPHONE ENCOUNTER
I think she may want to consider being seen by a headache specialist. She saw neurology in 2018 and this was suggested but at that time she declined.  Has she seen a headache specialist?

## 2021-12-15 NOTE — TELEPHONE ENCOUNTER
Pt called in to request to speak with a nurse. The pain in located in her head . Please be advised thank you.   Pt can be reached @  [phone]

## 2021-12-15 NOTE — TELEPHONE ENCOUNTER
See below  S/W pt who states the pain in her head was not relieved this time by the ONB she had on 12/3  States pain is on the right ride and forehead  Does take MM which helps a little  On Gabapentin 400mg am and 900mg pm.    Pt states pain is 10/10 and does not know what to do    Please advise

## 2021-12-16 NOTE — TELEPHONE ENCOUNTER
Scheduled pt for right C2-C4 MBB# 1 on 12/21/21 at 1430 arrival  Pt states she also has pain on left side of neck but it is intermittent. Discussed with Dr Miki Khoury, can switch to Issa if pt has pain there on procedure day. Pt aware of same    Advised  needed  Nothing to eat or drink one hour prior  Something easy to change out of to put on gown  Denies COVID or COVID symptoms  Denies contact with COVID positive person  Denies any vaccinations and reminded not to get any 2 weeks prior or 2 weeks after.   Advised to call and cancel if sick or put on ABX for any infection  Advised pain level needs to be 5/10 or greater  No pain medication 6-8 hours prior to appt  CB from now until procedure with any questions  Son aware of appt time

## 2021-12-20 ENCOUNTER — APPOINTMENT (OUTPATIENT)
Dept: PHYSICAL THERAPY | Facility: CLINIC | Age: 81
End: 2021-12-20
Payer: MEDICARE

## 2021-12-21 ENCOUNTER — HOSPITAL ENCOUNTER (OUTPATIENT)
Dept: RADIOLOGY | Facility: CLINIC | Age: 81
Discharge: HOME | End: 2021-12-21
Attending: STUDENT IN AN ORGANIZED HEALTH CARE EDUCATION/TRAINING PROGRAM | Admitting: STUDENT IN AN ORGANIZED HEALTH CARE EDUCATION/TRAINING PROGRAM
Payer: MEDICARE

## 2021-12-21 VITALS
OXYGEN SATURATION: 94 % | DIASTOLIC BLOOD PRESSURE: 75 MMHG | HEART RATE: 71 BPM | TEMPERATURE: 98 F | RESPIRATION RATE: 18 BRPM | SYSTOLIC BLOOD PRESSURE: 148 MMHG

## 2021-12-21 DIAGNOSIS — M47.812 CERVICAL SPONDYLOSIS: ICD-10-CM

## 2021-12-21 PROCEDURE — 64491 INJ PARAVERT F JNT C/T 2 LEV: CPT | Performed by: STUDENT IN AN ORGANIZED HEALTH CARE EDUCATION/TRAINING PROGRAM

## 2021-12-21 PROCEDURE — 64490 INJ PARAVERT F JNT C/T 1 LEV: CPT | Performed by: STUDENT IN AN ORGANIZED HEALTH CARE EDUCATION/TRAINING PROGRAM

## 2021-12-21 RX ORDER — BUPIVACAINE HCL/PF 2.5 MG/ML
1.5 VIAL (ML) INJECTION ONCE
Status: COMPLETED | OUTPATIENT
Start: 2021-12-21 | End: 2021-12-21

## 2021-12-21 RX ADMIN — Medication 1.5 ML: at 14:56

## 2021-12-21 NOTE — H&P (VIEW-ONLY)
History of Present Illness:  The patient is a 79 y o  female who presents with complaints of right sided neck pain    Patient Active Problem List   Diagnosis    Chronic nonintractable headache    Trigeminal neuralgia    Occipital neuralgia of right side    Trigeminal neuropathy    Myofascial pain dysfunction syndrome    Other chronic pain    Cervical disc disorder with radiculopathy of volfgyqz-wupabrw-vzxeu region    Essential hypertension    Mixed hyperlipidemia    Insomnia    Ambulatory dysfunction    Cognitive deficits    Moderate episode of recurrent major depressive disorder ([address] )    Encounter for medication review    Mixed incontinence, 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) (female)    Hearing loss    Macular degeneration    Vitamin B12 deficiency       Past Medical History:   Diagnosis Date    Arthritis     Basal cell carcinoma     Dry eye syndrome     Foraminal stenosis of cervical region     Macular degeneration     Memory loss     Migraine     Neck pain     Spinal stenosis of lumbar region     Squamous cell skin cancer     Supraventricular tachycardia ([facility] [address] )     Vitamin D deficiency     last assessed 2/7/17       Past Surgical History:   Procedure Laterality Date    APPENDECTOMY      BACK SURGERY      lower back surgery lumbar disc    CATARACT EXTRACTION      FEMUR FRACTURE SURGERY      DC STEREO TREAT TRIGEMINAL NERVE Right 3/26/2019    Procedure: GLYCEROL RHIZOTOMY TRIGEMINAL NERVES (V1-V3), RIGHT;  Surgeon: Baltazar Bell MD;  Location: [facility] OR;  Service: Neurosurgery    REPLACEMENT TOTAL KNEE Right     REPLACEMENT TOTAL KNEE Left     SKIN BIOPSY           Current Outpatient Medications:     Acetaminophen 325 MG CAPS, Take 650 mg by mouth every 6 (six) hours as needed, Disp: , Rfl:     ammonium lactate (LAC-HYDRIN) 12 % lotion, , Disp: , Rfl:     Cholecalciferol (VITAMIN D PO), Take 5,000 Units by mouth Daily  , Disp: , Rfl:     DULoxetine (CYMBALTA) 60 mg delayed release capsule, Take 2 caps by mouth daily, Disp: 120 capsule, Rfl: 2    gabapentin (NEURONTIN) 100 mg capsule, Take 2 capsules (200 mg total) by mouth daily, Disp: 180 capsule, Rfl: 1    gabapentin (NEURONTIN) 300 mg capsule, Take 3 capsules (900 mg total) by mouth daily at bedtime, Disp: 90 capsule, Rfl: 3    Multiple Vitamins-Minerals (PRESERVISION AREDS 2+MULTI VIT) CAPS, BID, Disp: , Rfl:     mupirocin (BACTROBAN) 2 % ointment, APPLY TO OPEN/ SORE AREAS TWICE DAILY UNTIL HEALED, Disp: , Rfl:     verapamil (VERELAN PM) 120 MG 24 hr capsule, Take 1 capsule (120 mg total) by mouth daily at bedtime, Disp: 90 capsule, Rfl: 1    vitamin B-12 (CYANOCOBALAMIN) 250 MCG tablet, Take 1 tablet (250 mcg total) by mouth daily, Disp: 90 tablet, Rfl: 0    Zoster Vac Recomb Adjuvanted (Shingrix) 50 MCG/0 5ML SUSR, 0 5mL IM for one dose, followed by 0 5mL IM 2-6 months after first dose, Disp: 1 each, Rfl: 1    Allergies   Allergen Reactions    Dilaudid [Hydromorphone] Shortness Of Breath     Other reaction(s): Respiratory Distress  Other reaction(s): HORENESS    Penicillins Rash     Rash       Physical Exam: There were no vitals filed for this visit  General: Awake, Alert, Oriented x 3, Mood and affect appropriate  Respiratory: Respirations even and unlabored  Cardiovascular: Peripheral pulses intact; no edema  Musculoskeletal Exam: neck pain, right    ASA Score: 3         Assessment:   1   Cervical spondylosis        Plan: RIGHT C2-C4 MBB #1

## 2021-12-22 ENCOUNTER — TELEPHONE (OUTPATIENT)
Dept: RADIOLOGY | Facility: CLINIC | Age: 81
End: 2021-12-22

## 2021-12-22 ENCOUNTER — APPOINTMENT (OUTPATIENT)
Dept: PHYSICAL THERAPY | Facility: CLINIC | Age: 81
End: 2021-12-22
Payer: MEDICARE

## 2021-12-24 DIAGNOSIS — I10 ESSENTIAL HYPERTENSION: ICD-10-CM

## 2021-12-24 RX ORDER — VERAPAMIL HYDROCHLORIDE 120 MG/1
CAPSULE, EXTENDED RELEASE ORAL
Qty: 90 CAPSULE | Refills: 1 | Status: SHIPPED | OUTPATIENT
Start: 2021-12-24 | End: 2022-06-18 | Stop reason: SDUPTHER

## 2021-12-27 ENCOUNTER — TELEPHONE (OUTPATIENT)
Dept: FAMILY MEDICINE CLINIC | Facility: CLINIC | Age: 81
End: 2021-12-27

## 2021-12-27 ENCOUNTER — APPOINTMENT (OUTPATIENT)
Dept: PHYSICAL THERAPY | Facility: CLINIC | Age: 81
End: 2021-12-27
Payer: MEDICARE

## 2021-12-29 ENCOUNTER — APPOINTMENT (OUTPATIENT)
Dept: PHYSICAL THERAPY | Facility: CLINIC | Age: 81
End: 2021-12-29
Payer: MEDICARE

## 2022-01-03 ENCOUNTER — TELEPHONE (OUTPATIENT)
Dept: FAMILY MEDICINE CLINIC | Facility: CLINIC | Age: 82
End: 2022-01-03

## 2022-01-03 ENCOUNTER — TELEPHONE (OUTPATIENT)
Dept: PAIN MEDICINE | Facility: CLINIC | Age: 82
End: 2022-01-03

## 2022-01-03 NOTE — TELEPHONE ENCOUNTER
gabapentin (NEURONTIN) 100 mg capsule     this medication was put in by [facility]   However son is wondering if you are able to put in a script in for medication for his mom or does he need to call [facility] to do it

## 2022-01-03 NOTE — TELEPHONE ENCOUNTER
Laura Dennison ( Son) contacted Call Center requested refill of their medication  Medication Name:gabapentin (NEURONTIN        Dosage of Med: 100 mg       Frequency of Med:Take 2 capsules (200 mg total) by mouth daily      Remaining Medication: 14       Pharmacy and Location:  [facility] [address] [place] [address] [place] [address] [address]   Phone:  [phone]        Pt  Preferred Callback Phone Number: [phone]      Thank you

## 2022-01-04 NOTE — TELEPHONE ENCOUNTER
Lm for pt to cb  Is pt still taking 300mg tabs also? Does she only need a refill on 100mg? Pt should have been out of both meds awhile ago per last script

## 2022-01-04 NOTE — TELEPHONE ENCOUNTER
Please send gabapentin 100mg to [facility] on file     Pt has enough supply to await SP's return to office 1/5  Last seen 12/21 for procedure

## 2022-01-06 ENCOUNTER — TELEPHONE (OUTPATIENT)
Dept: FAMILY MEDICINE CLINIC | Facility: CLINIC | Age: 82
End: 2022-01-06

## 2022-01-06 NOTE — TELEPHONE ENCOUNTER
Please let Candy Thorne know that they want a face to face appt on why she needs physical therapy  OK to schedule

## 2022-01-06 NOTE — TELEPHONE ENCOUNTER
Suhail Osoriodeshaunchapito called from [facility]   About a referral put in for   Muscular deconditioning  She has a visit on 11/12   However the notes do not stated why she was diagnosed with muscular deconditioning   Or as to why   Suhail Marshall said they would need the following   1 ) NEW FACE TO FACE ; AND A NEW ORDER PUT IN   2) OR to add a addendum to notes stating the diagnosis

## 2022-01-11 ENCOUNTER — HOSPITAL ENCOUNTER (OUTPATIENT)
Dept: RADIOLOGY | Facility: CLINIC | Age: 82
Discharge: HOME | End: 2022-01-11
Attending: STUDENT IN AN ORGANIZED HEALTH CARE EDUCATION/TRAINING PROGRAM
Payer: MEDICARE

## 2022-01-11 VITALS
TEMPERATURE: 98.4 F | RESPIRATION RATE: 20 BRPM | SYSTOLIC BLOOD PRESSURE: 155 MMHG | HEART RATE: 57 BPM | OXYGEN SATURATION: 96 % | DIASTOLIC BLOOD PRESSURE: 68 MMHG

## 2022-01-11 DIAGNOSIS — M47.812 CERVICAL SPONDYLOSIS: ICD-10-CM

## 2022-01-11 PROCEDURE — 64490 INJ PARAVERT F JNT C/T 1 LEV: CPT | Performed by: STUDENT IN AN ORGANIZED HEALTH CARE EDUCATION/TRAINING PROGRAM

## 2022-01-11 PROCEDURE — 64491 INJ PARAVERT F JNT C/T 2 LEV: CPT | Performed by: STUDENT IN AN ORGANIZED HEALTH CARE EDUCATION/TRAINING PROGRAM

## 2022-01-11 RX ORDER — BUPIVACAINE HYDROCHLORIDE 7.5 MG/ML
1.5 INJECTION, SOLUTION EPIDURAL; RETROBULBAR ONCE
Status: COMPLETED | OUTPATIENT
Start: 2022-01-11 | End: 2022-01-11

## 2022-01-11 RX ADMIN — BUPIVACAINE HYDROCHLORIDE 1.5 ML: 7.5 INJECTION, SOLUTION EPIDURAL; RETROBULBAR at 15:53

## 2022-01-11 NOTE — INTERVAL H&P NOTE
Update: (This section must be completed if the H&P was completed greater than 24 hrs to procedure or admission)    H&P reviewed  After examining the patient, I find no changed to the H&P since it had been written  Patient re-evaluated   Accept as history and physical     Bryan Merino MD/January 11, 2022/3:33 PM

## 2022-01-11 NOTE — DISCHARGE INSTR - LAB

## 2022-01-12 ENCOUNTER — TELEPHONE (OUTPATIENT)
Dept: PAIN MEDICINE | Facility: CLINIC | Age: 82
End: 2022-01-12

## 2022-01-12 NOTE — TELEPHONE ENCOUNTER
If after the third hour was still having notable relief even if less than 80%, then would schedule RFA

## 2022-01-12 NOTE — TELEPHONE ENCOUNTER
S/w pt who states she did have relief past the 3rd hour and was able to sleep    Please schedule RFA

## 2022-01-14 NOTE — TELEPHONE ENCOUNTER
S/w pt's son and scheduled Right C2-4 RFA for 1/25/22 1 pm  Gave pre procedure instructions and masking/ policy

## 2022-01-20 ENCOUNTER — OFFICE VISIT (OUTPATIENT)
Dept: FAMILY MEDICINE CLINIC | Facility: CLINIC | Age: 82
End: 2022-01-20
Payer: MEDICARE

## 2022-01-20 VITALS
DIASTOLIC BLOOD PRESSURE: 62 MMHG | TEMPERATURE: 96.9 F | OXYGEN SATURATION: 97 % | HEART RATE: 71 BPM | SYSTOLIC BLOOD PRESSURE: 122 MMHG

## 2022-01-20 DIAGNOSIS — M46.82 OTHER SPECIFIED INFLAMMATORY SPONDYLOPATHIES, CERVICAL REGION (HCC): ICD-10-CM

## 2022-01-20 DIAGNOSIS — R53.81 PHYSICAL DECONDITIONING: Primary | ICD-10-CM

## 2022-01-20 DIAGNOSIS — F33.1 MODERATE EPISODE OF RECURRENT MAJOR DEPRESSIVE DISORDER (HCC): ICD-10-CM

## 2022-01-20 DIAGNOSIS — F11.20 UNCOMPLICATED OPIOID DEPENDENCE (HCC): ICD-10-CM

## 2022-01-20 DIAGNOSIS — R53.1 GENERALIZED WEAKNESS: ICD-10-CM

## 2022-01-20 DIAGNOSIS — F03.90 DEMENTIA WITHOUT BEHAVIORAL DISTURBANCE, UNSPECIFIED DEMENTIA TYPE (HCC): ICD-10-CM

## 2022-01-20 DIAGNOSIS — Z91.81 AT RISK FOR FALLS: ICD-10-CM

## 2022-01-20 DIAGNOSIS — M48.02 SPINAL STENOSIS IN CERVICAL REGION: ICD-10-CM

## 2022-01-20 DIAGNOSIS — Z74.09 IMPAIRED MOBILITY: ICD-10-CM

## 2022-01-20 DIAGNOSIS — M50.11 CERVICAL DISC DISORDER WITH RADICULOPATHY OF OCCIPITO-ATLANTO-AXIAL REGION: ICD-10-CM

## 2022-01-20 PROCEDURE — 99214 OFFICE O/P EST MOD 30 MIN: CPT | Performed by: FAMILY MEDICINE

## 2022-01-20 NOTE — PATIENT INSTRUCTIONS
Reviewed all with pt and her son  Will order home physical therapy  Keep the f/u appt with Dr Vianey Shannon  Follow here as planned  Pt had her flu shot at [employer]

## 2022-01-20 NOTE — PROGRESS NOTES
Assessment/Plan:     Chronic Problems:  Cervical disc disorder with radiculopathy of jrbrohrl-xgbtlrz-loxjo region  Pt follows with Dr Anatoly Newberry and has rhizotomy scheduled for next week  Visit Diagnosis:  Diagnoses and all orders for this visit:    Physical deconditioning  -     Ambulatory Referral to [address] [place] [address] Dillon Camacho; Future    Generalized weakness  -     Ambulatory Referral to Home Health; Future    Impaired mobility  -     Ambulatory Referral to Home Health; Future    At risk for falls  -     Ambulatory Referral to [address] [place] [address] Dillon Camacho; Future    Cervical disc disorder with radiculopathy of yxwuyedn-dbttkpu-rryta region          Subjective:    Patient ID: Xochitl Gordon is a 79 y o  female  Pt is here ac by her son  Pt is currently received via wheel chair and uses a walker at home  Family is requesting physical therapy for strengthening, stamina, balance, and fall prevention  No vertigo  Pt is unable to leave her home without assistance  Son lives with mom and is her primary care giver  Still follows with neuro for cervical spondylosis  Pt has refused any procedures for possible tgn  Takes all other meds as directed  No side effects noted  The following portions of the patient's history were reviewed and updated as appropriate: allergies, current medications, past family history, past medical history, past social history, past surgical history and problem list     Review of Systems   Constitutional: Positive for fatigue  Negative for chills, diaphoresis and fever  HENT: Negative for ear pain, postnasal drip, sinus pressure, sinus pain and sore throat  Eyes: Negative for pain and visual disturbance  Respiratory: Negative for cough, chest tightness and shortness of breath  Cardiovascular: Negative for chest pain and palpitations  Gastrointestinal: Negative for abdominal pain, constipation, diarrhea, nausea and vomiting  Genitourinary: Negative for dysuria, frequency and urgency     Musculoskeletal: Positive for gait problem  Negative for arthralgias and myalgias  Pt has not walked without assistance for years  Pt had h/o bilateral knee replacement and then fx femur and has not walked since then  Skin: Negative for rash and wound  Neurological: Positive for weakness  Negative for dizziness, light-headedness and numbness  Psychiatric/Behavioral: Negative for dysphoric mood and sleep disturbance  The patient is not nervous/anxious  /62 (BP Location: Right arm, Patient Position: Sitting)   Pulse 71   Temp (!) 96 9 °F (36 1 °C) (Tympanic)   SpO2 97%   Social History     Socioeconomic History    Marital status:       Spouse name: Not on file    Number of children: Not on file    Years of education: Not on file    Highest education level: Not on file   Occupational History    Occupation: retired   Tobacco Use    Smoking status: Former Smoker    Smokeless tobacco: Never Used   Vaping Use    Vaping Use: Never used   Substance and Sexual Activity    Alcohol use: Yes     Comment: occasional    Drug use: Yes     Types: Marijuana     Comment: medical marijuana    Sexual activity: Not Currently     Partners: Male   Other Topics Concern    Not on file   Social History Narrative    Always uses seatbelt     Social Determinants of Health     Financial Resource Strain: Not on file   Food Insecurity: Not on file   Transportation Needs: Not on file   Physical Activity: Not on file   Stress: Not on file   Social Connections: Not on file   Intimate Partner Violence: Not on file   Housing Stability: Not on file     Past Medical History:   Diagnosis Date    Arthritis     Basal cell carcinoma     Dry eye syndrome     Foraminal stenosis of cervical region     Macular degeneration     Memory loss     Migraine     Neck pain     Spinal stenosis of lumbar region     Squamous cell skin cancer     Supraventricular tachycardia ([address] )     Vitamin D deficiency     last assessed 2/7/17 Family History   Problem Relation Age of Onset    Aortic aneurysm Mother         abdominal aortic aneurysm    Hypertension Mother    Fantasma Pert Breast cancer Mother     Hypertension Father     Hypertension Sister     Hyperlipidemia Sister     Squamous cell carcinoma Sister      Past Surgical History:   Procedure Laterality Date    APPENDECTOMY      BACK SURGERY      lower back surgery lumbar disc    CATARACT EXTRACTION      FEMUR FRACTURE SURGERY      [place] STEREO TREAT TRIGEMINAL NERVE Right 3/26/2019    Procedure: GLYCEROL RHIZOTOMY TRIGEMINAL NERVES (V1-V3), RIGHT;  Surgeon: Liam Ha MD;  Location:  MAIN OR;  Service: Neurosurgery    REPLACEMENT TOTAL KNEE Right     REPLACEMENT TOTAL KNEE Left     SKIN BIOPSY         Current Outpatient Medications:     Acetaminophen 325 MG CAPS, Take 650 mg by mouth every 6 (six) hours as needed, Disp: , Rfl:     ammonium lactate (LAC-HYDRIN) 12 % lotion, , Disp: , Rfl:     Cholecalciferol (VITAMIN D PO), Take 5,000 Units by mouth Daily  , Disp: , Rfl:     DULoxetine (CYMBALTA) 60 mg delayed release capsule, Take 2 caps by mouth daily, Disp: 120 capsule, Rfl: 2    gabapentin (NEURONTIN) 100 mg capsule, Take 2 capsules (200 mg total) by mouth daily, Disp: 180 capsule, Rfl: 1    gabapentin (NEURONTIN) 300 mg capsule, Take 3 capsules (900 mg total) by mouth daily at bedtime, Disp: 90 capsule, Rfl: 3    Multiple Vitamins-Minerals (PRESERVISION AREDS 2+MULTI VIT) CAPS, BID, Disp: , Rfl:     verapamil (VERELAN PM) 120 MG 24 hr capsule, TAKE 1 CAPSULE(120 MG) BY MOUTH DAILY AT BEDTIME, Disp: 90 capsule, Rfl: 1    vitamin B-12 (CYANOCOBALAMIN) 250 MCG tablet, Take 1 tablet (250 mcg total) by mouth daily, Disp: 90 tablet, Rfl: 0    mupirocin (BACTROBAN) 2 % ointment, APPLY TO OPEN/ SORE AREAS TWICE DAILY UNTIL HEALED (Patient not taking: Reported on 1/20/2022), Disp: , Rfl:     Zoster Vac Recomb Adjuvanted (Shingrix) 50 MCG/0 5ML SUSR, 0 5mL IM for one dose, followed by 0 5mL IM 2-6 months after first dose, Disp: 1 each, Rfl: 1    Allergies   Allergen Reactions    Dilaudid [Hydromorphone] Shortness Of Breath     Other reaction(s): Respiratory Distress  Other reaction(s): HORENESS    Penicillins Rash     Rash          Lab Review   No visits with results within 2 Month(s) from this visit  Latest known visit with results is:   Appointment on 08/05/2021   Component Date Value    TSH 3RD GENERATON 08/05/2021 2 044     Vitamin B-12 08/05/2021 150     Folate 08/05/2021 >20 0*        Imaging: FL spine and pain procedure    Result Date: 1/11/2022  Narrative: PROCEDURE NOTE PATIENT NAME:  Julianna Aldridge Desouza MEDICAL RECORD NUMBER:  [id number] YOB: 1940 DATE OF PROCEDURE:  01/11/22 PROCEDURE:  Diagnostic blockade of the medial branch nerves corresponding to the left sided cervical facet joints at the C2-4 levels under fluoroscopic guidance (#2)  ATTENDING PHYSICIAN:  SHAWN Mijares  PREPROCEDURE DIAGNOSIS:  Left cervical facet arthropathy, cervical spondylosis without myelopathy or radiculopathy, cervical pain, cervicogenic headache POSTPROCEDURE DIAGNOSIS:   Diagnosis unchanged ANESTHESIA:  Local ESTIMATED BLOOD LOSS:  Minimal COMPLICATIONS:  None []   CONSENT:  Today's procedure, its potential benefits as well as its risks and potential side effects were reviewed  Discussed risks of the procedure include bleeding, infection, abscess formation, nerve irritation or damage, seizure, headache, reaction to medications administered, failure of the pain to improve, and exacerbation of the pain  These risks were explained in detail to the patient, who verbalized understanding and wished to proceed  Informed consent was signed  DESCRIPTION OF THE PROCEDURE:  After written informed consent was obtained, the patient was taken to the fluoroscopy suite and placed in the lateral position with the operative side up    The patient's cervical region was prepped and draped in the usual sterile fashion using anesthetic solution  25-gauge 3-1/2-inch needles were advanced under fluoroscopic guidance from the lateral view such that the needle tips were positioned on os at the center of the cuboid masses of the posterior columns of at each of the above named levels  Needle placement was confirmed via the aid of fluoroscopy  After negative aspiration, 0 5 ml of 0 75% bupivacaine was injected at each site  All needles were removed intact and hemostasis was maintained throughout  The patient tolerated the procedure well and no paresthesias or other complications were reported during or following this procedure  The patient's cervical region was then cleaned and bandages were placed as appropriate  The patient was transferred to the recovery area and was observed for an appropriate period of time and remained hemodynamically stable and neurovascularly intact  The patient was subsequently discharged home in stable condition with supervision and instructed to complete and submit the pain diary  Objective:     Physical Exam  Constitutional:       General: She is not in acute distress  Appearance: Normal appearance  She is well-developed  She is not ill-appearing, toxic-appearing or diaphoretic  HENT:      Head: Normocephalic and atraumatic  Right Ear: There is no impacted cerumen  Left Ear: There is no impacted cerumen  Nose: Nose normal  No congestion  Mouth/Throat:      Mouth: Mucous membranes are moist       Pharynx: No oropharyngeal exudate  Eyes:      General:         Right eye: No discharge  Left eye: No discharge  Extraocular Movements: Extraocular movements intact  Conjunctiva/sclera: Conjunctivae normal       Pupils: Pupils are equal, round, and reactive to light  Cardiovascular:      Rate and Rhythm: Normal rate and regular rhythm  Heart sounds: No murmur heard        Pulmonary:      Effort: Pulmonary effort is normal  No respiratory distress  Breath sounds: Normal breath sounds  Musculoskeletal:         General: No deformity  Cervical back: Normal range of motion and neck supple  No rigidity  Right lower leg: No edema  Left lower leg: No edema  Comments: Received in wheel chair  Skin:     General: Skin is warm  Capillary Refill: Capillary refill takes less than 2 seconds  Coloration: Skin is not pale  Findings: No erythema  Neurological:      General: No focal deficit present  Mental Status: She is alert and oriented to person, place, and time  Psychiatric:         Mood and Affect: Mood normal          Behavior: Behavior normal          Thought Content: Thought content normal          Judgment: Judgment normal            Patient Instructions   Reviewed all with pt and her son  Will order home physical therapy  Keep the f/u appt with Dr Diana Vitale  Follow here as planned  Pt had her flu shot at [facility]    Portions of the record may have been created with voice recognition software  Occasional wrong word or "sound a like" substitutions may have occurred due to the inherent limitations of voice recognition software  Read the chart carefully and recognize, using context, where substitutions have occurred

## 2022-01-21 ENCOUNTER — TELEPHONE (OUTPATIENT)
Dept: FAMILY MEDICINE CLINIC | Facility: CLINIC | Age: 82
End: 2022-01-21

## 2022-01-21 ENCOUNTER — OFFICE VISIT (OUTPATIENT)
Dept: OBGYN CLINIC | Facility: CLINIC | Age: 82
End: 2022-01-21
Payer: MEDICARE

## 2022-01-21 VITALS
SYSTOLIC BLOOD PRESSURE: 140 MMHG | WEIGHT: 187 LBS | HEIGHT: 65 IN | DIASTOLIC BLOOD PRESSURE: 82 MMHG | HEART RATE: 74 BPM | BODY MASS INDEX: 31.16 KG/M2

## 2022-01-21 DIAGNOSIS — M65.312 TRIGGER THUMB OF LEFT HAND: Primary | ICD-10-CM

## 2022-01-21 PROCEDURE — 99213 OFFICE O/P EST LOW 20 MIN: CPT | Performed by: FAMILY MEDICINE

## 2022-01-21 PROCEDURE — 20550 NJX 1 TENDON SHEATH/LIGAMENT: CPT | Performed by: FAMILY MEDICINE

## 2022-01-21 RX ORDER — LIDOCAINE HYDROCHLORIDE 10 MG/ML
1 INJECTION, SOLUTION INFILTRATION; PERINEURAL
Status: COMPLETED | OUTPATIENT
Start: 2022-01-21 | End: 2022-01-21

## 2022-01-21 RX ORDER — TRIAMCINOLONE ACETONIDE 40 MG/ML
20 INJECTION, SUSPENSION INTRA-ARTICULAR; INTRAMUSCULAR
Status: COMPLETED | OUTPATIENT
Start: 2022-01-21 | End: 2022-01-21

## 2022-01-21 RX ORDER — LIDOCAINE HYDROCHLORIDE 10 MG/ML
0.5 INJECTION, SOLUTION INFILTRATION; PERINEURAL
Status: COMPLETED | OUTPATIENT
Start: 2022-01-21 | End: 2022-01-21

## 2022-01-21 RX ADMIN — LIDOCAINE HYDROCHLORIDE 1 ML: 10 INJECTION, SOLUTION INFILTRATION; PERINEURAL at 16:43

## 2022-01-21 RX ADMIN — TRIAMCINOLONE ACETONIDE 20 MG: 40 INJECTION, SUSPENSION INTRA-ARTICULAR; INTRAMUSCULAR at 16:43

## 2022-01-21 RX ADMIN — LIDOCAINE HYDROCHLORIDE 0.5 ML: 10 INJECTION, SOLUTION INFILTRATION; PERINEURAL at 16:43

## 2022-01-21 NOTE — TELEPHONE ENCOUNTER
Humaira [address]  She also sent you a message  They received the referral for Home Health for her, but it looks like it needs to go to residential home health  She wanted to advise you that Medicare does not accept the codes that are on the referral and they would accept spinal stenosis, but in the face to face note, you would need to addend it to include that she came into the office in a wheelchair and has weakness that is related to spinal stenosis and send that and the referral to residential home health

## 2022-01-21 NOTE — PROGRESS NOTES
Assessment/Plan:  Assessment/Plan   Diagnoses and all orders for this visit:    Trigger thumb of left hand  -     Hand/upper extremity injection: L thumb A3     79year-old right-hand-dominant female with locking and snapping of the left thumb more than 1 month  Discussed with patient physical exam, impression and plan  Physical exam of the left thumb noted for tenderness volar aspect of the MCP joint with palpable flexor tendon nodule  There is reproducible locking of the finger with flexion with snapping after forceful extension  She is intact neurovascularly  Clinical impression is that she is symptomatic from trigger thumb  Advised patient that since she had received only 1 injection to this site for may repeat corticosteroid injection, as she is allowed up to 2 injections to the same site  I administered mixture of 0 5 cc once% lidocaine and 0 5 cc Kenalog to the left thumb A1 site without complication  She is to start taking tumeric 500 mg twice daily and tart cherry at least 1000 mg daily  She may apply topical diclofenac gel 3 to 4 times a day for the next 10 days  She was advised that if trigger of left thumb recurs we will refer to hand surgeon  She will follow up with me as needed  Subjective:   Patient ID: Otis Ames is a 79 y o  female  Chief Complaint   Patient presents with    Left Hand - Pain       79year-old right-dominant female following up for locking stepping of left thumb on and off more than 18 months duration  She was last seen by Orthopedics more than 15 months ago which point she underwent left trigger thumb injection  She reports that since then symptoms improved up until 1 month ago  For the past month she started having recurrence of locking and snapping of the thumb    She has been having pain described as localized to the volar aspect of the thumb at the MCP joint, achy and throbbing, non radiating, associated with locking, and worse after snapping open of the thumb  Hand Pain  This is a recurrent problem  The current episode started more than 1 month ago  The problem occurs daily  The problem has been gradually worsening  Associated symptoms include arthralgias  Pertinent negatives include no joint swelling, numbness or weakness  Exacerbated by: Gripping  She has tried rest and position changes for the symptoms  The treatment provided mild relief  The following portions of the patient's history were reviewed and updated as appropriate: She  has a past medical history of Arthritis, Basal cell carcinoma, Dry eye syndrome, Foraminal stenosis of cervical region, Macular degeneration, Memory loss, Migraine, Neck pain, Spinal stenosis of lumbar region, Squamous cell skin cancer, Supraventricular tachycardia ([address] ), and Vitamin D deficiency  She  has a past surgical history that includes Cataract extraction; Replacement total knee (Right); Appendectomy; Femur fracture surgery; Back surgery; Replacement total knee (Left); pr stereo treat trigeminal nerve (Right, 3/26/2019); and Skin biopsy  Her family history includes Aortic aneurysm in her mother; Breast cancer in her mother; Hyperlipidemia in her sister; Hypertension in her father, mother, and sister; Squamous cell carcinoma in her sister  She  reports that she has quit smoking  She has never used smokeless tobacco  She reports current alcohol use  She reports current drug use  Drug: Marijuana  She is allergic to dilaudid [hydromorphone] and penicillins       Review of Systems   Musculoskeletal: Positive for arthralgias  Negative for joint swelling  Neurological: Negative for weakness and numbness  Objective:  Vitals:    01/21/22 1519   BP: 140/82   Pulse: 74   Weight: 84 8 kg (187 lb)   Height: 5' 5" (1 651 m)     Left Hand Exam     Muscle Strength   The patient has normal left wrist strength      Other   Sensation: normal  Pulse: present    Comments:   left thumb noted for tenderness volar aspect of the MCP joint with palpable flexor tendon nodule  There is reproducible locking of the finger with flexion with snapping after forceful extension  Strength/Myotome Testing     Left Wrist/Hand   Normal wrist strength      Physical Exam  Vitals and nursing note reviewed  Constitutional:       General: She is not in acute distress  Appearance: She is well-developed  She is not ill-appearing or diaphoretic  HENT:      Head: Normocephalic  Right Ear: External ear normal       Left Ear: External ear normal    Eyes:      Conjunctiva/sclera: Conjunctivae normal    Neck:      Trachea: No tracheal deviation  Cardiovascular:      Rate and Rhythm: Normal rate  Pulmonary:      Effort: Pulmonary effort is normal  No respiratory distress  Abdominal:      General: There is no distension  Musculoskeletal:         General: Tenderness present  No swelling, deformity or signs of injury  Skin:     General: Skin is warm and dry  Coloration: Skin is not jaundiced or pale  Neurological:      Mental Status: She is alert and oriented to person, place, and time  Psychiatric:         Mood and Affect: Mood normal          Behavior: Behavior normal          Thought Content: Thought content normal          Judgment: Judgment normal                Hand/upper extremity injection: L thumb A1  Universal Protocol:  Consent: Verbal consent obtained  Risks and benefits: risks, benefits and alternatives were discussed  Consent given by: patient  Time out: Immediately prior to procedure a "time out" was called to verify the correct patient, procedure, equipment, support staff and site/side marked as required    Patient understanding: patient states understanding of the procedure being performed  Patient consent: the patient's understanding of the procedure matches consent given  Procedure consent: procedure consent matches procedure scheduled  Relevant documents: relevant documents present and verified  Test results: test results available and properly labeled  Site marked: the operative site was marked  Radiology Images displayed and confirmed   If images not available, report reviewed: imaging studies available  Required items: required blood products, implants, devices, and special equipment available  Patient identity confirmed: verbally with patient    Supporting Documentation  Indications: pain and tendon swelling   Procedure Details  Condition:trigger finger Location: thumb - L thumb A1   Preparation: Patient was prepped and draped in the usual sterile fashion  Needle size: 27 G  Ultrasound guidance: no  Approach: volar  Medications administered: 1 mL lidocaine 1 %; 0 5 mL lidocaine 1 %; 20 mg triamcinolone acetonide 40 mg/mL    Patient tolerance: patient tolerated the procedure well with no immediate complications  Dressing:  Sterile dressing applied

## 2022-01-22 PROBLEM — F11.20 UNCOMPLICATED OPIOID DEPENDENCE (HCC): Status: ACTIVE | Noted: 2022-01-22

## 2022-01-22 PROBLEM — M46.82: Status: ACTIVE | Noted: 2022-01-22

## 2022-01-22 PROBLEM — F03.90 DEMENTIA WITHOUT BEHAVIORAL DISTURBANCE, UNSPECIFIED DEMENTIA TYPE (HCC): Status: ACTIVE | Noted: 2022-01-22

## 2022-01-25 ENCOUNTER — HOSPITAL ENCOUNTER (OUTPATIENT)
Dept: RADIOLOGY | Facility: CLINIC | Age: 82
Discharge: HOME | End: 2022-01-25
Attending: STUDENT IN AN ORGANIZED HEALTH CARE EDUCATION/TRAINING PROGRAM | Admitting: STUDENT IN AN ORGANIZED HEALTH CARE EDUCATION/TRAINING PROGRAM
Payer: MEDICARE

## 2022-01-25 ENCOUNTER — TELEPHONE (OUTPATIENT)
Dept: RADIOLOGY | Facility: CLINIC | Age: 82
End: 2022-01-25

## 2022-01-25 VITALS
OXYGEN SATURATION: 98 % | DIASTOLIC BLOOD PRESSURE: 74 MMHG | HEART RATE: 68 BPM | RESPIRATION RATE: 20 BRPM | SYSTOLIC BLOOD PRESSURE: 170 MMHG | TEMPERATURE: 98 F

## 2022-01-25 DIAGNOSIS — M47.812 CERVICAL SPONDYLOSIS: ICD-10-CM

## 2022-01-25 PROCEDURE — 64634 DESTROY C/TH FACET JNT ADDL: CPT | Performed by: STUDENT IN AN ORGANIZED HEALTH CARE EDUCATION/TRAINING PROGRAM

## 2022-01-25 PROCEDURE — 64633 DESTROY CERV/THOR FACET JNT: CPT | Performed by: STUDENT IN AN ORGANIZED HEALTH CARE EDUCATION/TRAINING PROGRAM

## 2022-01-25 RX ADMIN — Medication 3 ML: at 13:43

## 2022-01-25 NOTE — H&P
History of Present Illness:  The patient is a 79 y o  female who presents with complaints of RIGHT sided neck pain and headache    Patient Active Problem List   Diagnosis    Chronic nonintractable headache    Trigeminal neuralgia    Occipital neuralgia of right side    Trigeminal neuropathy    Myofascial pain dysfunction syndrome    Other chronic pain    Cervical disc disorder with radiculopathy of yohwjkhv-lkmdeml-uqaih region    Essential hypertension    Mixed hyperlipidemia    Insomnia    Ambulatory dysfunction    Cognitive deficits    Moderate episode of recurrent major depressive disorder ([facility] )    Encounter for medication review    Mixed incontinence, 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) (female)    Hearing loss    Macular degeneration    Vitamin B12 deficiency    Other specified inflammatory spondylopathies, cervical region ([address] )    Dementia without behavioral disturbance, unspecified dementia type ([address] )    Uncomplicated opioid dependence ([facility] )       Past Medical History:   Diagnosis Date    Arthritis     Basal cell carcinoma     Dry eye syndrome     Foraminal stenosis of cervical region     Macular degeneration     Memory loss     Migraine     Neck pain     Spinal stenosis of lumbar region     Squamous cell skin cancer     Supraventricular tachycardia ([facility] )     Vitamin D deficiency     last assessed 2/7/17       Past Surgical History:   Procedure Laterality Date    APPENDECTOMY      BACK SURGERY      lower back surgery lumbar disc    CATARACT EXTRACTION      FEMUR FRACTURE SURGERY      [place] STEREO TREAT TRIGEMINAL NERVE Right 3/26/2019    Procedure: GLYCEROL RHIZOTOMY TRIGEMINAL NERVES (V1-V3), RIGHT;  Surgeon: Ольга Higgins MD;  Location:  MAIN OR;  Service: Neurosurgery    REPLACEMENT TOTAL KNEE Right     REPLACEMENT TOTAL KNEE Left     SKIN BIOPSY           Current Outpatient Medications:     Acetaminophen 325 MG CAPS, Take 650 mg by mouth every 6 (six) hours as needed, Disp: , Rfl:     ammonium lactate (LAC-HYDRIN) 12 % lotion, , Disp: , Rfl:     Cholecalciferol (VITAMIN D PO), Take 5,000 Units by mouth Daily  , Disp: , Rfl:     DULoxetine (CYMBALTA) 60 mg delayed release capsule, Take 2 caps by mouth daily, Disp: 120 capsule, Rfl: 2    gabapentin (NEURONTIN) 100 mg capsule, Take 2 capsules (200 mg total) by mouth daily, Disp: 180 capsule, Rfl: 1    gabapentin (NEURONTIN) 300 mg capsule, Take 3 capsules (900 mg total) by mouth daily at bedtime, Disp: 90 capsule, Rfl: 3    Multiple Vitamins-Minerals (PRESERVISION AREDS 2+MULTI VIT) CAPS, BID, Disp: , Rfl:     mupirocin (BACTROBAN) 2 % ointment, APPLY TO OPEN/ SORE AREAS TWICE DAILY UNTIL HEALED (Patient not taking: Reported on 1/20/2022), Disp: , Rfl:     verapamil (VERELAN PM) 120 MG 24 hr capsule, TAKE 1 CAPSULE(120 MG) BY MOUTH DAILY AT BEDTIME, Disp: 90 capsule, Rfl: 1    vitamin B-12 (CYANOCOBALAMIN) 250 MCG tablet, Take 1 tablet (250 mcg total) by mouth daily, Disp: 90 tablet, Rfl: 0    Zoster Vac Recomb Adjuvanted (Shingrix) 50 MCG/0 5ML SUSR, 0 5mL IM for one dose, followed by 0 5mL IM 2-6 months after first dose, Disp: 1 each, Rfl: 1    Current Facility-Administered Medications:     lidocaine (PF) (XYLOCAINE-MPF) 2 % injection 3 mL, 3 mL, Other, Once, Sandi Aly MD    Allergies   Allergen Reactions    Dilaudid [Hydromorphone] Shortness Of Breath     Other reaction(s): Respiratory Distress  Other reaction(s): HORENESS    Penicillins Rash     Rash       Physical Exam: There were no vitals filed for this visit  General: Awake, Alert, Oriented x 3, Mood and affect appropriate  Respiratory: Respirations even and unlabored  Cardiovascular: Peripheral pulses intact; no edema  Musculoskeletal Exam: neck pain    ASA Score: 3         Assessment:   1   Cervical spondylosis        Plan: RIGHT C2-C4 RFA

## 2022-01-25 NOTE — DISCHARGE INSTR - LAB
Medial Branch Radiofrequency Ablation     WHAT YOU NEED TO KNOW:   Medial branch radiofrequency ablation (RFA) is a procedure used to treat facet joint pain in your neck, mid back, or lower back  Facet joints are found at the back of each vertebra  A needle electrode is used to send electrical currents to the nerves in your facet joint  The electrical currents create heat that damages the nerve so it cannot send pain signals  ACTIVITY  Do not drive or operate machinery today  No strenuous activity today - bending, lifting, etc   You may shower today, but do not sit in a tub of water  Resume normal activities tomorrow as tolerated  CARE OF THE INJECTION SITE  If you have soreness or pain, apply ice to the area today (20 minutes on/20 minutes off)  Starting tomorrow, you may use warm, moist heat or ice if needed  Notify [facility] if you have any of the following: redness, drainage, swelling, or fever above 100°F     SPECIAL INSTRUCTIONS  Our office will call you tomorrow for a progress report and make an appointment for a follow up visit in 4 weeks  If you feel a sunburn-like sensation in the area of your procedure, call our office  MEDICATIONS  Continue to take all routine medications  Our office may have instructed you to hold some medications  As no general anesthesia was used in today's procedure, you should not experience any side effects related to anesthesia  If you have a problem specifically related to your procedure, please call our office at [phone]  Problems not related to your procedure should be directed to your primary care physician

## 2022-01-26 NOTE — TELEPHONE ENCOUNTER
FYI    S/w pt  States doing better than yesterday  Still has some neck and head pain  Advised pt to give 4-6 weeks for full benefit  Advised ice or heat to area today  Scheduled 2/28 f/u ov  Pt will call if she needs anything sooner

## 2022-01-31 ENCOUNTER — TELEPHONE (OUTPATIENT)
Dept: PAIN MEDICINE | Facility: CLINIC | Age: 82
End: 2022-01-31

## 2022-01-31 NOTE — TELEPHONE ENCOUNTER
Pt contacted Call Center requested refill of their medication  Medication Name: Gabapentin       Dosage of Med: 300 mg      Frequency of Med: The patient will take in the morning and will continue 600 mg dose at bedtime      Remaining Medication: 5 day supply      Pharmacy and Location: [facility] on file         Pt  Preferred Callback Phone Number: [phone]       Thank you

## 2022-01-31 NOTE — TELEPHONE ENCOUNTER
S/w son  Pt needs a refill on 300mg tab only  Pt takes 2 at HS  Pt is still taking 200mg in AM and does not need a refill on that  Please send to [place] on file  No need to cb when script sent

## 2022-02-01 ENCOUNTER — TELEPHONE (OUTPATIENT)
Dept: FAMILY MEDICINE CLINIC | Facility: CLINIC | Age: 82
End: 2022-02-01

## 2022-02-01 NOTE — TELEPHONE ENCOUNTER
Melissa Mcdaniel from [facility] called to let you know there were some discrepancies in Jessica's medication from what she is taking compared to what was given to her on her paperwork  Duloxetine-  She is taking only one capsule  She cannot handle anymore than one capsule  Gabapentin- she is taking 2 capsules a day  Ammonium Lactate- she is not taking  Preservision Areds- she is not taking  Acetaminophen- she is taking 500mg tablet  She is taking this twice a day  She needed a verbal to get occupational therapy  I got a verbal from Kemar Triana and Melissa Mcdaniel was made aware  She is admitting her into PT and OT

## 2022-02-11 ENCOUNTER — TELEPHONE (OUTPATIENT)
Dept: PAIN MEDICINE | Facility: CLINIC | Age: 82
End: 2022-02-11

## 2022-02-11 NOTE — TELEPHONE ENCOUNTER
Patient may consider short course of NSAID if she is agreeable   I would clarify if she has ever tried this

## 2022-02-11 NOTE — TELEPHONE ENCOUNTER
Pt says she has a lot of pain and does not know how to control it, her pain is in her forehead and in the back of her neck  She doesn't have have an appointment 2/28/22  Writer did not find a sooner appt to offer   # [phone]

## 2022-02-11 NOTE — TELEPHONE ENCOUNTER
S/w pt  C/o forehead and neck pain which she states is not new  Unrelieved with gabapentin and tylenol  Pt requesting sooner ov, none available  Pt does not recall if pain is the same as prior to occipital nerve blocks  Pt unable to say if procedures helped her pain at all     Pt asking for any additional recs until she can be seen in the office

## 2022-02-28 ENCOUNTER — OFFICE VISIT (OUTPATIENT)
Dept: PAIN MEDICINE | Facility: CLINIC | Age: 82
End: 2022-02-28
Payer: MEDICARE

## 2022-02-28 VITALS
HEART RATE: 69 BPM | DIASTOLIC BLOOD PRESSURE: 71 MMHG | SYSTOLIC BLOOD PRESSURE: 137 MMHG | HEIGHT: 65 IN | WEIGHT: 187 LBS | RESPIRATION RATE: 18 BRPM | BODY MASS INDEX: 31.16 KG/M2

## 2022-02-28 DIAGNOSIS — M50.11 CERVICAL DISC DISORDER WITH RADICULOPATHY OF OCCIPITO-ATLANTO-AXIAL REGION: ICD-10-CM

## 2022-02-28 DIAGNOSIS — M79.18 MYOFASCIAL PAIN SYNDROME: ICD-10-CM

## 2022-02-28 DIAGNOSIS — M54.81 OCCIPITAL NEURALGIA OF RIGHT SIDE: ICD-10-CM

## 2022-02-28 DIAGNOSIS — G89.4 CHRONIC PAIN SYNDROME: Primary | ICD-10-CM

## 2022-02-28 PROCEDURE — 99214 OFFICE O/P EST MOD 30 MIN: CPT | Performed by: STUDENT IN AN ORGANIZED HEALTH CARE EDUCATION/TRAINING PROGRAM

## 2022-02-28 NOTE — PROGRESS NOTES
Pain Medicine Follow-Up Note    Assessment:  1  Chronic pain syndrome    2  Occipital neuralgia of right side    3  Cervical disc disorder with radiculopathy of nltfpysl-jvgiptk-wtipg region    4  Myofascial pain syndrome        Plan:  No orders of the defined types were placed in this encounter  No orders of the defined types were placed in this encounter  My impressions and treatment recommendations were discussed in detail with the patient who verbalized understanding and had no further questions  This is an 67-year-old female who returns to our office following right C2 to for radiofrequency ablation  She is reporting notable relief in her right-sided neck pain, however it is difficult to get accurate understanding of her level of relief  Her son reports that she still has periods of significant notable pain where she is crying  Patient reports me that most of her residual pain is still in the top of the head and in the right frontal area  Discussed that the ablation is unlikely to help with this issue  She has reached the point of maximal medical improvement with the ablation  She continues on medications including Cymbalta 60 mg and has been weaning gabapentin herself  She is now taking 100 mg during the day 300 mg at bedtime with no escalation in her symptoms  Advised she can continue decreasing gabapentin by 100 mg every other day  If she is having notable increase in pain, she will let us know  Therefore, will not send any further refills now  She may be candidate for Lyrica which we can discuss at next office visit  She is also reporting that Celebrex is not helping either  Will discontinue as medication as well  She has a few times remaining  Advised she can try taking 100 mg b i d  p r n  and see if this helps more  [facility] Prescription Drug Monitoring Program report was reviewed and was appropriate       Follow-up is planned in 12w time or sooner as warranted  Discharge instructions were provided  I personally saw and examined the patient and I agree with the above discussed plan of care  History of Present Illness:    Kim Landis is a 79 y o  female who presents to [facility] for interval re-evaluation of the above stated pain complaints  The patient has a past medical and chronic pain history as outlined in the assessment section  She was last seen on 01/25/2022 for right C2 to for radiofrequency ablation with notable relief of  relief of her symptoms  However, her son reports that she is still having notable pain in her head  Pain score is 6/10  Pain is predominantly on the right side of the head as worse at the morning and night  Pain is intermittent, sharp, pressure-like in nature       She points to the top of head and a few centimeters up from the occiput as the areas of the worst pain  Also notable the right frontal region    Current medications include Celebrex 100 mg daily p r n , gabapentin 600 mg q h s  and 200 mg daily, and Cymbalta 120 mg daily  She is now taking 100mg gabapentin in the morning and 300mg at night because she is not sure if it is helping  Other than as stated above, the patient denies any interval changes in medications, medical condition, mental condition, symptoms, or allergies since the last office visit  Review of Systems:    Review of Systems   Respiratory: Negative for shortness of breath  Cardiovascular: Negative for chest pain  Gastrointestinal: Negative for constipation, diarrhea, nausea and vomiting  Musculoskeletal: Positive for gait problem  Negative for arthralgias, joint swelling and myalgias  Decreased ROM  Joint Stiffness   Skin: Negative for rash  Neurological: Negative for dizziness, seizures and weakness  Psychiatric/Behavioral: Positive for decreased concentration  All other systems reviewed and are negative          Patient Active Problem List   Diagnosis  Chronic nonintractable headache    Trigeminal neuralgia    Occipital neuralgia of right side    Trigeminal neuropathy    Myofascial pain dysfunction syndrome    Other chronic pain    Cervical disc disorder with radiculopathy of iwamijma-pqnlpvd-zpzah region    Essential hypertension    Mixed hyperlipidemia    Insomnia    Ambulatory dysfunction    Cognitive deficits    Moderate episode of recurrent major depressive disorder ([facility]    Encounter for medication review    Mixed incontinence, 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) (female)    Hearing loss    Macular degeneration    Vitamin B12 deficiency    Other specified inflammatory spondylopathies, cervical region ([facility]    Dementia without behavioral disturbance, unspecified dementia type ([facility]    Uncomplicated opioid dependence ([facility] [facility] )       Past Medical History:   Diagnosis Date    Arthritis     Basal cell carcinoma     Dry eye syndrome     Foraminal stenosis of cervical region     Macular degeneration     Memory loss     Migraine     Neck pain     Spinal stenosis of lumbar region     Squamous cell skin cancer     Supraventricular tachycardia ([facility]     Vitamin D deficiency     last assessed 2/7/17       Past Surgical History:   Procedure Laterality Date    APPENDECTOMY      BACK SURGERY      lower back surgery lumbar disc    CATARACT EXTRACTION      FEMUR FRACTURE SURGERY      UT STEREO TREAT TRIGEMINAL NERVE Right 3/26/2019    Procedure: GLYCEROL RHIZOTOMY TRIGEMINAL NERVES (V1-V3), RIGHT;  Surgeon: Alyse Mahoney MD;  Location:  MAIN OR;  Service: Neurosurgery    REPLACEMENT TOTAL KNEE Right     REPLACEMENT TOTAL KNEE Left     SKIN BIOPSY         Family History   Problem Relation Age of Onset    Aortic aneurysm Mother         abdominal aortic aneurysm    Hypertension Mother     Breast cancer Mother     Hypertension Father     Hypertension Sister     Hyperlipidemia Sister     Squamous cell carcinoma Sister        Social History Occupational History    Occupation: retired   Tobacco Use    Smoking status: Former Smoker    Smokeless tobacco: Never Used   Vaping Use    Vaping Use: Never used   Substance and Sexual Activity    Alcohol use: Yes     Comment: occasional    Drug use: Yes     Types: Marijuana     Comment: medical marijuana    Sexual activity: Not Currently     Partners: Male         Current Outpatient Medications:     Acetaminophen 325 MG CAPS, Take 650 mg by mouth every 6 (six) hours as needed, Disp: , Rfl:     ammonium lactate (LAC-HYDRIN) 12 % lotion, , Disp: , Rfl:     celecoxib (CeleBREX) 100 mg capsule, Take 1 capsule (100 mg total) by mouth daily as needed for mild pain or moderate pain, Disp: 30 capsule, Rfl: 0    Cholecalciferol (VITAMIN D PO), Take 5,000 Units by mouth Daily  , Disp: , Rfl:     DULoxetine (CYMBALTA) 60 mg delayed release capsule, Take 2 caps by mouth daily, Disp: 120 capsule, Rfl: 2    gabapentin (NEURONTIN) 100 mg capsule, Take 2 capsules (200 mg total) by mouth daily (Patient taking differently: Take 100 mg by mouth daily  ), Disp: 180 capsule, Rfl: 1    gabapentin (NEURONTIN) 300 mg capsule, Take 2 capsules (600 mg total) by mouth daily at bedtime (Patient taking differently: Take 300 mg by mouth daily at bedtime  ), Disp: 180 capsule, Rfl: 1    verapamil (VERELAN PM) 120 MG 24 hr capsule, TAKE 1 CAPSULE(120 MG) BY MOUTH DAILY AT BEDTIME, Disp: 90 capsule, Rfl: 1    vitamin B-12 (CYANOCOBALAMIN) 250 MCG tablet, Take 1 tablet (250 mcg total) by mouth daily, Disp: 90 tablet, Rfl: 0    Multiple Vitamins-Minerals (PRESERVISION AREDS 2+MULTI VIT) CAPS, BID (Patient not taking: Reported on 2/28/2022), Disp: , Rfl:     Zoster Vac Recomb Adjuvanted (Shingrix) 50 MCG/0 5ML SUSR, 0 5mL IM for one dose, followed by 0 5mL IM 2-6 months after first dose, Disp: 1 each, Rfl: 1    Allergies   Allergen Reactions    Dilaudid [Hydromorphone] Shortness Of Breath     Other reaction(s): Respiratory Distress  Other reaction(s): HORENESS    Penicillins Rash     Rash       Physical Exam:    /71   Pulse 69   Resp 18   Ht 5' 5" (1 651 m)   Wt 84 8 kg (187 lb)   BMI 31 12 kg/m²     Constitutional:normal, well developed, well nourished, alert, in no distress and non-toxic and no overt pain behavior  Eyes:anicteric  HEENT:grossly intact  Neck:supple, symmetric, trachea midline and no masses   Pulmonary:even and unlabored  Cardiovascular:No edema or pitting edema present  Skin:Normal without rashes or lesions and well hydrated  Psychiatric:Mood and affect appropriate  Neurologic:Cranial Nerves II-XII grossly intact  Musculoskeletal:normal      Imaging  No orders to display         No orders of the defined types were placed in this encounter

## 2022-03-03 NOTE — TELEPHONE ENCOUNTER
Pt has seen [facility] specialist in the past, this was discussed  S/W pt who states she would like to try the Tramadol    Also should appt scheduled in May be moved up earlier with you?

## 2022-03-03 NOTE — TELEPHONE ENCOUNTER
I believe patient needs to establish care with a headache specialist, or neurology for medical mgmt of her trigeminal neuralgia  If she wants I can also give her a short course of Tramadol for immediate pain control  She can schedule her next f/u with me instead of Ameena Calzada so we can reassess  There are some other blocks we may be able to consider

## 2022-03-03 NOTE — TELEPHONE ENCOUNTER
Sent tramadol, 20 tabs  Take sparingly twice a day as needed  She is also on high dose cymbalta and medicines can interact if taken too much   She may move up her OV with me if she would like

## 2022-03-03 NOTE — TELEPHONE ENCOUNTER
Review below  Seen on 2/28  States the next day 3/1, pain returned and she had to restart Gabapentin  She is currently taking two 100mg in am and two 300mg in pm  Pain is in her forehead and brow  S/W son Rodrigo Richmond who states that she has not gotten out of bed in 2 days  Still taking Celebrex  Advised this was d/c'd at [facility], Rodrigoelias Richmond said she was supposed to take it prn  Advised that if it's not helping, then the risk outweighs the benefit      Please advise on next step

## 2022-03-13 ENCOUNTER — TELEPHONE (OUTPATIENT)
Dept: OTHER | Facility: OTHER | Age: 82
End: 2022-03-13

## 2022-03-13 NOTE — TELEPHONE ENCOUNTER
Laurena Hamilton called in with an update on new pt  Pt's son is reporting depression and pt is not complying with at home instructions  Pt has thoughts of not waking up anymore  Mental Health therapy was suggested, but she refuses  She says no to all depression questions so it's never addressed  The son says she goes out, but when she gets back home, she get a headache and complains of pain  When someone calls hr on the phone, she doesn't have any pain, but when she hangs up the phone, the pain is there again  Felicia Zelaya suggested pt try outpatient therapy since she enjoys being outside  Son said they tried it before but she was dismissed because she wasn't complying with exercises at home  Pt is always compliant when Felicia Zelaya is there  DULoxetine (CYMBALTA) 60 mg delayed release capsule [[id number]] is not working

## 2022-03-15 ENCOUNTER — OFFICE VISIT (OUTPATIENT)
Dept: FAMILY MEDICINE CLINIC | Facility: CLINIC | Age: 82
End: 2022-03-15
Payer: MEDICARE

## 2022-03-15 VITALS
HEIGHT: 65 IN | RESPIRATION RATE: 18 BRPM | DIASTOLIC BLOOD PRESSURE: 62 MMHG | HEART RATE: 84 BPM | SYSTOLIC BLOOD PRESSURE: 130 MMHG | BODY MASS INDEX: 31.12 KG/M2 | OXYGEN SATURATION: 96 % | TEMPERATURE: 98.5 F

## 2022-03-15 DIAGNOSIS — I10 ESSENTIAL HYPERTENSION: ICD-10-CM

## 2022-03-15 DIAGNOSIS — Z00.00 HEALTHCARE MAINTENANCE: Primary | ICD-10-CM

## 2022-03-15 DIAGNOSIS — G89.29 OTHER CHRONIC PAIN: ICD-10-CM

## 2022-03-15 DIAGNOSIS — R26.2 AMBULATORY DYSFUNCTION: ICD-10-CM

## 2022-03-15 DIAGNOSIS — N39.46 MIXED INCONTINENCE, URGE AND STRESS (MALE) (FEMALE): ICD-10-CM

## 2022-03-15 PROBLEM — Z79.899 ENCOUNTER FOR MEDICATION REVIEW: Status: RESOLVED | Noted: 2021-07-30 | Resolved: 2022-03-15

## 2022-03-15 PROCEDURE — 99214 OFFICE O/P EST MOD 30 MIN: CPT | Performed by: NURSE PRACTITIONER

## 2022-03-15 NOTE — PROGRESS NOTES
Assessment/Plan:     Chronic Problems:  Essential hypertension  Well controlled  Continue verapimil  Other chronic pain  Continue care with pain management  Ambulatory dysfunction  Continue physical therapy  Visit Diagnosis:  Diagnoses and all orders for this visit:    Healthcare maintenance  -     CBC and differential; Future  -     Comprehensive metabolic panel; Future  -     Hemoglobin A1C; Future  -     Lipid panel; Future  -     TSH, 3rd generation with Free T4 reflex; Future  -     UA w Reflex to Microscopic w Reflex to Culture -Lab Collect; Future    Essential hypertension    Other chronic pain    Ambulatory dysfunction    Mixed incontinence, 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) (female)          Subjective:    Patient ID: Ace Helton is a 79 y o  female  Patient presents with son for routine follow up  Davion Bennett is concerned with frequent urination and always being hot  She gets up every 3 hours to urinate  This has been an issue for a few years  Denies dysuria  She does follow with Dr Lore Fernandes for chronic pain issues  She states she does have neck pain currently, but overall seems to be manageable  She does use a walker to get around and appears to be steady with walker  The following portions of the patient's history were reviewed and updated as appropriate: allergies, current medications, past family history, past medical history, past social history, past surgical history and problem list     Review of Systems   Constitutional: Positive for diaphoresis and fatigue  Negative for chills and fever  HENT: Negative for ear pain and sore throat  Eyes: Negative for pain and visual disturbance  Respiratory: Negative for cough and shortness of breath  Cardiovascular: Negative for chest pain, palpitations and leg swelling  Gastrointestinal: Negative for abdominal pain, constipation, diarrhea and vomiting  Genitourinary: Positive for frequency  Negative for dysuria and hematuria  Musculoskeletal: Positive for back pain and neck pain  Negative for arthralgias  Skin: Negative for color change and rash  Neurological: Positive for headaches  Negative for dizziness, seizures, syncope and light-headedness  Psychiatric/Behavioral: Positive for dysphoric mood  Negative for sleep disturbance  The patient is nervous/anxious  All other systems reviewed and are negative  /62   Pulse 84   Temp 98 5 °F (36 9 °C)   Resp 18   Ht 5' 5" (1 651 m)   SpO2 96%   BMI 31 12 kg/m²   Social History     Socioeconomic History    Marital status:       Spouse name: Not on file    Number of children: Not on file    Years of education: Not on file    Highest education level: Not on file   Occupational History    Occupation: retired   Tobacco Use    Smoking status: Former Smoker    Smokeless tobacco: Never Used   Vaping Use    Vaping Use: Never used   Substance and Sexual Activity    Alcohol use: Yes     Comment: occasional    Drug use: Yes     Types: Marijuana     Comment: medical marijuana    Sexual activity: Not Currently     Partners: Male   Other Topics Concern    Not on file   Social History Narrative    Always uses seatbelt     Social Determinants of Health     Financial Resource Strain: Not on file   Food Insecurity: Not on file   Transportation Needs: Not on file   Physical Activity: Not on file   Stress: Not on file   Social Connections: Not on file   Intimate Partner Violence: Not on file   Housing Stability: Not on file     Past Medical History:   Diagnosis Date    Arthritis     Basal cell carcinoma     Dry eye syndrome     Foraminal stenosis of cervical region     Macular degeneration     Memory loss     Migraine     Neck pain     Spinal stenosis of lumbar region     Squamous cell skin cancer     Supraventricular tachycardia ([address] )     Vitamin D deficiency     last assessed 2/7/17     Family History   Problem Relation Age of Onset    Aortic aneurysm Mother         abdominal aortic aneurysm    Hypertension Mother    [facility] Breast cancer Mother     Hypertension Father     Hypertension Sister     Hyperlipidemia Sister     Squamous cell carcinoma Sister      Past Surgical History:   Procedure Laterality Date    APPENDECTOMY      BACK SURGERY      lower back surgery lumbar disc    CATARACT EXTRACTION      FEMUR FRACTURE SURGERY      [place] STEREO TREAT TRIGEMINAL NERVE Right 3/26/2019    Procedure: GLYCEROL RHIZOTOMY TRIGEMINAL NERVES (V1-V3), RIGHT;  Surgeon: Roberto Tipton MD;  Location:  MAIN OR;  Service: Neurosurgery    REPLACEMENT TOTAL KNEE Right     REPLACEMENT TOTAL KNEE Left     SKIN BIOPSY         Current Outpatient Medications:     Acetaminophen 325 MG CAPS, Take 650 mg by mouth every 6 (six) hours as needed, Disp: , Rfl:     ammonium lactate (LAC-HYDRIN) 12 % lotion, , Disp: , Rfl:     Cholecalciferol (VITAMIN D PO), Take 5,000 Units by mouth Daily  , Disp: , Rfl:     DULoxetine (CYMBALTA) 60 mg delayed release capsule, Take 2 caps by mouth daily, Disp: 120 capsule, Rfl: 2    gabapentin (NEURONTIN) 100 mg capsule, Take 2 capsules (200 mg total) by mouth daily (Patient taking differently: Take 100 mg by mouth daily  ), Disp: 180 capsule, Rfl: 1    gabapentin (NEURONTIN) 300 mg capsule, Take 2 capsules (600 mg total) by mouth daily at bedtime (Patient taking differently: Take 300 mg by mouth daily at bedtime  ), Disp: 180 capsule, Rfl: 1    verapamil (VERELAN PM) 120 MG 24 hr capsule, TAKE 1 CAPSULE(120 MG) BY MOUTH DAILY AT BEDTIME, Disp: 90 capsule, Rfl: 1    vitamin B-12 (CYANOCOBALAMIN) 250 MCG tablet, Take 1 tablet (250 mcg total) by mouth daily, Disp: 90 tablet, Rfl: 0    Allergies   Allergen Reactions    Dilaudid [Hydromorphone] Shortness Of Breath     Other reaction(s): Respiratory Distress  Other reaction(s): HORENESS    Penicillins Rash     Rash          Lab Review   No visits with results within 2 Month(s) from this visit  Latest known visit with results is:   Appointment on 08/05/2021   Component Date Value    TSH 3RD GENERATON 08/05/2021 2 044     Vitamin B-12 08/05/2021 150     Folate 08/05/2021 >20 0*        Imaging: No results found  Objective:     Physical Exam  Constitutional:       Appearance: She is well-developed  Cardiovascular:      Rate and Rhythm: Normal rate and regular rhythm  Heart sounds: Normal heart sounds  No murmur heard  Pulmonary:      Effort: Pulmonary effort is normal  No respiratory distress  Breath sounds: Normal breath sounds  Musculoskeletal:      Comments: Able to stand with walker, w/o added assistance  Skin:     General: Skin is warm and dry  Neurological:      Mental Status: She is alert and oriented to person, place, and time  There are no Patient Instructions on file for this visit  CL Aguilera    Portions of the record may have been created with voice recognition software  Occasional wrong word or "sound a like" substitutions may have occurred due to the inherent limitations of voice recognition software  Read the chart carefully and recognize, using context, where substitutions have occurred

## 2022-03-18 ENCOUNTER — TELEPHONE (OUTPATIENT)
Dept: FAMILY MEDICINE CLINIC | Facility: CLINIC | Age: 82
End: 2022-03-18

## 2022-03-18 NOTE — TELEPHONE ENCOUNTER
Adrianne Rendon from  [facility] called Shireen Singh has not been participating in pt when she is there once again just wanted to update

## 2022-03-21 ENCOUNTER — TELEPHONE (OUTPATIENT)
Dept: FAMILY MEDICINE CLINIC | Facility: CLINIC | Age: 82
End: 2022-03-21

## 2022-03-21 NOTE — TELEPHONE ENCOUNTER
Jinny Shah from [facility] calling in to recertify physical therapy in the next week   She needs your approval   Contact [phone]

## 2022-03-23 ENCOUNTER — OFFICE VISIT (OUTPATIENT)
Dept: PAIN MEDICINE | Facility: CLINIC | Age: 82
End: 2022-03-23
Payer: MEDICARE

## 2022-03-23 VITALS — WEIGHT: 186 LBS | HEIGHT: 65 IN | RESPIRATION RATE: 18 BRPM | BODY MASS INDEX: 30.99 KG/M2

## 2022-03-23 DIAGNOSIS — M50.11 CERVICAL DISC DISORDER WITH RADICULOPATHY OF OCCIPITO-ATLANTO-AXIAL REGION: ICD-10-CM

## 2022-03-23 DIAGNOSIS — G89.4 CHRONIC PAIN SYNDROME: Primary | ICD-10-CM

## 2022-03-23 DIAGNOSIS — G50.0 SUPRAORBITAL NEURALGIA: ICD-10-CM

## 2022-03-23 DIAGNOSIS — R51.9 FRONTAL HEADACHE: ICD-10-CM

## 2022-03-23 DIAGNOSIS — G50.0 TRIGEMINAL NEURALGIA: ICD-10-CM

## 2022-03-23 DIAGNOSIS — M79.18 MYOFASCIAL PAIN SYNDROME: ICD-10-CM

## 2022-03-23 DIAGNOSIS — M54.81 OCCIPITAL NEURALGIA OF RIGHT SIDE: ICD-10-CM

## 2022-03-23 PROCEDURE — 99214 OFFICE O/P EST MOD 30 MIN: CPT | Performed by: STUDENT IN AN ORGANIZED HEALTH CARE EDUCATION/TRAINING PROGRAM

## 2022-03-23 RX ORDER — GABAPENTIN 100 MG/1
200 CAPSULE ORAL DAILY
Qty: 180 CAPSULE | Refills: 1 | Status: SHIPPED | OUTPATIENT
Start: 2022-03-23 | End: 2022-05-19 | Stop reason: SDUPTHER

## 2022-03-23 RX ORDER — GABAPENTIN 300 MG/1
600 CAPSULE ORAL
Qty: 180 CAPSULE | Refills: 1 | Status: SHIPPED | OUTPATIENT
Start: 2022-03-23 | End: 2022-05-19 | Stop reason: SDUPTHER

## 2022-03-23 NOTE — PROGRESS NOTES
Pain Medicine Follow-Up Note    Assessment:  1  Chronic pain syndrome    2  Occipital neuralgia of right side    3  Cervical disc disorder with radiculopathy of orcdocrp-xcuraio-mklnh region    4  Myofascial pain syndrome    5  Supraorbital neuralgia    6  Frontal headache    7  Trigeminal neuralgia        Plan:  Orders Placed This Encounter   Procedures    FL spine and pain procedure     Standing Status:   Future     Standing Expiration Date:   3/23/2026     Order Specific Question:   Reason for Exam:     Answer:   right supraorbital nerve block     Order Specific Question:   Anticoagulant hold needed? Answer:   no       New Medications Ordered This Visit   Medications    gabapentin (NEURONTIN) 100 mg capsule     Sig: Take 2 capsules (200 mg total) by mouth daily     Dispense:  180 capsule     Refill:  1     The patient will take in the morning and will continue 600 mg dose at bedtime    gabapentin (NEURONTIN) 300 mg capsule     Sig: Take 2 capsules (600 mg total) by mouth daily at bedtime     Dispense:  180 capsule     Refill:  1       My impressions and treatment recommendations were discussed in detail with the patient who verbalized understanding and had no further questions  This is a an 14-year-old female who returns to our office with continued complaints of right-sided frontal headache  She has history of trigeminal nerve rhizotomy with about a year and a half of relief in the similar symptoms according to her son  She does not want repeat the procedure  Differential diagnosis also includes supraorbital neuralgia  She has tenderness to palpation over tapping over the supraorbital region with reproduction of right-sided frontal headaches  Unfortunately, patient is not the most reliable historian, however she did seem notably irritated with palpation over the area  Discussed right-sided supraorbital nerve block for diagnostic and potentially therapeutic purposes    If she does have notable a temporary relief, may consider supraorbital radiofrequency ablation in the future  She otherwise continues gabapentin 300 mg q a m  and 600 mg q h s  continues medication without significant side effects  Also continues Cymbalta 120 mg daily  [place] Prescription Drug Monitoring Program report was reviewed and was appropriate   Complete risks and benefits including bleeding, infection, tissue reaction, nerve injury and allergic reaction were discussed  The approach was demonstrated using models and literature was provided  Verbal and written consent was obtained  Discharge instructions were provided  I personally saw and examined the patient and I agree with the above discussed plan of care  History of Present Illness:    Harjinder Ramirez is a 79 y o  female who presents to [facility] for interval re-evaluation of the above stated pain complaints  The patient has a past medical and chronic pain history as outlined in the assessment section  She was last seen on 02/20/2022 regards to chronic pain syndrome secondary to occipital neuralgia, cervical disc disorder with radiculopathy, myofascial pain syndrome, and trigeminal neuralgia  Returns today with pain score 4/10 that is worse in the morning  Pain is intermittent, dull/aching, sharp, pressure-like  She has undergone a multitude procedures without significant improvement in her symptoms         Other than as stated above, the patient denies any interval changes in medications, medical condition, mental condition, symptoms, or allergies since the last office visit  Review of Systems:    Review of Systems   Respiratory: Positive for shortness of breath  Cardiovascular: Negative for chest pain  Gastrointestinal: Negative for constipation, diarrhea, nausea and vomiting  Musculoskeletal: Positive for gait problem  Negative for arthralgias, joint swelling and myalgias          Decreased ROM  Joint Stiffness Skin: Negative for rash  Neurological: Positive for dizziness  Negative for seizures and weakness  Psychiatric/Behavioral:        Memory loss   All other systems reviewed and are negative          Patient Active Problem List   Diagnosis    Chronic nonintractable headache    Trigeminal neuralgia    Occipital neuralgia of right side    Trigeminal neuropathy    Myofascial pain dysfunction syndrome    Other chronic pain    Cervical disc disorder with radiculopathy of ocuwgdej-bmcmppf-nxtbt region    Essential hypertension    Mixed hyperlipidemia    Insomnia    Ambulatory dysfunction    Cognitive deficits    Moderate episode of recurrent major depressive disorder (HCC)    Mixed incontinence, 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) (female)    Hearing loss    Macular degeneration    Vitamin B12 deficiency    Other specified inflammatory spondylopathies, cervical region ([facility]    Dementia without behavioral disturbance, unspecified dementia type ([facility]    Uncomplicated opioid dependence ([facility] )       Past Medical History:   Diagnosis Date    Arthritis     Basal cell carcinoma     Dry eye syndrome     Foraminal stenosis of cervical region     Macular degeneration     Memory loss     Migraine     Neck pain     Spinal stenosis of lumbar region     Squamous cell skin cancer     Supraventricular tachycardia ([facility]     Vitamin D deficiency     last assessed 2/7/17       Past Surgical History:   Procedure Laterality Date    APPENDECTOMY      BACK SURGERY      lower back surgery lumbar disc    CATARACT EXTRACTION      FEMUR FRACTURE SURGERY      DE STEREO TREAT TRIGEMINAL NERVE Right 3/26/2019    Procedure: GLYCEROL RHIZOTOMY TRIGEMINAL NERVES (V1-V3), RIGHT;  Surgeon: Mira Nguyen MD;  Location:  MAIN OR;  Service: Neurosurgery    REPLACEMENT TOTAL KNEE Right     REPLACEMENT TOTAL KNEE Left     SKIN BIOPSY         Family History   Problem Relation Age of Onset    Aortic aneurysm Mother         abdominal aortic aneurysm    Hypertension Mother    Varsha Sim Breast cancer Mother    Varsha Sim Hypertension Father     Hypertension Sister     Hyperlipidemia Sister     Squamous cell carcinoma Sister        Social History     Occupational History    Occupation: retired   Tobacco Use    Smoking status: Former Smoker    Smokeless tobacco: Never Used   Vaping Use    Vaping Use: Never used   Substance and Sexual Activity    Alcohol use: Yes     Comment: occasional    Drug use: Yes     Types: Marijuana     Comment: medical marijuana    Sexual activity: Not Currently     Partners: Male         Current Outpatient Medications:     Acetaminophen 325 MG CAPS, Take 650 mg by mouth every 6 (six) hours as needed, Disp: , Rfl:     ammonium lactate (LAC-HYDRIN) 12 % lotion, , Disp: , Rfl:     Cholecalciferol (VITAMIN D PO), Take 5,000 Units by mouth Daily  , Disp: , Rfl:     DULoxetine (CYMBALTA) 60 mg delayed release capsule, Take 2 caps by mouth daily, Disp: 120 capsule, Rfl: 2    gabapentin (NEURONTIN) 100 mg capsule, Take 2 capsules (200 mg total) by mouth daily, Disp: 180 capsule, Rfl: 1    gabapentin (NEURONTIN) 300 mg capsule, Take 2 capsules (600 mg total) by mouth daily at bedtime, Disp: 180 capsule, Rfl: 1    verapamil (VERELAN PM) 120 MG 24 hr capsule, TAKE 1 CAPSULE(120 MG) BY MOUTH DAILY AT BEDTIME, Disp: 90 capsule, Rfl: 1    vitamin B-12 (CYANOCOBALAMIN) 250 MCG tablet, Take 1 tablet (250 mcg total) by mouth daily, Disp: 90 tablet, Rfl: 0    Allergies   Allergen Reactions    Dilaudid [Hydromorphone] Shortness Of Breath     Other reaction(s): Respiratory Distress  Other reaction(s): HORENESS    Penicillins Rash     Rash       Physical Exam:    Resp 18   Ht 5' 5" (1 651 m)   Wt 84 4 kg (186 lb)   BMI 30 95 kg/m²     Constitutional:normal, well developed, well nourished, alert, in no distress and non-toxic and no overt pain behavior    Eyes:anicteric  HEENT:grossly intact  Neck:supple, symmetric, trachea midline and no masses   Pulmonary:even and unlabored  Cardiovascular:No edema or pitting edema present  Skin:Normal without rashes or lesions and well hydrated  Psychiatric:Mood and affect appropriate  Neurologic:Cranial Nerves II-XII grossly intact  Musculoskeletal:normal      Imaging  FL spine and pain procedure    (Results Pending)         Orders Placed This Encounter   Procedures    FL spine and pain procedure

## 2022-03-25 ENCOUNTER — TELEPHONE (OUTPATIENT)
Dept: RADIOLOGY | Facility: CLINIC | Age: 82
End: 2022-03-25

## 2022-03-31 ENCOUNTER — TELEPHONE (OUTPATIENT)
Dept: FAMILY MEDICINE CLINIC | Facility: CLINIC | Age: 82
End: 2022-03-31

## 2022-04-08 ENCOUNTER — PROCEDURE VISIT (OUTPATIENT)
Dept: PAIN MEDICINE | Facility: CLINIC | Age: 82
End: 2022-04-08
Payer: MEDICARE

## 2022-04-08 VITALS
SYSTOLIC BLOOD PRESSURE: 118 MMHG | HEART RATE: 71 BPM | RESPIRATION RATE: 18 BRPM | HEIGHT: 65 IN | BODY MASS INDEX: 30.99 KG/M2 | WEIGHT: 186 LBS | DIASTOLIC BLOOD PRESSURE: 58 MMHG

## 2022-04-08 DIAGNOSIS — G50.0 SUPRAORBITAL NEURALGIA: Primary | ICD-10-CM

## 2022-04-08 PROCEDURE — 64450 NJX AA&/STRD OTHER PN/BRANCH: CPT | Performed by: STUDENT IN AN ORGANIZED HEALTH CARE EDUCATION/TRAINING PROGRAM

## 2022-04-08 RX ORDER — METHYLPREDNISOLONE ACETATE 40 MG/ML
20 INJECTION, SUSPENSION INTRA-ARTICULAR; INTRALESIONAL; INTRAMUSCULAR; SOFT TISSUE ONCE
Status: CANCELLED | OUTPATIENT
Start: 2022-04-08 | End: 2022-04-08

## 2022-04-08 RX ORDER — BUPIVACAINE HYDROCHLORIDE 2.5 MG/ML
1 INJECTION, SOLUTION EPIDURAL; INFILTRATION; INTRACAUDAL ONCE
Status: COMPLETED | OUTPATIENT
Start: 2022-04-08 | End: 2022-04-08

## 2022-04-08 RX ADMIN — BUPIVACAINE HYDROCHLORIDE 1 ML: 2.5 INJECTION, SOLUTION EPIDURAL; INFILTRATION; INTRACAUDAL at 13:10

## 2022-04-08 RX ADMIN — Medication 5 MG: at 13:10

## 2022-04-08 NOTE — PROGRESS NOTES
Nerve block    Date/Time: 4/8/2022 12:54 PM  Performed by: Onel Ricardo MD  Authorized by: Onel Ricardo MD     Patient location:  Bedside  [place] Protocol:  Consent: The procedure was performed in an emergent situation  Verbal consent obtained  Written consent obtained  Risks and benefits: risks, benefits and alternatives were discussed  Consent given by: patient  Timeout called at: 4/8/2022 11:15 AM   Patient understanding: patient states understanding of the procedure being performed  Patient consent: the patient's understanding of the procedure matches consent given  Procedure consent: procedure consent matches procedure scheduled  Relevant documents: relevant documents present and verified  Test results: test results available and properly labeled  Site marked: the operative site was marked  Radiology Images displayed and confirmed  If images not available, report reviewed: imaging studies available  Required items: required blood products, implants, devices, and special equipment available  Patient identity confirmed: verbally with patient      Indications:     Indications:  Pain relief  Location:     Body area:  Head    Head nerve blocked: supraorbital     Nerve type:  Peripheral    Laterality:  Right  Pre-procedure details:     Skin preparation:  2% chlorhexidine  Skin anesthesia (see MAR for exact dosages):     Skin anesthesia method:  None  Procedure details (see MAR for exact dosages): Block needle gauge:  27 G    Anesthetic injected:  Bupivacaine 0 25% w/o epi    Steroid injected:  Dexamethasone    Additive injected:  None    Injection procedure:  Anatomic landmarks identified, incremental injection, negative aspiration for blood, introduced needle and anatomic landmarks palpated  Post-procedure details:     Dressing:  None    Outcome:  Pain improved    Patient tolerance of procedure:   Tolerated well, no immediate complications  Comments:      PATIENT NAME:  Alethea James    MR#: [id number]    YOB: 1940    DATE OF PROCEDURE:  04/08/22    PROCEDURE:  Right supraorbital nerve block with landmark technique    ATTENDING PHYSICIAN: Bony Rosario MD    PRE-PROCEDURE DIAGNOSIS:  RIGHT SUPRAORBITAL NEURALGIA    POST-PROCEDURE DIAGNOSIS:  Diagnosis unchanged    ANESTHESIA:  None    ESTIMATED BLOOD LOSS:  Minimal    COMPLICATIONS:  None       CONSENT:  Today's procedure, its benefits, risks, alternatives were discussed in detail with the patient  Specific risks discussed included, but were not limited to bleeding, infection, nerve damage, damage to adjacent structures, hematoma, abscess, failure of the pain to improve, worsening of the pain, and reaction to medications administered  The patient verbalized understanding  Written informed consent was obtained  DESCRIPTION OF PROCEDURE:  After informed consent was obtained, the patient was placed in a seated position on the exam table  The left supraorbital notch was palpated over the right eye  The point of maximum tenderness was appreciated and marked  The area was then prepped with alcohol solution  Using strict aseptic technique, a 1 5 mL injectate was drawn up which consisted of 1 mL of 0 25% bupivacaine and 0 5 ML dexamethasone 10mg/ml  Using a 27-gauge 1 5 inch needle, the skin was entered in a perpendicular fashion over the point of maximum tenderness and advanced from a lateral to medial approach until os at the supraorbital notch was contacted  After negative aspiration, the above solution was injected  The needle was with withdrawn, tip intact  Hemostasis was maintained  No paresthesias or complications were noted  The skin was wiped clean and a band aid was placed as appropriate  The patient was monitored in the exam room following the procedure, during which time she remained neurologically and hemodynamically stable  Ultimately,  Ms Gail Hodges was discharged home with instructions to call the office in 7 days with an update or sooner should symptoms warrant

## 2022-04-15 ENCOUNTER — TELEPHONE (OUTPATIENT)
Dept: FAMILY MEDICINE CLINIC | Facility: CLINIC | Age: 82
End: 2022-04-15

## 2022-04-15 NOTE — TELEPHONE ENCOUNTER
Shefali Valadez from [facility] called to inform you that Papito King and her son cancelled PT for this week because she had procedure on her neck, and they will resume it next week

## 2022-04-20 ENCOUNTER — HOSPITAL ENCOUNTER (INPATIENT)
Facility: HOSPITAL | Age: 82
LOS: 1 days | Discharge: HOME HEALTH | DRG: 661 | End: 2022-04-22
Attending: EMERGENCY MEDICINE | Admitting: FAMILY MEDICINE
Payer: MEDICARE

## 2022-04-20 ENCOUNTER — APPOINTMENT (EMERGENCY)
Dept: CT IMAGING | Facility: HOSPITAL | Age: 82
DRG: 661 | End: 2022-04-20
Payer: MEDICARE

## 2022-04-20 DIAGNOSIS — N20.0 KIDNEY STONE: Primary | ICD-10-CM

## 2022-04-20 DIAGNOSIS — K65.4 SCLEROSING MESENTERITIS (HCC): ICD-10-CM

## 2022-04-20 DIAGNOSIS — N20.0 RENAL CALCULI: ICD-10-CM

## 2022-04-20 LAB
ALBUMIN SERPL BCP-MCNC: 4.1 G/DL (ref 3.5–5)
ALP SERPL-CCNC: 70 U/L (ref 46–116)
ALT SERPL W P-5'-P-CCNC: 17 U/L (ref 12–78)
ANION GAP SERPL CALCULATED.3IONS-SCNC: 7 MMOL/L (ref 4–13)
AST SERPL W P-5'-P-CCNC: 18 U/L (ref 5–45)
BASOPHILS # BLD AUTO: 0.04 THOUSANDS/ΜL (ref 0–0.1)
BASOPHILS NFR BLD AUTO: 0 % (ref 0–1)
BILIRUB DIRECT SERPL-MCNC: 0.1 MG/DL (ref 0–0.2)
BILIRUB SERPL-MCNC: 0.43 MG/DL (ref 0.2–1)
BUN SERPL-MCNC: 20 MG/DL (ref 5–25)
CALCIUM SERPL-MCNC: 9.5 MG/DL (ref 8.3–10.1)
CHLORIDE SERPL-SCNC: 104 MMOL/L (ref 100–108)
CO2 SERPL-SCNC: 31 MMOL/L (ref 21–32)
CREAT SERPL-MCNC: 1.13 MG/DL (ref 0.6–1.3)
EOSINOPHIL # BLD AUTO: 0.1 THOUSAND/ΜL (ref 0–0.61)
EOSINOPHIL NFR BLD AUTO: 1 % (ref 0–6)
ERYTHROCYTE [DISTWIDTH] IN BLOOD BY AUTOMATED COUNT: 13.1 % (ref 11.6–15.1)
GFR SERPL CREATININE-BSD FRML MDRD: 45 ML/MIN/1.73SQ M
GLUCOSE SERPL-MCNC: 133 MG/DL (ref 65–140)
HCT VFR BLD AUTO: 43.7 % (ref 34.8–46.1)
HGB BLD-MCNC: 14.4 G/DL (ref 11.5–15.4)
IMM GRANULOCYTES # BLD AUTO: 0.06 THOUSAND/UL (ref 0–0.2)
IMM GRANULOCYTES NFR BLD AUTO: 1 % (ref 0–2)
LACTATE SERPL-SCNC: 0.6 MMOL/L (ref 0.5–2)
LIPASE SERPL-CCNC: 72 U/L (ref 73–393)
LYMPHOCYTES # BLD AUTO: 1.37 THOUSANDS/ΜL (ref 0.6–4.47)
LYMPHOCYTES NFR BLD AUTO: 11 % (ref 14–44)
MCH RBC QN AUTO: 32.7 PG (ref 26.8–34.3)
MCHC RBC AUTO-ENTMCNC: 33 G/DL (ref 31.4–37.4)
MCV RBC AUTO: 99 FL (ref 82–98)
MONOCYTES # BLD AUTO: 1.04 THOUSAND/ΜL (ref 0.17–1.22)
MONOCYTES NFR BLD AUTO: 8 % (ref 4–12)
NEUTROPHILS # BLD AUTO: 10.13 THOUSANDS/ΜL (ref 1.85–7.62)
NEUTS SEG NFR BLD AUTO: 79 % (ref 43–75)
NRBC BLD AUTO-RTO: 0 /100 WBCS
PLATELET # BLD AUTO: 266 THOUSANDS/UL (ref 149–390)
PMV BLD AUTO: 10 FL (ref 8.9–12.7)
POTASSIUM SERPL-SCNC: 3.8 MMOL/L (ref 3.5–5.3)
PROT SERPL-MCNC: 7.4 G/DL (ref 6.4–8.2)
RBC # BLD AUTO: 4.41 MILLION/UL (ref 3.81–5.12)
SODIUM SERPL-SCNC: 142 MMOL/L (ref 136–145)
WBC # BLD AUTO: 12.74 THOUSAND/UL (ref 4.31–10.16)

## 2022-04-20 PROCEDURE — 99285 EMERGENCY DEPT VISIT HI MDM: CPT | Performed by: SURGERY

## 2022-04-20 PROCEDURE — 80048 BASIC METABOLIC PNL TOTAL CA: CPT | Performed by: SURGERY

## 2022-04-20 PROCEDURE — 96374 THER/PROPH/DIAG INJ IV PUSH: CPT

## 2022-04-20 PROCEDURE — 93005 ELECTROCARDIOGRAM TRACING: CPT

## 2022-04-20 PROCEDURE — 96361 HYDRATE IV INFUSION ADD-ON: CPT

## 2022-04-20 PROCEDURE — 85025 COMPLETE CBC W/AUTO DIFF WBC: CPT | Performed by: SURGERY

## 2022-04-20 PROCEDURE — 36415 COLL VENOUS BLD VENIPUNCTURE: CPT | Performed by: SURGERY

## 2022-04-20 PROCEDURE — 83605 ASSAY OF LACTIC ACID: CPT | Performed by: SURGERY

## 2022-04-20 PROCEDURE — 74177 CT ABD & PELVIS W/CONTRAST: CPT

## 2022-04-20 PROCEDURE — 99285 EMERGENCY DEPT VISIT HI MDM: CPT

## 2022-04-20 PROCEDURE — 83690 ASSAY OF LIPASE: CPT | Performed by: SURGERY

## 2022-04-20 PROCEDURE — 96375 TX/PRO/DX INJ NEW DRUG ADDON: CPT

## 2022-04-20 PROCEDURE — 80076 HEPATIC FUNCTION PANEL: CPT | Performed by: SURGERY

## 2022-04-20 RX ORDER — ONDANSETRON 2 MG/ML
4 INJECTION INTRAMUSCULAR; INTRAVENOUS ONCE
Status: COMPLETED | OUTPATIENT
Start: 2022-04-20 | End: 2022-04-20

## 2022-04-20 RX ORDER — MORPHINE SULFATE 4 MG/ML
4 INJECTION, SOLUTION INTRAMUSCULAR; INTRAVENOUS ONCE
Status: COMPLETED | OUTPATIENT
Start: 2022-04-20 | End: 2022-04-20

## 2022-04-20 RX ORDER — ACETAMINOPHEN 325 MG/1
650 TABLET ORAL ONCE
Status: DISCONTINUED | OUTPATIENT
Start: 2022-04-20 | End: 2022-04-22 | Stop reason: HOSPADM

## 2022-04-20 RX ADMIN — SODIUM CHLORIDE 1000 ML: 0.9 INJECTION, SOLUTION INTRAVENOUS at 22:02

## 2022-04-20 RX ADMIN — IOHEXOL 100 ML: 350 INJECTION, SOLUTION INTRAVENOUS at 22:36

## 2022-04-20 RX ADMIN — MORPHINE SULFATE 2 MG: 2 INJECTION, SOLUTION INTRAMUSCULAR; INTRAVENOUS at 22:55

## 2022-04-20 RX ADMIN — MORPHINE SULFATE 4 MG: 4 INJECTION INTRAVENOUS at 21:54

## 2022-04-20 RX ADMIN — ONDANSETRON 4 MG: 2 INJECTION INTRAMUSCULAR; INTRAVENOUS at 21:54

## 2022-04-21 ENCOUNTER — TELEPHONE (OUTPATIENT)
Dept: UROLOGY | Facility: CLINIC | Age: 82
End: 2022-04-21

## 2022-04-21 ENCOUNTER — ANESTHESIA (INPATIENT)
Dept: PERIOP | Facility: HOSPITAL | Age: 82
DRG: 661 | End: 2022-04-21
Payer: MEDICARE

## 2022-04-21 ENCOUNTER — ANESTHESIA EVENT (INPATIENT)
Dept: PERIOP | Facility: HOSPITAL | Age: 82
DRG: 661 | End: 2022-04-21
Payer: MEDICARE

## 2022-04-21 ENCOUNTER — APPOINTMENT (INPATIENT)
Dept: RADIOLOGY | Facility: HOSPITAL | Age: 82
DRG: 661 | End: 2022-04-21
Payer: MEDICARE

## 2022-04-21 DIAGNOSIS — N13.5 UPJ OBSTRUCTION, ACQUIRED: ICD-10-CM

## 2022-04-21 DIAGNOSIS — R31.0 GROSS HEMATURIA: ICD-10-CM

## 2022-04-21 DIAGNOSIS — N20.0 RENAL CALCULUS, LEFT: Primary | ICD-10-CM

## 2022-04-21 PROBLEM — K65.4 SCLEROSING MESENTERITIS (HCC): Status: ACTIVE | Noted: 2022-04-21

## 2022-04-21 PROBLEM — H53.143 PHOTOPHOBIA OF BOTH EYES: Status: ACTIVE | Noted: 2022-04-21

## 2022-04-21 PROBLEM — R93.5 ABNORMAL CT OF THE ABDOMEN: Status: ACTIVE | Noted: 2022-04-21

## 2022-04-21 PROBLEM — N17.9 ACUTE KIDNEY INJURY (HCC): Status: ACTIVE | Noted: 2022-04-21

## 2022-04-21 LAB
ANION GAP SERPL CALCULATED.3IONS-SCNC: 5 MMOL/L (ref 4–13)
ATRIAL RATE: 84 BPM
ATRIAL RATE: 84 BPM
ATRIAL RATE: 85 BPM
BACTERIA UR QL AUTO: ABNORMAL /HPF
BASOPHILS # BLD AUTO: 0.03 THOUSANDS/ΜL (ref 0–0.1)
BASOPHILS NFR BLD AUTO: 0 % (ref 0–1)
BILIRUB UR QL STRIP: NEGATIVE
BUN SERPL-MCNC: 18 MG/DL (ref 5–25)
CALCIUM SERPL-MCNC: 8.8 MG/DL (ref 8.3–10.1)
CAOX CRY URNS QL MICRO: ABNORMAL /HPF
CHLORIDE SERPL-SCNC: 104 MMOL/L (ref 100–108)
CLARITY UR: CLEAR
CO2 SERPL-SCNC: 31 MMOL/L (ref 21–32)
COLOR UR: YELLOW
CREAT SERPL-MCNC: 1.27 MG/DL (ref 0.6–1.3)
CRP SERPL QL: 13.2 MG/L
EOSINOPHIL # BLD AUTO: 0.1 THOUSAND/ΜL (ref 0–0.61)
EOSINOPHIL NFR BLD AUTO: 1 % (ref 0–6)
ERYTHROCYTE [DISTWIDTH] IN BLOOD BY AUTOMATED COUNT: 13.1 % (ref 11.6–15.1)
ERYTHROCYTE [SEDIMENTATION RATE] IN BLOOD: 33 MM/HOUR (ref 0–29)
FLUAV RNA RESP QL NAA+PROBE: NEGATIVE
FLUBV RNA RESP QL NAA+PROBE: NEGATIVE
GFR SERPL CREATININE-BSD FRML MDRD: 39 ML/MIN/1.73SQ M
GLUCOSE SERPL-MCNC: 98 MG/DL (ref 65–140)
GLUCOSE UR STRIP-MCNC: NEGATIVE MG/DL
HCT VFR BLD AUTO: 39.8 % (ref 34.8–46.1)
HGB BLD-MCNC: 12.9 G/DL (ref 11.5–15.4)
HGB UR QL STRIP.AUTO: ABNORMAL
IMM GRANULOCYTES # BLD AUTO: 0.02 THOUSAND/UL (ref 0–0.2)
IMM GRANULOCYTES NFR BLD AUTO: 0 % (ref 0–2)
KETONES UR STRIP-MCNC: NEGATIVE MG/DL
LEUKOCYTE ESTERASE UR QL STRIP: NEGATIVE
LYMPHOCYTES # BLD AUTO: 1.64 THOUSANDS/ΜL (ref 0.6–4.47)
LYMPHOCYTES NFR BLD AUTO: 17 % (ref 14–44)
MCH RBC QN AUTO: 32.7 PG (ref 26.8–34.3)
MCHC RBC AUTO-ENTMCNC: 32.4 G/DL (ref 31.4–37.4)
MCV RBC AUTO: 101 FL (ref 82–98)
MONOCYTES # BLD AUTO: 0.88 THOUSAND/ΜL (ref 0.17–1.22)
MONOCYTES NFR BLD AUTO: 9 % (ref 4–12)
NEUTROPHILS # BLD AUTO: 7.03 THOUSANDS/ΜL (ref 1.85–7.62)
NEUTS SEG NFR BLD AUTO: 73 % (ref 43–75)
NITRITE UR QL STRIP: NEGATIVE
NON-SQ EPI CELLS URNS QL MICRO: ABNORMAL /HPF
NRBC BLD AUTO-RTO: 0 /100 WBCS
P AXIS: 57 DEGREES
P AXIS: 65 DEGREES
P AXIS: 76 DEGREES
PH UR STRIP.AUTO: 6 [PH]
PLATELET # BLD AUTO: 233 THOUSANDS/UL (ref 149–390)
PMV BLD AUTO: 9.7 FL (ref 8.9–12.7)
POTASSIUM SERPL-SCNC: 3.5 MMOL/L (ref 3.5–5.3)
PR INTERVAL: 124 MS
PR INTERVAL: 128 MS
PR INTERVAL: 140 MS
PROT UR STRIP-MCNC: NEGATIVE MG/DL
QRS AXIS: 20 DEGREES
QRS AXIS: 22 DEGREES
QRS AXIS: 9 DEGREES
QRSD INTERVAL: 74 MS
QRSD INTERVAL: 74 MS
QRSD INTERVAL: 78 MS
QT INTERVAL: 378 MS
QT INTERVAL: 392 MS
QT INTERVAL: 404 MS
QTC INTERVAL: 446 MS
QTC INTERVAL: 466 MS
QTC INTERVAL: 477 MS
RBC # BLD AUTO: 3.95 MILLION/UL (ref 3.81–5.12)
RBC #/AREA URNS AUTO: ABNORMAL /HPF
RSV RNA RESP QL NAA+PROBE: NEGATIVE
SARS-COV-2 RNA RESP QL NAA+PROBE: NEGATIVE
SODIUM SERPL-SCNC: 140 MMOL/L (ref 136–145)
SP GR UR STRIP.AUTO: 1.01 (ref 1–1.03)
T WAVE AXIS: 15 DEGREES
T WAVE AXIS: 59 DEGREES
T WAVE AXIS: 59 DEGREES
UROBILINOGEN UR QL STRIP.AUTO: 0.2 E.U./DL
VENTRICULAR RATE: 84 BPM
VENTRICULAR RATE: 84 BPM
VENTRICULAR RATE: 85 BPM
WBC # BLD AUTO: 9.7 THOUSAND/UL (ref 4.31–10.16)
WBC #/AREA URNS AUTO: ABNORMAL /HPF

## 2022-04-21 PROCEDURE — 36415 COLL VENOUS BLD VENIPUNCTURE: CPT | Performed by: PHYSICIAN ASSISTANT

## 2022-04-21 PROCEDURE — 96361 HYDRATE IV INFUSION ADD-ON: CPT

## 2022-04-21 PROCEDURE — 99223 1ST HOSP IP/OBS HIGH 75: CPT | Performed by: UROLOGY

## 2022-04-21 PROCEDURE — 82360 CALCULUS ASSAY QUANT: CPT | Performed by: UROLOGY

## 2022-04-21 PROCEDURE — 52356 CYSTO/URETERO W/LITHOTRIPSY: CPT | Performed by: UROLOGY

## 2022-04-21 PROCEDURE — C1894 INTRO/SHEATH, NON-LASER: HCPCS | Performed by: UROLOGY

## 2022-04-21 PROCEDURE — 86140 C-REACTIVE PROTEIN: CPT | Performed by: PHYSICIAN ASSISTANT

## 2022-04-21 PROCEDURE — 99223 1ST HOSP IP/OBS HIGH 75: CPT | Performed by: STUDENT IN AN ORGANIZED HEALTH CARE EDUCATION/TRAINING PROGRAM

## 2022-04-21 PROCEDURE — 80048 BASIC METABOLIC PNL TOTAL CA: CPT | Performed by: STUDENT IN AN ORGANIZED HEALTH CARE EDUCATION/TRAINING PROGRAM

## 2022-04-21 PROCEDURE — 85025 COMPLETE CBC W/AUTO DIFF WBC: CPT | Performed by: STUDENT IN AN ORGANIZED HEALTH CARE EDUCATION/TRAINING PROGRAM

## 2022-04-21 PROCEDURE — 81001 URINALYSIS AUTO W/SCOPE: CPT | Performed by: SURGERY

## 2022-04-21 PROCEDURE — 0241U HB NFCT DS VIR RESP RNA 4 TRGT: CPT | Performed by: SURGERY

## 2022-04-21 PROCEDURE — 85652 RBC SED RATE AUTOMATED: CPT | Performed by: PHYSICIAN ASSISTANT

## 2022-04-21 PROCEDURE — 0TC18ZZ EXTIRPATION OF MATTER FROM LEFT KIDNEY, VIA NATURAL OR ARTIFICIAL OPENING ENDOSCOPIC: ICD-10-PCS | Performed by: UROLOGY

## 2022-04-21 PROCEDURE — C2617 STENT, NON-COR, TEM W/O DEL: HCPCS | Performed by: UROLOGY

## 2022-04-21 PROCEDURE — 0T778DZ DILATION OF LEFT URETER WITH INTRALUMINAL DEVICE, VIA NATURAL OR ARTIFICIAL OPENING ENDOSCOPIC: ICD-10-PCS | Performed by: UROLOGY

## 2022-04-21 PROCEDURE — C1758 CATHETER, URETERAL: HCPCS | Performed by: UROLOGY

## 2022-04-21 PROCEDURE — 93010 ELECTROCARDIOGRAM REPORT: CPT | Performed by: INTERNAL MEDICINE

## 2022-04-21 PROCEDURE — C1769 GUIDE WIRE: HCPCS | Performed by: UROLOGY

## 2022-04-21 PROCEDURE — BT1F1ZZ FLUOROSCOPY OF LEFT KIDNEY, URETER AND BLADDER USING LOW OSMOLAR CONTRAST: ICD-10-PCS | Performed by: UROLOGY

## 2022-04-21 PROCEDURE — 74420 UROGRAPHY RTRGR +-KUB: CPT

## 2022-04-21 DEVICE — INLAY OPTIMA URETERAL STENT W/O GUIDEWIRE
Type: IMPLANTABLE DEVICE | Site: URETER | Status: FUNCTIONAL
Brand: BARD® INLAY OPTIMA® URETERAL STENT

## 2022-04-21 RX ORDER — DEXAMETHASONE SODIUM PHOSPHATE 10 MG/ML
INJECTION, SOLUTION INTRAMUSCULAR; INTRAVENOUS AS NEEDED
Status: DISCONTINUED | OUTPATIENT
Start: 2022-04-21 | End: 2022-04-21

## 2022-04-21 RX ORDER — ACETAMINOPHEN 325 MG/1
975 TABLET ORAL EVERY 8 HOURS PRN
Status: DISCONTINUED | OUTPATIENT
Start: 2022-04-21 | End: 2022-04-22 | Stop reason: HOSPADM

## 2022-04-21 RX ORDER — OXYCODONE HYDROCHLORIDE 5 MG/1
2.5 TABLET ORAL EVERY 4 HOURS PRN
Status: DISCONTINUED | OUTPATIENT
Start: 2022-04-21 | End: 2022-04-22 | Stop reason: HOSPADM

## 2022-04-21 RX ORDER — AMMONIUM LACTATE 12 G/100G
LOTION TOPICAL DAILY PRN
Status: DISCONTINUED | OUTPATIENT
Start: 2022-04-21 | End: 2022-04-22 | Stop reason: HOSPADM

## 2022-04-21 RX ORDER — METOCLOPRAMIDE HYDROCHLORIDE 5 MG/ML
10 INJECTION INTRAMUSCULAR; INTRAVENOUS ONCE AS NEEDED
Status: DISCONTINUED | OUTPATIENT
Start: 2022-04-21 | End: 2022-04-21 | Stop reason: HOSPADM

## 2022-04-21 RX ORDER — SODIUM CHLORIDE, SODIUM LACTATE, POTASSIUM CHLORIDE, CALCIUM CHLORIDE 600; 310; 30; 20 MG/100ML; MG/100ML; MG/100ML; MG/100ML
INJECTION, SOLUTION INTRAVENOUS CONTINUOUS PRN
Status: DISCONTINUED | OUTPATIENT
Start: 2022-04-21 | End: 2022-04-21

## 2022-04-21 RX ORDER — LIDOCAINE 50 MG/G
2 PATCH TOPICAL DAILY PRN
Status: DISCONTINUED | OUTPATIENT
Start: 2022-04-21 | End: 2022-04-22 | Stop reason: HOSPADM

## 2022-04-21 RX ORDER — LIDOCAINE HYDROCHLORIDE 20 MG/ML
INJECTION, SOLUTION EPIDURAL; INFILTRATION; INTRACAUDAL; PERINEURAL AS NEEDED
Status: DISCONTINUED | OUTPATIENT
Start: 2022-04-21 | End: 2022-04-21

## 2022-04-21 RX ORDER — DULOXETIN HYDROCHLORIDE 60 MG/1
120 CAPSULE, DELAYED RELEASE ORAL DAILY
Status: DISCONTINUED | OUTPATIENT
Start: 2022-04-21 | End: 2022-04-22 | Stop reason: HOSPADM

## 2022-04-21 RX ORDER — POLYETHYLENE GLYCOL 3350 17 G/17G
17 POWDER, FOR SOLUTION ORAL DAILY PRN
Status: DISCONTINUED | OUTPATIENT
Start: 2022-04-21 | End: 2022-04-22 | Stop reason: HOSPADM

## 2022-04-21 RX ORDER — SODIUM CHLORIDE, SODIUM LACTATE, POTASSIUM CHLORIDE, CALCIUM CHLORIDE 600; 310; 30; 20 MG/100ML; MG/100ML; MG/100ML; MG/100ML
50 INJECTION, SOLUTION INTRAVENOUS CONTINUOUS
Status: CANCELLED | OUTPATIENT
Start: 2022-04-21

## 2022-04-21 RX ORDER — ONDANSETRON 2 MG/ML
4 INJECTION INTRAMUSCULAR; INTRAVENOUS ONCE AS NEEDED
Status: DISCONTINUED | OUTPATIENT
Start: 2022-04-21 | End: 2022-04-21 | Stop reason: HOSPADM

## 2022-04-21 RX ORDER — HYDRALAZINE HYDROCHLORIDE 20 MG/ML
5 INJECTION INTRAMUSCULAR; INTRAVENOUS EVERY 6 HOURS PRN
Status: DISCONTINUED | OUTPATIENT
Start: 2022-04-21 | End: 2022-04-22 | Stop reason: HOSPADM

## 2022-04-21 RX ORDER — SODIUM CHLORIDE 9 MG/ML
75 INJECTION, SOLUTION INTRAVENOUS CONTINUOUS
Status: DISCONTINUED | OUTPATIENT
Start: 2022-04-21 | End: 2022-04-22 | Stop reason: HOSPADM

## 2022-04-21 RX ORDER — DOCUSATE SODIUM 100 MG/1
100 CAPSULE, LIQUID FILLED ORAL 2 TIMES DAILY
Status: DISCONTINUED | OUTPATIENT
Start: 2022-04-21 | End: 2022-04-22 | Stop reason: HOSPADM

## 2022-04-21 RX ORDER — PROPOFOL 10 MG/ML
INJECTION, EMULSION INTRAVENOUS AS NEEDED
Status: DISCONTINUED | OUTPATIENT
Start: 2022-04-21 | End: 2022-04-21

## 2022-04-21 RX ORDER — LEVOFLOXACIN 5 MG/ML
500 INJECTION, SOLUTION INTRAVENOUS ONCE
Status: COMPLETED | OUTPATIENT
Start: 2022-04-21 | End: 2022-04-21

## 2022-04-21 RX ORDER — MAGNESIUM HYDROXIDE/ALUMINUM HYDROXICE/SIMETHICONE 120; 1200; 1200 MG/30ML; MG/30ML; MG/30ML
30 SUSPENSION ORAL EVERY 6 HOURS PRN
Status: DISCONTINUED | OUTPATIENT
Start: 2022-04-21 | End: 2022-04-22 | Stop reason: HOSPADM

## 2022-04-21 RX ORDER — OXYCODONE HYDROCHLORIDE 5 MG/1
5 TABLET ORAL EVERY 4 HOURS PRN
Status: DISCONTINUED | OUTPATIENT
Start: 2022-04-21 | End: 2022-04-22 | Stop reason: HOSPADM

## 2022-04-21 RX ORDER — ONDANSETRON 2 MG/ML
INJECTION INTRAMUSCULAR; INTRAVENOUS AS NEEDED
Status: DISCONTINUED | OUTPATIENT
Start: 2022-04-21 | End: 2022-04-21

## 2022-04-21 RX ORDER — GABAPENTIN 300 MG/1
600 CAPSULE ORAL
Status: DISCONTINUED | OUTPATIENT
Start: 2022-04-21 | End: 2022-04-22 | Stop reason: HOSPADM

## 2022-04-21 RX ORDER — MAGNESIUM HYDROXIDE 1200 MG/15ML
LIQUID ORAL AS NEEDED
Status: DISCONTINUED | OUTPATIENT
Start: 2022-04-21 | End: 2022-04-21 | Stop reason: HOSPADM

## 2022-04-21 RX ORDER — DIPHENHYDRAMINE HCL 25 MG
12.5 TABLET ORAL ONCE
Status: COMPLETED | OUTPATIENT
Start: 2022-04-21 | End: 2022-04-21

## 2022-04-21 RX ORDER — GABAPENTIN 100 MG/1
200 CAPSULE ORAL DAILY
Status: DISCONTINUED | OUTPATIENT
Start: 2022-04-21 | End: 2022-04-22 | Stop reason: HOSPADM

## 2022-04-21 RX ORDER — ALBUTEROL SULFATE 2.5 MG/3ML
2.5 SOLUTION RESPIRATORY (INHALATION) ONCE AS NEEDED
Status: DISCONTINUED | OUTPATIENT
Start: 2022-04-21 | End: 2022-04-21 | Stop reason: HOSPADM

## 2022-04-21 RX ORDER — FENTANYL CITRATE 50 UG/ML
INJECTION, SOLUTION INTRAMUSCULAR; INTRAVENOUS AS NEEDED
Status: DISCONTINUED | OUTPATIENT
Start: 2022-04-21 | End: 2022-04-21

## 2022-04-21 RX ORDER — FENTANYL CITRATE/PF 50 MCG/ML
50 SYRINGE (ML) INJECTION
Status: DISCONTINUED | OUTPATIENT
Start: 2022-04-21 | End: 2022-04-21 | Stop reason: HOSPADM

## 2022-04-21 RX ADMIN — VERAPAMIL HYDROCHLORIDE 120 MG: 120 TABLET, FILM COATED, EXTENDED RELEASE ORAL at 09:27

## 2022-04-21 RX ADMIN — DOCUSATE SODIUM 100 MG: 100 CAPSULE ORAL at 18:16

## 2022-04-21 RX ADMIN — DOCUSATE SODIUM 100 MG: 100 CAPSULE ORAL at 09:28

## 2022-04-21 RX ADMIN — SODIUM CHLORIDE 75 ML/HR: 0.9 INJECTION, SOLUTION INTRAVENOUS at 18:17

## 2022-04-21 RX ADMIN — DEXAMETHASONE SODIUM PHOSPHATE 5 MG: 10 INJECTION, SOLUTION INTRAMUSCULAR; INTRAVENOUS at 13:47

## 2022-04-21 RX ADMIN — LEVOFLOXACIN 500 MG: 5 INJECTION, SOLUTION INTRAVENOUS at 13:36

## 2022-04-21 RX ADMIN — OXYCODONE HYDROCHLORIDE 5 MG: 5 TABLET ORAL at 06:55

## 2022-04-21 RX ADMIN — FENTANYL CITRATE 25 MCG: 50 INJECTION, SOLUTION INTRAMUSCULAR; INTRAVENOUS at 14:25

## 2022-04-21 RX ADMIN — FENTANYL CITRATE 25 MCG: 50 INJECTION, SOLUTION INTRAMUSCULAR; INTRAVENOUS at 14:34

## 2022-04-21 RX ADMIN — DIPHENHYDRAMINE HYDROCHLORIDE 12.5 MG: 25 TABLET ORAL at 22:46

## 2022-04-21 RX ADMIN — GABAPENTIN 600 MG: 300 CAPSULE ORAL at 22:46

## 2022-04-21 RX ADMIN — DULOXETINE HYDROCHLORIDE 120 MG: 60 CAPSULE, DELAYED RELEASE ORAL at 09:27

## 2022-04-21 RX ADMIN — GABAPENTIN 200 MG: 100 CAPSULE ORAL at 09:27

## 2022-04-21 RX ADMIN — LIDOCAINE HYDROCHLORIDE 80 MG: 20 INJECTION, SOLUTION EPIDURAL; INFILTRATION; INTRACAUDAL; PERINEURAL at 13:46

## 2022-04-21 RX ADMIN — PROPOFOL 30 MG: 10 INJECTION, EMULSION INTRAVENOUS at 14:17

## 2022-04-21 RX ADMIN — FENTANYL CITRATE 25 MCG: 50 INJECTION, SOLUTION INTRAMUSCULAR; INTRAVENOUS at 13:55

## 2022-04-21 RX ADMIN — ONDANSETRON 4 MG: 2 INJECTION INTRAMUSCULAR; INTRAVENOUS at 13:48

## 2022-04-21 RX ADMIN — SODIUM CHLORIDE, SODIUM LACTATE, POTASSIUM CHLORIDE, AND CALCIUM CHLORIDE: .6; .31; .03; .02 INJECTION, SOLUTION INTRAVENOUS at 13:36

## 2022-04-21 RX ADMIN — FENTANYL CITRATE 25 MCG: 50 INJECTION, SOLUTION INTRAMUSCULAR; INTRAVENOUS at 14:13

## 2022-04-21 RX ADMIN — PROPOFOL 80 MG: 10 INJECTION, EMULSION INTRAVENOUS at 13:46

## 2022-04-21 RX ADMIN — SODIUM CHLORIDE 75 ML/HR: 0.9 INJECTION, SOLUTION INTRAVENOUS at 05:52

## 2022-04-21 RX ADMIN — PROPOFOL 40 MG: 10 INJECTION, EMULSION INTRAVENOUS at 13:47

## 2022-04-21 RX ADMIN — PHENYLEPHRINE HYDROCHLORIDE 20 MCG/MIN: 10 INJECTION INTRAVENOUS at 13:53

## 2022-04-21 NOTE — ASSESSMENT & PLAN NOTE
· CT abdomen pelvis revealing 9 mm obstructing calculus at left UPJ with severe hydronephrosis and delayed nephrogram, small to moderate low density perinephric fat stranding and fluid related to possible calyceal rupture  · Per ED provider, spoke with Urology who recommends urology consult for a m  And pain control tonight  · Urology consult placed  · P r n   Aqua K pad, Tylenol, lidocaine patch, oxycodone for moderate to severe pain  · Will keep NPO and hold VTE prophylaxis for now pending urology consult  · Gentle IV fluid hydration  · Check urinalysis

## 2022-04-21 NOTE — ASSESSMENT & PLAN NOTE
· Continue home verapamil q h s   · Initially presenting with systolic blood pressure of 206, now down to 183, but will add p r n  IV hydralazine with parameters

## 2022-04-21 NOTE — H&P
[facility]  H&P- [address] [place] [address] 1940, 79 y o  female MRN: [id number]  Unit/Bed#: ED 26 Encounter: [id number]  Primary Care Provider: Tedi Prader, CRNP   Date and time admitted to hospital: 4/20/2022  9:39 PM    * Renal calculus, left  Assessment & Plan  · CT abdomen pelvis revealing 9 mm obstructing calculus at left UPJ with severe hydronephrosis and delayed nephrogram, small to moderate low density perinephric fat stranding and fluid related to possible calyceal rupture  · Per ED provider, spoke with Urology who recommends urology consult for a m  And pain control tonight  · Urology consult placed  · P r n  Aqua K pad, Tylenol, lidocaine patch, oxycodone for moderate to severe pain  · Will keep NPO and hold VTE prophylaxis for now pending urology consult  · Gentle IV fluid hydration  · Check urinalysis    Abnormal CT of the abdomen  Assessment & Plan  · Incidental finding on CT abdomen pelvis revealing mesenteric haziness which is nonspecific but rule out sclerosing mesenteritis  · Patient denies any significant weight loss, fevers, altered bowel habits, flank and left lower quadrant pain currently related to renal stone so patient appears asymptomatic  · Will check CRP and sed rate  · However, due to being asymptomatic no treatment warranted at this time, discussed finding with patient and follow-up outpatient with PCP especially if any symptoms arise    Essential hypertension  Assessment & Plan  · Continue home verapamil q h s   · Initially presenting with systolic blood pressure of 206, now down to 183, but will add p r n  IV hydralazine with parameters      VTE Pharmacologic Prophylaxis: VTE Score: 4 Moderate Risk (Score 3-4) - Pharmacological DVT Prophylaxis Contraindicated  Sequential Compression Devices Ordered    Code Status: Level 3 - DNAR and DNI per pt/advanced directive on file      Anticipated Length of Stay: Patient will be admitted on an inpatient basis with an anticipated length of stay of greater than 2 midnights secondary to see above  Total Time for Visit, including Counseling / Coordination of Care: 60 minutes Greater than 50% of this total time spent on direct patient counseling and coordination of care  Chief Complaint:    Chief Complaint   Patient presents with    Flank Pain     pt with L flank pain that began at 1600 tonight, pt c/o nausea  no urinary sx       History of Present Illness:  Tanya Ching is a 79 y o  female with a PMH of hypertension, dementia who presents with complaint of left flank pain radiating to left lower quadrant starting yesterday evening around dinnertime  Denies any worsening but not going why so came to the ED to be evaluated  Denies shortness of breath, chest pain  Currently reports after receiving morphine in the ED pain has subsided  Denies any dysuria, urinary frequency  Denies significant weight loss unintentional, fevers, change in bowel habits       Review of Systems:  Review of Systems   Constitutional: Negative for activity change and fever  Respiratory: Negative for shortness of breath  Cardiovascular: Negative for chest pain  Gastrointestinal: Positive for abdominal pain  Negative for nausea and vomiting  Genitourinary: Positive for flank pain  Negative for dysuria and frequency  Neurological: Negative for headaches         Past Medical and Surgical History:   Past Medical History:   Diagnosis Date    Arthritis     Basal cell carcinoma     Dry eye syndrome     Foraminal stenosis of cervical region     Macular degeneration     Memory loss     Migraine     Neck pain     Spinal stenosis of lumbar region     Squamous cell skin cancer     Supraventricular tachycardia ([address] )     Vitamin D deficiency     last assessed 2/7/17       Past Surgical History:   Procedure Laterality Date    APPENDECTOMY      BACK SURGERY      lower back surgery lumbar disc    CATARACT EXTRACTION      FEMUR FRACTURE SURGERY      [place] STEREO TREAT TRIGEMINAL NERVE Right 3/26/2019    Procedure: GLYCEROL RHIZOTOMY TRIGEMINAL NERVES (V1-V3), RIGHT;  Surgeon: Nestor Romero MD;  Location: [facility];  Service: Neurosurgery    REPLACEMENT TOTAL KNEE Right     REPLACEMENT TOTAL KNEE Left     SKIN BIOPSY         Meds/Allergies:  Prior to Admission medications    Medication Sig Start Date End Date Taking? Authorizing Provider   Acetaminophen 325 MG CAPS Take 650 mg by mouth every 6 (six) hours as needed    Historical Provider, MD   ammonium lactate (LAC-HYDRIN) 12 % lotion  8/25/21   Historical Provider, MD   Cholecalciferol (VITAMIN D PO) Take 5,000 Units by mouth Daily   3/2/17   Historical Provider, MD   DULoxetine (CYMBALTA) 60 mg delayed release capsule Take 2 caps by mouth daily 6/14/21   CL Davis   gabapentin (NEURONTIN) 100 mg capsule Take 2 capsules (200 mg total) by mouth daily 3/23/22   Rosangela Brenner MD   gabapentin (NEURONTIN) 300 mg capsule Take 2 capsules (600 mg total) by mouth daily at bedtime 3/23/22   Rosangela Brenner MD   verapamil (VERELAN PM) 120 MG 24 hr capsule TAKE 1 CAPSULE(120 MG) BY MOUTH DAILY AT BEDTIME 12/24/21   CL Milner   vitamin B-12 (CYANOCOBALAMIN) 250 MCG tablet Take 1 tablet (250 mcg total) by mouth daily 8/12/21   CL Jennings     Have reviewed home medications per review of chart  Allergies: Allergies   Allergen Reactions    Dilaudid [Hydromorphone] Shortness Of Breath     Other reaction(s): Respiratory Distress  Other reaction(s): HORENESS    Penicillins Rash     Rash       Social History:  Marital Status:       Patient Pre-hospital Living Situation: Home  Patient Pre-hospital Level of Mobility: unable to be assessed at time of evaluation  Patient Pre-hospital Diet Restrictions: n/a  Substance Use History:   Social History     Substance and Sexual Activity   Alcohol Use Yes    Comment: occasional     Social History     Tobacco Use   Smoking Status Former Smoker   Smokeless Tobacco Never Used     Social History     Substance and Sexual Activity   Drug Use Yes    Types: Marijuana    Comment: medical marijuana       Family History:  Family History   Problem Relation Age of Onset    Aortic aneurysm Mother         abdominal aortic aneurysm    Hypertension Mother     Breast cancer Mother     Hypertension Father     Hypertension Sister     Hyperlipidemia Sister     Squamous cell carcinoma Sister        Physical Exam:     Vitals:   Blood Pressure: (!) 182/84 (04/21/22 0100)  Pulse: 81 (04/21/22 0100)  Temperature: 98 1 °F (36 7 °C) (04/20/22 2140)  Temp Source: Oral (04/20/22 2140)  Respirations: 19 (04/21/22 0100)  Weight - Scale: 85 kg (187 lb 6 3 oz) (04/20/22 2140)  SpO2: 93 % (04/21/22 0100)    Physical Exam  Vitals and nursing note reviewed  Constitutional:       General: She is not in acute distress  Appearance: Normal appearance  She is not diaphoretic  HENT:      Head: Normocephalic  Cardiovascular:      Rate and Rhythm: Normal rate and regular rhythm  Heart sounds: Normal heart sounds  Pulmonary:      Effort: Pulmonary effort is normal  No respiratory distress  Breath sounds: Normal breath sounds  Abdominal:      General: Abdomen is flat  Bowel sounds are normal  There is no distension  Palpations: Abdomen is soft  Tenderness: There is abdominal tenderness in the left lower quadrant  There is no right CVA tenderness, left CVA tenderness or guarding  Musculoskeletal:         General: No tenderness  Right lower leg: No edema  Left lower leg: No edema  Skin:     General: Skin is warm and dry  Neurological:      General: No focal deficit present  Mental Status: She is alert  Mental status is at baseline  Psychiatric:         Mood and Affect: Mood normal          Behavior: Behavior normal          Thought Content:  Thought content normal          Judgment: Judgment normal           Additional Data:     Lab Results:  Results from last 7 days   Lab Units 04/20/22  2200   WBC Thousand/uL 12 74*   HEMOGLOBIN g/dL 14 4   HEMATOCRIT % 43 7   PLATELETS Thousands/uL 266   NEUTROS PCT % 79*   LYMPHS PCT % 11*   MONOS PCT % 8   EOS PCT % 1     Results from last 7 days   Lab Units 04/20/22  2200   SODIUM mmol/L 142   POTASSIUM mmol/L 3 8   CHLORIDE mmol/L 104   CO2 mmol/L 31   BUN mg/dL 20   CREATININE mg/dL 1 13   ANION GAP mmol/L 7   CALCIUM mg/dL 9 5   ALBUMIN g/dL 4 1   TOTAL BILIRUBIN mg/dL 0 43   ALK PHOS U/L 70   ALT U/L 17   AST U/L 18   GLUCOSE RANDOM mg/dL 133                 Results from last 7 days   Lab Units 04/20/22  2200   LACTIC ACID mmol/L 0 6       Imaging: Reviewed radiology reports from this admission including: abdominal/pelvic CT  CT Abdomen pelvis with contrast   Final Result by Neeraj Lewis MD (04/21 0002)      1   9 mm obstructing calculus at the left ureteropelvic junction causing severe hydronephrosis  There is small to moderate amount of low density perinephric fluid and fat stranding raising the possibility of calyceal rupture  Urology consultation is    recommended  2   Multiple nonobstructing left renal calculi  3   Mesenteric haziness surrounding nonenlarged lymph nodes  Those nonspecific, this appearance can be seen with sclerosing mesenteritis  I personally discussed this study with Kaitlyn Pulliam on 4/21/2022 at 12:01 AM                          Workstation performed: [id number]             EKG and Other Studies Reviewed on Admission:   · EKG: Prolonged QTC, normal sinus rhythm  ** Please Note: This note has been constructed using a voice recognition system   **

## 2022-04-21 NOTE — ASSESSMENT & PLAN NOTE
Creatinine in July last year was 0 74  Creatinine is trending up since admission 1 13->1 27  This most likely obstructive nephropathy    - continue IV hydration  - urologist on board

## 2022-04-21 NOTE — TELEPHONE ENCOUNTER
Patient had left UPJ stone on imaging but found to have left UPJ obstruction (although could have been reaction to temporarily impacted stone) and hard stone in the kidney  Treated today by ureteroscopy  Plan to keep stent in place for 3-4 weeks and then get a CT IVP afterwards to follow-up on UPJ obstruction

## 2022-04-21 NOTE — INCIDENTAL FINDINGS
The following findings require follow up:  Radiographic finding   Finding: "Mesenteric haziness surrounding nonenlarged lymph nodes  Those nonspecific, this appearance can be seen with sclerosing mesenteritis "     Follow up required:  Outpatient with primary care physician especially if symptoms arise including abdominal distension, change in bowel habits, and unintentional weight loss, significant abdominal pain, fevers     Follow up should be done within 1 month(s) or if symptoms arise sooner    Please notify the following clinician to assist with the follow up:    With outpatient primary care physician and possibly outpatient GI

## 2022-04-21 NOTE — TELEPHONE ENCOUNTER
Added to stent list  Tentatively scheduled for 5/16/22 cysto stent removal with Destiny Lucas @ [facility] office    To confirm with patient on discharge

## 2022-04-21 NOTE — ASSESSMENT & PLAN NOTE
· Incidental finding on CT abdomen pelvis revealing mesenteric haziness which is nonspecific but rule out sclerosing mesenteritis  · Patient denies any significant weight loss, fevers, altered bowel habits, flank and left lower quadrant pain currently related to renal stone so patient appears asymptomatic  · Will check CRP and sed rate  · However, due to being asymptomatic no treatment warranted at this time, discussed finding with patient and follow-up outpatient with PCP especially if any symptoms arise

## 2022-04-21 NOTE — CONSULTS
CONSULT    Patient Name: Kadeem Lees  Patient MRN: [id number]  Date of Service: 4/21/2022   Date / Time Note Created: 4/21/2022 9:19 AM   Referring Provider: Garo Blum MD    Provider Creating Note: CL Staples    PCP: [id number] [address]  Attending Provider:  Garo Blum MD    Reason for Consult: Flank Pain    HPI:  Kadeem Lees is an 58-year-old female without prior  history presenting with left flank pain; not accompanied by fever, chills, gross hematuria or dysuria  She is normally incontinent but denies any prior formal urologic consultation, evaluation or  surgical manipulation  CT of the abdomen and pelvis for investigation demonstrated 9 mm left UPJ junction obstructing ureteral calculus with resultant hydronephrosis  Patient was admitted to Internal Medicine service for symptom management  She is afebrile, hemodynamically stable without acute kidney injury or leukocytosis  Urinalysis does not suggest infection  Urologic consultation requested for possible surgical intervention       Active Problems:    Patient Active Problem List   Diagnosis    Chronic nonintractable headache    Trigeminal neuralgia    Occipital neuralgia of right side    Trigeminal neuropathy    Myofascial pain dysfunction syndrome    Other chronic pain    Cervical disc disorder with radiculopathy of fpmrlacq-mtsnalr-qxpya region    Essential hypertension    Mixed hyperlipidemia    Insomnia    Ambulatory dysfunction    Cognitive deficits    Moderate episode of recurrent major depressive disorder (HCC)    Mixed incontinence, 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) (female)    Hearing loss    Macular degeneration    Vitamin B12 deficiency    Other specified inflammatory spondylopathies, cervical region ([address] )    Dementia without behavioral disturbance, unspecified dementia type ([address] )    Uncomplicated opioid dependence ([address] )    Renal calculus, left    Abnormal CT of the abdomen            Impressions  · Left Ureteral Calculus  · Left Hydronephrosis  · Renal Colic    Recommendations  1  Initiate aggressive IVFs   2  Flomax  3  Analgesia/Narcotics   4  Anti-emetics   5  ATBs empirically while awaiting culture   6  Strain urine   7  NPO for OR if no spontaneous expulsion achieved  Explained risk, benefits and potential complications of ureteroscopic stone extraction  Patient has verbalized understanding of possible need for ureteral stent only for any signs of infection and/or technical difficult prohibiting stone retrieval etc ; requiring staged ureteroscopy electively once recovered and infection free as OP  Formal consent by surgeon  Past Medical History:   Diagnosis Date    Arthritis     Basal cell carcinoma     Dry eye syndrome     Foraminal stenosis of cervical region     Macular degeneration     Memory loss     Migraine     Neck pain     Spinal stenosis of lumbar region     Squamous cell skin cancer     Supraventricular tachycardia ([address] )     Vitamin D deficiency     last assessed 2/7/17       Past Surgical History:   Procedure Laterality Date    APPENDECTOMY      BACK SURGERY      lower back surgery lumbar disc    CATARACT EXTRACTION      FEMUR FRACTURE SURGERY      IN STEREO TREAT TRIGEMINAL NERVE Right 3/26/2019    Procedure: GLYCEROL RHIZOTOMY TRIGEMINAL NERVES (V1-V3), RIGHT;  Surgeon: Cindy Mora MD;  Location: [facility] OR;  Service: Neurosurgery    REPLACEMENT TOTAL KNEE Right     REPLACEMENT TOTAL KNEE Left     SKIN BIOPSY         Family History   Problem Relation Age of Onset    Aortic aneurysm Mother         abdominal aortic aneurysm    Hypertension Mother    Julio C Vital Breast cancer Mother    Julio C Vital Hypertension Father     Hypertension Sister     Hyperlipidemia Sister     Squamous cell carcinoma Sister        Social History     Socioeconomic History    Marital status:       Spouse name: Not on file    Number of children: Not on file    Years of education: Not on file    Highest education level: Not on file   Occupational History    Occupation: retired   Tobacco Use    Smoking status: Former Smoker    Smokeless tobacco: Never Used   Vaping Use    Vaping Use: Never used   Substance and Sexual Activity    Alcohol use: Yes     Comment: occasional    Drug use: Yes     Types: Marijuana     Comment: medical marijuana    Sexual activity: Not Currently     Partners: Male   Other Topics Concern    Not on file   Social History Narrative    Always uses seatbelt     Social Determinants of Health     Financial Resource Strain: Not on file   Food Insecurity: Not on file   Transportation Needs: Not on file   Physical Activity: Not on file   Stress: Not on file   Social Connections: Not on file   Intimate Partner Violence: Not on file   Housing Stability: Not on file       Allergies   Allergen Reactions    Dilaudid [Hydromorphone] Shortness Of Breath     Other reaction(s): Respiratory Distress  Other reaction(s): HORENESS    Penicillins Rash     Rash       Review of Systems  10 point review of systems negative except as noted in HPI  Constitutional:   negative for - chills or fever  Cardiovascular:   no chest pain or dyspnea on exertion  Gastrointestinal:   positive for - abdominal pain  Genito-Urinary:   no dysuria, trouble voiding, or hematuria  Neurological:   no TIA or stroke symptoms     Chart Review   Allergies, current medications, history, problem list    Vital Signs  /64   Pulse 75   Temp 98 1 °F (36 7 °C) (Oral)   Resp 20   Wt 85 kg (187 lb 6 3 oz)   SpO2 95%   BMI 31 18 kg/m²     Physical Exam  General appearance: alert and oriented, in no acute distress, appears stated age, cooperative and no distress  Head: Normocephalic, without obvious abnormality, atraumatic  Neck: no adenopathy, no carotid bruit, no JVD, supple, symmetrical, trachea midline and thyroid not enlarged, symmetric, no tenderness/mass/nodules  Lungs: diminished breath sounds  Heart: regular rate and rhythm, S1, S2 normal, no murmur, click, rub or gallop  Abdomen: soft, non-tender; bowel sounds normal; no masses,  no organomegaly  Extremities: extremities normal, warm and well-perfused; no cyanosis, clubbing, or edema  Pulses: 2+ and symmetric  Neurologic: Grossly normal  Incontinent--worse pad  Clear yellow urine     Laboratory Studies  Lab Results   Component Value Date    HGBA1C 5 6 07/21/2021    K 3 8 04/20/2022     04/20/2022    CO2 31 04/20/2022    CREATININE 1 13 04/20/2022    BUN 20 04/20/2022     Lab Results   Component Value Date    WBC 12 74 (H) 04/20/2022    RBC 4 41 04/20/2022    HGB 14 4 04/20/2022    HCT 43 7 04/20/2022    MCV 99 (H) 04/20/2022    MCH 32 7 04/20/2022    RDW 13 1 04/20/2022     04/20/2022         Imaging and Other Studies  )  CT Abdomen pelvis with contrast    Result Date: 4/21/2022  Narrative: CT ABDOMEN AND PELVIS WITH IV CONTRAST INDICATION:   LLQ abdominal pain  COMPARISON:  None  TECHNIQUE:  CT examination of the abdomen and pelvis was performed  Axial, sagittal, and coronal 2D reformatted images were created from the source data and submitted for interpretation  Radiation dose length product (DLP) for this visit:  825 mGy-cm   This examination, like all CT scans performed in the [facility], was performed utilizing techniques to minimize radiation dose exposure, including the use of iterative reconstruction and automated exposure control  IV Contrast:  100 mL of iohexol (OMNIPAQUE) Enteric Contrast:  Enteric contrast was not administered  FINDINGS: ABDOMEN LOWER CHEST:  Small hiatal hernia noted  No other clinically significant abnormality identified in the visualized lower chest  LIVER/BILIARY TREE:  One or more subcentimeter sharply circumscribed low-density hepatic lesion(s) are noted, too small to accurately characterize  No suspicious solid hepatic lesion is identified  Hepatic contours are normal   No biliary dilatation   GALLBLADDER: Distended  No calcified gallstones  No pericholecystic inflammatory change  SPLEEN:  Unremarkable  PANCREAS:  Severely atrophic   5 mm cystic focus in the pancreatic body (601/61, 2/23)  ADRENAL GLANDS:  Unremarkable  KIDNEYS/URETERS:  9 mm obstructing calculus at the left ureteropelvic junction causing severe hydronephrosis with a delayed nephrogram   Small to moderate amount of low-density left perinephric fluid with fat stranding  Multiple punctate nonobstructing left renal calculi are seen  One or more sharply circumscribed subcentimeter renal hypodensities are present, too small to accurately characterize, and statistically most likely benign findings  According to recent literature (Radiology 2019) no further  workup of these findings is recommended  STOMACH AND BOWEL:  Limited evaluation of the GI tract without enteric contrast   Moderate fecal stasis in the colon  Fecalization of contents of some distal small bowel loops suggestive of slow transit  No evidence of bowel obstruction  Colonic diverticulosis without evidence of acute diverticulitis  APPENDIX:  Prior appendectomy  ABDOMINOPELVIC CAVITY:  No ascites  No pneumoperitoneum  No lymphadenopathy  Mesenteric haziness surrounding nonenlarged lymph nodes  VESSELS:  Atherosclerotic changes are present  No evidence of aneurysm  PELVIS REPRODUCTIVE ORGANS:  Unremarkable for patient's age  URINARY BLADDER:  Unremarkable  ABDOMINAL WALL/INGUINAL REGIONS:  Unremarkable  OSSEOUS STRUCTURES:  No acute fracture or destructive osseous lesion  Spinal degenerative changes are noted  Impression: 1   9 mm obstructing calculus at the left ureteropelvic junction causing severe hydronephrosis  There is small to moderate amount of low density perinephric fluid and fat stranding raising the possibility of calyceal rupture  Urology consultation is recommended  2   Multiple nonobstructing left renal calculi   3   Mesenteric haziness surrounding nonenlarged lymph nodes   Those nonspecific, this appearance can be seen with sclerosing mesenteritis    I personally discussed this study with Rylee Dorsey on 4/21/2022 at 12:01 AM   Workstation performed: [id number]       Medications   Current Facility-Administered Medications   Medication Dose Route Frequency Provider Last Rate    acetaminophen  650 mg Oral Once Nonda Savana, PA-C      acetaminophen  975 mg Oral Q8H PRN Ever Scales, PA-C      aluminum-magnesium hydroxide-simethicone  30 mL Oral Q6H PRN Ever Scales, PA-C      ammonium lactate   Topical Daily PRN Ever Scales, PA-C      docusate sodium  100 mg Oral BID Ever Scales, PA-C      DULoxetine  120 mg Oral Daily Ever Scales, PA-C      gabapentin  200 mg Oral Daily Ever Scales, PA-C      gabapentin  600 mg Oral HS Ever Scales, PA-C      hydrALAZINE  5 mg Intravenous Q6H PRN Ever Scales, PA-C      lidocaine  2 patch Topical Daily PRN Ever Scales, PA-C      naloxone  0 04 mg Intravenous Q1MIN PRN Ever Scales, PA-C      oxyCODONE  2 5 mg Oral Q4H PRN Ever Scales, PA-C      Or    oxyCODONE  5 mg Oral Q4H PRN Ever Scales, PA-C      polyethylene glycol  17 g Oral Daily PRN Ever Scales, PA-C      sodium chloride  75 mL/hr Intravenous Continuous Ever Scales, PA-C 75 mL/hr (04/21/22 0552)    verapamil  120 mg Oral HS MD Stew Moran CRNP

## 2022-04-21 NOTE — ASSESSMENT & PLAN NOTE
Blood pressure was high in a m    · Continue home verapamil q h s  will give the dose in a m  because patient did not bed yesterday night

## 2022-04-21 NOTE — ANESTHESIA PREPROCEDURE EVALUATION
Procedure:  CYSTOSCOPY URETEROSCOPY WITH LITHOTRIPSY HOLMIUM LASER, RETROGRADE PYELOGRAM AND INSERTION STENT URETERAL (Left Bladder)    Relevant Problems   CARDIO   (+) Essential hypertension   (+) Mixed hyperlipidemia      /RENAL   (+) Acute kidney injury ([address] )   (+) Renal calculus, left      NEURO/PSYCH   (+) Dementia without behavioral disturbance, unspecified dementia type (HCC)      Other   (+) Ambulatory dysfunction   (+) Cognitive deficits   (+) Hearing loss   (+) Photophobia of both eyes   (+) Trigeminal neuralgia     EKG 4/20/22:  NSR     CT A/P:   1   9 mm obstructing calculus at the left ureteropelvic junction causing severe hydronephrosis  There is small to moderate amount of low density perinephric fluid and fat stranding raising the possibility of calyceal rupture  Urology consultation is   recommended      2   Multiple nonobstructing left renal calculi      3   Mesenteric haziness surrounding nonenlarged lymph nodes  Those nonspecific, this appearance can be seen with sclerosing mesenteritis  Physical Exam    Airway    Mallampati score: II  TM Distance: <3 FB  Neck ROM: full     Dental       Cardiovascular  Rhythm: regular, Rate: normal,     Pulmonary  Breath sounds clear to auscultation,     Other Findings        Anesthesia Plan  ASA Score- 3     Anesthesia Type- general with ASA Monitors  Additional Monitors:   Airway Plan: LMA      Comment: Recent labs personally reviewed:  Lab Results       Component                Value               Date                       WBC                      9 70                04/21/2022                 HGB                      12 9                04/21/2022                 PLT                      233                 04/21/2022            Lab Results       Component                Value               Date                       K                        3 5                 04/21/2022                 BUN                      18 04/21/2022                 CREATININE               1 27                04/21/2022            Lab Results       Component                Value               Date                       PTT                      30                  03/22/2019             Lab Results       Component                Value               Date                       INR                      0 98                03/22/2019              Plan for LMA, ETT if any evidence of nausea, signficant GERD concerning for elevated aspiration risk  Minimize opiate use, utilize precedex, no dilaudid due to allergy  Blood type       I, Noreen Fall MD, have personally seen and evaluated the patient prior to anesthetic care  I have reviewed the pre-anesthetic record, medical history, allergies, medications and any other medical records if appropriate to the anesthetic care  If a CRNA is involved in the case, I have reviewed the CRNA assessment, if present, and agree  I consented the patient for general anesthesia with appropriate airway support as indicated  We reviewed the risks associated including PONV, sore throat, allergic reaction to anesthetics and management plan to address these issues  We discussed the indication and risks associated with any invasive monitors that would be placed  We discussed post op pain control and expectations  We discussed rare complications including hypoxia, perioperative cardiac and neurologic events, and death based on the patient's baseline risk  All questions and concerns were addressed          Plan Factors-Exercise tolerance (METS): <4 METS  Chart reviewed  EKG reviewed  Imaging results reviewed  Existing labs reviewed  Patient summary reviewed  Patient is not a current smoker  Patient did not smoke on day of surgery  Obstructive sleep apnea risk education given perioperatively  Induction- intravenous  Postoperative Plan- Plan for postoperative opioid use   Planned trial extubation    Informed Consent- Anesthetic plan and risks discussed with patient and son (son, Amy Hodgson )  I personally reviewed this patient with the CRNA  Discussed and agreed on the Anesthesia Plan with the JOSUE Rivas

## 2022-04-21 NOTE — PROGRESS NOTES
Dr Lindsey Alfaro Texted about antibiotics for today's OR case  He requested 500mg Levaquin  Will place order in on his behalf

## 2022-04-21 NOTE — ED PROVIDER NOTES
History  Chief Complaint   Patient presents with    Flank Pain     pt with L flank pain that began at 1600 tonight, pt c/o nausea  no urinary sx     Hilary Ra is a 79 y o  female with a PMHx of arthritis, migraine presenting today with LLQ abdominal pain  The patient reports that she began to experience abdominal pain in her LLQ at 6pm which was sudden in onset and has been constant since  Pain radiates to her left flank  She has never had pain like this in the past  No other pertinent PMHx  She has a history of appendectomy  Denies any chest pain, shortness of breath, lightheadedness, dizziness, nausea, vomiting, diarrhea, fevers, chills, numbness, tingling, weakness in the extremities  No further complaints at this time            Prior to Admission Medications   Prescriptions Last Dose Informant Patient Reported? Taking?    Acetaminophen 325 MG CAPS  Self Yes No   Sig: Take 650 mg by mouth every 6 (six) hours as needed   Cholecalciferol (VITAMIN D PO)  Self Yes No   Sig: Take 5,000 Units by mouth Daily     DULoxetine (CYMBALTA) 60 mg delayed release capsule  Self No No   Sig: Take 2 caps by mouth daily   ammonium lactate (LAC-HYDRIN) 12 % lotion  Self Yes No   gabapentin (NEURONTIN) 100 mg capsule  Self No No   Sig: Take 2 capsules (200 mg total) by mouth daily   gabapentin (NEURONTIN) 300 mg capsule  Self No No   Sig: Take 2 capsules (600 mg total) by mouth daily at bedtime   verapamil (VERELAN PM) 120 MG 24 hr capsule  Self No No   Sig: TAKE 1 CAPSULE(120 MG) BY MOUTH DAILY AT BEDTIME   vitamin B-12 (CYANOCOBALAMIN) 250 MCG tablet  Self No No   Sig: Take 1 tablet (250 mcg total) by mouth daily      Facility-Administered Medications: None       Past Medical History:   Diagnosis Date    Arthritis     Basal cell carcinoma     Dry eye syndrome     Foraminal stenosis of cervical region     Macular degeneration     Memory loss     Migraine     Neck pain     Spinal stenosis of lumbar region     Squamous cell skin cancer     Supraventricular tachycardia (HCC)     Vitamin D deficiency     last assessed 2/7/17       Past Surgical History:   Procedure Laterality Date    APPENDECTOMY      BACK SURGERY      lower back surgery lumbar disc    CATARACT EXTRACTION      FEMUR FRACTURE SURGERY      PA STEREO TREAT TRIGEMINAL NERVE Right 3/26/2019    Procedure: GLYCEROL RHIZOTOMY TRIGEMINAL NERVES (V1-V3), RIGHT;  Surgeon: Carmita Salazar MD;  Location:  MAIN OR;  Service: Neurosurgery    REPLACEMENT TOTAL KNEE Right     REPLACEMENT TOTAL KNEE Left     SKIN BIOPSY         Family History   Problem Relation Age of Onset    Aortic aneurysm Mother         abdominal aortic aneurysm    Hypertension Mother    [place] Breast cancer Mother     Hypertension Father     Hypertension Sister     Hyperlipidemia Sister     Squamous cell carcinoma Sister      I have reviewed and agree with the history as documented  E-Cigarette/Vaping    E-Cigarette Use Never User      E-Cigarette/Vaping Substances    Nicotine No     THC Yes     CBD No     Flavoring No     Other No     Unknown No      Social History     Tobacco Use    Smoking status: Former Smoker    Smokeless tobacco: Never Used   Vaping Use    Vaping Use: Never used   Substance Use Topics    Alcohol use: Yes     Comment: occasional    Drug use: Yes     Types: Marijuana     Comment: medical marijuana       Review of Systems   Constitutional: Negative for activity change, chills, diaphoresis and fever  HENT: Negative for congestion, ear discharge, ear pain, rhinorrhea, sore throat and trouble swallowing  Eyes: Negative for pain, discharge, redness and visual disturbance  Respiratory: Negative for cough, chest tightness, shortness of breath and wheezing  Cardiovascular: Negative for chest pain, palpitations and leg swelling  Gastrointestinal: Positive for abdominal pain   Negative for abdominal distention, blood in stool, constipation, diarrhea, nausea and vomiting  Genitourinary: Negative for difficulty urinating, dysuria, flank pain, frequency, hematuria and urgency  Musculoskeletal: Negative for arthralgias, myalgias, neck pain and neck stiffness  Skin: Negative for color change and rash  Neurological: Negative for dizziness, syncope, facial asymmetry, weakness, light-headedness, numbness and headaches  Physical Exam  Physical Exam  Vitals reviewed  Constitutional:       General: She is not in acute distress  Appearance: Normal appearance  She is not ill-appearing  HENT:      Head: Normocephalic and atraumatic  Right Ear: Tympanic membrane normal       Left Ear: Tympanic membrane normal       Nose: Nose normal  No congestion or rhinorrhea  Mouth/Throat:      Mouth: Mucous membranes are moist       Pharynx: Oropharynx is clear  No oropharyngeal exudate or posterior oropharyngeal erythema  Eyes:      Extraocular Movements: Extraocular movements intact  Conjunctiva/sclera: Conjunctivae normal       Pupils: Pupils are equal, round, and reactive to light  Cardiovascular:      Rate and Rhythm: Normal rate and regular rhythm  Pulses: Normal pulses  Heart sounds: Normal heart sounds  Pulmonary:      Effort: Pulmonary effort is normal  No respiratory distress  Breath sounds: Normal breath sounds  No wheezing  Abdominal:      General: Abdomen is flat  Bowel sounds are normal  There is no distension  Palpations: Abdomen is soft  There is no mass  Tenderness: There is abdominal tenderness (LLQ)  There is no right CVA tenderness, left CVA tenderness or guarding  Hernia: No hernia is present  Musculoskeletal:         General: No swelling, tenderness or deformity  Normal range of motion  Cervical back: Normal range of motion and neck supple  No rigidity or tenderness  Right lower leg: No edema  Left lower leg: No edema  Skin:     General: Skin is warm and dry        Capillary Refill: Capillary refill takes less than 2 seconds  Coloration: Skin is not jaundiced  Findings: No erythema or rash  Neurological:      General: No focal deficit present  Mental Status: She is alert and oriented to person, place, and time  Cranial Nerves: No cranial nerve deficit  Sensory: No sensory deficit  Motor: No weakness        Coordination: Coordination normal       Gait: Gait normal       Deep Tendon Reflexes: Reflexes normal          Vital Signs  ED Triage Vitals [04/20/22 2140]   Temperature Pulse Respirations Blood Pressure SpO2   98 1 °F (36 7 °C) 86 18 (!) 206/98 95 %      Temp Source Heart Rate Source Patient Position - Orthostatic VS BP Location FiO2 (%)   Oral Monitor Lying Right arm --      Pain Score       10 - Worst Possible Pain           Vitals:    04/20/22 2140 04/20/22 2200 04/21/22 0000 04/21/22 0100   BP: (!) 206/98 (!) 192/83 (!) 183/83 (!) 182/84   Pulse: 86 82 83 81   Patient Position - Orthostatic VS: Lying Lying Lying Lying         Visual Acuity      ED Medications  Medications   acetaminophen (TYLENOL) tablet 650 mg (650 mg Oral Not Given 4/20/22 2229)   ondansetron (ZOFRAN) injection 4 mg (4 mg Intravenous Given 4/20/22 2154)   sodium chloride 0 9 % bolus 1,000 mL (1,000 mL Intravenous New Bag 4/20/22 2202)   morphine (PF) 4 mg/mL injection 4 mg (4 mg Intravenous Given 4/20/22 2154)   iohexol (OMNIPAQUE) 350 MG/ML injection (SINGLE-DOSE) 100 mL (100 mL Intravenous Given 4/20/22 2236)   morphine injection 2 mg (2 mg Intravenous Given 4/20/22 2255)       Diagnostic Studies  Results Reviewed     Procedure Component Value Units Date/Time    COVID/FLU/RSV - 2 hour TAT [[id number]]  (Normal) Collected: 04/21/22 0048    Lab Status: Final result Specimen: Nasopharyngeal Swab Updated: 04/21/22 0129     SARS-CoV-2 Negative     INFLUENZA A PCR Negative     INFLUENZA B PCR Negative     RSV PCR Negative    Narrative:      FOR PEDIATRIC PATIENTS - copy/paste COVID Guidelines URL to browser: https://pyco/  ashx    SARS-CoV-2 assay is a Nucleic Acid Amplification assay intended for the  qualitative detection of nucleic acid from SARS-CoV-2 in nasopharyngeal  swabs  Results are for the presumptive identification of SARS-CoV-2 RNA  Positive results are indicative of infection with SARS-CoV-2, the virus  causing COVID-19, but do not rule out bacterial infection or co-infection  with other viruses  Laboratories within the [place] and its  territories are required to report all positive results to the appropriate  public health authorities  Negative results do not preclude SARS-CoV-2  infection and should not be used as the sole basis for treatment or other  patient management decisions  Negative results must be combined with  clinical observations, patient history, and epidemiological information  This test has not been FDA cleared or approved  This test has been authorized by FDA under an Emergency Use Authorization  (EUA)  This test is only authorized for the duration of time the  declaration that circumstances exist justifying the authorization of the  emergency use of an in vitro diagnostic tests for detection of SARS-CoV-2  virus and/or diagnosis of COVID-19 infection under section 564(b)(1) of  the Act, 21 U  S C  373OCN-1(F)(4), unless the authorization is terminated  or revoked sooner  The test has been validated but independent review by FDA  and CLIA is pending  Test performed using [employer] GeneXpert: This RT-PCR assay targets N2,  a region unique to SARS-CoV-2  A conserved region in the E-gene was chosen  for pan-Sarbecovirus detection which includes SARS-CoV-2      Lactic acid [[id number]]  (Normal) Collected: 04/20/22 2200    Lab Status: Final result Specimen: Blood from Arm, Right Updated: 04/20/22 2225     LACTIC ACID 0 6 mmol/L     Narrative:      Result may be elevated if tourniquet was used during collection      Lipase [[id number]]  (Abnormal) Collected: 04/20/22 2200    Lab Status: Final result Specimen: Blood from Arm, Right Updated: 04/20/22 2222     Lipase 72 u/L     Basic metabolic panel [[id number]] Collected: 04/20/22 2200    Lab Status: Final result Specimen: Blood from Arm, Right Updated: 04/20/22 2222     Sodium 142 mmol/L      Potassium 3 8 mmol/L      Chloride 104 mmol/L      CO2 31 mmol/L      ANION GAP 7 mmol/L      BUN 20 mg/dL      Creatinine 1 13 mg/dL      Glucose 133 mg/dL      Calcium 9 5 mg/dL      eGFR 45 ml/min/1 73sq m     Narrative:      [place] guidelines for Chronic Kidney Disease (CKD):     Stage 1 with normal or high GFR (GFR > 90 mL/min/1 73 square meters)    Stage 2 Mild CKD (GFR = 60-89 mL/min/1 73 square meters)    Stage 3A Moderate CKD (GFR = 45-59 mL/min/1 73 square meters)    Stage 3B Moderate CKD (GFR = 30-44 mL/min/1 73 square meters)    Stage 4 Severe CKD (GFR = 15-29 mL/min/1 73 square meters)    Stage 5 End Stage CKD (GFR <15 mL/min/1 73 square meters)  Note: GFR calculation is accurate only with a steady state creatinine    Hepatic function panel [[id number]]  (Normal) Collected: 04/20/22 2200    Lab Status: Final result Specimen: Blood from Arm, Right Updated: 04/20/22 2222     Total Bilirubin 0 43 mg/dL      Bilirubin, Direct 0 10 mg/dL      Alkaline Phosphatase 70 U/L      AST 18 U/L      ALT 17 U/L      Total Protein 7 4 g/dL      Albumin 4 1 g/dL     CBC and differential [[id number]]  (Abnormal) Collected: 04/20/22 2200    Lab Status: Final result Specimen: Blood from Arm, Right Updated: 04/20/22 2206     WBC 12 74 Thousand/uL      RBC 4 41 Million/uL      Hemoglobin 14 4 g/dL      Hematocrit 43 7 %      MCV 99 fL      MCH 32 7 pg      MCHC 33 0 g/dL      RDW 13 1 %      MPV 10 0 fL      Platelets 225 Thousands/uL      nRBC 0 /100 WBCs      Neutrophils Relative 79 %      Immat GRANS % 1 %      Lymphocytes Relative 11 %      Monocytes Relative 8 %      Eosinophils Relative 1 %      Basophils Relative 0 %      Neutrophils Absolute 10 13 Thousands/µL      Immature Grans Absolute 0 06 Thousand/uL      Lymphocytes Absolute 1 37 Thousands/µL      Monocytes Absolute 1 04 Thousand/µL      Eosinophils Absolute 0 10 Thousand/µL      Basophils Absolute 0 04 Thousands/µL     UA w Reflex to Microscopic w Reflex to Culture [[id number]]     Lab Status: No result Specimen: Urine, Clean Catch                  CT Abdomen pelvis with contrast   Final Result by Keyana Camara MD (04/21 0002)      1   9 mm obstructing calculus at the left ureteropelvic junction causing severe hydronephrosis  There is small to moderate amount of low density perinephric fluid and fat stranding raising the possibility of calyceal rupture  Urology consultation is    recommended  2   Multiple nonobstructing left renal calculi  3   Mesenteric haziness surrounding nonenlarged lymph nodes  Those nonspecific, this appearance can be seen with sclerosing mesenteritis  I personally discussed this study with Nyasia Zuluaga on 4/21/2022 at 12:01 AM                          Workstation performed: [id number]                    Procedures  Procedures         ED Course  ED Course as of 04/21/22 0223   u Apr 21, 2022   0039 Reviewed findings of CT  Will reach out to Urology now  6827 TT to Patrick Sarah PA-C, urology  Discussed findings of CT imaging and labwork + pt presentations  Awaiting response  [phone] TT from Patrick Sarah PA-C recommending admission for pain control and urology consultation in the morning  TT send to Dieter Gallego PA-C for admission  Awaiting response  SBIRT 22yo+      Most Recent Value   SBIRT (24 yo +)    In order to provide better care to our patients, we are screening all of our patients for alcohol and drug use  Would it be okay to ask you these screening questions?  Yes Filed at: 04/21/2022 0123   Initial Alcohol Screen: US AUDIT-C     1  How often do you have a drink containing alcohol? 0 Filed at: 04/21/2022 0123   2  How many drinks containing alcohol do you have on a typical day you are drinking? 0 Filed at: 04/21/2022 0123   3b  FEMALE Any Age, or MALE 65+: How often do you have 4 or more drinks on one occassion? 0 Filed at: 04/21/2022 0123   Audit-C Score 0 Filed at: 04/21/2022 7147   INGE: How many times in the past year have you    Used an illegal drug or used a prescription medication for non-medical reasons? Never Filed at: 04/21/2022 0123                    MDM  Number of Diagnoses or Management Options  Kidney stone: new and requires workup  Renal calculi: new and requires workup  Diagnosis management comments: Patient with severe left flank and left lower quadrant abdominal pain  CT imaging as above  Discussed with urology who recommended admission, they will see in the morning  Patient updated on plan of care  She is agreeable  All questions answered adequately         Amount and/or Complexity of Data Reviewed  Clinical lab tests: ordered and reviewed  Tests in the radiology section of CPT®: ordered and reviewed  Tests in the medicine section of CPT®: ordered and reviewed  Review and summarize past medical records: yes  Discuss the patient with other providers: yes  Independent visualization of images, tracings, or specimens: yes    Risk of Complications, Morbidity, and/or Mortality  Presenting problems: high  Diagnostic procedures: moderate  Management options: moderate    Patient Progress  Patient progress: stable      Disposition  Final diagnoses:   Kidney stone   Renal calculi     Time reflects when diagnosis was documented in both MDM as applicable and the Disposition within this note     Time User Action Codes Description Comment    4/21/2022  1:07 AM Mauricia Snellen Add [N20 0] Kidney stone     4/21/2022  1:07 AM Mauricia Snellen Add [N20 0] Renal calculi       ED Disposition     ED Disposition Condition Date/Time Comment    Admit Stable Thu Apr 21, 2022  1:07 AM Case was discussed with Carmelo Helms PA-C and the patient's admission status was agreed to be Admission Status: inpatient status to the service of Dr Mary Ann Murillo   Follow-up Information    None         Patient's Medications   Discharge Prescriptions    No medications on file       No discharge procedures on file      PDMP Review       Value Time User    PDMP Reviewed  Yes 9/23/2020  1:36 PM Radha Sky [facility]          ED Provider  Electronically Signed by           Evette Souza PA-C  04/21/22 022

## 2022-04-21 NOTE — ASSESSMENT & PLAN NOTE
Patient presented with left lower quadrant pain  UA is positive blood and calcium oxalate crystals  CT abdomen pelvis revealed 9 mm obstructing calculus at left UPJ with severe hydronephrosis and delayed nephrogram, small to moderate low density perinephric fat stranding and fluid related to possible calyceal rupture    · P r n   Aqua K pad, Tylenol, lidocaine patch, oxycodone for moderate to severe pain  · Will keep NPO and hold VTE prophylaxis for now pending urology consult  · Gentle IV fluid hydration

## 2022-04-21 NOTE — PROGRESS NOTES
[address] [place] [address]  Progress Note Author Lisandro Desouza 1940, 79 y o  female MRN: [id number]  Unit/Bed#: ED 26 Encounter: [id number]  Primary Care Provider: CL Garcia   Date and time admitted to hospital: 4/20/2022  9:39 PM    Acute kidney injury [facility])  Assessment & Plan  Creatinine in July last year was 0 74  Creatinine is trending up since admission 1 13->1 27  This most likely obstructive nephropathy    - continue IV hydration  - urologist on board    Essential hypertension  Assessment & Plan  Blood pressure was high in a m  · Continue home verapamil q h s  will give the dose in a m  because patient did not bed yesterday night      * Renal calculus, left  Assessment & Plan  Patient presented with left lower quadrant pain  UA is positive blood and calcium oxalate crystals  CT abdomen pelvis revealed 9 mm obstructing calculus at left UPJ with severe hydronephrosis and delayed nephrogram, small to moderate low density perinephric fat stranding and fluid related to possible calyceal rupture    · P r n  Aqua K pad, Tylenol, lidocaine patch, oxycodone for moderate to severe pain  · Will keep NPO and hold VTE prophylaxis for now pending urology consult  · Gentle IV fluid hydration        VTE Pharmacologic Prophylaxis:   VTE Score: 4 Moderate Risk (Score 3-4) - Pharmacological DVT Prophylaxis Ordered: Enoxaparin (Lovenox)  Held today in anticipation of procedure    Mechanical VTE Prophylaxis in Place: Yes    Patient Centered Rounds: I have performed bedside rounds with nursing staff today  Discussions with Specialists or Other Care Team Provider:  Urology    Education and Discussions with Family / Patient: plan to call son    Current Length of Stay: 0 day(s)    Current Patient Status: Inpatient     Discharge Plan / Estimated Discharge Date: Anticipate discharge tomorrow to home      Code Status: Level 3 - DNAR and DNI      Subjective:   Patient is still in mild pain, she says she was given pain medication few minutes back  Patient denies fever, chills, lightheadedness, palpitation, nausea, vomiting  Patient is NPO  Objective:     Vitals:   Temp (24hrs), Av 1 °F (36 7 °C), Min:98 1 °F (36 7 °C), Max:98 1 °F (36 7 °C)    Temp:  [98 1 °F (36 7 °C)] 98 1 °F (36 7 °C)  HR:  [75-86] 77  Resp:  [13-24] 16  BP: (139-206)/(64-98) 156/72  SpO2:  [93 %-95 %] 95 %  Body mass index is 31 18 kg/m²  Input and Output Summary (last 24 hours):     No intake or output data in the 24 hours ending 22 1127    Physical Exam:     Physical Exam  Vitals and nursing note reviewed  Constitutional:       General: She is not in acute distress  Appearance: She is well-developed  HENT:      Head: Normocephalic and atraumatic  Mouth/Throat:      Mouth: Mucous membranes are moist    Eyes:      Conjunctiva/sclera: Conjunctivae normal    Cardiovascular:      Rate and Rhythm: Normal rate and regular rhythm  Heart sounds: No murmur heard  Pulmonary:      Effort: Pulmonary effort is normal  No respiratory distress  Breath sounds: Normal breath sounds  No wheezing, rhonchi or rales  Abdominal:      Palpations: Abdomen is soft  Tenderness: There is no abdominal tenderness  Musculoskeletal:         General: No swelling or tenderness  Cervical back: Neck supple  Right lower leg: No edema  Left lower leg: No edema  Skin:     General: Skin is warm and dry  Findings: No lesion or rash  Neurological:      Mental Status: She is alert and oriented to person, place, and time           Additional Data:     Labs:  Results from last 7 days   Lab Units 22  0938   WBC Thousand/uL 9 70   HEMOGLOBIN g/dL 12 9   HEMATOCRIT % 39 8   PLATELETS Thousands/uL 233   NEUTROS PCT % 73   LYMPHS PCT % 17   MONOS PCT % 9   EOS PCT % 1     Results from last 7 days   Lab Units 22  0938 22  2200 22  2200   SODIUM mmol/L 140   < > 142   POTASSIUM mmol/L 3 5   < > 3 8 CHLORIDE mmol/L 104   < > 104   CO2 mmol/L 31   < > 31   BUN mg/dL 18   < > 20   CREATININE mg/dL 1 27   < > 1 13   ANION GAP mmol/L 5   < > 7   CALCIUM mg/dL 8 8   < > 9 5   ALBUMIN g/dL  --   --  4 1   TOTAL BILIRUBIN mg/dL  --   --  0 43   ALK PHOS U/L  --   --  70   ALT U/L  --   --  17   AST U/L  --   --  18   GLUCOSE RANDOM mg/dL 98   < > 133    < > = values in this interval not displayed                   Results from last 7 days   Lab Units 04/20/22  2200   LACTIC ACID mmol/L 0 6       Imaging: Reviewed radiology reports from this admission including: abdominal/pelvic CT scan    Recent Cultures (last 7 days):           Lines/Drains:  Invasive Devices  Report    Peripheral Intravenous Line            Peripheral IV 04/20/22 Left Antecubital <1 day                Telemetry:        Last 24 Hours Medication List:   Current Facility-Administered Medications   Medication Dose Route Frequency Provider Last Rate    acetaminophen  650 mg Oral Once Emogene Bears, PA-C      acetaminophen  975 mg Oral Q8H PRN Brianna Madisyn, PA-C      aluminum-magnesium hydroxide-simethicone  30 mL Oral Q6H PRN Brianna Madisyn, PA-C      ammonium lactate   Topical Daily PRN Brianna Madisyn, PA-C      docusate sodium  100 mg Oral BID Brianna Madisyn, PA-C      DULoxetine  120 mg Oral Daily Brianna Madisyn, PA-C      gabapentin  200 mg Oral Daily Brianna Madisyn, PA-C      gabapentin  600 mg Oral HS Brianna Madisyn, PA-C      hydrALAZINE  5 mg Intravenous Q6H PRN Brianna Madisyn, PA-C      lidocaine  2 patch Topical Daily PRN Brianna Madisyn, PA-C      naloxone  0 04 mg Intravenous Q1MIN PRN Brianna Madisyn, PA-C      oxyCODONE  2 5 mg Oral Q4H PRN Brianna Madisyn, PA-C      Or    oxyCODONE  5 mg Oral Q4H PRN Brianna Madisyn, PA-C      polyethylene glycol  17 g Oral Daily PRN Brianna Madisyn, PA-C      sodium chloride  75 mL/hr Intravenous Continuous Brianna Madisyn, PA-C 75 mL/hr (04/21/22 0587)    verapamil  120 mg Oral HS Palina Lyudmila Germain MD          Today, Patient Was Seen By: Michael Albright MD    ** Please Note: This note has been constructed using a voice recognition system   **

## 2022-04-21 NOTE — ANESTHESIA POSTPROCEDURE EVALUATION
Post-Op Assessment Note    CV Status:  Stable  Pain Score: 0    Pain management: adequate     Mental Status:  Awake   Hydration Status:  Stable   PONV Controlled:  Controlled   Airway Patency:  Patent      Post Op Vitals Reviewed: Yes      Staff: CRNA         No complications documented      BP   153/71   Temp   99 2   Pulse  102   Resp   12   SpO2   96

## 2022-04-21 NOTE — OP NOTE
OPERATIVE REPORT  PATIENT NAME: Jose Anthony    :  1940  MRN: [id number]  Pt Location: [facility] OR ROOM 04    SURGERY DATE: 2022    Surgeon(s) and Role:     * Flavia Astorga MD - Primary    Preop Diagnosis:  Left ureteral stone    Post-Op Diagnosis Codes:     Left kidney stone     Left UPJ obstruction    Procedure(s) (LRB):  CYSTOSCOPY URETEROSCOPY WITH LITHOTRIPSY HOLMIUM LASER, RETROGRADE PYELOGRAM AND INSERTION STENT URETERAL (Left)    Specimen(s):  ID Type Source Tests Collected by Time Destination   A : left ureteral stone  Calculus Ureter, Left STONE ANALYSIS Flavia Astorga MD 2022 1434        Estimated Blood Loss:   Minimal    Drains:  Ureteral Internal Stent Left ureter 5 Fr  (Active)   Site Assessment ISREAL 22 1545   Number of days: 0       Anesthesia Type:   General/LMA    Operative Indications:  53-year-old female with flank pain and imaging showing 9mm UPJ stone with hydronephrosis    Operative Findings:  Left UPJ obstruction with severe hydronephrosis  Left UPJ/renal pelvis stone fragmented and basket extracted      Complications:   None    Procedure and Technique:    After informed consent including the risks of bleeding, infection, ureteral injury, and need for secondary procedures, patient was placed supine in the operating room theater  Gen  anesthesia was administered  They were then placed in the dorsal lithotomy position and sterilely prepped and draped in usual fashion  Antibiotic prophylaxis and DVT prophylaxis were administered Cystoscopy was performed the 21 Swedish cystoscope 30° lens  This revealed a normal urethra  Inspection of the bladder revealed no abnormalities  Fluoroscopy did show evidence of a stone in left proximal ureter vs kidney  Attention was turned to the left ureteral orifice  A 5fr open ended catheter was attempted to be passed into the ureteral orifice   A retrograde ureteropyelogram was performed showing normal ureter with stenosis at UPJ with severe hydronephrosis consistent with UPJ obstruction  Then a 0 38 solo guidewire was passed through the open ended catheter and up the ureter into the renal pelvis  The scope was removed and reintroduced and a second solo wire was passed up the ureter into the renal pelvis  An 10/12 access sheath was then sequentially placed (inner sheath and then inner + outer sheath) over one of the wires under fluoroscopic guidance with minimal resistance  A optical 5fr flexible ureteroscope was then passed through the access sheath  There was stenosis at the UPJ measuring approximately 6 [place] in size  There was some difficulty getting the scope through this area initially so a 2nd wire was used through the scope which helped with gentle scope passage  The stone was encountered in the renal pelvis  It was brown and hard and 1 cm in size  This was fragmented with the use of a 270 micron holmium laser fiber at settings of 0 8J and 10Hz  It was fragmented to a small size given the UPJ stenosis which would hamper basket retrieval   Sequential passes were then made with a 1 9fr zero tip wire basket in order to retrieve stone debris  There were a few pieces too large to be safely pulled through UPJ stenosis so these were fragmented further with laser and then basket extracted  The collecting system was examined again and no significant residual stone fragments were appreciated  Hemostasis was appropriate  The ureteroscope was backed down the ureter under vision and there were no residual fragments and the ureter was noted to be intact with no injury and intact stenosed UPJ area with mild mucosal trauma from passage through      A retrograde was performed from distal ureter and appeared unremarkable without extravasation of contrast     Cystoscope was reassembled over the guidewire and a 6 x 24 double-J stent inserted without difficulty with good coil seen in left collecting system on fluoroscopy and visually in the bladder  The string was not left on  The bladder was drained  The patient was placed back supine, awakened from general anesthesia and brought to recovery room in stable condition  A qualified resident physician was not available    Patient Disposition:  PACU     PLAN:  The patient will keep the stent in place for 3-4 weeks given the UPJ stenotic area with the hopes that this will help dilate this area, but she may have a congenital UPJ obstruction which is not amenable to passive stent dilation    Plan for ultrasound or CT renal study for 6 weeks later      SIGNATURE: Yazmin Macdonald MD  DATE: April 21, 2022  TIME: 4:54 PM

## 2022-04-22 ENCOUNTER — TELEPHONE (OUTPATIENT)
Dept: FAMILY MEDICINE CLINIC | Facility: CLINIC | Age: 82
End: 2022-04-22

## 2022-04-22 VITALS
OXYGEN SATURATION: 91 % | HEIGHT: 65 IN | BODY MASS INDEX: 31.22 KG/M2 | SYSTOLIC BLOOD PRESSURE: 103 MMHG | WEIGHT: 187.39 LBS | HEART RATE: 69 BPM | TEMPERATURE: 97.8 F | DIASTOLIC BLOOD PRESSURE: 76 MMHG | RESPIRATION RATE: 18 BRPM

## 2022-04-22 LAB
ANION GAP SERPL CALCULATED.3IONS-SCNC: 6 MMOL/L (ref 4–13)
BASOPHILS # BLD AUTO: 0 THOUSANDS/ΜL (ref 0–0.1)
BASOPHILS NFR BLD AUTO: 0 % (ref 0–1)
BUN SERPL-MCNC: 14 MG/DL (ref 5–25)
CALCIUM SERPL-MCNC: 9 MG/DL (ref 8.3–10.1)
CHLORIDE SERPL-SCNC: 106 MMOL/L (ref 100–108)
CO2 SERPL-SCNC: 28 MMOL/L (ref 21–32)
CREAT SERPL-MCNC: 0.75 MG/DL (ref 0.6–1.3)
EOSINOPHIL # BLD AUTO: 0 THOUSAND/ΜL (ref 0–0.61)
EOSINOPHIL NFR BLD AUTO: 0 % (ref 0–6)
ERYTHROCYTE [DISTWIDTH] IN BLOOD BY AUTOMATED COUNT: 13 % (ref 11.6–15.1)
GFR SERPL CREATININE-BSD FRML MDRD: 73 ML/MIN/1.73SQ M
GLUCOSE SERPL-MCNC: 124 MG/DL (ref 65–140)
HCT VFR BLD AUTO: 38.1 % (ref 34.8–46.1)
HGB BLD-MCNC: 12.4 G/DL (ref 11.5–15.4)
IMM GRANULOCYTES # BLD AUTO: 0.02 THOUSAND/UL (ref 0–0.2)
IMM GRANULOCYTES NFR BLD AUTO: 0 % (ref 0–2)
LYMPHOCYTES # BLD AUTO: 0.73 THOUSANDS/ΜL (ref 0.6–4.47)
LYMPHOCYTES NFR BLD AUTO: 8 % (ref 14–44)
MCH RBC QN AUTO: 32.4 PG (ref 26.8–34.3)
MCHC RBC AUTO-ENTMCNC: 32.5 G/DL (ref 31.4–37.4)
MCV RBC AUTO: 100 FL (ref 82–98)
MONOCYTES # BLD AUTO: 0.38 THOUSAND/ΜL (ref 0.17–1.22)
MONOCYTES NFR BLD AUTO: 4 % (ref 4–12)
NEUTROPHILS # BLD AUTO: 8.24 THOUSANDS/ΜL (ref 1.85–7.62)
NEUTS SEG NFR BLD AUTO: 88 % (ref 43–75)
NRBC BLD AUTO-RTO: 0 /100 WBCS
PLATELET # BLD AUTO: 215 THOUSANDS/UL (ref 149–390)
PMV BLD AUTO: 9.8 FL (ref 8.9–12.7)
POTASSIUM SERPL-SCNC: 4.3 MMOL/L (ref 3.5–5.3)
RBC # BLD AUTO: 3.83 MILLION/UL (ref 3.81–5.12)
SODIUM SERPL-SCNC: 140 MMOL/L (ref 136–145)
WBC # BLD AUTO: 9.37 THOUSAND/UL (ref 4.31–10.16)

## 2022-04-22 PROCEDURE — 99239 HOSP IP/OBS DSCHRG MGMT >30: CPT | Performed by: STUDENT IN AN ORGANIZED HEALTH CARE EDUCATION/TRAINING PROGRAM

## 2022-04-22 PROCEDURE — 85025 COMPLETE CBC W/AUTO DIFF WBC: CPT | Performed by: INTERNAL MEDICINE

## 2022-04-22 PROCEDURE — 80048 BASIC METABOLIC PNL TOTAL CA: CPT | Performed by: INTERNAL MEDICINE

## 2022-04-22 RX ADMIN — DOCUSATE SODIUM 100 MG: 100 CAPSULE ORAL at 08:55

## 2022-04-22 RX ADMIN — DULOXETINE HYDROCHLORIDE 120 MG: 60 CAPSULE, DELAYED RELEASE ORAL at 08:56

## 2022-04-22 RX ADMIN — GABAPENTIN 200 MG: 100 CAPSULE ORAL at 08:55

## 2022-04-22 NOTE — TELEPHONE ENCOUNTER
Marisa Miguel from [facility]  Son canceled physical therapy   Natasha Card had sever pain, he took her to the ER, kidney stones, had to be operated on, this was on the 04/20/ 2022

## 2022-04-22 NOTE — ASSESSMENT & PLAN NOTE
Patient presented with left lower quadrant pain  UA was positive blood and calcium oxalate crystals  CT abdomen/pelvis revealed 9 mm obstructing calculus at left UPJ with severe hydronephrosis, small to moderate low density perinephric fat stranding and fluid related to possible calyceal rupture  Patient is s/p cystoscopy, ureteroscopy with lithotripsy, retrograde pyelogram and ureteral stent placement on 04/21/22  She does not report pain after the procedure    · Schedule labeled with urologist on 05/16  · The stent is supposed to stay in place for 3-4 weeks  · renal CT in 6 weeks

## 2022-04-22 NOTE — NURSING NOTE
Notified SLIM that the patient was appearing anxious  Received a prescription for benadryl and gave to patient

## 2022-04-22 NOTE — CASE MANAGEMENT
Case Management Discharge Planning Note    Patient name Anna Hinojosa  Location /-25 MRN [id number]  : 1940 Date 2022       Current Admission Date: 2022  Current Admission Diagnosis:Renal calculus, left   Patient Active Problem List    Diagnosis Date Noted    Renal calculus, left 2022    Abnormal CT of the abdomen 2022    Acute kidney injury ([facility] [facility] ) 2022    Photophobia of both eyes 2022    Other specified inflammatory spondylopathies, cervical region ([facility] [facility] ) 2022    Dementia without behavioral disturbance, unspecified dementia type ([facility] [address] [facility] )     Uncomplicated opioid dependence ([facility] [address] [facility] ) 2022    Vitamin B12 deficiency 2021    Macular degeneration 2021    Cognitive deficits 2021    Moderate episode of recurrent major depressive disorder ([facility] [address] ) 2021    Mixed incontinence, 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) (female) 2021    Hearing loss 2021    Ambulatory dysfunction 2021    Insomnia 2020    Essential hypertension 2020    Mixed hyperlipidemia 2020    Cervical disc disorder with radiculopathy of jzkrzabm-twyztrf-fsjxe region     Other chronic pain 2019    Trigeminal neuralgia 2018    Occipital neuralgia of right side 2018    Trigeminal neuropathy 2018    Myofascial pain dysfunction syndrome 2018    Chronic nonintractable headache 2018      LOS (days): 1  Geometric Mean LOS (GMLOS) (days): 2 10  Days to GMLOS:0 6     OBJECTIVE:  Risk of Unplanned Readmission Score: 12         Current admission status: Inpatient   Preferred Pharmacy:   [address] [address]  Phone: [phone] Fax: [phone], [address]  [address] [place] [address],[address]  [facility] [address]  Phone: [phone] Fax: [phone]    [id number] [address] [place] [address]  Phone: [phone] Fax: [phone]    Primary Care Provider: CL Dawson    Primary Insurance: MEDICARE  Secondary Insurance: [employer]    DISCHARGE DETAILS:    Requested [address]         Is the patient interested in [facility] at discharge?: Yes  [address] requested[de-identified] [place] Name[de-identified] Other (Residential)  [address] [place] [address] Provider[de-identified] PCP  Home Health Services Needed[de-identified] Strengthening/Theraputic Exercises to Improve Function,Evaluate Functional Status and Safety,Gait/ADL Training  Homebound Criteria Met[de-identified] Requires the Assistance of Another Person for Safe Ambulation or to Leave the Home  Supporting Clincal Findings[de-identified] Fatigues Easliy in Short Distances,Limited Endurance    Additional Comments: Per chart review, pt current with [facility]  CM called [facility] ([phone]) and spoke with China Pinzon who confirmed that pt is current with their services  CM was transferred to [facility] with intake who confirmed that they are able to accept pt back for care and that she would send CM to Tamara to add pt to the schedule  CM was sent to the voicemail of Galo Frank to inform of pt's discharge   Referral sent via [facility] to [facility]

## 2022-04-22 NOTE — ASSESSMENT & PLAN NOTE
Creatinine in July last year was 0 74  Creatinine improved after stent placement and IV hydration 1 13->1 27->0 75  This was most likely obstructive nephropathy    - outpatient follow-up  - BMP in 1 week

## 2022-04-22 NOTE — PLAN OF CARE
Problem: MOBILITY - ADULT  Goal: Maintain or return to baseline ADL function  Description: INTERVENTIONS:  -  Assess patient's ability to carry out ADLs; assess patient's baseline for ADL function and identify physical deficits which impact ability to perform ADLs (bathing, care of mouth/teeth, toileting, grooming, dressing, etc )  - Assess/evaluate cause of self-care deficits   - Assess range of motion  - Assess patient's mobility; develop plan if impaired  - Assess patient's need for assistive devices and provide as appropriate  - Encourage maximum independence but intervene and supervise when necessary  - Involve family in performance of ADLs  - Assess for home care needs following discharge   - Consider OT consult to assist with ADL evaluation and planning for discharge  - Provide patient education as appropriate  Outcome: Progressing     Problem: Potential for Falls  Goal: Patient will remain free of falls  Description: INTERVENTIONS:  - Educate patient/family on patient safety including physical limitations  - Instruct patient to call for assistance with activity   - Consult OT/PT to assist with strengthening/mobility   - Keep Call bell within reach  - Keep bed low and locked with side rails adjusted as appropriate  - Keep care items and personal belongings within reach  - Initiate and maintain comfort rounds  - Make Fall Risk Sign visible to staff  - Offer Toileting every 2 Hours, in advance of need  - Initiate/Maintain BED alarm  - Apply yellow socks and bracelet for high fall risk patients  - Consider moving patient to room near nurses station  Outcome: Progressing

## 2022-04-22 NOTE — PLAN OF CARE
Problem: MOBILITY - ADULT  Goal: Maintain or return to baseline ADL function  Description: INTERVENTIONS:  -  Assess patient's ability to carry out ADLs; assess patient's baseline for ADL function and identify physical deficits which impact ability to perform ADLs (bathing, care of mouth/teeth, toileting, grooming, dressing, etc )  - Assess/evaluate cause of self-care deficits   - Assess range of motion  - Assess patient's mobility; develop plan if impaired  - Assess patient's need for assistive devices and provide as appropriate  - Encourage maximum independence but intervene and supervise when necessary  - Involve family in performance of ADLs  - Assess for home care needs following discharge   - Consider OT consult to assist with ADL evaluation and planning for discharge  - Provide patient education as appropriate  4/22/2022 1200 by Pat Sims RN  Outcome: Adequate for Discharge  4/22/2022 1149 by Pat Sims RN  Outcome: Progressing  Goal: Maintains/Returns to pre admission functional level  Description: INTERVENTIONS:  - Perform BMAT or MOVE assessment daily    - Set and communicate daily mobility goal to care team and patient/family/caregiver  - Collaborate with rehabilitation services on mobility goals if consulted  - Perform Range of Motion 4 times a day  - Reposition patient every 2 hours    - Dangle patient 3 times a day  - Stand patient 3 times a day  - Ambulate patient 3 times a day  - Out of bed to chair 3 times a day   - Out of bed for meals 3 times a day  - Out of bed for toileting  - Record patient progress and toleration of activity level   Outcome: Adequate for Discharge     Problem: Potential for Falls  Goal: Patient will remain free of falls  Description: INTERVENTIONS:  - Educate patient/family on patient safety including physical limitations  - Instruct patient to call for assistance with activity   - Consult OT/PT to assist with strengthening/mobility   - Keep Call bell within reach  - Keep bed low and locked with side rails adjusted as appropriate  - Keep care items and personal belongings within reach  - Initiate and maintain comfort rounds  - Make Fall Risk Sign visible to staff  - Offer Toileting every 2 Hours, in advance of need  - Initiate/Maintain BED alarm  - Apply yellow socks and bracelet for high fall risk patients  - Consider moving patient to room near nurses station  4/22/2022 1200 by Rogers Malik RN  Outcome: Adequate for Discharge  4/22/2022 1149 by Rogers Malik RN  Outcome: Progressing     Problem: Prexisting or High Potential for Compromised Skin Integrity  Goal: Skin integrity is maintained or improved  Description: INTERVENTIONS:  - Identify patients at risk for skin breakdown  - Assess and monitor skin integrity  - Assess and monitor nutrition and hydration status  - Monitor labs   - Assess for incontinence   - Turn and reposition patient  - Assist with mobility/ambulation  - Relieve pressure over bony prominences  - Avoid friction and shearing  - Provide appropriate hygiene as needed including keeping skin clean and dry  - Evaluate need for skin moisturizer/barrier cream  - Collaborate with interdisciplinary team   - Patient/family teaching  - Consider wound care consult   Outcome: Adequate for Discharge

## 2022-04-22 NOTE — DISCHARGE SUMMARY
[facility]  Discharge- Abelardo Ribera [place] 1940, 79 y o  female MRN: [id number]  Unit/Bed#: -01 Encounter: [id number]  Primary Care Provider: CL Bucio   Date and time admitted to hospital: 4/20/2022  9:39 PM    * Renal calculus, left  Assessment & Plan  Patient presented with left lower quadrant pain  UA was positive blood and calcium oxalate crystals  CT abdomen/pelvis revealed 9 mm obstructing calculus at left UPJ with severe hydronephrosis, small to moderate low density perinephric fat stranding and fluid related to possible calyceal rupture  Patient is s/p cystoscopy, ureteroscopy with lithotripsy, retrograde pyelogram and ureteral stent placement on 04/21/22  She does not report pain after the procedure    · Schedule labeled with urologist on 05/16  · The stent is supposed to stay in place for 3-4 weeks  · renal CT in 6 weeks    Acute kidney injury ([address] )  Assessment & Plan  Creatinine in July last year was 0 74  Creatinine improved after stent placement and IV hydration 1 13->1 27->0 75  This was most likely obstructive nephropathy    - outpatient follow-up  - BMP in 1 week    Essential hypertension  Assessment & Plan  Blood pressure is reasonably controlled    · Continue home verapamil 120 mg q h s  Discharging Resident Physician: Crystal Concepcion MD  Attending: No att  providers found  PCP: CL Bucio  Admission Date: 4/20/2022  Discharge Date: 04/22/22    Disposition:     Home with [facility] Services (Reminder: Complete face to face encounter)    Reason for Admission:  Left ureteral calculus with UPJ obstruction    Consultations During Hospital Stay:  · Urology    Procedures Performed:     · Cystoscopy, ureteroscopy, lithotripsy, retrograde pyelogram and ureteral stent placement    Significant Findings / Test Results:     · CT abdomen/pelvis on 04/20/2022:  1   9 mm obstructing calculus at the left ureteropelvic junction causing severe hydronephrosis    There is small to moderate amount of low density perinephric fluid and fat stranding raising the possibility of calyceal rupture  Urology consultation is   recommended      2   Multiple nonobstructing left renal calculi      3   Mesenteric haziness surrounding nonenlarged lymph nodes  Those nonspecific, this appearance can be seen with sclerosing mesenteritis  Incidental Findings:   · None    Test Results Pending at Discharge (will require follow up): · None     Outpatient Tests Requested:  · CBC, BMP in 1    Complications:  None    Hospital Course:     Kadeem Lees is a 79 y o  female patient with history of HTN, dementia who originally presented to the hospital on 4/20/2022 due to severe left lower quadrant pain for 1 day  Patient was not able to provide many details due to dementia  She denied shortness of breath, chest pain fever, chills  Vital signs were stable  UA was consistent with microscopic hematuria  Creatinine was elevated from baseline Meeting SPENCER criteria  CT of the abdomen and pelvis showed 9 mm obstructive calculus in the left ureteropelvic junction with severe hydronephrosis  Patient was treated with IV hydration and pain management  She underwent  ureteroscopy with lithotripsy and ureteral stent placement on 04/21  Patient felt much better after the procedure  She will need 4 to follow-up with urologist, appointment was scheduled on 05/16/2022  Renal CT was schedule in 6 weeks  Patient and her son where aware of the plan  Patient was discharged home were she head visiting nurse and physical therapy services  Condition at Discharge: fair     Discharge Day Visit / Exam:     Subjective:  Patient is feeling well today  She denies pain or discomfort  Denies chest pain, shortness of breath, cough, abdominal pain, nausea, vomiting  She is able to eat      Vitals: Blood Pressure: 103/76 (04/22/22 0757)  Pulse: 69 (04/22/22 0757)  Temperature: 97 8 °F (36 6 °C) (04/22/22 0757)  Temp Source: Oral (04/21/22 1803)  Respirations: 18 (04/22/22 0757)  Height: 5' 5" (165 1 cm) (04/21/22 1240)  Weight - Scale: 85 kg (187 lb 6 3 oz) (04/21/22 1240)  SpO2: 91 % (04/22/22 0903)  Exam:   Physical Exam  Vitals reviewed  Constitutional:       Appearance: Normal appearance  HENT:      Head: Normocephalic and atraumatic  Nose: Nose normal       Mouth/Throat:      Mouth: Mucous membranes are moist    Eyes:      General: No scleral icterus  Extraocular Movements: Extraocular movements intact  Conjunctiva/sclera: Conjunctivae normal    Cardiovascular:      Rate and Rhythm: Normal rate and regular rhythm  Pulses: Normal pulses  Heart sounds: Normal heart sounds  No murmur heard  No gallop  Pulmonary:      Effort: Pulmonary effort is normal  No respiratory distress  Breath sounds: Normal breath sounds  No wheezing or rales  Abdominal:      General: Bowel sounds are normal  There is no distension  Tenderness: There is no abdominal tenderness  There is no guarding or rebound  Genitourinary:     Comments: Bloody urine noted in purewick container  Musculoskeletal:         General: No swelling or tenderness  Skin:     General: Skin is warm  Coloration: Skin is not jaundiced  Findings: No erythema or rash  Neurological:      General: No focal deficit present  Mental Status: She is alert and oriented to person, place, and time  Cranial Nerves: No cranial nerve deficit  Motor: No weakness  Psychiatric:         Mood and Affect: Mood normal          Behavior: Behavior normal        Discussion with Family:  Son    Discharge instructions/Information to patient and family:   See after visit summary for information provided to patient and family  Provisions for Follow-Up Care:  See after visit summary for information related to follow-up care and any pertinent home health orders        Planned Readmission:  None     Discharge Medications:  See after visit summary for reconciled discharge medications provided to patient and family        ** Please Note: This note has been constructed using a voice recognition system **

## 2022-04-22 NOTE — TELEPHONE ENCOUNTER
Cysto/stent removal scheduled at [place] and 4-6 wk follow up scheduled at [facility] due to patient location  Will change if patient wishes to travel to [place] for AP follow up      Patient inpatient, will monitor for d/c then call to confirm appt

## 2022-04-22 NOTE — PLAN OF CARE
Problem: MOBILITY - ADULT  Goal: Maintain or return to baseline ADL function  Description: INTERVENTIONS:  -  Assess patient's ability to carry out ADLs; assess patient's baseline for ADL function and identify physical deficits which impact ability to perform ADLs (bathing, care of mouth/teeth, toileting, grooming, dressing, etc )  - Assess/evaluate cause of self-care deficits   - Assess range of motion  - Assess patient's mobility; develop plan if impaired  - Assess patient's need for assistive devices and provide as appropriate  - Encourage maximum independence but intervene and supervise when necessary  - Involve family in performance of ADLs  - Assess for home care needs following discharge   - Consider OT consult to assist with ADL evaluation and planning for discharge  - Provide patient education as appropriate  4/22/2022 0054 by Javan Rm RN  Outcome: Progressing  4/22/2022 0054 by Javan Rm RN  Outcome: Progressing  Goal: Maintains/Returns to pre admission functional level  Description: INTERVENTIONS:  - Perform BMAT or MOVE assessment daily    - Set and communicate daily mobility goal to care team and patient/family/caregiver  - Collaborate with rehabilitation services on mobility goals if consulted  - Perform Range of Motion 3 times a day  - Reposition patient every 2 hours    - Dangle patient 2 times a day  - Stand patient 2 times a day  - Ambulate patient 2 times a day  - Out of bed to chair 2 times a day   - Out of bed for meals 2 times a day  - Out of bed for toileting  - Record patient progress and toleration of activity level   4/22/2022 0054 by Javan Rm RN  Outcome: Progressing  4/22/2022 0054 by Javan Rm RN  Outcome: Progressing     Problem: Potential for Falls  Goal: Patient will remain free of falls  Description: INTERVENTIONS:  - Educate patient/family on patient safety including physical limitations  - Instruct patient to call for assistance with activity   - Consult OT/PT to assist with strengthening/mobility   - Keep Call bell within reach  - Keep bed low and locked with side rails adjusted as appropriate  - Keep care items and personal belongings within reach  - Initiate and maintain comfort rounds  - Make Fall Risk Sign visible to staff  - Offer Toileting every 2 Hours, in advance of need  - Initiate/Maintain bed alarm  - Apply yellow socks and bracelet for high fall risk patients  - Consider moving patient to room near nurses station  4/22/2022 0054 by Johana Espinal RN  Outcome: Progressing  4/22/2022 0054 by Johana Espinal RN  Outcome: Progressing

## 2022-04-23 PROBLEM — K65.4 SCLEROSING MESENTERITIS (HCC): Status: ACTIVE | Noted: 2022-04-23

## 2022-04-25 ENCOUNTER — TRANSITIONAL CARE MANAGEMENT (OUTPATIENT)
Dept: FAMILY MEDICINE CLINIC | Facility: CLINIC | Age: 82
End: 2022-04-25

## 2022-04-25 ENCOUNTER — TELEPHONE (OUTPATIENT)
Dept: FAMILY MEDICINE CLINIC | Facility: CLINIC | Age: 82
End: 2022-04-25

## 2022-04-25 ENCOUNTER — OFFICE VISIT (OUTPATIENT)
Dept: FAMILY MEDICINE CLINIC | Facility: CLINIC | Age: 82
End: 2022-04-25
Payer: MEDICARE

## 2022-04-25 VITALS
DIASTOLIC BLOOD PRESSURE: 60 MMHG | SYSTOLIC BLOOD PRESSURE: 120 MMHG | BODY MASS INDEX: 31.18 KG/M2 | HEART RATE: 73 BPM | HEIGHT: 65 IN | OXYGEN SATURATION: 96 % | TEMPERATURE: 97.6 F

## 2022-04-25 DIAGNOSIS — R26.2 AMBULATORY DYSFUNCTION: ICD-10-CM

## 2022-04-25 DIAGNOSIS — N20.0 RENAL CALCULUS, LEFT: Primary | ICD-10-CM

## 2022-04-25 DIAGNOSIS — K65.4 SCLEROSING MESENTERITIS (HCC): ICD-10-CM

## 2022-04-25 DIAGNOSIS — I10 ESSENTIAL HYPERTENSION: ICD-10-CM

## 2022-04-25 DIAGNOSIS — R93.89 ABNORMAL CT SCAN: ICD-10-CM

## 2022-04-25 DIAGNOSIS — N17.9 ACUTE KIDNEY INJURY (HCC): ICD-10-CM

## 2022-04-25 PROCEDURE — 99496 TRANSJ CARE MGMT HIGH F2F 7D: CPT

## 2022-04-25 NOTE — PROGRESS NOTES
Assessment/Plan:         Problem List Items Addressed This Visit     None            Subjective:      Patient ID: Chai Duncan is a 79 y o  female  HPI    The following portions of the patient's history were reviewed and updated as appropriate:   Past Medical History:  She has a past medical history of Arthritis, Basal cell carcinoma, Dry eye syndrome, Foraminal stenosis of cervical region, Macular degeneration, Memory loss, Migraine, Neck pain, Spinal stenosis of lumbar region, Squamous cell skin cancer, Supraventricular tachycardia ([address] ), and Vitamin D deficiency  ,  _______________________________________________________________________  Medical Problems:  does not have any pertinent problems on file ,  _______________________________________________________________________  Past Surgical History:   has a past surgical history that includes Cataract extraction; Replacement total knee (Right); Appendectomy; Femur fracture surgery; Back surgery; Replacement total knee (Left); pr stereo treat trigeminal nerve (Right, 3/26/2019); Skin biopsy; FL retrograde pyelogram (4/21/2022); and pr cysto/uretero w/lithotripsy &indwell stent insrt (Left, 4/21/2022)  ,  _______________________________________________________________________  Family History:  family history includes Aortic aneurysm in her mother; Breast cancer in her mother; Hyperlipidemia in her sister; Hypertension in her father, mother, and sister; Squamous cell carcinoma in her sister  ,  _______________________________________________________________________  Social History:   reports that she has quit smoking  She has never used smokeless tobacco  She reports current drug use  Drug: Marijuana  She reports that she does not drink alcohol ,  _______________________________________________________________________  Allergies:  is allergic to dilaudid [hydromorphone] and penicillins     _______________________________________________________________________  Current Outpatient Medications   Medication Sig Dispense Refill    Acetaminophen 325 MG CAPS Take 650 mg by mouth every 6 (six) hours as needed      Cholecalciferol (VITAMIN D PO) Take 5,000 Units by mouth Daily        DULoxetine (CYMBALTA) 60 mg delayed release capsule Take 2 caps by mouth daily 120 capsule 2    gabapentin (NEURONTIN) 100 mg capsule Take 2 capsules (200 mg total) by mouth daily 180 capsule 1    gabapentin (NEURONTIN) 300 mg capsule Take 2 capsules (600 mg total) by mouth daily at bedtime 180 capsule 1    verapamil (VERELAN PM) 120 MG 24 hr capsule TAKE 1 CAPSULE(120 MG) BY MOUTH DAILY AT BEDTIME 90 capsule 1    vitamin B-12 (CYANOCOBALAMIN) 250 MCG tablet Take 1 tablet (250 mcg total) by mouth daily 90 tablet 0    ammonium lactate (LAC-HYDRIN) 12 % lotion  (Patient not taking: Reported on 4/25/2022 )       No current facility-administered medications for this visit      _______________________________________________________________________  Review of Systems      Objective:  Vitals:    04/25/22 1311   BP: 120/60   BP Location: Left arm   Patient Position: Sitting   Cuff Size: Standard   Pulse: 73   Temp: 97 6 °F (36 4 °C)   TempSrc: Tympanic   SpO2: 96%   Height: 5' 5" (1 651 m)     Body mass index is 31 18 kg/m²       Physical Exam

## 2022-04-25 NOTE — PATIENT INSTRUCTIONS
Keep follow up appointment with urology  Try to increase water intake and stay well hydrated  Continue all medications  Follow up CT since you wish not to see GI at this time to evaluate sclerosising mesenteritis  Continue to follow with home health nursing/PT  Call if any issues or concerns

## 2022-04-25 NOTE — TELEPHONE ENCOUNTER
Cristina Hill from [facility] called wanting to know if you would allow a home nurse from [place] [address] to do her resumption of care instead of physical therapy doing it?

## 2022-04-25 NOTE — TELEPHONE ENCOUNTER
Spoke with patient's son and explained common stent symptoms and expectations  He confirms she is experiencing those symptoms  Reviewed conservative measures and advised to call office if patient should develop fever  She is aware of appt for cysto stent removal on 5/16/22  No further questions at this time

## 2022-04-25 NOTE — PROGRESS NOTES
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[address] )  Arelis Swann does not wish to follow up with GI  Will repeat imaging in 6 weeks at the same time as her follow up CT for Renal Calc us     Essential hypertension  Blood pressure perfect today  Continue current medications  Renal calculus, left  Keep follow up appointment with urology  Currently asymptomatic  Acute kidney injury ([address] )  Resolved at time of discharge  Encouraged to drink plenty of water  Ambulatory dysfunction  Encouraged to continue physical therapy and exercises to recondition her body  Visit Diagnosis:  Diagnoses and all orders for this visit:    Renal calculus, left    Abnormal CT scan  -     CT abdomen pelvis w contrast; Future    Acute kidney injury ([address] )    Sclerosing mesenteritis [facility])    Essential hypertension    Ambulatory dysfunction        Subjective:     Patient ID: Anna Hinojosa is a 79 y o  female  Arelis Swann is here today for follow up  She has on and off lower abdominal pain         Active Problems    Patient Active Problem List   Diagnosis    Chronic nonintractable headache    Trigeminal neuralgia    Occipital neuralgia of right side    Trigeminal neuropathy    Myofascial pain dysfunction syndrome    Other chronic pain    Cervical disc disorder with radiculopathy of wjpgrhmt-nwjpaja-xeohh region    Essential hypertension    Mixed hyperlipidemia    Insomnia    Ambulatory dysfunction    Cognitive deficits    Moderate episode of recurrent major depressive disorder (HCC)    Mixed incontinence, 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) (female)    Hearing loss    Macular degeneration    Vitamin B12 deficiency    Other specified inflammatory spondylopathies, cervical region ([address] )    Dementia without behavioral disturbance, unspecified dementia type ([address] )    Uncomplicated opioid dependence ([address] )    Renal calculus, left    Abnormal CT of the abdomen    Acute kidney injury ([address] )    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[address] ) Past Medical History     Past Medical History:   Diagnosis Date    Arthritis     Basal cell carcinoma     Dry eye syndrome     Foraminal stenosis of cervical region     Macular degeneration     Memory loss     Migraine     Neck pain     Spinal stenosis of lumbar region     Squamous cell skin cancer     Supraventricular tachycardia (HCC)     Vitamin D deficiency     last assessed 2/7/17       Surgical History    Past Surgical History:   Procedure Laterality Date    APPENDECTOMY      BACK SURGERY      lower back surgery lumbar disc    CATARACT EXTRACTION      FEMUR FRACTURE SURGERY      FL RETROGRADE PYELOGRAM  4/21/2022    MA CYSTO/URETERO W/LITHOTRIPSY &INDWELL STENT INSRT Left 4/21/2022    Procedure: CYSTOSCOPY URETEROSCOPY WITH LITHOTRIPSY HOLMIUM LASER, RETROGRADE PYELOGRAM AND INSERTION STENT URETERAL;  Surgeon: Mari Reina MD;  Location[facility];  Service: Urology    MA STEREO TREAT TRIGEMINAL NERVE Right 3/26/2019    Procedure: GLYCEROL RHIZOTOMY TRIGEMINAL NERVES (V1-V3), RIGHT;  Surgeon: Jeny Swan MD;  Location:  MAIN OR;  Service: Neurosurgery    REPLACEMENT TOTAL KNEE Right     REPLACEMENT TOTAL KNEE Left     SKIN BIOPSY         Current Meds       Current Outpatient Medications:     Acetaminophen 325 MG CAPS, Take 650 mg by mouth every 6 (six) hours as needed, Disp: , Rfl:     Cholecalciferol (VITAMIN D PO), Take 5,000 Units by mouth Daily  , Disp: , Rfl:     DULoxetine (CYMBALTA) 60 mg delayed release capsule, Take 2 caps by mouth daily, Disp: 120 capsule, Rfl: 2    gabapentin (NEURONTIN) 100 mg capsule, Take 2 capsules (200 mg total) by mouth daily, Disp: 180 capsule, Rfl: 1    gabapentin (NEURONTIN) 300 mg capsule, Take 2 capsules (600 mg total) by mouth daily at bedtime, Disp: 180 capsule, Rfl: 1    verapamil (VERELAN PM) 120 MG 24 hr capsule, TAKE 1 CAPSULE(120 MG) BY MOUTH DAILY AT BEDTIME, Disp: 90 capsule, Rfl: 1    vitamin B-12 (CYANOCOBALAMIN) 250 MCG tablet, Take 1 tablet (250 mcg total) by mouth daily, Disp: 90 tablet, Rfl: 0    Allergies    Allergies   Allergen Reactions    Dilaudid [Hydromorphone] Shortness Of Breath     Other reaction(s): Respiratory Distress  Other reaction(s): HORENESS    Penicillins Rash     Rash       No images are attached to the encounter      Health Management    Health Maintenance   Topic Date Due    Influenza Vaccine (1) 09/01/2021    Breast Cancer Screening: Mammogram  09/04/2021    PT PLAN OF CARE  01/12/2022    BMI: Followup Plan  03/19/2022    Depression Remission PHQ  08/12/2022    Fall Risk  08/26/2022    Medicare Annual Wellness Visit (AWV)  11/12/2022    BMI: Adult  04/21/2023    DTaP,Tdap,and Td Vaccines (2 - Td or Tdap) 09/07/2027    Osteoporosis Screening  Completed    Pneumococcal Vaccine: 65+ Years  Completed    COVID-19 Vaccine  Completed    HIB Vaccine  Aged Out    Hepatitis B Vaccine  Aged Out    IPV Vaccine  Aged Out    Hepatitis A Vaccine  Aged Out    Meningococcal ACWY Vaccine  Aged Out    HPV Vaccine  Aged Out       CBC:   Results from last 6 Months   Lab Units 04/22/22  0511   WBC Thousand/uL 9 37   RBC Million/uL 3 83   HEMOGLOBIN g/dL 12 4   HEMATOCRIT % 38 1   MCV fL 100*   MCH pg 32 4   MCHC g/dL 32 5   RDW % 13 0   MPV fL 9 8   PLATELETS Thousands/uL 215   NRBC AUTO /100 WBCs 0   NEUTROS PCT % 88*   LYMPHS PCT % 8*   MONOS PCT % 4   EOS PCT % 0   BASOS PCT % 0   NEUTROS ABS Thousands/µL 8 24*   LYMPHS ABS Thousands/µL 0 73   MONOS ABS Thousand/µL 0 38   EOS ABS Thousand/µL 0 00     Chemistry Profile:   Results from last 6 Months   Lab Units 04/22/22  0511 04/21/22  0938 04/20/22  2200   POTASSIUM mmol/L 4 3   < > 3 8   CHLORIDE mmol/L 106   < > 104   CO2 mmol/L 28   < > 31   BUN mg/dL 14   < > 20   CREATININE mg/dL 0 75   < > 1 13   CALCIUM mg/dL 9 0   < > 9 5   AST U/L  --   --  18   ALT U/L  --   --  17   ALK PHOS U/L  --   --  70   EGFR ml/min/1 73sq m 73   < > 45    < > = values in this interval not displayed  Coagulation Studies:     Endocrine Studies:       Invalid input(s): [facility]    Imaging: FL retrograde pyelogram    Result Date: 4/21/2022  Narrative: LEFT RETROGRADE PYELOGRAM INDICATION:   Hydronephrosis with ureteral calculus  COMPARISON: CT from 4/20/2020 IMAGES:  8 FLUOROSCOPY TIME:   18 seconds CONTRAST:  15 mL of iohexol (OMNIPAQUE) FINDINGS: Fluoroscopic guidance provided for retrograde pyelogram  There is left-sided hydronephrosis secondary to UPJ calculus  There is placement of a left nephroureteral stent  Osseous and soft tissue detail limited by technique  Impression: Fluoroscopic guidance provided for left retrograde pyelogram  Please see procedure report for further details  Workstation performed: [id number]     CT Abdomen pelvis with contrast    Result Date: 4/21/2022  Narrative: CT ABDOMEN AND PELVIS WITH IV CONTRAST INDICATION:   LLQ abdominal pain  COMPARISON:  None  TECHNIQUE:  CT examination of the abdomen and pelvis was performed  Axial, sagittal, and coronal 2D reformatted images were created from the source data and submitted for interpretation  Radiation dose length product (DLP) for this visit:  825 mGy-cm   This examination, like all CT scans performed in the [facility], was performed utilizing techniques to minimize radiation dose exposure, including the use of iterative reconstruction and automated exposure control  IV Contrast:  100 mL of iohexol (OMNIPAQUE) Enteric Contrast:  Enteric contrast was not administered  FINDINGS: ABDOMEN LOWER CHEST:  Small hiatal hernia noted  No other clinically significant abnormality identified in the visualized lower chest  LIVER/BILIARY TREE:  One or more subcentimeter sharply circumscribed low-density hepatic lesion(s) are noted, too small to accurately characterize  No suspicious solid hepatic lesion is identified  Hepatic contours are normal   No biliary dilatation  GALLBLADDER:  Distended  No calcified gallstones  No pericholecystic inflammatory change  SPLEEN:  Unremarkable  PANCREAS:  Severely atrophic   5 mm cystic focus in the pancreatic body (601/61, 2/23)  ADRENAL GLANDS:  Unremarkable  KIDNEYS/URETERS:  9 mm obstructing calculus at the left ureteropelvic junction causing severe hydronephrosis with a delayed nephrogram   Small to moderate amount of low-density left perinephric fluid with fat stranding  Multiple punctate nonobstructing left renal calculi are seen  One or more sharply circumscribed subcentimeter renal hypodensities are present, too small to accurately characterize, and statistically most likely benign findings  According to recent literature (Radiology 2019) no further  workup of these findings is recommended  STOMACH AND BOWEL:  Limited evaluation of the GI tract without enteric contrast   Moderate fecal stasis in the colon  Fecalization of contents of some distal small bowel loops suggestive of slow transit  No evidence of bowel obstruction  Colonic diverticulosis without evidence of acute diverticulitis  APPENDIX:  Prior appendectomy  ABDOMINOPELVIC CAVITY:  No ascites  No pneumoperitoneum  No lymphadenopathy  Mesenteric haziness surrounding nonenlarged lymph nodes  VESSELS:  Atherosclerotic changes are present  No evidence of aneurysm  PELVIS REPRODUCTIVE ORGANS:  Unremarkable for patient's age  URINARY BLADDER:  Unremarkable  ABDOMINAL WALL/INGUINAL REGIONS:  Unremarkable  OSSEOUS STRUCTURES:  No acute fracture or destructive osseous lesion  Spinal degenerative changes are noted  Impression: 1   9 mm obstructing calculus at the left ureteropelvic junction causing severe hydronephrosis  There is small to moderate amount of low density perinephric fluid and fat stranding raising the possibility of calyceal rupture  Urology consultation is recommended  2   Multiple nonobstructing left renal calculi  3   Mesenteric haziness surrounding nonenlarged lymph nodes  Those nonspecific, this appearance can be seen with sclerosing mesenteritis  I personally discussed this study with Marianne Toponas on 4/21/2022 at 12:01 AM   Workstation performed: [id number]         Review of Systems   Constitutional: Negative for chills, fatigue and fever  HENT: Negative for ear pain, sinus pressure, sinus pain and sore throat  Eyes: Negative for pain and visual disturbance  Respiratory: Negative for cough and shortness of breath  Cardiovascular: Negative for chest pain and palpitations  Gastrointestinal: Negative for abdominal pain and vomiting  Genitourinary: Negative for dysuria and hematuria  Incontinent    Musculoskeletal: Negative for arthralgias and back pain  Skin: Negative for color change and rash  Neurological: Negative for seizures and syncope  Psychiatric/Behavioral: Negative for agitation and decreased concentration  All other systems reviewed and are negative  Objective:     Physical Exam  Vitals and nursing note reviewed  Constitutional:       Appearance: Normal appearance  HENT:      Right Ear: Tympanic membrane normal  There is no impacted cerumen  Ears:      Comments: Hearing aid in Left ear not removed  Nose: Nose normal       Mouth/Throat:      Mouth: Mucous membranes are moist       Pharynx: No posterior oropharyngeal erythema  Eyes:      Extraocular Movements: Extraocular movements intact  Cardiovascular:      Rate and Rhythm: Normal rate  Heart sounds: Normal heart sounds  No murmur heard  Pulmonary:      Effort: Pulmonary effort is normal       Breath sounds: Normal breath sounds  No wheezing  Comments: Decreased lung sounds  Abdominal:      Palpations: Abdomen is soft  Tenderness: There is no abdominal tenderness  Musculoskeletal:         General: Normal range of motion  Cervical back: Normal range of motion  Comments:  In a wheel chair for exam   Skin:     General: Skin is warm and dry    Neurological:      General: No focal deficit present  Mental Status: She is alert  Psychiatric:         Mood and Affect: Mood normal          Behavior: Behavior normal            /60 (BP Location: Left arm, Patient Position: Sitting, Cuff Size: Standard)   Pulse 73   Temp 97 6 °F (36 4 °C) (Tympanic)   Ht 5' 5" (1 651 m)   SpO2 96%   BMI 31 18 kg/m²     Vitals:    04/25/22 1311   BP: 120/60   BP Location: Left arm   Patient Position: Sitting   Cuff Size: Standard   Pulse: 73   Temp: 97 6 °F (36 4 °C)   TempSrc: Tympanic   SpO2: 96%   Height: 5' 5" (1 651 m)       Transitional Care Management Review:  Kdaeem Lees is a 79 y o  female here for TCM follow up  During the TCM phone call patient stated:    TCM Call (since 3/25/2022)     Date and time call was made  4/22/2022 10:51 AM    Date of Admission  04/20/22    Date of discharge  04/22/22    Diagnosis  Kidney Stone     Disposition  Home    Were the patients medications reviewed and updated  No    Current Symptoms  None      TCM Call (since 3/25/2022)     Scheduled for follow up?   Yes    I have advised the patient to call PCP with any new or worsening symptoms  KARISHMA Thomas CRNP

## 2022-04-25 NOTE — TELEPHONE ENCOUNTER
Can you answer this question since you was the last provider to see her today David Cantrell is calling regarding the last message curt sent In thanks          fax number [phone] if needed to fax number                 [phone] David Cantrell

## 2022-04-27 LAB
CALCIUM OXALATE DIHYDRATE MFR STONE IR: 30 %
COLOR STONE: NORMAL
COM MFR STONE: 70 %
COMMENT-STONE3: NORMAL
COMPOSITION: NORMAL
LABORATORY COMMENT REPORT: NORMAL
PHOTO: NORMAL
SIZE STONE: NORMAL MM
SPEC SOURCE SUBJ: NORMAL
STONE ANALYSIS-IMP: NORMAL
STONE ANALYSIS-IMP: NORMAL
WT STONE: 246 MG

## 2022-04-27 NOTE — PHYSICIAN ADVISOR
Current patient class: Inpatient  The patient is currently on Hospital Day: 3 at [address]      The patient was admitted to the hospital at  1:08 AM on 4/21/22 for the following diagnosis:  Kidney stone [N20 0]  Renal calculi [N20 0]  Flank pain [R10 9]       There is documentation in the medical record of an expected length of stay of at least 2 midnights  The patient is therefore expected to satisfy the 2 midnight benchmark and given the 2 midnight presumption is appropriate for INPATIENT ADMISSION  Given this expectation of a satisfying stay, CMS instructs us that the patient is most often appropriate for inpatient admission under part A provided medical necessity is documented in the chart  After review of the relevant documentation, labs, vital signs and test results, the patient is appropriate for INPATIENT ADMISSION  Admission to the hospital as an inpatient is a complex decision making process which requires the practitioner to consider the patients presenting complaint, history and physical examination and all relevant testing  With this in mind, in this case, the patient was deemed appropriate for INPATIENT ADMISSION  After review of the documentation and testing available at the time of the admission I concur with this clinical determination of medical necessity  Rationale is as follows: The patient is a 79 yrs old Female who presented to the ED at 4/20/2022  9:39 PM with a chief complaint of Flank Pain (pt with L flank pain that began at 1600 tonight, pt c/o nausea   no urinary sx) Patient with past medical history of hypertension, dementia, presented to [facility] Emergency room with left flank pain radiating to left lower quadrant since yesterday   On further evaluation noted to have 9 mm obstructing calculus at the left ureteropelvic junction causing severe left hydronephrosis, small to moderate amount of low-density perinephric fluid and fat stranding raising the possibility of Calceal rupture   Multiple nonobstructing left renal calculi noted, incidentally noted finding concerning for sclerosing mesenteritis  Urology was consulted and she underwent cystoscopy, lithotripsy, left ureteral stent placement 4/21/22  She received IV levaquin and several doses IV morphine initially  She received IVF throughout her hospital course  Her cr baseline is 0 6-0 7 and was 1 27 on 4/21 but improved to 0 75 by discharge       The patients vitals on arrival were   ED Triage Vitals [04/20/22 2140]   Temperature Pulse Respirations Blood Pressure SpO2   98 1 °F (36 7 °C) 86 18 (!) 206/98 95 %      Temp Source Heart Rate Source Patient Position - Orthostatic VS BP Location FiO2 (%)   Oral Monitor Lying Right arm --      Pain Score       10 - Worst Possible Pain           Past Medical History:   Diagnosis Date    Arthritis     Basal cell carcinoma     Dry eye syndrome     Foraminal stenosis of cervical region     Macular degeneration     Memory loss     Migraine     Neck pain     Spinal stenosis of lumbar region     Squamous cell skin cancer     Supraventricular tachycardia (HCC)     Vitamin D deficiency     last assessed 2/7/17     Past Surgical History:   Procedure Laterality Date    APPENDECTOMY      BACK SURGERY      lower back surgery lumbar disc    CATARACT EXTRACTION      FEMUR FRACTURE SURGERY      FL RETROGRADE PYELOGRAM  4/21/2022    GA CYSTO/URETERO W/LITHOTRIPSY &INDWELL STENT INSRT Left 4/21/2022    Procedure: CYSTOSCOPY URETEROSCOPY WITH LITHOTRIPSY HOLMIUM LASER, RETROGRADE PYELOGRAM AND INSERTION STENT URETERAL;  Surgeon: Adriana Almanza MD;  Location: [facility];  Service: Urology    GA STEREO TREAT TRIGEMINAL NERVE Right 3/26/2019    Procedure: GLYCEROL RHIZOTOMY TRIGEMINAL NERVES (V1-V3), RIGHT;  Surgeon: Vikki Mcdaniel MD;  Location: [facility];  Service: Neurosurgery    REPLACEMENT TOTAL KNEE Right     REPLACEMENT TOTAL KNEE Left     SKIN BIOPSY             Consults have been placed to:   IP CONSULT TO UROLOGY    Vitals:    04/21/22 1946 04/21/22 2317 04/22/22 0757 04/22/22 0903   BP: 158/81 142/73 103/76    BP Location:       Pulse: 90 84 69    Resp: 20  18    Temp: 98 1 °F (36 7 °C) 98 4 °F (36 9 °C) 97 8 °F (36 6 °C)    TempSrc:       SpO2: 94% 95% 94% 91%   Weight:       Height:           Most recent labs:    No results for input(s): WBC, HGB, HCT, PLT, K, NA, CALCIUM, BUN, CREATININE, LIPASE, AMYLASE, INR, TROPONINI, CKTOTAL, AST, ALT, ALKPHOS, BILITOT in the last 72 hours  Scheduled Meds:  Continuous Infusions:No current facility-administered medications for this encounter  PRN Meds:      Surgical procedures (if appropriate):  Procedure(s):  CYSTOSCOPY URETEROSCOPY WITH LITHOTRIPSY HOLMIUM LASER, RETROGRADE PYELOGRAM AND INSERTION STENT URETERAL

## 2022-04-28 ENCOUNTER — TELEPHONE (OUTPATIENT)
Dept: PAIN MEDICINE | Facility: CLINIC | Age: 82
End: 2022-04-28

## 2022-04-28 NOTE — TELEPHONE ENCOUNTER
See below request  Takes 60mg once per day  Has appt 5/19 with [address], [address]  Can refill be sent?

## 2022-04-28 NOTE — TELEPHONE ENCOUNTER
Pt contacted Call Center requested refill of their medication          Medication Name: DULoxetine (CYMBALTA    Dosage of Med:60 mg delayed release     Frequency of Med: 1x a day       Remaining Medication: 8-9 pills       Pharmacy and Location:  [facility], [address] [place] [address]

## 2022-05-06 ENCOUNTER — TELEPHONE (OUTPATIENT)
Dept: FAMILY MEDICINE CLINIC | Facility: CLINIC | Age: 82
End: 2022-05-06

## 2022-05-09 ENCOUNTER — APPOINTMENT (OUTPATIENT)
Dept: LAB | Facility: HOSPITAL | Age: 82
End: 2022-05-09
Payer: MEDICARE

## 2022-05-09 DIAGNOSIS — R31.0 GROSS HEMATURIA: ICD-10-CM

## 2022-05-09 LAB
BACTERIA UR QL AUTO: ABNORMAL /HPF
BILIRUB UR QL STRIP: NEGATIVE
CLARITY UR: CLEAR
COLOR UR: ABNORMAL
GLUCOSE UR STRIP-MCNC: NEGATIVE MG/DL
HGB UR QL STRIP.AUTO: ABNORMAL
KETONES UR STRIP-MCNC: NEGATIVE MG/DL
LEUKOCYTE ESTERASE UR QL STRIP: ABNORMAL
NITRITE UR QL STRIP: NEGATIVE
NON-SQ EPI CELLS URNS QL MICRO: ABNORMAL /HPF
PH UR STRIP.AUTO: 6.5 [PH]
PROT UR STRIP-MCNC: ABNORMAL MG/DL
RBC #/AREA URNS AUTO: ABNORMAL /HPF
SP GR UR STRIP.AUTO: 1.01 (ref 1–1.03)
UROBILINOGEN UR QL STRIP.AUTO: 0.2 E.U./DL
WBC #/AREA URNS AUTO: ABNORMAL /HPF

## 2022-05-09 PROCEDURE — 81001 URINALYSIS AUTO W/SCOPE: CPT | Performed by: UROLOGY

## 2022-05-09 PROCEDURE — 87086 URINE CULTURE/COLONY COUNT: CPT

## 2022-05-09 NOTE — TELEPHONE ENCOUNTER
3 weeks post op and son is calling to say that patient is in pain and still has blood in her urine    Please call

## 2022-05-09 NOTE — TELEPHONE ENCOUNTER
Spoke with son and explained common stent symptoms and expectations  Reviewed conservative measures and son verbalized understanding   He is aware of appt next week for stent removal

## 2022-05-09 NOTE — TELEPHONE ENCOUNTER
Patients son calling back stating that he had a missed call from the office and stated he was to call back, he provided his phone number [phone] to be reached at and if he is unable to be reached please leave detailed vm

## 2022-05-09 NOTE — TELEPHONE ENCOUNTER
Pts care is managed by the [place] office  Pt was last seen 4/21/22    Procedure: CYSTOSCOPY URETEROSCOPY WITH LITHOTRIPSY HOLMIUM LASER, RETROGRADE PYELOGRAM AND INSERTION STENT URETERAL (Left Bladder)    Pt states she has been having ongoing bleeding of rust colored blood since the day of the surgery 4/21/22  This bleeding has not subsided  Patient has denied the passing of blood clots  Pt reports she is having bladder spasms that have become ongoing at a # 7 daily  Pt does claim she is not well hydrated and will try to consume more water  Pt is requesting medication to treat her bladder spasms  Urine testing has been ordered to rule out urinary tract infection    Please advise   Thank you

## 2022-05-09 NOTE — TELEPHONE ENCOUNTER
Hematuria is common with stent   Encourage hydration, tylenol, and ibuprofen, as well as heating pad

## 2022-05-12 LAB — BACTERIA UR CULT: NORMAL

## 2022-05-13 ENCOUNTER — TELEPHONE (OUTPATIENT)
Dept: FAMILY MEDICINE CLINIC | Facility: CLINIC | Age: 82
End: 2022-05-13

## 2022-05-13 NOTE — TELEPHONE ENCOUNTER
Annemarie Goldstein from [facility] called to tell you patient cancelled PT appointment today due to pain on her side where her kidney stent is  She has an appointment to take care of her stent on Monday  They will reschedule for next week

## 2022-05-16 ENCOUNTER — PROCEDURE VISIT (OUTPATIENT)
Dept: UROLOGY | Facility: CLINIC | Age: 82
End: 2022-05-16
Payer: MEDICARE

## 2022-05-16 VITALS — DIASTOLIC BLOOD PRESSURE: 80 MMHG | HEART RATE: 69 BPM | OXYGEN SATURATION: 93 % | SYSTOLIC BLOOD PRESSURE: 160 MMHG

## 2022-05-16 DIAGNOSIS — N20.0 RENAL CALCULUS, LEFT: Primary | ICD-10-CM

## 2022-05-16 PROCEDURE — 52310 CYSTOSCOPY AND TREATMENT: CPT | Performed by: PHYSICIAN ASSISTANT

## 2022-05-16 PROCEDURE — 96372 THER/PROPH/DIAG INJ SC/IM: CPT

## 2022-05-16 RX ORDER — GENTAMICIN SULFATE 40 MG/ML
1 INJECTION, SOLUTION INTRAMUSCULAR; INTRAVENOUS ONCE
Status: COMPLETED | OUTPATIENT
Start: 2022-05-16 | End: 2022-05-16

## 2022-05-16 RX ADMIN — GENTAMICIN SULFATE 60 MG: 40 INJECTION, SOLUTION INTRAMUSCULAR; INTRAVENOUS at 11:54

## 2022-05-16 NOTE — PROGRESS NOTES
Cystoscopy     Date/Time 5/16/2022 11:54 AM     Performed by  Aj Smallwood PA-C     Authorized by Aj Smallwood PA-C      Universal Protocol:  Consent: Written consent obtained  Consent given by: patient        Procedure Details:  Procedure type: simple removal of a foreign body, stone, or stent    Patient tolerance: Patient tolerated the procedure well with no immediate complications    Additional Procedure Details: Patient was prepped with chlorhexidine  Urojet instilled  Cystoscope passed through urethra into her bladder  Some hematuria present  Distal coil of ureteral stent visualized and grasped  Removed entirely  Patient provided with Gentamicin prophylaxis  Patient will f/u as scheduled with CT to ensure adequate drainage

## 2022-05-19 ENCOUNTER — OFFICE VISIT (OUTPATIENT)
Dept: PAIN MEDICINE | Facility: CLINIC | Age: 82
End: 2022-05-19
Payer: MEDICARE

## 2022-05-19 VITALS — DIASTOLIC BLOOD PRESSURE: 68 MMHG | SYSTOLIC BLOOD PRESSURE: 114 MMHG

## 2022-05-19 DIAGNOSIS — R51.9 FRONTAL HEADACHE: ICD-10-CM

## 2022-05-19 DIAGNOSIS — M54.81 OCCIPITAL NEURALGIA OF RIGHT SIDE: ICD-10-CM

## 2022-05-19 DIAGNOSIS — G50.0 SUPRAORBITAL NEURALGIA: ICD-10-CM

## 2022-05-19 DIAGNOSIS — G89.4 CHRONIC PAIN SYNDROME: Primary | ICD-10-CM

## 2022-05-19 DIAGNOSIS — G50.0 TRIGEMINAL NEURALGIA: ICD-10-CM

## 2022-05-19 PROCEDURE — 99214 OFFICE O/P EST MOD 30 MIN: CPT

## 2022-05-19 RX ORDER — PSYLLIUM SEED (WITH DEXTROSE)
3.4 POWDER (GRAM) ORAL
COMMUNITY
Start: 2022-05-12

## 2022-05-19 RX ORDER — GABAPENTIN 100 MG/1
200 CAPSULE ORAL DAILY
Qty: 180 CAPSULE | Refills: 1 | Status: SHIPPED | OUTPATIENT
Start: 2022-05-19

## 2022-05-19 RX ORDER — GABAPENTIN 300 MG/1
600 CAPSULE ORAL
Qty: 180 CAPSULE | Refills: 1 | Status: SHIPPED | OUTPATIENT
Start: 2022-05-19

## 2022-05-19 NOTE — PROGRESS NOTES
Pain Medicine Follow-Up Note    Assessment:  1  Chronic pain syndrome    2  Frontal headache    3  Supraorbital neuralgia    4  Occipital neuralgia of right side    5  Trigeminal neuralgia        Plan:  Orders Placed This Encounter   Procedures    [place] spine and pain procedure     Dr Wanda Haile     Standing Status:   Future     Standing Expiration Date:   5/19/2026     Order Specific Question:   Reason for Exam:     Answer:   Right supraorbital Nerve Block     Order Specific Question:   Anticoagulant hold needed? Answer:   No       New Medications Ordered This Visit   Medications    Psyllium (Metamucil) 28 3 % POWD     Sig: 3 4 each    gabapentin (NEURONTIN) 300 mg capsule     Sig: Take 2 capsules (600 mg total) by mouth daily at bedtime     Dispense:  180 capsule     Refill:  1    gabapentin (NEURONTIN) 100 mg capsule     Sig: Take 2 capsules (200 mg total) by mouth in the morning  Dispense:  180 capsule     Refill:  1     The patient will take in the morning and will continue 600 mg dose at bedtime       My impressions and treatment recommendations were discussed in detail with the patient who verbalized understanding and had no further questions  The patient is an 65-year-old female with a history of chronic pain secondary to frontal headaches, occipital neuralgia of the right side, supraorbital neuralgia, trigeminal neuralgia and myofascial pain who presents to the office with ongoing pain on the right side of her head just above her right eyebrow  To provide her more relief of her headaches and decrease inflammation, I will order a repeat right supraorbital nerve block to be done  I instructed our  will call to schedule her  Complete risks and benefits including bleeding, infection, tissue reaction, nerve injury and allergic reaction were discussed  The approach was demonstrated using models and literature was provided  Verbal and written consent was obtained      She can continue taking gabapentin and Cymbalta as prescribed  Refills for gabapentin were sent to the patient's pharmacy  Refills for Cymbalta are not needed at this time  Follow-up is planned post injection or sooner as warranted  Discharge instructions were provided  I personally saw and examined the patient and I agree with the above discussed plan of care  History of Present Illness:    Geovany Rizzo is a 79 y o  female with a history of chronic pain secondary to frontal headaches, occipital neuralgia right-side, supraorbital neuralgia, trigeminal neuralgia and myofascial pain who presents to [facility] for interval re-evaluation of the above stated pain complaints  She was last seen on 04/08/2022 where she underwent a right-sided supraorbital nerve block and states she did get relief for 2-3 weeks following that procedure  She presents to the office with ongoing pain on the right side of her head just above her right eyebrow  She states her pain has mildly improved since the last office visit and is intermittent  She rates the quality of her pain as sharp and is currently rating it a 5/10 on a numeric scale  Current pain medications include gabapentin 200 mg daily and 600 mg before bed, she is also on Cymbalta 60 mg  She states these medications provide her mild relief of her pain symptoms and denies any side effects at this time  Other than as stated above, the patient denies any interval changes in medications, medical condition, mental condition, symptoms, or allergies since the last office visit  Review of Systems:    Review of Systems   Respiratory: Negative for shortness of breath  Cardiovascular: Negative for chest pain  Gastrointestinal: Positive for constipation  Negative for diarrhea, nausea and vomiting  Musculoskeletal: Positive for arthralgias and gait problem  Negative for joint swelling and myalgias  Decreased ROM    Skin: Negative for rash  Neurological: Negative for dizziness, seizures and weakness  Memory loss   All other systems reviewed and are negative          Patient Active Problem List   Diagnosis    Chronic nonintractable headache    Trigeminal neuralgia    Occipital neuralgia of right side    Trigeminal neuropathy    Myofascial pain dysfunction syndrome    Other chronic pain    Cervical disc disorder with radiculopathy of niloemci-hpwsbum-zqmdo region    Essential hypertension    Mixed hyperlipidemia    Insomnia    Ambulatory dysfunction    Cognitive deficits    Moderate episode of recurrent major depressive disorder (HCC)    Mixed incontinence, 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) (female)    Hearing loss    Macular degeneration    Vitamin B12 deficiency    Other specified inflammatory spondylopathies, cervical region ([address]    Dementia without behavioral disturbance, unspecified dementia type ([address] )    Uncomplicated opioid dependence ([address] )    Renal calculus, left    Abnormal CT of the abdomen    Acute kidney injury ([address] )    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[address] )       Past Medical History:   Diagnosis Date    Arthritis     Basal cell carcinoma     Dry eye syndrome     Foraminal stenosis of cervical region     Macular degeneration     Memory loss     Migraine     Neck pain     Spinal stenosis of lumbar region     Squamous cell skin cancer     Supraventricular tachycardia ([address] )     Vitamin D deficiency     last assessed 2/7/17       Past Surgical History:   Procedure Laterality Date    APPENDECTOMY      BACK SURGERY      lower back surgery lumbar disc    CATARACT EXTRACTION      FEMUR FRACTURE SURGERY      FL RETROGRADE PYELOGRAM  04/21/2022    KIDNEY STONE SURGERY Left     ND CYSTO/URETERO W/LITHOTRIPSY &INDWELL STENT INSRT Left 04/21/2022    Procedure: CYSTOSCOPY URETEROSCOPY WITH LITHOTRIPSY HOLMIUM LASER, RETROGRADE PYELOGRAM AND INSERTION STENT URETERAL;  Surgeon: Garcia Stone MD;  Location: [facility]; Service: Urology    VA STEREO TREAT TRIGEMINAL NERVE Right 03/26/2019    Procedure: GLYCEROL RHIZOTOMY TRIGEMINAL NERVES (V1-V3), RIGHT;  Surgeon: Vivi Kowalski MD;  Location: [facility];  Service: Neurosurgery    REPLACEMENT TOTAL KNEE Right     REPLACEMENT TOTAL KNEE Left     SKIN BIOPSY         Family History   Problem Relation Age of Onset    Aortic aneurysm Mother         abdominal aortic aneurysm    Hypertension Mother    Clarice Ravi Breast cancer Mother    Clarice Ravi Hypertension Father     Hypertension Sister     Hyperlipidemia Sister     Squamous cell carcinoma Sister        Social History     Occupational History    Occupation: retired   Tobacco Use    Smoking status: Former Smoker    Smokeless tobacco: Never Used   Vaping Use    Vaping Use: Never used   Substance and Sexual Activity    Alcohol use: Never     Comment: occasional    Drug use: Yes     Types: Marijuana     Comment: medical marijuana    Sexual activity: Not Currently     Partners: Male         Current Outpatient Medications:     Acetaminophen 325 MG CAPS, Take 650 mg by mouth every 6 (six) hours as needed, Disp: , Rfl:     Cholecalciferol (VITAMIN D PO), Take 5,000 Units by mouth Daily  , Disp: , Rfl:     DULoxetine (CYMBALTA) 60 mg delayed release capsule, Take 2 caps by mouth daily, Disp: 120 capsule, Rfl: 2    gabapentin (NEURONTIN) 100 mg capsule, Take 2 capsules (200 mg total) by mouth in the morning , Disp: 180 capsule, Rfl: 1    gabapentin (NEURONTIN) 300 mg capsule, Take 2 capsules (600 mg total) by mouth daily at bedtime, Disp: 180 capsule, Rfl: 1    Psyllium (Metamucil) 28 3 % POWD, 3 4 each, Disp: , Rfl:     verapamil (VERELAN PM) 120 MG 24 hr capsule, TAKE 1 CAPSULE(120 MG) BY MOUTH DAILY AT BEDTIME, Disp: 90 capsule, Rfl: 1    vitamin B-12 (CYANOCOBALAMIN) 250 MCG tablet, Take 1 tablet (250 mcg total) by mouth daily, Disp: 90 tablet, Rfl: 0    Allergies   Allergen Reactions    Dilaudid [Hydromorphone] Shortness Of Breath Other reaction(s): Respiratory Distress  Other reaction(s): HORENESS    Penicillins Rash     Rash       Physical Exam:    /68 (BP Location: Right arm, Patient Position: Sitting, Cuff Size: Standard)     Constitutional:normal, well developed, well nourished, alert, in no distress and non-toxic and no overt pain behavior  Eyes:anicteric  HEENT:grossly intact  Neck:supple, symmetric, trachea midline and no masses   Pulmonary:even and unlabored  Cardiovascular:No edema or pitting edema present  Skin:Normal without rashes or lesions and well hydrated  Psychiatric:Mood and affect appropriate  Neurologic:Cranial Nerves II-XII grossly intact  Musculoskeletal:antalgic and Ambulates with a roller walker  Pain with palpation on right side of head anteriorly  Pain with palpation at the base of her skull on the right side        Imaging  FL spine and pain procedure    (Results Pending)         Orders Placed This Encounter   Procedures    FL spine and pain procedure

## 2022-05-20 ENCOUNTER — TELEPHONE (OUTPATIENT)
Dept: FAMILY MEDICINE CLINIC | Facility: CLINIC | Age: 82
End: 2022-05-20

## 2022-05-20 NOTE — TELEPHONE ENCOUNTER
Called Cayden Sanabria and made her aware that Evette Gaxiola has given her a verbal to continue physical therapy

## 2022-05-20 NOTE — TELEPHONE ENCOUNTER
Patient needs recertification for physical therapy  Her current order ends 5/31/2022  Reynaldo Gil would like a call back for verbal confirmation

## 2022-05-27 NOTE — TELEPHONE ENCOUNTER
Karen Torres called with an update on Ameena Wetzel  She is moving from regular physical therapy to maintenance physical therapy to prevent the patient from regressing

## 2022-06-01 ENCOUNTER — HOSPITAL ENCOUNTER (OUTPATIENT)
Dept: CT IMAGING | Facility: CLINIC | Age: 82
Discharge: HOME | End: 2022-06-01
Payer: MEDICARE

## 2022-06-01 DIAGNOSIS — R93.89 ABNORMAL CT SCAN: ICD-10-CM

## 2022-06-01 PROCEDURE — 74177 CT ABD & PELVIS W/CONTRAST: CPT

## 2022-06-01 PROCEDURE — G1004 CDSM NDSC: HCPCS

## 2022-06-01 RX ADMIN — IOHEXOL 100 ML: 350 INJECTION, SOLUTION INTRAVENOUS at 12:57

## 2022-06-03 ENCOUNTER — TELEPHONE (OUTPATIENT)
Dept: PAIN MEDICINE | Facility: CLINIC | Age: 82
End: 2022-06-03

## 2022-06-03 NOTE — TELEPHONE ENCOUNTER
Julieth Paris- son  [phone]  Dr Alicia Bonner     Please reach out for scheduling   Julieth Paris said that his mother has been waiting since 5/19/22 thank you

## 2022-06-03 NOTE — TELEPHONE ENCOUNTER
Patient called frustrated I offered to schedule procedure for 6/24 at 10:15 am she declined she would like to speak with Dr Alicia Bonner directly       Please advise    Thank you

## 2022-06-08 ENCOUNTER — NURSE TRIAGE (OUTPATIENT)
Dept: OTHER | Facility: OTHER | Age: 82
End: 2022-06-08

## 2022-06-08 ENCOUNTER — APPOINTMENT (OUTPATIENT)
Dept: LAB | Facility: HOSPITAL | Age: 82
End: 2022-06-08
Payer: MEDICARE

## 2022-06-08 DIAGNOSIS — N39.0 URINARY TRACT INFECTION WITHOUT HEMATURIA, SITE UNSPECIFIED: ICD-10-CM

## 2022-06-08 DIAGNOSIS — N39.0 URINARY TRACT INFECTION WITHOUT HEMATURIA, SITE UNSPECIFIED: Primary | ICD-10-CM

## 2022-06-08 LAB
BACTERIA UR QL AUTO: ABNORMAL /HPF
BILIRUB UR QL STRIP: NEGATIVE
CLARITY UR: ABNORMAL
COLOR UR: ABNORMAL
GLUCOSE UR STRIP-MCNC: NEGATIVE MG/DL
HGB UR QL STRIP.AUTO: ABNORMAL
KETONES UR STRIP-MCNC: NEGATIVE MG/DL
LEUKOCYTE ESTERASE UR QL STRIP: ABNORMAL
NITRITE UR QL STRIP: NEGATIVE
NON-SQ EPI CELLS URNS QL MICRO: ABNORMAL /HPF
PH UR STRIP.AUTO: 6 [PH]
PROT UR STRIP-MCNC: NEGATIVE MG/DL
RBC #/AREA URNS AUTO: ABNORMAL /HPF
SP GR UR STRIP.AUTO: <=1.005 (ref 1–1.03)
UROBILINOGEN UR QL STRIP.AUTO: 0.2 E.U./DL
WBC #/AREA URNS AUTO: ABNORMAL /HPF

## 2022-06-08 PROCEDURE — 81001 URINALYSIS AUTO W/SCOPE: CPT

## 2022-06-08 PROCEDURE — 87186 SC STD MICRODIL/AGAR DIL: CPT

## 2022-06-08 PROCEDURE — 87077 CULTURE AEROBIC IDENTIFY: CPT

## 2022-06-08 PROCEDURE — 87086 URINE CULTURE/COLONY COUNT: CPT

## 2022-06-08 NOTE — TELEPHONE ENCOUNTER
I scheduled patient on 6/24 at 3:30pm for Right supraorbital Nerve Block  Please ensure this is appropriate

## 2022-06-08 NOTE — TELEPHONE ENCOUNTER
Regarding: possible UTI  ----- Message from Anita Fuentes sent at 6/8/2022 12:16 PM EDT -----  "My mother has pain and burning with urination and also frequency  "

## 2022-06-08 NOTE — TELEPHONE ENCOUNTER
Spoke to "[employer] in the office  She reviewed last CT  Patient to increase water intake, avoid bladder irritants and avoid constipation  Agreed for urine testing  When Malia calls back please review this information regarding patient

## 2022-06-08 NOTE — TELEPHONE ENCOUNTER
Patient's son reports that patient has been having ongoing urinary symptoms from kidney stones, but recently started with burning, pain with urination and frequency  He would like her to have a UA and would like a call back from the [facility] office with care advice  UA ordered  Reason for Disposition   All other urine symptoms    Answer Assessment - Initial Assessment Questions  1  SYMPTOM: "What's the main symptom you're concerned about?" (e g , frequency, incontinence)      Frequency, pain, burning   2  ONSET: "When did the symptoms start?"      A few days ago   3  PAIN: "Is there any pain?" If Yes, ask: "How bad is it?" (Scale: 1-10; mild, moderate, severe)      Moderate   4  CAUSE: "What do you think is causing the symptoms?"      UTI   5  OTHER SYMPTOMS: "Do you have any other symptoms?" (e g , fever, flank pain, blood in urine, pain with urination)      Pain with urination, denies fever   6   PREGNANCY: "Is there any chance you are pregnant?" "When was your last menstrual period?"     Denies    Protocols used: URINARY SYMPTOMS-ADULT-[place]

## 2022-06-09 ENCOUNTER — NURSE TRIAGE (OUTPATIENT)
Dept: OTHER | Facility: OTHER | Age: 82
End: 2022-06-09

## 2022-06-09 DIAGNOSIS — N39.0 URINARY TRACT INFECTION WITHOUT HEMATURIA, SITE UNSPECIFIED: ICD-10-CM

## 2022-06-09 RX ORDER — NITROFURANTOIN 25; 75 MG/1; MG/1
100 CAPSULE ORAL 2 TIMES DAILY
Qty: 10 CAPSULE | Refills: 0 | Status: SHIPPED | OUTPATIENT
Start: 2022-06-09 | End: 2022-06-09 | Stop reason: SDUPTHER

## 2022-06-09 RX ORDER — NITROFURANTOIN 25; 75 MG/1; MG/1
100 CAPSULE ORAL 2 TIMES DAILY
Qty: 10 CAPSULE | Refills: 0 | Status: SHIPPED | OUTPATIENT
Start: 2022-06-09 | End: 2022-06-14

## 2022-06-09 NOTE — TELEPHONE ENCOUNTER
Reason for Disposition   [1] Prescription prescribed recently is not at pharmacy AND [2] triager has access to patient's EMR AND [3] prescription is recorded in the EMR    Answer Assessment - Initial Assessment Questions  1  NAME of MEDICATION: "What medicine are you calling about?"      Pts son states, "Her antibiotic is not at the pharmacy "    Reviewed meds, [place] was sent incorrectly  Re-send under "normal" class  Verified confirmation receipt of rx to pharmacy      Protocols used: MEDICATION QUESTION CALL-ADULT-

## 2022-06-09 NOTE — TELEPHONE ENCOUNTER
Patient's son called to see update on results for the urine test from yesterday I did advise UC is not bad yet he still wants to see if antibiotics will be begin yet or not

## 2022-06-09 NOTE — TELEPHONE ENCOUNTER
Regarding: medication not at pharmacy   ----- Message from Breana Butler sent at 6/9/2022  4:33 PM EDT -----  " There is an issue with the medication not reaching the pharmacy and my mother really needs it :

## 2022-06-10 ENCOUNTER — TELEPHONE (OUTPATIENT)
Dept: FAMILY MEDICINE CLINIC | Facility: CLINIC | Age: 82
End: 2022-06-10

## 2022-06-10 LAB
BACTERIA UR CULT: ABNORMAL
BACTERIA UR CULT: ABNORMAL

## 2022-06-10 NOTE — TELEPHONE ENCOUNTER
Marilee Dominique from [facility]   The son delayed PT visit   Patient has a UTI   Missed visit will be faxed over for you to sign

## 2022-06-14 ENCOUNTER — OFFICE VISIT (OUTPATIENT)
Dept: UROLOGY | Facility: CLINIC | Age: 82
End: 2022-06-14
Payer: MEDICARE

## 2022-06-14 VITALS
OXYGEN SATURATION: 97 % | BODY MASS INDEX: 31.18 KG/M2 | DIASTOLIC BLOOD PRESSURE: 74 MMHG | HEIGHT: 65 IN | SYSTOLIC BLOOD PRESSURE: 118 MMHG | HEART RATE: 77 BPM

## 2022-06-14 DIAGNOSIS — N20.0 NEPHROLITHIASIS: Primary | ICD-10-CM

## 2022-06-14 PROCEDURE — 99213 OFFICE O/P EST LOW 20 MIN: CPT | Performed by: PHYSICIAN ASSISTANT

## 2022-06-14 NOTE — PROGRESS NOTES
6/14/2022      Chief Complaint   Patient presents with    Nephrolithiasis     Assessment and Plan    1  Nephrolithiasis  - S/p left ureteroscopy with laser lithotripsy on 4/21/22  - CT from 6/1/22 - no hydronephrosis or ureteral calculi  Multiple nonobstructing punctate left renal calculi  - Stone analysis - CaOx  - Recommend dietary modifications and proper hydration to prevent future kidney stone formation  - Will f/u in 1 year with KUB and US to monitor stone burden  If no significant stone burden at that time can discontinue routine imaging  2  UTI  - Urine culture from 6/8/22 positive for E Coli  - Took last dose of antibiotic today  - Denies any dysuria, hematuria, flank pain, suprapubic pressure, fever, or chills  She does have chronic ongoing left-sided lower abdominal pain  CT as above negative for obstructive uropathy  CT did show incidental finding of nonspecific finding in the mesentery along the left anthony abdomen and recommend she follow-up with PCP regarding this finding   - Recommend proper hydration, avoiding constipation, probiotics and cranberry supplementation to prevent UTIs  - she is advised to contact our office if she has to experience any signs and symptoms of recurrent UTI for urine testing  History of Present Illness  Ameya Granger is a 79 y o  female here for follow up evaluation of  nephrolithiasis  Patient was initially seen in the hospital for left-sided flank pain and CT findings of obstructing left ureteral calculus  She underwent left ureteroscopy with laser lithotripsy on 04/21/2022  Ureteral stent was removed on 05/16/2022  She underwent CT on 06/01/2022 which showed resolution of hydronephrosis  Did show multiple nonobstructing left renal calculi, punctate  She did develop urinary tract infection with positive urine culture growing E coli on 06/08/2022  She is currently on Macrobid  Stone analysis from surgery was primarily calcium oxalate in composition    She does have ongoing chronic left-sided lower abdominal pain  Denies any dysuria, hematuria, flank pain, fever, chills, or suprapubic pain or pressure  She denies any history of recurrent UTIs  Review of Systems   Constitutional: Negative for chills and fever  Respiratory: Negative for shortness of breath  Cardiovascular: Negative for chest pain  Gastrointestinal: Positive for abdominal pain  Negative for nausea and vomiting  Genitourinary: Negative for difficulty urinating, dysuria, flank pain, frequency, hematuria, pelvic pain and urgency  Neurological: Negative for dizziness         Past Medical History  Past Medical History:   Diagnosis Date    Arthritis     Basal cell carcinoma     Dry eye syndrome     Foraminal stenosis of cervical region     Macular degeneration     Memory loss     Migraine     Neck pain     Spinal stenosis of lumbar region     Squamous cell skin cancer     Supraventricular tachycardia (HCC)     Vitamin D deficiency     last assessed 2/7/17       Past Social History  Past Surgical History:   Procedure Laterality Date    APPENDECTOMY      BACK SURGERY      lower back surgery lumbar disc    CATARACT EXTRACTION      FEMUR FRACTURE SURGERY      FL RETROGRADE PYELOGRAM  04/21/2022    KIDNEY STONE SURGERY Left     GA CYSTO/URETERO W/LITHOTRIPSY &INDWELL STENT INSRT Left 04/21/2022    Procedure: CYSTOSCOPY URETEROSCOPY WITH LITHOTRIPSY HOLMIUM LASER, RETROGRADE PYELOGRAM AND INSERTION STENT URETERAL;  Surgeon: Gregory Hawkins MD;  Location: [facility];  Service: Urology    GA STEREO TREAT TRIGEMINAL NERVE Right 03/26/2019    Procedure: GLYCEROL RHIZOTOMY TRIGEMINAL NERVES (V1-V3), RIGHT;  Surgeon: Delmy Melendez MD;  Location:  MAIN OR;  Service: Neurosurgery    REPLACEMENT TOTAL KNEE Right     REPLACEMENT TOTAL KNEE Left     SKIN BIOPSY       Social History     Tobacco Use   Smoking Status Former Smoker   Smokeless Tobacco Never Used       Past Family History  Family History   Problem Relation Age of Onset    Aortic aneurysm Mother         abdominal aortic aneurysm    Hypertension Mother    [facility] Breast cancer Mother     Hypertension Father     Hypertension Sister     Hyperlipidemia Sister     Squamous cell carcinoma Sister        Past Social history  Social History     Socioeconomic History    Marital status:      Spouse name: Not on file    Number of children: Not on file    Years of education: Not on file    Highest education level: Not on file   Occupational History    Occupation: retired   Tobacco Use    Smoking status: Former Smoker    Smokeless tobacco: Never Used   Vaping Use    Vaping Use: Never used   Substance and Sexual Activity    Alcohol use: Never     Comment: occasional    Drug use: Yes     Types: Marijuana     Comment: medical marijuana    Sexual activity: Not Currently     Partners: Male   Other Topics Concern    Not on file   Social History Narrative    Always uses seatbelt     Social Determinants of Health     Financial Resource Strain: Not on file   Food Insecurity: Not on file   Transportation Needs: Not on file   Physical Activity: Not on file   Stress: Not on file   Social Connections: Not on file   Intimate Partner Violence: Not on file   Housing Stability: Not on file       Current Medications  Current Outpatient Medications   Medication Sig Dispense Refill    Acetaminophen 325 MG CAPS Take 650 mg by mouth every 6 (six) hours as needed      Cholecalciferol (VITAMIN D PO) Take 5,000 Units by mouth Daily        DULoxetine (CYMBALTA) 60 mg delayed release capsule Take 2 caps by mouth daily 120 capsule 2    gabapentin (NEURONTIN) 100 mg capsule Take 2 capsules (200 mg total) by mouth in the morning   180 capsule 1    gabapentin (NEURONTIN) 300 mg capsule Take 2 capsules (600 mg total) by mouth daily at bedtime 180 capsule 1    nitrofurantoin (MACROBID) 100 mg capsule Take 1 capsule (100 mg total) by mouth 2 (two) times a day for 5 days 10 capsule 0    Psyllium (Metamucil) 28 3 % POWD 3 4 each      verapamil (VERELAN PM) 120 MG 24 hr capsule TAKE 1 CAPSULE(120 MG) BY MOUTH DAILY AT BEDTIME 90 capsule 1    vitamin B-12 (CYANOCOBALAMIN) 250 MCG tablet Take 1 tablet (250 mcg total) by mouth daily 90 tablet 0     No current facility-administered medications for this visit  Allergies  Allergies   Allergen Reactions    Dilaudid [Hydromorphone] Shortness Of Breath     Other reaction(s): Respiratory Distress  Other reaction(s): HORENESS    Penicillins Rash     Rash         The following portions of the patient's history were reviewed and updated as appropriate: allergies, current medications, past medical history, past social history, past surgical history and problem list       Vitals  Vitals:    06/14/22 1111   BP: 118/74   Pulse: 77   SpO2: 97%   Height: 5' 5" (1 651 m)           Physical Exam  Physical Exam  Constitutional:       Appearance: Normal appearance  Comments: Accompanied by son   HENT:      Head: Normocephalic and atraumatic  Right Ear: External ear normal       Left Ear: External ear normal    Eyes:      General: No scleral icterus  Conjunctiva/sclera: Conjunctivae normal    Cardiovascular:      Pulses: Normal pulses  Pulmonary:      Effort: Pulmonary effort is normal    Musculoskeletal:      Cervical back: Normal range of motion  Comments: Ambulating with use of wheelchair   Neurological:      General: No focal deficit present  Mental Status: She is alert and oriented to person, place, and time  Psychiatric:         Mood and Affect: Mood normal          Behavior: Behavior normal          Thought Content: Thought content normal          Judgment: Judgment normal       Comments: Forgetful at times           Results  No results found for this or any previous visit (from the past 1 hour(s))  ]  No results found for: PSA  Lab Results   Component Value Date    CALCIUM 9 0 04/22/2022 K 4 3 04/22/2022    CO2 28 04/22/2022     04/22/2022    BUN 14 04/22/2022    CREATININE 0 75 04/22/2022     Lab Results   Component Value Date    WBC 9 37 04/22/2022    HGB 12 4 04/22/2022    HCT 38 1 04/22/2022     (H) 04/22/2022     04/22/2022           Orders  Orders Placed This Encounter   Procedures    XR abdomen 1 view kub     Standing Status:   Future     Standing Expiration Date:   6/14/2026     Scheduling Instructions:      Bring along any outside films relating to this procedure   US kidney and bladder     Standing Status:   Future     Standing Expiration Date:   6/14/2026     Scheduling Instructions:      "Prep required if being scheduled in conjunction with other studies, refer to those examination's Preps first before scheduling  All patients for US Kidney and Bladder they must drink 24 oz of water 60 minutes before your scheduled appointment time  This test requires you to have a FULL bladder  Please do not urinate before your test             If you have difficulty holding your urine please arrive 30 minutes early to complete your drinking prep  You may take all of your medications for this test             Pediatric Preps-birth to 12 years             Infants and toddlers to 1 year of age- no drinking prep or restrictions             Toddlers (33 year old)- just give liquids , any clear liquid or juice, and hour prior to the exam             3years old and older- drink liquids (approx  16 to 24 oz and no soda) 30 minutes before, try to not let the child void until the test is completed            Please bring your insurance cards, a form of photo ID and a list of yourmedications with you  Arrive 15 minutes prior to your appointment time in order to register  If you need to have lab work or a urinalysis, please do this AFTER your ultrasound  "            To schedule this appointment, please contact Central Scheduling at [phone]  Order Specific Question:   Is a Renal Artery Doppler also being requested in addition to the Kidney/Renal ultrasound ?      Answer:   No       Fausto Hodge PA-C

## 2022-06-18 DIAGNOSIS — I10 ESSENTIAL HYPERTENSION: ICD-10-CM

## 2022-06-21 RX ORDER — VERAPAMIL HYDROCHLORIDE 120 MG/1
120 CAPSULE, EXTENDED RELEASE ORAL
Qty: 90 CAPSULE | Refills: 0 | Status: SHIPPED | OUTPATIENT
Start: 2022-06-21 | End: 2022-06-23

## 2022-06-23 DIAGNOSIS — I10 ESSENTIAL HYPERTENSION: ICD-10-CM

## 2022-06-23 RX ORDER — VERAPAMIL HYDROCHLORIDE 120 MG/1
120 CAPSULE, EXTENDED RELEASE ORAL
Qty: 90 CAPSULE | Refills: 0 | Status: SHIPPED | OUTPATIENT
Start: 2022-06-23

## 2022-06-23 RX ORDER — VERAPAMIL HYDROCHLORIDE 120 MG/1
CAPSULE, EXTENDED RELEASE ORAL
Qty: 90 CAPSULE | Refills: 0 | Status: SHIPPED | OUTPATIENT
Start: 2022-06-23 | End: 2022-06-23 | Stop reason: SDUPTHER

## 2022-06-23 NOTE — TELEPHONE ENCOUNTER
Patient would like to refill verapamil (VERELAN PM) 120 MG 24 hr capsule,  She only has 1 week worth left

## 2022-06-24 ENCOUNTER — TELEPHONE (OUTPATIENT)
Dept: FAMILY MEDICINE CLINIC | Facility: CLINIC | Age: 82
End: 2022-06-24

## 2022-06-24 ENCOUNTER — APPOINTMENT (OUTPATIENT)
Dept: LAB | Facility: HOSPITAL | Age: 82
End: 2022-06-24
Payer: MEDICARE

## 2022-06-24 ENCOUNTER — PROCEDURE VISIT (OUTPATIENT)
Dept: PAIN MEDICINE | Facility: CLINIC | Age: 82
End: 2022-06-24
Payer: MEDICARE

## 2022-06-24 DIAGNOSIS — N30.00 ACUTE CYSTITIS WITHOUT HEMATURIA: ICD-10-CM

## 2022-06-24 DIAGNOSIS — R30.0 DYSURIA: Primary | ICD-10-CM

## 2022-06-24 DIAGNOSIS — R30.0 DYSURIA: ICD-10-CM

## 2022-06-24 DIAGNOSIS — G50.0 SUPRAORBITAL NEURALGIA: Primary | ICD-10-CM

## 2022-06-24 DIAGNOSIS — G50.0 TRIGEMINAL NEURALGIA: ICD-10-CM

## 2022-06-24 LAB
BACTERIA UR QL AUTO: ABNORMAL /HPF
BILIRUB UR QL STRIP: NEGATIVE
CLARITY UR: ABNORMAL
COLOR UR: YELLOW
GLUCOSE UR STRIP-MCNC: NEGATIVE MG/DL
HGB UR QL STRIP.AUTO: ABNORMAL
KETONES UR STRIP-MCNC: NEGATIVE MG/DL
LEUKOCYTE ESTERASE UR QL STRIP: ABNORMAL
NITRITE UR QL STRIP: NEGATIVE
NON-SQ EPI CELLS URNS QL MICRO: ABNORMAL /HPF
PH UR STRIP.AUTO: 6 [PH]
PROT UR STRIP-MCNC: ABNORMAL MG/DL
RBC #/AREA URNS AUTO: ABNORMAL /HPF
SP GR UR STRIP.AUTO: 1.02 (ref 1–1.03)
UROBILINOGEN UR QL STRIP.AUTO: 0.2 E.U./DL
WBC #/AREA URNS AUTO: ABNORMAL /HPF

## 2022-06-24 PROCEDURE — 64450 NJX AA&/STRD OTHER PN/BRANCH: CPT | Performed by: STUDENT IN AN ORGANIZED HEALTH CARE EDUCATION/TRAINING PROGRAM

## 2022-06-24 PROCEDURE — 87086 URINE CULTURE/COLONY COUNT: CPT

## 2022-06-24 PROCEDURE — 81001 URINALYSIS AUTO W/SCOPE: CPT | Performed by: NURSE PRACTITIONER

## 2022-06-24 RX ORDER — BUPIVACAINE HYDROCHLORIDE 2.5 MG/ML
1 INJECTION, SOLUTION EPIDURAL; INFILTRATION; INTRACAUDAL ONCE
Status: COMPLETED | OUTPATIENT
Start: 2022-06-24 | End: 2022-06-24

## 2022-06-24 RX ORDER — METHYLPREDNISOLONE ACETATE 40 MG/ML
20 INJECTION, SUSPENSION INTRA-ARTICULAR; INTRALESIONAL; INTRAMUSCULAR; SOFT TISSUE ONCE
Status: COMPLETED | OUTPATIENT
Start: 2022-06-24 | End: 2022-06-24

## 2022-06-24 RX ORDER — SULFAMETHOXAZOLE AND TRIMETHOPRIM 800; 160 MG/1; MG/1
1 TABLET ORAL EVERY 12 HOURS SCHEDULED
Qty: 10 TABLET | Refills: 0 | Status: SHIPPED | OUTPATIENT
Start: 2022-06-24 | End: 2022-06-29

## 2022-06-24 RX ADMIN — METHYLPREDNISOLONE ACETATE 20 MG: 40 INJECTION, SUSPENSION INTRA-ARTICULAR; INTRALESIONAL; INTRAMUSCULAR; SOFT TISSUE at 16:21

## 2022-06-24 RX ADMIN — BUPIVACAINE HYDROCHLORIDE 1 ML: 2.5 INJECTION, SOLUTION EPIDURAL; INFILTRATION; INTRACAUDAL at 16:20

## 2022-06-24 NOTE — TELEPHONE ENCOUNTER
Radha acosta son is bringing in urine this afternoon he wants to know if she needs medication for uti if it will be called in before weekend he doesn't want her to wait to [facility], [facility]

## 2022-06-24 NOTE — PROGRESS NOTES
Nerve block    Date/Time: 6/24/2022 3:56 PM  Performed by: Aidee Canales MD  Authorized by: Aidee Canales MD     Patient location:  Bedside  [place] Protocol:  Consent: Verbal consent obtained  Written consent obtained  Risks and benefits: risks, benefits and alternatives were discussed  Consent given by: patient  Timeout called at: 6/24/2022 3:56 PM   Patient understanding: patient states understanding of the procedure being performed  Patient consent: the patient's understanding of the procedure matches consent given  Procedure consent: procedure consent matches procedure scheduled  Relevant documents: relevant documents present and verified  Test results: test results available and properly labeled  Site marked: the operative site was marked  Radiology Images displayed and confirmed  If images not available, report reviewed: imaging studies available      Indications:     Indications:  Pain relief  Location:     Body area:  Head    Head nerve blocked: right supraorbital     Nerve type:  Peripheral    Laterality:  Right  Pre-procedure details:     Skin preparation:  2% chlorhexidine  Skin anesthesia (see MAR for exact dosages):     Skin anesthesia method:  None  Procedure details (see MAR for exact dosages): Block needle gauge:  30 G  Post-procedure details:     Dressing:  None    Outcome:  Pain improved    Patient tolerance of procedure:   Tolerated well, no immediate complications  Comments:      PROCEDURE NOTE    PATIENT NAME:  Jessica Cat [place]    MEDICAL RECORD NUMBER:  [id number]    YOB: 1940    DATE OF PROCEDURE:  06/24/22    PROCEDURE:  Right supraorbital nerve block with landmark technique     ATTENDING PHYSICIAN: Aidee Canales MD     PRE-PROCEDURE DIAGNOSIS:  RIGHT SUPRAORBITAL NEURALGIA     POST-PROCEDURE DIAGNOSIS:  Diagnosis unchanged     ANESTHESIA:  None     ESTIMATED BLOOD LOSS:  Minimal     COMPLICATIONS:  None        CONSENT:  Today's procedure, its benefits, risks, alternatives were discussed in detail with the patient  Specific risks discussed included, but were not limited to bleeding, infection, nerve damage, damage to adjacent structures, hematoma, abscess, failure of the pain to improve, worsening of the pain, and reaction to medications administered  The patient verbalized understanding  Written informed consent was obtained      DESCRIPTION OF PROCEDURE:  After informed consent was obtained, the patient was placed in a seated position on the exam table  The left supraorbital notch was palpated over the right eye  The point of maximum tenderness was appreciated and marked  The area was then prepped with alcohol solution  Using strict aseptic technique, a 1 5 mL injectate was drawn up which consisted of 1 mL of 0 25% bupivacaine and 0 5 ML dexamethasone 10mg/ml        Using a 27-gauge 1 5 inch needle, the skin was entered in a perpendicular fashion over the point of maximum tenderness and advanced from a lateral to medial approach until os at the supraorbital notch was contacted  After negative aspiration, the above solution was injected      The needle was with withdrawn, tip intact  Hemostasis was maintained  No paresthesias or complications were noted  The skin was wiped clean and a band aid was placed as appropriate  The patient was monitored in the exam room following the procedure, during which time she remained neurologically and hemodynamically stable  Ultimately,  Ms Alethea Morel was discharged home with instructions to call the office in 7 days with an update or sooner should symptoms warrant

## 2022-06-26 LAB — BACTERIA UR CULT: NORMAL

## 2022-07-01 NOTE — TELEPHONE ENCOUNTER
Okay spoke to Rosa Cannon he said he will see if he is able to have [employer] in a cup   If he can bring it in by 5 he will   However if he is unable to he would be able to take it to the lab first thing tomorrow morning

## 2022-07-01 NOTE — TELEPHONE ENCOUNTER
Hari Damon called back in  UTI is much better   She still has a mild discomfort   Would like to to know how to move forward

## 2022-07-05 DIAGNOSIS — R30.0 DYSURIA: ICD-10-CM

## 2022-07-05 DIAGNOSIS — N30.00 ACUTE CYSTITIS WITHOUT HEMATURIA: Primary | ICD-10-CM

## 2022-07-05 LAB
BACTERIA UR QL AUTO: ABNORMAL /HPF
BILIRUB UR QL STRIP: NEGATIVE
CLARITY UR: CLEAR
COLOR UR: ABNORMAL
GLUCOSE UR STRIP-MCNC: NEGATIVE MG/DL
HGB UR QL STRIP.AUTO: NEGATIVE
KETONES UR STRIP-MCNC: NEGATIVE MG/DL
LEUKOCYTE ESTERASE UR QL STRIP: NEGATIVE
NITRITE UR QL STRIP: NEGATIVE
NON-SQ EPI CELLS URNS QL MICRO: ABNORMAL /HPF
PH UR STRIP.AUTO: 6 [PH]
PROT UR STRIP-MCNC: NEGATIVE MG/DL
RBC #/AREA URNS AUTO: ABNORMAL /HPF
SP GR UR STRIP.AUTO: 1.01 (ref 1–1.03)
UROBILINOGEN UR STRIP-ACNC: <2 MG/DL
WBC #/AREA URNS AUTO: ABNORMAL /HPF

## 2022-07-05 PROCEDURE — 87086 URINE CULTURE/COLONY COUNT: CPT | Performed by: NURSE PRACTITIONER

## 2022-07-05 PROCEDURE — 81001 URINALYSIS AUTO W/SCOPE: CPT | Performed by: NURSE PRACTITIONER

## 2022-07-06 LAB — BACTERIA UR CULT: NORMAL

## 2022-07-08 ENCOUNTER — OFFICE VISIT (OUTPATIENT)
Dept: FAMILY MEDICINE CLINIC | Facility: CLINIC | Age: 82
End: 2022-07-08
Payer: MEDICARE

## 2022-07-08 VITALS
TEMPERATURE: 96.5 F | OXYGEN SATURATION: 96 % | DIASTOLIC BLOOD PRESSURE: 62 MMHG | HEART RATE: 76 BPM | SYSTOLIC BLOOD PRESSURE: 122 MMHG

## 2022-07-08 DIAGNOSIS — Z00.00 HEALTHCARE MAINTENANCE: ICD-10-CM

## 2022-07-08 DIAGNOSIS — N20.0 RENAL CALCULUS, LEFT: ICD-10-CM

## 2022-07-08 DIAGNOSIS — R30.0 DYSURIA: Primary | ICD-10-CM

## 2022-07-08 DIAGNOSIS — I10 ESSENTIAL HYPERTENSION: ICD-10-CM

## 2022-07-08 DIAGNOSIS — K65.4 SCLEROSING MESENTERITIS (HCC): ICD-10-CM

## 2022-07-08 DIAGNOSIS — G50.0 TRIGEMINAL NEURALGIA: ICD-10-CM

## 2022-07-08 PROBLEM — N17.9 ACUTE KIDNEY INJURY (HCC): Status: RESOLVED | Noted: 2022-04-21 | Resolved: 2022-07-08

## 2022-07-08 PROBLEM — G89.29 CHRONIC NONINTRACTABLE HEADACHE: Status: RESOLVED | Noted: 2018-08-20 | Resolved: 2022-07-08

## 2022-07-08 PROBLEM — R51.9 CHRONIC NONINTRACTABLE HEADACHE: Status: RESOLVED | Noted: 2018-08-20 | Resolved: 2022-07-08

## 2022-07-08 LAB
SL AMB  POCT GLUCOSE, UA: ABNORMAL
SL AMB LEUKOCYTE ESTERASE,UA: ABNORMAL
SL AMB POCT BILIRUBIN,UA: ABNORMAL
SL AMB POCT BLOOD,UA: ABNORMAL
SL AMB POCT CLARITY,UA: CLEAR
SL AMB POCT COLOR,UA: YELLOW
SL AMB POCT KETONES,UA: ABNORMAL
SL AMB POCT NITRITE,UA: ABNORMAL
SL AMB POCT PH,UA: 5.5
SL AMB POCT SPECIFIC GRAVITY,UA: 1.03
SL AMB POCT URINE PROTEIN: ABNORMAL
SL AMB POCT UROBILINOGEN: ABNORMAL

## 2022-07-08 PROCEDURE — 99214 OFFICE O/P EST MOD 30 MIN: CPT | Performed by: NURSE PRACTITIONER

## 2022-07-08 PROCEDURE — 87086 URINE CULTURE/COLONY COUNT: CPT | Performed by: NURSE PRACTITIONER

## 2022-07-08 PROCEDURE — 81002 URINALYSIS NONAUTO W/O SCOPE: CPT | Performed by: NURSE PRACTITIONER

## 2022-07-08 NOTE — ASSESSMENT & PLAN NOTE
Discussed recent CT scan  Given continuation of LLQ pain and findings on CT, she needs to see GI  Referral placed

## 2022-07-08 NOTE — PROGRESS NOTES
BMI Counseling: There is no height or weight on file to calculate BMI  The BMI is above normal  Nutrition recommendations include decreasing portion sizes and moderation in carbohydrate intake  Exercise recommendations include exercising 3-5 times per week  Rationale for BMI follow-up plan is due to patient being overweight or obese  Assessment/Plan:     Chronic Problems:  Sclerosing mesenteritis [facility])  Discussed recent CT scan  Given continuation of LLQ pain and findings on CT, she needs to see GI  Referral placed  Essential hypertension  BP ok today  Trigeminal neuralgia  Follows with pain management  Renal calculus, left  Nonobstructing stones, reviewed ct scan with patient and son  Visit Diagnosis:  Diagnoses and all orders for this visit:    Sclerosing mesenteritis [facility])  -     Ambulatory Referral to Gastroenterology; Future    Healthcare maintenance  -     CBC and differential; Future  -     Comprehensive metabolic panel; Future  -     Hemoglobin A1C; Future  -     Lipid panel; Future  -     TSH, 3rd generation with Free T4 reflex; Future    Essential hypertension    Trigeminal neuralgia    Renal calculus, left          Subjective:    Patient ID: Megan Camacho is a 79 y o  female  Patient presents with llq pain, sometimes more severe than others  She is also constipated frequently, says her son  She states she has BM every other day  The pain is particularly bad today and last night  Pain is 8/10  Fahad nausea, vomiting, diarrhea  Pain is constant and stabbing  Patient recently admitted for kidney stones in 4/2022, she was found to have sclerosing mesenteritis on CT scan in April  She repeated CT scan in 6/2022 and states she never got results of the CT scan  It did show possible sclerosing mesenteritis  Per note from office visit on 4/25, Lizbethpiedadphylicia Hurst refused consult with GI  As she continues to experience LLQ pain with no other source, discussed she needs to see GI at this point   She states she refuses colonoscopy  The following portions of the patient's history were reviewed and updated as appropriate: allergies, current medications, past family history, past medical history, past social history, past surgical history and problem list     Review of Systems   Constitutional: Positive for appetite change, chills, diaphoresis and fatigue  Negative for fever  HENT: Negative for ear pain and sore throat  Eyes: Negative for pain and visual disturbance  Respiratory: Negative for cough and shortness of breath  Cardiovascular: Negative for chest pain and palpitations  Gastrointestinal: Positive for abdominal pain and constipation  Negative for diarrhea, nausea and vomiting  Genitourinary: Negative for dysuria, frequency and hematuria  Musculoskeletal: Negative for arthralgias and back pain  Skin: Negative for color change and rash  Neurological: Negative for seizures and syncope  All other systems reviewed and are negative  /62   Pulse 76   Temp (!) 96 5 °F (35 8 °C)   SpO2 96%   Social History     Socioeconomic History    Marital status:       Spouse name: Not on file    Number of children: Not on file    Years of education: Not on file    Highest education level: Not on file   Occupational History    Occupation: retired   Tobacco Use    Smoking status: Former Smoker    Smokeless tobacco: Never Used   Vaping Use    Vaping Use: Never used   Substance and Sexual Activity    Alcohol use: Never     Comment: occasional    Drug use: Yes     Types: Marijuana     Comment: medical marijuana    Sexual activity: Not Currently     Partners: Male   Other Topics Concern    Not on file   Social History Narrative    Always uses seatbelt     Social Determinants of Health     Financial Resource Strain: Not on file   Food Insecurity: Not on file   Transportation Needs: Not on file   Physical Activity: Not on file   Stress: Not on file   Social Connections: Not on file   Intimate Partner Violence: Not on file   Housing Stability: Not on file     Past Medical History:   Diagnosis Date    Arthritis     Basal cell carcinoma     Dry eye syndrome     Foraminal stenosis of cervical region     Macular degeneration     Memory loss     Migraine     Neck pain     Spinal stenosis of lumbar region     Squamous cell skin cancer     Supraventricular tachycardia (HCC)     Vitamin D deficiency     last assessed 2/7/17     Family History   Problem Relation Age of Onset    Aortic aneurysm Mother         abdominal aortic aneurysm    Hypertension Mother    Ardeth Needs Breast cancer Mother     Hypertension Father     Hypertension Sister     Hyperlipidemia Sister     Squamous cell carcinoma Sister      Past Surgical History:   Procedure Laterality Date    APPENDECTOMY      BACK SURGERY      lower back surgery lumbar disc    CATARACT EXTRACTION      FEMUR FRACTURE SURGERY      FL RETROGRADE PYELOGRAM  04/21/2022    KIDNEY STONE SURGERY Left     [place] CYSTO/URETERO W/LITHOTRIPSY &INDWELL STENT INSRT Left 04/21/2022    Procedure: CYSTOSCOPY URETEROSCOPY WITH LITHOTRIPSY HOLMIUM LASER, RETROGRADE PYELOGRAM AND INSERTION STENT URETERAL;  Surgeon: Foreign Morris MD;  Location: [facility];  Service: Urology    [place] STEREO TREAT TRIGEMINAL NERVE Right 03/26/2019    Procedure: GLYCEROL RHIZOTOMY TRIGEMINAL NERVES (V1-V3), RIGHT;  Surgeon: Madyson Dominique MD;  Location:  MAIN OR;  Service: Neurosurgery    REPLACEMENT TOTAL KNEE Right     REPLACEMENT TOTAL KNEE Left     SKIN BIOPSY         Current Outpatient Medications:     Acetaminophen 325 MG CAPS, Take 650 mg by mouth every 6 (six) hours as needed PRN, Disp: , Rfl:     Cholecalciferol (VITAMIN D PO), Take 5,000 Units by mouth Daily  , Disp: , Rfl:     DULoxetine (CYMBALTA) 60 mg delayed release capsule, Take 2 caps by mouth daily, Disp: 120 capsule, Rfl: 2    gabapentin (NEURONTIN) 100 mg capsule, Take 2 capsules (200 mg total) by mouth in the morning , Disp: 180 capsule, Rfl: 1    gabapentin (NEURONTIN) 300 mg capsule, Take 2 capsules (600 mg total) by mouth daily at bedtime, Disp: 180 capsule, Rfl: 1    Psyllium (Metamucil) 28 3 % POWD, 3 4 each, Disp: , Rfl:     verapamil (VERELAN PM) 120 MG 24 hr capsule, Take 1 capsule (120 mg total) by mouth daily at bedtime, Disp: 90 capsule, Rfl: 0    vitamin B-12 (CYANOCOBALAMIN) 250 MCG tablet, Take 1 tablet (250 mcg total) by mouth daily, Disp: 90 tablet, Rfl: 0    Allergies   Allergen Reactions    Dilaudid [Hydromorphone] Shortness Of Breath     Other reaction(s): Respiratory Distress  Other reaction(s): HORENESS    Penicillins Rash     Rash          Lab Review   Orders Only on 07/05/2022   Component Date Value    Color, UA 07/05/2022 Light Yellow     Clarity, UA 07/05/2022 Clear     Specific Gravity, UA 07/05/2022 1 013     pH, UA 07/05/2022 6 0     Leukocytes, UA 07/05/2022 Negative     Nitrite, UA 07/05/2022 Negative     Protein, UA 07/05/2022 Negative     Glucose, UA 07/05/2022 Negative     Ketones, UA 07/05/2022 Negative     Urobilinogen, UA 07/05/2022 <2 0     Bilirubin, UA 07/05/2022 Negative     Occult Blood, UA 07/05/2022 Negative     RBC, UA 07/05/2022 None Seen     WBC, UA 07/05/2022 2-4 (A)    Epithelial Cells 07/05/2022 Occasional     Bacteria, UA 07/05/2022 None Seen     Urine Culture 07/05/2022 20,000-29,000 cfu/ml     Appointment on 06/24/2022   Component Date Value    Urine Culture 06/24/2022 40,000-49,000 cfu/ml     Orders Only on 06/24/2022   Component Date Value    Color, UA 06/24/2022 Yellow     Clarity, UA 06/24/2022 Cloudy     Specific Gravity, UA 06/24/2022 1 025     pH, UA 06/24/2022 6 0     Leukocytes, UA 06/24/2022 Moderate (A)    Nitrite, UA 06/24/2022 Negative     Protein, UA 06/24/2022 100 (2+) (A)    Glucose, UA 06/24/2022 Negative     Ketones, UA 06/24/2022 Negative     Urobilinogen, UA 06/24/2022 0 2     Bilirubin, UA 06/24/2022 Negative     Occult Blood, UA 06/24/2022 Large (A)    RBC, UA 06/24/2022 20-30 (A)    WBC, UA 06/24/2022 Innumerable (A)    Epithelial Cells 06/24/2022 Occasional     Bacteria, UA 06/24/2022 Occasional    Appointment on 06/08/2022   Component Date Value    Urine Culture 06/08/2022 30,000-39,000 cfu/ml Escherichia coli (A)    Urine Culture 06/08/2022 <10,000 cfu/ml     Nurse Triage on 06/08/2022   Component Date Value    Clarity, UA 06/08/2022 Cloudy     Color, UA 06/08/2022 Light Yellow     Specific Gravity, UA 06/08/2022 <=1 005     pH, UA 06/08/2022 6 0     Glucose, UA 06/08/2022 Negative     Ketones, UA 06/08/2022 Negative     Occult Blood, UA 06/08/2022 Large (A)    Protein, UA 06/08/2022 Negative     Nitrite, UA 06/08/2022 Negative     Bilirubin, UA 06/08/2022 Negative     Urobilinogen, UA 06/08/2022 0 2     Leukocytes, UA 06/08/2022 Large (A)    WBC, UA 06/08/2022 30-50 (A)    RBC, UA 06/08/2022 4-10 (A)    Bacteria, UA 06/08/2022 None Seen     Epithelial Cells 06/08/2022 None Seen    Appointment on 05/09/2022   Component Date Value    Urine Culture 05/09/2022 70,000-79,000 cfu/ml          Imaging: No results found  Objective:     Physical Exam  Constitutional:       Appearance: She is well-developed  Cardiovascular:      Rate and Rhythm: Normal rate and regular rhythm  Heart sounds: Normal heart sounds  No murmur heard  Pulmonary:      Effort: Pulmonary effort is normal  No respiratory distress  Breath sounds: Normal breath sounds  Abdominal:      Tenderness: There is abdominal tenderness in the left lower quadrant  Skin:     General: Skin is warm and dry  Neurological:      Mental Status: She is alert and oriented to person, place, and time  There are no Patient Instructions on file for this visit  CL Aguilera    Portions of the record may have been created with voice recognition software    Occasional wrong word or "sound a like" substitutions may have occurred due to the inherent limitations of voice recognition software  Read the chart carefully and recognize, using context, where substitutions have occurred

## 2022-07-11 LAB — BACTERIA UR CULT: NORMAL

## 2022-07-12 ENCOUNTER — TELEPHONE (OUTPATIENT)
Dept: FAMILY MEDICINE CLINIC | Facility: CLINIC | Age: 82
End: 2022-07-12

## 2022-07-12 DIAGNOSIS — N39.0 URINARY TRACT INFECTION WITHOUT HEMATURIA, SITE UNSPECIFIED: Primary | ICD-10-CM

## 2022-07-12 RX ORDER — CIPROFLOXACIN 500 MG/1
500 TABLET, FILM COATED ORAL EVERY 12 HOURS SCHEDULED
Qty: 10 TABLET | Refills: 0 | Status: SHIPPED | OUTPATIENT
Start: 2022-07-12 | End: 2022-07-17

## 2022-07-12 NOTE — TELEPHONE ENCOUNTER
Patients son called requesting urine results  I advised it was contaminated  He wants to know what else they should do since Candy Thorne is still having burning and the specimens are coming back contaminated?

## 2022-07-21 ENCOUNTER — TELEPHONE (OUTPATIENT)
Dept: FAMILY MEDICINE CLINIC | Facility: CLINIC | Age: 82
End: 2022-07-21

## 2022-07-21 NOTE — TELEPHONE ENCOUNTER
Justice from [facility] called  Héctor Shaikh wants to be discharged from home PT because her headaches are back and she wants to see Dr Ellen Brown for them and she wants to see GI for her issues and she cannot do PT with all of these issues  Is it ok for her to be discharged?

## 2022-07-22 ENCOUNTER — APPOINTMENT (OUTPATIENT)
Dept: LAB | Facility: HOSPITAL | Age: 82
End: 2022-07-22
Payer: MEDICARE

## 2022-07-22 ENCOUNTER — OFFICE VISIT (OUTPATIENT)
Dept: GASTROENTEROLOGY | Facility: CLINIC | Age: 82
End: 2022-07-22
Payer: MEDICARE

## 2022-07-22 ENCOUNTER — OFFICE VISIT (OUTPATIENT)
Dept: FAMILY MEDICINE CLINIC | Facility: CLINIC | Age: 82
End: 2022-07-22
Payer: MEDICARE

## 2022-07-22 VITALS
HEART RATE: 71 BPM | SYSTOLIC BLOOD PRESSURE: 120 MMHG | BODY MASS INDEX: 31.18 KG/M2 | DIASTOLIC BLOOD PRESSURE: 68 MMHG | TEMPERATURE: 98 F | OXYGEN SATURATION: 96 % | HEIGHT: 65 IN

## 2022-07-22 VITALS
BODY MASS INDEX: 31.18 KG/M2 | HEART RATE: 69 BPM | OXYGEN SATURATION: 95 % | SYSTOLIC BLOOD PRESSURE: 128 MMHG | DIASTOLIC BLOOD PRESSURE: 68 MMHG | HEIGHT: 65 IN

## 2022-07-22 DIAGNOSIS — M50.11 CERVICAL DISC DISORDER WITH RADICULOPATHY OF OCCIPITO-ATLANTO-AXIAL REGION: ICD-10-CM

## 2022-07-22 DIAGNOSIS — Z00.00 HEALTHCARE MAINTENANCE: ICD-10-CM

## 2022-07-22 DIAGNOSIS — K65.4 SCLEROSING MESENTERITIS (HCC): ICD-10-CM

## 2022-07-22 DIAGNOSIS — N30.01 ACUTE CYSTITIS WITH HEMATURIA: Primary | ICD-10-CM

## 2022-07-22 DIAGNOSIS — I10 ESSENTIAL HYPERTENSION: Primary | ICD-10-CM

## 2022-07-22 DIAGNOSIS — K65.4 SCLEROSING MESENTERITIS (HCC): Primary | ICD-10-CM

## 2022-07-22 DIAGNOSIS — R10.12 LEFT UPPER QUADRANT ABDOMINAL PAIN: Primary | ICD-10-CM

## 2022-07-22 DIAGNOSIS — F03.90 DEMENTIA WITHOUT BEHAVIORAL DISTURBANCE, UNSPECIFIED DEMENTIA TYPE: ICD-10-CM

## 2022-07-22 LAB
ALBUMIN SERPL BCP-MCNC: 3.9 G/DL (ref 3.5–5)
ALP SERPL-CCNC: 61 U/L (ref 46–116)
ALT SERPL W P-5'-P-CCNC: 16 U/L (ref 12–78)
ANION GAP SERPL CALCULATED.3IONS-SCNC: 8 MMOL/L (ref 4–13)
AST SERPL W P-5'-P-CCNC: 16 U/L (ref 5–45)
BASOPHILS # BLD AUTO: 0.02 THOUSANDS/ΜL (ref 0–0.1)
BASOPHILS NFR BLD AUTO: 0 % (ref 0–1)
BILIRUB SERPL-MCNC: 0.39 MG/DL (ref 0.2–1)
BUN SERPL-MCNC: 18 MG/DL (ref 5–25)
CALCIUM SERPL-MCNC: 9.6 MG/DL (ref 8.3–10.1)
CHLORIDE SERPL-SCNC: 103 MMOL/L (ref 96–108)
CHOLEST SERPL-MCNC: 221 MG/DL
CO2 SERPL-SCNC: 32 MMOL/L (ref 21–32)
CREAT SERPL-MCNC: 0.82 MG/DL (ref 0.6–1.3)
CRP SERPL QL: 5.9 MG/L
EOSINOPHIL # BLD AUTO: 0.1 THOUSAND/ΜL (ref 0–0.61)
EOSINOPHIL NFR BLD AUTO: 1 % (ref 0–6)
ERYTHROCYTE [DISTWIDTH] IN BLOOD BY AUTOMATED COUNT: 12.8 % (ref 11.6–15.1)
ERYTHROCYTE [SEDIMENTATION RATE] IN BLOOD: 31 MM/HOUR (ref 0–29)
EST. AVERAGE GLUCOSE BLD GHB EST-MCNC: 123 MG/DL
GFR SERPL CREATININE-BSD FRML MDRD: 66 ML/MIN/1.73SQ M
GLUCOSE SERPL-MCNC: 73 MG/DL (ref 65–140)
HBA1C MFR BLD: 5.9 %
HCT VFR BLD AUTO: 41 % (ref 34.8–46.1)
HDLC SERPL-MCNC: 47 MG/DL
HGB BLD-MCNC: 13.5 G/DL (ref 11.5–15.4)
IMM GRANULOCYTES # BLD AUTO: 0.02 THOUSAND/UL (ref 0–0.2)
IMM GRANULOCYTES NFR BLD AUTO: 0 % (ref 0–2)
LDLC SERPL CALC-MCNC: 115 MG/DL (ref 0–100)
LYMPHOCYTES # BLD AUTO: 1.56 THOUSANDS/ΜL (ref 0.6–4.47)
LYMPHOCYTES NFR BLD AUTO: 22 % (ref 14–44)
MCH RBC QN AUTO: 32.8 PG (ref 26.8–34.3)
MCHC RBC AUTO-ENTMCNC: 32.9 G/DL (ref 31.4–37.4)
MCV RBC AUTO: 100 FL (ref 82–98)
MONOCYTES # BLD AUTO: 0.6 THOUSAND/ΜL (ref 0.17–1.22)
MONOCYTES NFR BLD AUTO: 8 % (ref 4–12)
NEUTROPHILS # BLD AUTO: 4.83 THOUSANDS/ΜL (ref 1.85–7.62)
NEUTS SEG NFR BLD AUTO: 69 % (ref 43–75)
NONHDLC SERPL-MCNC: 174 MG/DL
NRBC BLD AUTO-RTO: 0 /100 WBCS
PLATELET # BLD AUTO: 262 THOUSANDS/UL (ref 149–390)
PMV BLD AUTO: 9.9 FL (ref 8.9–12.7)
POTASSIUM SERPL-SCNC: 4.2 MMOL/L (ref 3.5–5.3)
PROT SERPL-MCNC: 7.3 G/DL (ref 6.4–8.4)
RBC # BLD AUTO: 4.12 MILLION/UL (ref 3.81–5.12)
SL AMB  POCT GLUCOSE, UA: ABNORMAL
SL AMB LEUKOCYTE ESTERASE,UA: 15
SL AMB POCT BILIRUBIN,UA: ABNORMAL
SL AMB POCT BLOOD,UA: ABNORMAL
SL AMB POCT KETONES,UA: ABNORMAL
SL AMB POCT NITRITE,UA: 0
SL AMB POCT PH,UA: 6
SL AMB POCT SPECIFIC GRAVITY,UA: 1.03
SL AMB POCT URINE PROTEIN: ABNORMAL
SL AMB POCT UROBILINOGEN: 0
SODIUM SERPL-SCNC: 143 MMOL/L (ref 135–147)
TRIGL SERPL-MCNC: 293 MG/DL
TSH SERPL DL<=0.05 MIU/L-ACNC: 1.92 UIU/ML (ref 0.45–4.5)
WBC # BLD AUTO: 7.13 THOUSAND/UL (ref 4.31–10.16)

## 2022-07-22 PROCEDURE — 85025 COMPLETE CBC W/AUTO DIFF WBC: CPT

## 2022-07-22 PROCEDURE — 86038 ANTINUCLEAR ANTIBODIES: CPT

## 2022-07-22 PROCEDURE — 99203 OFFICE O/P NEW LOW 30 MIN: CPT | Performed by: PHYSICIAN ASSISTANT

## 2022-07-22 PROCEDURE — 87086 URINE CULTURE/COLONY COUNT: CPT | Performed by: NURSE PRACTITIONER

## 2022-07-22 PROCEDURE — 85652 RBC SED RATE AUTOMATED: CPT

## 2022-07-22 PROCEDURE — 80053 COMPREHEN METABOLIC PANEL: CPT

## 2022-07-22 PROCEDURE — 87077 CULTURE AEROBIC IDENTIFY: CPT | Performed by: NURSE PRACTITIONER

## 2022-07-22 PROCEDURE — 99213 OFFICE O/P EST LOW 20 MIN: CPT | Performed by: NURSE PRACTITIONER

## 2022-07-22 PROCEDURE — 86140 C-REACTIVE PROTEIN: CPT

## 2022-07-22 PROCEDURE — 84443 ASSAY THYROID STIM HORMONE: CPT

## 2022-07-22 PROCEDURE — 81002 URINALYSIS NONAUTO W/O SCOPE: CPT | Performed by: NURSE PRACTITIONER

## 2022-07-22 PROCEDURE — 83036 HEMOGLOBIN GLYCOSYLATED A1C: CPT

## 2022-07-22 PROCEDURE — 80061 LIPID PANEL: CPT

## 2022-07-22 PROCEDURE — 36415 COLL VENOUS BLD VENIPUNCTURE: CPT

## 2022-07-22 RX ORDER — SULFAMETHOXAZOLE AND TRIMETHOPRIM 800; 160 MG/1; MG/1
1 TABLET ORAL EVERY 12 HOURS SCHEDULED
Qty: 10 TABLET | Refills: 0 | Status: SHIPPED | OUTPATIENT
Start: 2022-07-22 | End: 2022-07-27

## 2022-07-22 RX ORDER — DICYCLOMINE HYDROCHLORIDE 10 MG/1
10 CAPSULE ORAL 2 TIMES DAILY
Qty: 60 CAPSULE | Refills: 3 | Status: SHIPPED | OUTPATIENT
Start: 2022-07-22

## 2022-07-22 NOTE — PROGRESS NOTES
Assessment/Plan:     Chronic Problems:  No problem-specific Assessment & Plan notes found for this encounter  Visit Diagnosis:  Diagnoses and all orders for this visit:    Acute cystitis with hematuria  Comments:  Discussed starting probiotic, increasing fluids, timed voiding, hygiene  Sent culture, will call if we need to change antibiotic  Orders:  -     POCT urine dip  -     sulfamethoxazole-trimethoprim (BACTRIM DS) 800-160 mg per tablet; Take 1 tablet by mouth every 12 (twelve) hours for 5 days          Subjective:    Patient ID: Naresh Dominique is a 79 y o  female  Patient presents with concerns of UTI  She continues to experience dysuria, frequency and incontinence  She did see urology and advised to start probiotic and cranberry tablet  She did start cranberry supplement  She states she is incontinent due to frequency  She does use a pad to catch urine  Son states she does not drink enough water  The following portions of the patient's history were reviewed and updated as appropriate: allergies, current medications, past family history, past medical history, past social history, past surgical history and problem list     Review of Systems   Constitutional: Negative for chills, diaphoresis and fever  Gastrointestinal: Negative for abdominal pain, nausea and vomiting  Genitourinary: Positive for dysuria, frequency and urgency  Negative for flank pain  /68   Pulse 71   Temp 98 °F (36 7 °C)   Ht 5' 5" (1 651 m)   SpO2 96%   BMI 31 18 kg/m²   Social History     Socioeconomic History    Marital status:       Spouse name: Not on file    Number of children: Not on file    Years of education: Not on file    Highest education level: Not on file   Occupational History    Occupation: retired   Tobacco Use    Smoking status: Former Smoker    Smokeless tobacco: Never Used   Vaping Use    Vaping Use: Never used   Substance and Sexual Activity    Alcohol use: Never Comment: occasional    Drug use: Yes     Types: Marijuana     Comment: medical marijuana    Sexual activity: Not Currently     Partners: Male   Other Topics Concern    Not on file   Social History Narrative    Always uses seatbelt     Social Determinants of Health     Financial Resource Strain: Not on file   Food Insecurity: Not on file   Transportation Needs: Not on file   Physical Activity: Not on file   Stress: Not on file   Social Connections: Not on file   Intimate Partner Violence: Not on file   Housing Stability: Not on file     Past Medical History:   Diagnosis Date    Arthritis     Basal cell carcinoma     Dry eye syndrome     Foraminal stenosis of cervical region     Macular degeneration     Memory loss     Migraine     Neck pain     Spinal stenosis of lumbar region     Squamous cell skin cancer     Supraventricular tachycardia ([address] )     Vitamin D deficiency     last assessed 2/7/17     Family History   Problem Relation Age of Onset    Aortic aneurysm Mother         abdominal aortic aneurysm    Hypertension Mother    Jasmyn Courser Breast cancer Mother     Hypertension Father     Hypertension Sister     Hyperlipidemia Sister     Squamous cell carcinoma Sister      Past Surgical History:   Procedure Laterality Date    APPENDECTOMY      BACK SURGERY      lower back surgery lumbar disc    CATARACT EXTRACTION      FEMUR FRACTURE SURGERY      FL RETROGRADE PYELOGRAM  04/21/2022    KIDNEY STONE SURGERY Left     [place] CYSTO/URETERO W/LITHOTRIPSY &INDWELL STENT INSRT Left 04/21/2022    Procedure: CYSTOSCOPY URETEROSCOPY WITH LITHOTRIPSY HOLMIUM LASER, RETROGRADE PYELOGRAM AND INSERTION STENT URETERAL;  Surgeon: Anthony Cantrell MD;  Location[facility];  Service: Urology    [place] STEREO TREAT TRIGEMINAL NERVE Right 03/26/2019    Procedure: GLYCEROL RHIZOTOMY TRIGEMINAL NERVES (V1-V3), RIGHT;  Surgeon: Antonio Nails MD;  Location:  MAIN OR;  Service: Neurosurgery    REPLACEMENT TOTAL KNEE Right     REPLACEMENT TOTAL KNEE Left     SKIN BIOPSY         Current Outpatient Medications:     Acetaminophen 325 MG CAPS, Take 650 mg by mouth every 6 (six) hours as needed PRN, Disp: , Rfl:     Cholecalciferol (VITAMIN D PO), Take 5,000 Units by mouth Daily  , Disp: , Rfl:     dicyclomine (BENTYL) 10 mg capsule, Take 1 capsule (10 mg total) by mouth 2 (two) times a day, Disp: 60 capsule, Rfl: 3    DULoxetine (CYMBALTA) 60 mg delayed release capsule, Take 2 caps by mouth daily, Disp: 120 capsule, Rfl: 2    gabapentin (NEURONTIN) 100 mg capsule, Take 2 capsules (200 mg total) by mouth in the morning , Disp: 180 capsule, Rfl: 1    gabapentin (NEURONTIN) 300 mg capsule, Take 2 capsules (600 mg total) by mouth daily at bedtime, Disp: 180 capsule, Rfl: 1    Psyllium (Metamucil) 28 3 % POWD, 3 4 each, Disp: , Rfl:     sulfamethoxazole-trimethoprim (BACTRIM DS) 800-160 mg per tablet, Take 1 tablet by mouth every 12 (twelve) hours for 5 days, Disp: 10 tablet, Rfl: 0    verapamil (VERELAN PM) 120 MG 24 hr capsule, Take 1 capsule (120 mg total) by mouth daily at bedtime, Disp: 90 capsule, Rfl: 0    vitamin B-12 (CYANOCOBALAMIN) 250 MCG tablet, Take 1 tablet (250 mcg total) by mouth daily, Disp: 90 tablet, Rfl: 0    Allergies   Allergen Reactions    Dilaudid [Hydromorphone] Shortness Of Breath     Other reaction(s): Respiratory Distress  Other reaction(s): HORENESS    Penicillins Rash     Rash          Lab Review   Appointment on 07/22/2022   Component Date Value    WBC 07/22/2022 7 13     RBC 07/22/2022 4 12     Hemoglobin 07/22/2022 13 5     Hematocrit 07/22/2022 41 0     MCV 07/22/2022 100 (A)    MCH 07/22/2022 32 8     MCHC 07/22/2022 32 9     RDW 07/22/2022 12 8     MPV 07/22/2022 9 9     Platelets 10/05/2903 262     nRBC 07/22/2022 0     Neutrophils Relative 07/22/2022 69     Immat GRANS % 07/22/2022 0     Lymphocytes Relative 07/22/2022 22     Monocytes Relative 07/22/2022 8     Eosinophils Relative 07/22/2022 1     Basophils Relative 07/22/2022 0     Neutrophils Absolute 07/22/2022 4 83     Immature Grans Absolute 07/22/2022 0 02     Lymphocytes Absolute 07/22/2022 1 56     Monocytes Absolute 07/22/2022 0 60     Eosinophils Absolute 07/22/2022 0 10     Basophils Absolute 07/22/2022 0 02     Sodium 07/22/2022 143     Potassium 07/22/2022 4 2     Chloride 07/22/2022 103     CO2 07/22/2022 32     ANION GAP 07/22/2022 8     BUN 07/22/2022 18     Creatinine 07/22/2022 0 82     Glucose 07/22/2022 73     Calcium 07/22/2022 9 6     AST 07/22/2022 16     ALT 07/22/2022 16     Alkaline Phosphatase 07/22/2022 61     Total Protein 07/22/2022 7 3     Albumin 07/22/2022 3 9     Total Bilirubin 07/22/2022 0 39     eGFR 07/22/2022 66     Cholesterol 07/22/2022 221 (A)    Triglycerides 07/22/2022 293 (A)    HDL, Direct 07/22/2022 47 (A)    LDL Calculated 07/22/2022 115 (A)    Non-HDL-Chol (CHOL-HDL) 07/22/2022 174     TSH 3RD GENERATON 07/22/2022 1 915     Sed Rate 07/22/2022 31 (A)    CRP 07/22/2022 5 9 (A)   Office Visit on 07/08/2022   Component Date Value    Urine Culture 07/08/2022 20,000-29,000 cfu/ml      LEUKOCYTE ESTERASE,UA 07/08/2022 -     NITRITE,UA 07/08/2022 -     SL AMB POCT UROBILINOGEN 07/08/2022 -     POCT URINE PROTEIN 07/08/2022 -      PH,UA 07/08/2022 5 5     BLOOD,UA 07/08/2022 -     SPECIFIC GRAVITY,UA 07/08/2022 1 030     KETONES,UA 07/08/2022 -     BILIRUBIN,UA 07/08/2022 1+     GLUCOSE, UA 07/08/2022 -      COLOR,UA 07/08/2022 yellow     CLARITY,UA 07/08/2022 clear    Orders Only on 07/05/2022   Component Date Value    Color, UA 07/05/2022 Light Yellow     Clarity, UA 07/05/2022 Clear     Specific Gravity, UA 07/05/2022 1 013     pH, UA 07/05/2022 6 0     Leukocytes, UA 07/05/2022 Negative     Nitrite, UA 07/05/2022 Negative     Protein, UA 07/05/2022 Negative     Glucose, UA 07/05/2022 Negative     Ketones, UA 07/05/2022 Negative     Urobilinogen, UA 07/05/2022 <2 0     Bilirubin, UA 07/05/2022 Negative     Occult Blood, UA 07/05/2022 Negative     RBC, UA 07/05/2022 None Seen     WBC, UA 07/05/2022 2-4 (A)    Epithelial Cells 07/05/2022 Occasional     Bacteria, UA 07/05/2022 None Seen     Urine Culture 07/05/2022 20,000-29,000 cfu/ml     Appointment on 06/24/2022   Component Date Value    Urine Culture 06/24/2022 40,000-49,000 cfu/ml     Orders Only on 06/24/2022   Component Date Value    Color, UA 06/24/2022 Yellow     Clarity, UA 06/24/2022 Cloudy     Specific Gravity, UA 06/24/2022 1 025     pH, UA 06/24/2022 6 0     Leukocytes, UA 06/24/2022 Moderate (A)    Nitrite, UA 06/24/2022 Negative     Protein, UA 06/24/2022 100 (2+) (A)    Glucose, UA 06/24/2022 Negative     Ketones, UA 06/24/2022 Negative     Urobilinogen, UA 06/24/2022 0 2     Bilirubin, UA 06/24/2022 Negative     Occult Blood, UA 06/24/2022 Large (A)    RBC, UA 06/24/2022 20-30 (A)    WBC, UA 06/24/2022 Innumerable (A)    Epithelial Cells 06/24/2022 Occasional     Bacteria, UA 06/24/2022 Occasional    Appointment on 06/08/2022   Component Date Value    Urine Culture 06/08/2022 30,000-39,000 cfu/ml Escherichia coli (A)    Urine Culture 06/08/2022 <10,000 cfu/ml     Nurse Triage on 06/08/2022   Component Date Value    Clarity, UA 06/08/2022 Cloudy     Color, UA 06/08/2022 Light Yellow     Specific Gravity, UA 06/08/2022 <=1 005     pH, UA 06/08/2022 6 0     Glucose, UA 06/08/2022 Negative     Ketones, UA 06/08/2022 Negative     Occult Blood, UA 06/08/2022 Large (A)    Protein, UA 06/08/2022 Negative     Nitrite, UA 06/08/2022 Negative     Bilirubin, UA 06/08/2022 Negative     Urobilinogen, UA 06/08/2022 0 2     Leukocytes, UA 06/08/2022 Large (A)    WBC, UA 06/08/2022 30-50 (A)    RBC, UA 06/08/2022 4-10 (A)    Bacteria, UA 06/08/2022 None Seen     Epithelial Cells 06/08/2022 None Seen         Imaging: No results found      Objective: Physical Exam  Constitutional:       Appearance: Normal appearance  Cardiovascular:      Rate and Rhythm: Normal rate  Pulmonary:      Effort: Pulmonary effort is normal  No respiratory distress  Neurological:      Mental Status: She is alert and oriented to person, place, and time  Psychiatric:         Mood and Affect: Mood normal            There are no Patient Instructions on file for this visit  CL Aguilera    Portions of the record may have been created with voice recognition software  Occasional wrong word or "sound a like" substitutions may have occurred due to the inherent limitations of voice recognition software  Read the chart carefully and recognize, using context, where substitutions have occurred

## 2022-07-22 NOTE — PROGRESS NOTES
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[address] )  2  Left upper quadrant abdominal pain  She notes months of left sided abdominal pain  CT x 2 suggests sclerosing mesenteritis  No associated symptoms    Will check Doppler of the mesenteric vessels    We discussed Sclerosing mesenteritis in great detail  First line treatment would include Prednisone 40mg daily x 3 months and Tamoxifen 10mg BID  Both of these medications have a lot of potential (some serious) side effects  We agreed that as her symptoms are not debilitating at this time we would hold off  Start Dicyclomine 10mg BID    We discussed a colonoscopy but she is adamant that she does not want to do this    Will f/u after doppler and labs    ______________________________________________________________________    HPI:  79-year-old female presents for evaluation of left-sided abdominal pain  The symptoms have been present for several months  She 1st had a CT and evaluation for this in April of this year  This CT suggested mesenteric haziness surrounding nonenlarged lymph nodes which can be seen in sclerosing mesenteritis  She is also noted to have a 9 mm strict and calculus in the left ureteropelvic junction causing severe hydronephrosis which was felt to be the more likely explanation of her symptoms  Due the persistence of symptoms a CT was repeated in June  This time no hydronephrosis or urinary tract calculus was noted  What was noted was a stable appearance of mesenteric haziness surrounding nonenlarged lymph nodes which could be seen with sclerosing mesenteritis  IV contrast was administered and she was noted to have atherosclerotic changes of her vessels but there is no specific comment on her mesenteric vessels being narrowed  Patient reports that her pain is annoying    It is not debilitating  It is there constantly  Eating does not seem to exacerbate the symptom  She does have a slight increase in pain when she has a bowel movement  She reports that her bowel movements are normal   She has had no rectal bleeding and no unexpected weight loss  She reports that she had a colonoscopy about 20 years ago and she believes it was a normal exam   She is adamant that she does not want have a repeat colonoscopy  REVIEW OF SYSTEMS:    CONSTITUTIONAL: Denies any fever, chills, rigors, and weight loss  HEENT: No earache or tinnitus  Denies hearing loss or visual disturbances  CARDIOVASCULAR: No chest pain or palpitations  RESPIRATORY: Denies any cough, hemoptysis, shortness of breath or dyspnea on exertion  GASTROINTESTINAL: As noted in the History of Present Illness  GENITOURINARY: No problems with urination  Denies any hematuria or dysuria  NEUROLOGIC: No dizziness or vertigo, denies headaches  MUSCULOSKELETAL: Denies any muscle or joint pain  SKIN: Denies skin rashes or itching  ENDOCRINE: Denies excessive thirst  Denies intolerance to heat or cold  PSYCHOSOCIAL: Denies depression or anxiety  Denies any recent memory loss         Historical Information   Past Medical History:   Diagnosis Date    Arthritis     Basal cell carcinoma     Dry eye syndrome     Foraminal stenosis of cervical region     Macular degeneration     Memory loss     Migraine     Neck pain     Spinal stenosis of lumbar region     Squamous cell skin cancer     Supraventricular tachycardia ([address] )     Vitamin D deficiency     last assessed 2/7/17     Past Surgical History:   Procedure Laterality Date    APPENDECTOMY      BACK SURGERY      lower back surgery lumbar disc    CATARACT EXTRACTION      FEMUR FRACTURE SURGERY      FL RETROGRADE PYELOGRAM  04/21/2022    KIDNEY STONE SURGERY Left     [place] CYSTO/URETERO W/LITHOTRIPSY &INDWELL STENT INSRT Left 04/21/2022    Procedure: Juluis Door URETEROSCOPY WITH LITHOTRIPSY HOLMIUM LASER, RETROGRADE PYELOGRAM AND INSERTION STENT URETERAL;  Surgeon: Abdulkadir Mccain MD;  [facility];  Service: Urology    [place] STEREO TREAT TRIGEMINAL NERVE Right 03/26/2019    Procedure: GLYCEROL RHIZOTOMY TRIGEMINAL NERVES (V1-V3), RIGHT;  Surgeon: Roberto Tipton MD;  [facility];  Service: Neurosurgery    REPLACEMENT TOTAL KNEE Right     REPLACEMENT TOTAL KNEE Left     SKIN BIOPSY       Social History   Social History     Substance and Sexual Activity   Alcohol Use Never    Comment: occasional     Social History     Substance and Sexual Activity   Drug Use Yes    Types: Marijuana    Comment: medical marijuana     Social History     Tobacco Use   Smoking Status Former Smoker   Smokeless Tobacco Never Used     Family History   Problem Relation Age of Onset    Aortic aneurysm Mother         abdominal aortic aneurysm    Hypertension Mother    [facility] Breast cancer Mother     Hypertension Father     Hypertension Sister     Hyperlipidemia Sister     Squamous cell carcinoma Sister        Meds/Allergies       Current Outpatient Medications:     Acetaminophen 325 MG CAPS    Cholecalciferol (VITAMIN D PO)    dicyclomine (BENTYL) 10 mg capsule    DULoxetine (CYMBALTA) 60 mg delayed release capsule    gabapentin (NEURONTIN) 100 mg capsule    gabapentin (NEURONTIN) 300 mg capsule    Psyllium (Metamucil) 28 3 % POWD    verapamil (VERELAN PM) 120 MG 24 hr capsule    vitamin B-12 (CYANOCOBALAMIN) 250 MCG tablet    Allergies   Allergen Reactions    Dilaudid [Hydromorphone] Shortness Of Breath     Other reaction(s): Respiratory Distress  Other reaction(s): HORENESS    Penicillins Rash     Rash           Objective     Blood pressure 128/68, pulse 69, height 5' 5" (1 651 m), SpO2 95 %, not currently breastfeeding  Body mass index is 31 18 kg/m²          PHYSICAL EXAM:      General Appearance:   Alert, cooperative, no distress   HEENT:   Normocephalic, atraumatic, anicteric      Neck:  Supple, symmetrical, trachea midline   Lungs:   Clear to auscultation bilaterally; no rales, rhonchi or wheezing; respirations unlabored    Heart[de-identified]   Regular rate and rhythm; no murmur, rub, or gallop  Abdomen:   Soft, non-tender, non-distended; normal bowel sounds; no masses, no organomegaly    Genitalia:   Deferred    Rectal:   Deferred    Extremities:  No cyanosis, clubbing or edema    Pulses:  2+ and symmetric    Skin:  No jaundice, rashes, or lesions    Lymph nodes:  No palpable cervical lymphadenopathy        Lab Results:   No visits with results within 1 Day(s) from this visit  Latest known visit with results is:   Office Visit on 07/08/2022   Component Date Value    Urine Culture 07/08/2022 20,000-29,000 cfu/ml      LEUKOCYTE ESTERASE,UA 07/08/2022 -     NITRITE,UA 07/08/2022 -     SL AMB POCT UROBILINOGEN 07/08/2022 -     POCT URINE PROTEIN 07/08/2022 -      PH,UA 07/08/2022 5 5     BLOOD,UA 07/08/2022 -     SPECIFIC GRAVITY,UA 07/08/2022 1 030     KETONES,UA 07/08/2022 -     BILIRUBIN,UA 07/08/2022 1+     GLUCOSE, UA 07/08/2022 -      COLOR,UA 07/08/2022 yellow     CLARITY,UA 07/08/2022 clear          Radiology Results:   No results found

## 2022-07-22 NOTE — TELEPHONE ENCOUNTER
S/w Marcelino Bradford from [facility]  She spoke with her supervisor and they decided Shannon Crespo would benefit from having nurse care to help her with her medical issues before she continues PT      Can you please put an order in for Home care nursing

## 2022-07-24 LAB — BACTERIA UR CULT: NORMAL

## 2022-07-25 ENCOUNTER — TELEPHONE (OUTPATIENT)
Dept: FAMILY MEDICINE CLINIC | Facility: CLINIC | Age: 82
End: 2022-07-25

## 2022-07-25 DIAGNOSIS — R26.2 AMBULATORY DYSFUNCTION: ICD-10-CM

## 2022-07-25 DIAGNOSIS — R53.81 PHYSICAL DECONDITIONING: Primary | ICD-10-CM

## 2022-07-25 LAB — RYE IGE QN: NEGATIVE

## 2022-07-25 NOTE — TELEPHONE ENCOUNTER
Adrianne Rendon (nurse) @ [facility] [place], [facility]  called -       Pts son and pt have changed their mind regarding the agency discharge  They are now okay to do the reverification of maintenance for physical therapy  Adrianne Rendon is asking for an updated PT order for maintenance therapy  Fax order to # [phone]       Adrianne Rendon # [phone]  Please leave a detailed voiced message for Adrianne Rendon if call unanswered       Please advise

## 2022-07-25 NOTE — TELEPHONE ENCOUNTER
Pt son called and stated PCP would like pt to be seen ASAP for frequent UTI and pt currently has UTI  Pt did not want to wait till 8/15/22 for appt in [facility] so they are willing to travel to [place] for appt 7/27/22    PT call [phone]

## 2022-07-27 ENCOUNTER — OFFICE VISIT (OUTPATIENT)
Dept: UROLOGY | Facility: CLINIC | Age: 82
End: 2022-07-27
Payer: MEDICARE

## 2022-07-27 VITALS
HEART RATE: 65 BPM | HEIGHT: 65 IN | BODY MASS INDEX: 31.18 KG/M2 | OXYGEN SATURATION: 94 % | DIASTOLIC BLOOD PRESSURE: 68 MMHG | SYSTOLIC BLOOD PRESSURE: 126 MMHG

## 2022-07-27 DIAGNOSIS — N39.46 MIXED INCONTINENCE, URGE AND STRESS (MALE) (FEMALE): Primary | ICD-10-CM

## 2022-07-27 PROCEDURE — 99213 OFFICE O/P EST LOW 20 MIN: CPT | Performed by: PHYSICIAN ASSISTANT

## 2022-07-27 NOTE — PROGRESS NOTES
1  Mixed incontinence, 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) (female)           Assessment and plan:        1  Nephrolithiasis  - S/p left ureteroscopy with laser lithotripsy on 4/21/22  - CT from 6/1/22 - no hydronephrosis or ureteral calculi  Multiple nonobstructing punctate left renal calculi  - Stone analysis - CaOx  - Recommend dietary modifications and proper hydration to prevent future kidney stone formation  - Will f/u in 1 year with KUB and US to monitor stone burden  If no significant stone burden at that time can discontinue routine imaging       2  UTI  - patient will complete course of antibiotics that she is currently off  - continue proper hydration, avoidance of constipation, cranberry supplementation, and probiotics  - if concern for breakthrough infections then will need RN visit for straight catheterized specimen  Patient unable to void today in the office as well as on antibiotics so urine likely sterilized  - if patient having recurrent breakthrough infections then may try hip corrects or a low-dose suppressive antibiotic course  Sydnee Pringle PA-C      Chief Complaint       Urinary infections    History of Present Illness     Leatha Limon is a 79 y o  Shelvy Mingle female here for follow up evaluation of  nephrolithiasis and urinary infections  Patient was initially seen in the hospital for left-sided flank pain and CT findings of obstructing left ureteral calculus  She underwent left ureteroscopy with laser lithotripsy on 04/21/2022  Ureteral stent was removed on 05/16/2022  She underwent CT on 06/01/2022 which showed resolution of hydronephrosis  Did show multiple nonobstructing left renal calculi, punctate  She did develop urinary tract infection with positive urine culture growing E coli on 06/08/2022  She is currently on Macrobid  Stone analysis from surgery was primarily calcium oxalate in composition           Patient has had some concerns for urinary infections however cultures have predominantly been mixed contaminants  Patient is currently on a course of antibiotics and asymptomatic at this time  Unclear what her symptoms are as patient has some confusion in the office today  Per patient's son this has worsened over the past few months  Denies any dysuria, gross hematuria, flank pain, fevers, or chills  Patient unable to provide urine specimen    Laboratory     Lab Results   Component Value Date    CREATININE 0 82 07/22/2022       Review of Systems     Review of Systems   Constitutional: Negative for activity change, appetite change, chills, diaphoresis, fatigue, fever and unexpected weight change  Respiratory: Negative for chest tightness and shortness of breath  Cardiovascular: Negative for chest pain, palpitations and leg swelling  Gastrointestinal: Negative for abdominal distention, abdominal pain, constipation, diarrhea, nausea and vomiting  Genitourinary: Negative for decreased urine volume, difficulty urinating, dysuria, enuresis, flank pain, frequency, genital sores, hematuria and urgency  Musculoskeletal: Negative for back pain, gait problem and myalgias  Skin: Negative for color change, pallor, rash and wound  Psychiatric/Behavioral: Negative for behavioral problems  The patient is not nervous/anxious  Allergies     Allergies   Allergen Reactions    Dilaudid [Hydromorphone] Shortness Of Breath     Other reaction(s): Respiratory Distress  Other reaction(s): HORENESS    Penicillins Rash     Rash       Physical Exam     Physical Exam  Constitutional:       General: She is not in acute distress  Appearance: Normal appearance  She is normal weight  She is not ill-appearing, toxic-appearing or diaphoretic  HENT:      Head: Normocephalic and atraumatic  Eyes:      General:         Right eye: No discharge  Left eye: No discharge        Conjunctiva/sclera: Conjunctivae normal    Pulmonary:      Effort: Pulmonary effort is normal  No respiratory distress  Musculoskeletal:      Comments: Ambulates with wheelchair assistance   Skin:     General: Skin is warm and dry  Coloration: Skin is not jaundiced or pale  Neurological:      General: No focal deficit present  Mental Status: She is alert and oriented to person, place, and time  Psychiatric:         Mood and Affect: Mood normal          Behavior: Behavior normal          Thought Content:  Thought content normal          Vital Signs     Vitals:    07/27/22 1329   BP: 126/68   Pulse: 65   SpO2: 94%   Height: 5' 5" (1 651 m)         Current Medications       Current Outpatient Medications:     Acetaminophen 325 MG CAPS, Take 650 mg by mouth every 6 (six) hours as needed PRN, Disp: , Rfl:     Cholecalciferol (VITAMIN D PO), Take 5,000 Units by mouth Daily  , Disp: , Rfl:     dicyclomine (BENTYL) 10 mg capsule, Take 1 capsule (10 mg total) by mouth 2 (two) times a day, Disp: 60 capsule, Rfl: 3    DULoxetine (CYMBALTA) 60 mg delayed release capsule, Take 2 caps by mouth daily, Disp: 120 capsule, Rfl: 2    gabapentin (NEURONTIN) 100 mg capsule, Take 2 capsules (200 mg total) by mouth in the morning , Disp: 180 capsule, Rfl: 1    gabapentin (NEURONTIN) 300 mg capsule, Take 2 capsules (600 mg total) by mouth daily at bedtime, Disp: 180 capsule, Rfl: 1    Psyllium (Metamucil) 28 3 % POWD, 3 4 each, Disp: , Rfl:     sulfamethoxazole-trimethoprim (BACTRIM DS) 800-160 mg per tablet, Take 1 tablet by mouth every 12 (twelve) hours for 5 days, Disp: 10 tablet, Rfl: 0    verapamil (VERELAN PM) 120 MG 24 hr capsule, Take 1 capsule (120 mg total) by mouth daily at bedtime, Disp: 90 capsule, Rfl: 0    vitamin B-12 (CYANOCOBALAMIN) 250 MCG tablet, Take 1 tablet (250 mcg total) by mouth daily, Disp: 90 tablet, Rfl: 0      Active Problems     Patient Active Problem List   Diagnosis    Trigeminal neuralgia    Occipital neuralgia of right side    Trigeminal neuropathy    Myofascial pain dysfunction syndrome    Other chronic pain    Cervical disc disorder with radiculopathy of rrvxjyoi-hzkrydx-uuwyz region    Essential hypertension    Mixed hyperlipidemia    Insomnia    Ambulatory dysfunction    Cognitive deficits    Moderate episode of recurrent major depressive disorder (HCC)    Mixed incontinence, 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) (female)    Hearing loss    Macular degeneration    Vitamin B12 deficiency    Other specified inflammatory spondylopathies, cervical region [facility] [address]    Dementia without behavioral disturbance, unspecified dementia type ([facility] )    Uncomplicated opioid dependence ([facility] )    Renal calculus, left    Abnormal CT of the abdomen    Sclerosing mesenteritis [facility])         Past Medical History     Past Medical History:   Diagnosis Date    Arthritis     Basal cell carcinoma     Dry eye syndrome     Foraminal stenosis of cervical region     Macular degeneration     Memory loss     Migraine     Neck pain     Spinal stenosis of lumbar region     Squamous cell skin cancer     Supraventricular tachycardia (HCC)     Vitamin D deficiency     last assessed 2/7/17         Surgical History     Past Surgical History:   Procedure Laterality Date    APPENDECTOMY      BACK SURGERY      lower back surgery lumbar disc    CATARACT EXTRACTION      FEMUR FRACTURE SURGERY      FL RETROGRADE PYELOGRAM  04/21/2022    KIDNEY STONE SURGERY Left     ME CYSTO/URETERO W/LITHOTRIPSY &INDWELL STENT INSRT Left 04/21/2022    Procedure: CYSTOSCOPY URETEROSCOPY WITH LITHOTRIPSY HOLMIUM LASER, RETROGRADE PYELOGRAM AND INSERTION STENT URETERAL;  Surgeon: Felipe Aburto MD;  [facility];  Service: Urology    ME STEREO TREAT TRIGEMINAL NERVE Right 03/26/2019    Procedure: GLYCEROL RHIZOTOMY TRIGEMINAL NERVES (V1-V3), RIGHT;  Surgeon: Lauren Sauer MD;  Location:  MAIN OR;  Service: Neurosurgery    REPLACEMENT TOTAL KNEE Right     REPLACEMENT TOTAL KNEE Left     SKIN BIOPSY           Family History Family History   Problem Relation Age of Onset    Aortic aneurysm Mother         abdominal aortic aneurysm    Hypertension Mother    [facility] Breast cancer Mother    [facility] Hypertension Father     Hypertension Sister     Hyperlipidemia Sister     Squamous cell carcinoma Sister          Social History     Social History       Radiology

## 2022-07-29 ENCOUNTER — TELEPHONE (OUTPATIENT)
Dept: FAMILY MEDICINE CLINIC | Facility: CLINIC | Age: 82
End: 2022-07-29

## 2022-07-29 NOTE — TELEPHONE ENCOUNTER
Gaurav Swift called, she will be sending you a missed evaluation order for Maddy Certain  Maddy Rocha fell the other day, broke her hip and is in [facility]

## 2022-09-27 ENCOUNTER — TELEMEDICINE (OUTPATIENT)
Dept: FAMILY MEDICINE CLINIC | Facility: CLINIC | Age: 82
End: 2022-09-27
Payer: MEDICARE

## 2022-09-27 DIAGNOSIS — R63.4 RAPID WEIGHT LOSS: ICD-10-CM

## 2022-09-27 DIAGNOSIS — R41.0 DISORIENTATION: Primary | ICD-10-CM

## 2022-09-27 PROCEDURE — 99214 OFFICE O/P EST MOD 30 MIN: CPT | Performed by: NURSE PRACTITIONER

## 2022-09-27 NOTE — PROGRESS NOTES
Virtual Regular Visit    Verification of patient location:    Patient is located in the following state in which I hold an active license [place]      Assessment/Plan:    Problem List Items Addressed This Visit    None     Visit Diagnoses     Disorientation    -  Primary    Patient is now on Palliative Care  Advised to reach out if they need help with further placement/hospice in the future  Rapid weight loss        Nursing home resident                   Reason for visit is   Chief Complaint   Patient presents with    Virtual Regular Visit        Encounter provider CL Holt    Provider located at [facility] [address] [facility]  [address] [facility] [address] [phone]      Recent Visits  No visits were found meeting these conditions  Showing recent visits within past 7 days and meeting all other requirements  Today's Visits  Date Type Provider Dept   09/27/22 Telemedicine CL [facility] today's visits and meeting all other requirements  Future Appointments  No visits were found meeting these conditions  Showing future appointments within next 150 days and meeting all other requirements       The patient was identified by name and date of birth  Tameka Domínguez was informed that this is a telemedicine visit and that the visit is being conducted through [address] [place] [address] Now and patient was informed that this is a secure, HIPAA-compliant platform  She agrees to proceed     My office door was closed  No one else was in the room  She acknowledged consent and understanding of privacy and security of the video platform  The patient has agreed to participate and understands they can discontinue the visit at any time  Patient is aware this is a billable service  Subjective  Tameka Domínguez is a 79 y o  female presenting via virtual visit with her son   Her son, Kristian Chauhan, reached out yesterday that Sheri Lovell had a fall in late July and broke her pelvis/hip  She has been in a nursing home since that time  He felt like she has been steadily declining  She is confused on exam, not sure of where she is/date/president  Nicko  states mom was placed on palliative care yesterday  She did eat breakfast today, but not lunch  She has lost 30lbs since being in the nursing home  She did take her meds today  She does have PRN pain and anxiety meds  Nicko  states Candy Thorne declined yesterday, but does seem better today  Monica ABEL     Past Medical History:   Diagnosis Date    Arthritis     Basal cell carcinoma     Dry eye syndrome     Foraminal stenosis of cervical region     Macular degeneration     Memory loss     Migraine     Neck pain     Spinal stenosis of lumbar region     Squamous cell skin cancer     Supraventricular tachycardia ([address] )     Vitamin D deficiency     last assessed 2/7/17       Past Surgical History:   Procedure Laterality Date    APPENDECTOMY      BACK SURGERY      lower back surgery lumbar disc    CATARACT EXTRACTION      FEMUR FRACTURE SURGERY      FL RETROGRADE PYELOGRAM  04/21/2022    KIDNEY STONE SURGERY Left     [place] CYSTO/URETERO W/LITHOTRIPSY &INDWELL STENT INSRT Left 04/21/2022    Procedure: CYSTOSCOPY URETEROSCOPY WITH LITHOTRIPSY HOLMIUM LASER, RETROGRADE PYELOGRAM AND INSERTION STENT URETERAL;  Surgeon: Julai Morales MD;  [facility];  Service: Urology    [place] STEREO TREAT TRIGEMINAL NERVE Right 03/26/2019    Procedure: GLYCEROL RHIZOTOMY TRIGEMINAL NERVES (V1-V3), RIGHT;  Surgeon: Alma Mills MD;  Location:  MAIN OR;  Service: Neurosurgery    REPLACEMENT TOTAL KNEE Right     REPLACEMENT TOTAL KNEE Left     SKIN BIOPSY         Current Outpatient Medications   Medication Sig Dispense Refill    Acetaminophen 325 MG CAPS Take 650 mg by mouth every 6 (six) hours as needed PRN      Cholecalciferol (VITAMIN D PO) Take 5,000 Units by mouth Daily        dicyclomine (BENTYL) 10 mg capsule Take 1 capsule (10 mg total) by mouth 2 (two) times a day 60 capsule 3    DULoxetine (CYMBALTA) 60 mg delayed release capsule Take 2 caps by mouth daily 120 capsule 2    gabapentin (NEURONTIN) 100 mg capsule Take 2 capsules (200 mg total) by mouth in the morning  180 capsule 1    gabapentin (NEURONTIN) 300 mg capsule Take 2 capsules (600 mg total) by mouth daily at bedtime 180 capsule 1    Psyllium (Metamucil) 28 3 % POWD 3 4 each      verapamil (VERELAN PM) 120 MG 24 hr capsule Take 1 capsule (120 mg total) by mouth daily at bedtime 90 capsule 0    vitamin B-12 (CYANOCOBALAMIN) 250 MCG tablet Take 1 tablet (250 mcg total) by mouth daily 90 tablet 0     No current facility-administered medications for this visit  Allergies   Allergen Reactions    Dilaudid [Hydromorphone] Shortness Of Breath     Other reaction(s): Respiratory Distress  Other reaction(s): HORENESS    Penicillins Rash     Rash       Review of Systems   Constitutional: Positive for unexpected weight change  Negative for chills, diaphoresis and fever  Respiratory: Negative for cough and shortness of breath  Cardiovascular: Negative for chest pain and palpitations  Gastrointestinal: Negative  Genitourinary: Negative  Musculoskeletal: Positive for arthralgias  Psychiatric/Behavioral: The patient is nervous/anxious  Video Exam    There were no vitals filed for this visit  Physical Exam  Constitutional:       General: She is not in acute distress  Pulmonary:      Effort: Pulmonary effort is normal  No respiratory distress  Neurological:      Mental Status: She is disoriented            I spent 15 minutes directly with the patient during this visit

## 2022-09-28 ENCOUNTER — TELEPHONE (OUTPATIENT)
Dept: FAMILY MEDICINE CLINIC | Facility: CLINIC | Age: 82
End: 2022-09-28

## 2022-09-28 NOTE — TELEPHONE ENCOUNTER
Call back request :     Pts son called - re : mom  Aware of you are OOO until tomorrow  Non-medical question    @ [phone]      Please advise

## 2022-09-29 DIAGNOSIS — R53.81 PHYSICAL DECONDITIONING: ICD-10-CM

## 2022-09-29 DIAGNOSIS — R41.0 DISORIENTATION: Primary | ICD-10-CM

## 2022-09-30 ENCOUNTER — PATIENT OUTREACH (OUTPATIENT)
Dept: FAMILY MEDICINE CLINIC | Facility: CLINIC | Age: 82
End: 2022-09-30

## 2022-09-30 DIAGNOSIS — I10 ESSENTIAL HYPERTENSION: ICD-10-CM

## 2022-09-30 RX ORDER — VERAPAMIL HYDROCHLORIDE 120 MG/1
CAPSULE, EXTENDED RELEASE ORAL
Qty: 90 CAPSULE | Refills: 0 | Status: SHIPPED | OUTPATIENT
Start: 2022-09-30

## 2022-09-30 NOTE — PROGRESS NOTES
OPCM  is responding to referral from PCP requesting that I contact patient's son to discuss her discharge from the nursing home next week  Telephone call placed to patient's son, Abelardo Parker and I introduced myself and explained my role  He informed me that patient is at [facility] and the plan is for her to be discharged to home next week  Abelardo Parker informed me that patient has dementia and has been more confused over the last two weeks  He also reports that she is receiving PT services but she is decompensation and can not get out of bed  Abelardo Parker reports that he is unemployed and has been caring for patient for the last 8 years  He informed me that patient still owns her home and she also has private funds  Abelardo Parker lives with patient and is her primary care giver  I provided supportive listening and discussed long term planning including keeping her at the nursing home  Abelardo Parker informed me that he has decided to accept patient back home due to patient having to pay privately to remain there  I discussed the MA process and he is aware that patient will not qualify due to her assets  I discussed home health services and private pay home health aides   Abelardo Parker informed me that he has a list of private agencies already  He also reports that patient is receiving Palliative Care at the SNF and patient is receiving medication for anxiety  I suggested that he request that patient is evaluated for hospice care at home which would give him more support  Abelardo Parker informed me that he is going to the Nursing Home today and will speak to the  there  He reports that staff told him that they did not feel that patient was appropriate for hospice yet  However, I suggested that they evaluate her due to her recent decline overall  I also provided Abelardo Parker with my contact information and advised him to reach out to me if I can be of any further assistance or support and he agrees

## 2022-10-06 ENCOUNTER — TELEPHONE (OUTPATIENT)
Dept: FAMILY MEDICINE CLINIC | Facility: CLINIC | Age: 82
End: 2022-10-06

## 2022-10-06 ENCOUNTER — TELEMEDICINE (OUTPATIENT)
Dept: FAMILY MEDICINE CLINIC | Facility: CLINIC | Age: 82
End: 2022-10-06
Payer: MEDICARE

## 2022-10-06 DIAGNOSIS — Z51.5 END OF LIFE CARE: Primary | ICD-10-CM

## 2022-10-06 DIAGNOSIS — R32 URINARY INCONTINENCE, UNSPECIFIED TYPE: ICD-10-CM

## 2022-10-06 PROCEDURE — 99214 OFFICE O/P EST MOD 30 MIN: CPT | Performed by: NURSE PRACTITIONER

## 2022-10-06 NOTE — TELEPHONE ENCOUNTER
Called Melisa Castano from [facility] n/a did leave voicemail to make aware that Denny [facility] will need Bolanos and hospital pads at home, they are visiting her tomorrow 10/7 and wanted to catch them [facility] order for bolanos and it was faxed over today 10/6

## 2022-10-06 NOTE — PROGRESS NOTES
Virtual Regular Visit    Verification of patient location:    Patient is located in the following state in which I hold an active license [place]      Assessment/Plan:    Problem List Items Addressed This Visit    None     Visit Diagnoses     End of life care    -  Primary    Will order inpatient hospice and contact [facility]  Relevant Orders    Ambulatory Referral to Hospice    Urinary incontinence, unspecified type        Ordered bolanos, will fax to [facility]      Relevant Orders    Bedside Drainage Bag               Reason for visit is   Chief Complaint   Patient presents with    Virtual Regular Visit        Encounter provider CL Mandel    Provider located at [address] [place] [address]  [address] [place] [id number] [phone]      Recent Visits  No visits were found meeting these conditions  Showing recent visits within past 7 days and meeting all other requirements  Today's Visits  Date Type Provider Dept   10/06/22 Telephone [place] [facility] [place] [facility]   10/06/22 Telemedicine CL Aguilera [facility] [address] [facility]   Showing today's visits and meeting all other requirements  Future Appointments  No visits were found meeting these conditions  Showing future appointments within next 150 days and meeting all other requirements       The patient was identified by name and date of birth  Yudi Denise was informed that this is a telemedicine visit and that the visit is being conducted through [facility] and patient was informed this is a secure, HIPAA-complaint platform  She agrees to proceed     My office door was closed  No one else was in the room  She acknowledged consent and understanding of privacy and security of the video platform  The patient has agreed to participate and understands they can discontinue the visit at any time      Patient is aware this is a billable service  Subjective  Kim Landis is a 79 y o  female presenting via virtual visit with her son, Rivas Hernandez  Patient was just discharged yesterday from [facility], home with palliative care  She was sent home with scripts for morphine and ativan to use as needed  Patient's son states she has hardly been eating, is in a lot of pain/very anxious  He is not able to care for her properly at home and is requesting inpatient hospice  He is unable to clean her by himself  They only have home care coming 3 days/week  she is currently resting comfortably after Rivas Hernandez gave her a dose of morphine at 1300 and recently gave lorazepam           HPI     Past Medical History:   Diagnosis Date    Arthritis     Basal cell carcinoma     Dry eye syndrome     Foraminal stenosis of cervical region     Macular degeneration     Memory loss     Migraine     Neck pain     Spinal stenosis of lumbar region     Squamous cell skin cancer     Supraventricular tachycardia (HCC)     Vitamin D deficiency     last assessed 2/7/17       Past Surgical History:   Procedure Laterality Date    APPENDECTOMY      BACK SURGERY      lower back surgery lumbar disc    CATARACT EXTRACTION      FEMUR FRACTURE SURGERY      FL RETROGRADE PYELOGRAM  04/21/2022    KIDNEY STONE SURGERY Left     MI CYSTO/URETERO W/LITHOTRIPSY &INDWELL STENT INSRT Left 04/21/2022    Procedure: CYSTOSCOPY URETEROSCOPY WITH LITHOTRIPSY HOLMIUM LASER, RETROGRADE PYELOGRAM AND INSERTION STENT URETERAL;  Surgeon: Holly Thrasher MD;  Location: [facility];  Service: Urology    MI STEREO TREAT TRIGEMINAL NERVE Right 03/26/2019    Procedure: GLYCEROL RHIZOTOMY TRIGEMINAL NERVES (V1-V3), RIGHT;  Surgeon: Latrelle Mcardle, MD;  Location:  MAIN OR;  Service: Neurosurgery    REPLACEMENT TOTAL KNEE Right     REPLACEMENT TOTAL KNEE Left     SKIN BIOPSY         Current Outpatient Medications   Medication Sig Dispense Refill    Acetaminophen 325 MG CAPS Take 650 mg by mouth every 6 (six) hours as needed PRN      Cholecalciferol (VITAMIN D PO) Take 5,000 Units by mouth Daily        dicyclomine (BENTYL) 10 mg capsule Take 1 capsule (10 mg total) by mouth 2 (two) times a day 60 capsule 3    DULoxetine (CYMBALTA) 60 mg delayed release capsule Take 2 caps by mouth daily 120 capsule 2    gabapentin (NEURONTIN) 100 mg capsule Take 2 capsules (200 mg total) by mouth in the morning  180 capsule 1    gabapentin (NEURONTIN) 300 mg capsule Take 2 capsules (600 mg total) by mouth daily at bedtime 180 capsule 1    Psyllium (Metamucil) 28 3 % POWD 3 4 each      verapamil (VERELAN PM) 120 MG 24 hr capsule TAKE 1 CAPSULE(120 MG) BY MOUTH DAILY AT BEDTIME 90 capsule 0    vitamin B-12 (CYANOCOBALAMIN) 250 MCG tablet Take 1 tablet (250 mcg total) by mouth daily 90 tablet 0     No current facility-administered medications for this visit  Allergies   Allergen Reactions    Dilaudid [Hydromorphone] Shortness Of Breath     Other reaction(s): Respiratory Distress  Other reaction(s): HORENESS    Penicillins Rash     Rash       Review of Systems   Constitutional: Positive for fatigue  Negative for chills and fever  Genitourinary:        Incontinent     Musculoskeletal: Positive for arthralgias and back pain  Neurological: Positive for speech difficulty and weakness  Psychiatric/Behavioral: Positive for confusion  The patient is nervous/anxious  Video Exam    There were no vitals filed for this visit      Physical Exam     I spent 15 minutes directly with the patient during this visit

## 2022-10-06 NOTE — TELEPHONE ENCOUNTER
Order for Bedside Drainage Bag and Referral to Hospice faxed to [facility] - per provider's request      Confirmation fax received

## 2022-10-07 ENCOUNTER — TELEPHONE (OUTPATIENT)
Dept: FAMILY MEDICINE CLINIC | Facility: CLINIC | Age: 82
End: 2022-10-07

## 2022-10-07 NOTE — TELEPHONE ENCOUNTER
There is a message on the hospice order stating it was intended for [facility]   Type Date User Summary Attachment   General 10/07/2022  8:06 AM Khadra Kevin RN - -   Note    INTENDED FOR [facility] - NOTES STATE REFERRAL FAXED THERE

## 2022-10-07 NOTE — TELEPHONE ENCOUNTER
Called and s/w with son, Yanet Marshall  Patient was taken to the ER last night s/p fall out of bed  A hospital bed was delivered and she is technically on hospice now  There is a [facility] Nurse coming today with a bolanos  Mom is restless and uncomfortable  Patient does have meds to take as needed

## 2022-10-07 NOTE — TELEPHONE ENCOUNTER
Carol Hankins can you please call Rivas Hernandez today in regards to Shannon Crespo he would like to speak with you plus she fell last night   Thank you

## 2022-10-11 ENCOUNTER — TELEPHONE (OUTPATIENT)
Dept: FAMILY MEDICINE CLINIC | Facility: CLINIC | Age: 82
End: 2022-10-11

## 2022-10-11 NOTE — TELEPHONE ENCOUNTER
Called and s/w with son, Elvera Cipro Elouise Nyhan is comfortable, on hospice with [facility]  Receiving great hospice care with [facility]  Cesar Menezes to reach out if they need anything

## 2023-03-29 NOTE — TELEPHONE ENCOUNTER
GYNECOLOGY ROUNDING NOTE     Subjective   Hui was resting in bed this AM. She states she feels well. She denies headache, vision changes, SOB, chest pain, RUQ pain, nausea, vomiting, and worsening edema of the hands and feet.     Objective     Patient Vitals for the past 8 hrs:   BP Temp Temp src Pulse Resp SpO2   23 0604 108/68 98.6 °F (37 °C) -- 83 16 98 %   23 0200 122/77 98.1 °F (36.7 °C) Oral 93 16 98 %     PE:   Gen: No acute distress. Resting in bed   Skin: Warm, dry.  Normal for ethnicity.  Pulm: CTAB, no increased work of breathing  Card: RRR, good peripheral perfusion   Abdomen: Fundus firm and below umbilicus. No RUQ tenderness.    Extremities: 1+ patellar reflexes bilaterally. Non-pitting edema bilaterally. No ankle clonus    TTE 3/28: STUDY CONCLUSIONS  SUMMARY:     1. Left ventricle: The cavity size is normal. Wall thickness is normal.     Systolic function is hyperdynamic. The ejection fraction was measured by     biplane method of disks. Wall motion is normal; there are no regional wall     motion abnormalities. The tissue Doppler parameters are normal. Left     ventricular diastolic function parameters are normal. The ejection fraction     is 81%.  2. Aortic valve: The annulus is normal. The valve is structurally normal. The     valve is trileaflet. The leaflets are normal thickness. There is no     regurgitation.  3. Mitral valve: The valve is structurally normal. The annulus is normal. The     leaflets are normal thickness. There is no regurgitation.  4. Right ventricle: The cavity size is normal. Wall thickness is normal.     Systolic function is normal. The TAPSE is normal, suggestive of normal RV     systolic function. Systolic pressure is mildly increased. The estimated     peak pressure is 38mm Hg.  5. Pericardium, extracardiac: The pericardium is normal in appearance.    Assessment & Plan   Hui Buchanan is a 27 year old  s/p RCS/BS on 3/16 presenting with elevated  Pt returned the call and will schedule a follow up appt  Please be advise thank you  Bps. Previously readmitted 3/24-26 for pre-eclampsia with severe features s/p Mag x24hrs. During this admission, patient had Bps elevated up to 200s/100s in the ED. She received IVP of Hydralazine 5/10mg which brought down pressures to mild range. She is admitted for blood pressure control.     #Pre-eclampsia with SF by BP criteria  -s/p Mag 6/2 x24hrs (3/24-25). No Magnesium on this admission              -s/p H5/10 (3/27)  -Continue L200 BID, Nifedipine 60XL at bedtime, 20mg lasix daily   - Bps have been in the 100s/60s-130s/80s on this regimen which is at our goal   - LE edema has improved  - Urine output: Adequate. Net -5L since admit  -Labs:   - CBC/CMP stable   - ALT mildly elevated 80>73   - >228  - TTE 3/28 with normal finding, EF 81%  -Cards consult ordered, recs appreciated    Anxiety   -Continue Zoloft 25mg qd    Attending: SARAH BETH Tafyoa DO, PGY-3    OB Attending Note  Pt seen and examined, agree with resident note.  Pt states mood is better since yesterday, tolerating zoloft.  Pt c/o constipation - stool softener ordered.  BP's normal range on current regimen, appreciate cardiology consult.  Plan discharge home later today pending cardiology  recommendations.

## 2023-08-15 NOTE — TELEPHONE ENCOUNTER
We will be repeating injection later this month   I'm unclear what the issue is Prior to Visit Medications    Medication Sig Taking? Authorizing Provider   acetaminophen (TYLENOL) 325 MG tablet Take 2 tablets by mouth every 4 hours as needed for Pain or Fever Maximum dose of acetaminophen is 4000 mg from all sources in 24 hours. Caridad Santos MD   apixaban (ELIQUIS) 5 MG TABS tablet Take 1 tablet by mouth 2 times daily  Caridad Santos MD   carvedilol (COREG) 6.25 MG tablet Take 1 tablet by mouth 2 times daily (with meals)  Caridad Santos MD   miconazole (MICOTIN) 2 % powder Apply 2 % topically in the morning and at bedtime  Historical Provider, MD   ondansetron (ZOFRAN) 4 MG tablet Take 1 tablet by mouth every 6 hours as needed for Nausea or Vomiting  Historical Provider, MD   loratadine (CLARITIN) 10 MG tablet Take 1 tablet by mouth daily  Historical Provider, MD   budesonide-formoterol (SYMBICORT) 160-4.5 MCG/ACT AERO Inhale 2 puffs into the lungs 2 times daily  Historical Provider, MD   gabapentin (NEURONTIN) 300 MG capsule Take 1 capsule by mouth 3 times daily. Historical Provider, MD   guaiFENesin (MUCINEX) 600 MG extended release tablet Take 2 tablets by mouth 2 times daily as needed for Congestion  Historical Provider, MD   loperamide (IMODIUM A-D) 2 MG tablet Take 1 tablet by mouth 2 times daily as needed for Diarrhea  Historical Provider, MD   guaiFENesin-dextromethorphan (ROBITUSSIN DM) 100-10 MG/5ML syrup Take 10 mLs by mouth 4 times daily as needed for Cough  Historical Provider, MD   bumetanide (BUMEX) 2 MG tablet Take 1 tablet by mouth daily  Historical Provider, MD     Allergies   Allergen Reactions    Penicillins Rash    Sulfa Antibiotics        Subjective:      Review of Systems   Constitutional:  Negative for activity change, appetite change, chills, fatigue and fever. Respiratory:  Positive for cough. Negative for chest tightness, shortness of breath and wheezing. Cardiovascular:  Positive for leg swelling. Negative for chest pain and palpitations.

## 2024-05-15 NOTE — TELEPHONE ENCOUNTER
Pts son Laura Dennison  walked in the office - pt was d/c from [facility] on 10/05/2022  Please review d/c summary - copy attached to this note  Laura Dennison requested an virtual appt with you - scheduled  for Dieter Purcell for today @ 4 00 PM  Pts mom is unable to have in office visit  Pt wants discuss hospice options and is not feeling well  Please review  [N/A] : N/A [Denies] : Denies [No] : No [Yes] : Yes [24g] : 24g [Start Time: ___] : Medication Start Time: [unfilled] [End Time: ___] : Medication End Time: [unfilled] [Medication Name: ___] : Medication Name: [unfilled] [Total Amount Administered: ___] : Total Amount Administered: [unfilled] [IV discontinued. Intact. No signs or symptoms of IV complications noted. Time: ___] : IV discontinued. Intact. No signs or symptoms of IV complications noted. Time: [unfilled] [Patient  instructed to seek medical attention with signs and symptoms of adverse effects] : Patient  instructed to seek medical attention with signs and symptoms of adverse effects [Patient left unit in no acute distress] : Patient left unit in no acute distress [Medications administered as ordered and tolerated well.] : Medications administered as ordered and tolerated well. [de-identified] : left medial  [de-identified] : Patient presents for Zoledronic Acid infusion in NAD. Medication education provided; patient verbalized understanding and consented to today's infusion. Patient tolerated the infusion with no immediate AEs. Patient left the unit in [facility].

## 2024-05-22 NOTE — PROGRESS NOTES
May 22, 2024       Martha Ann MD  [address] [facility] [address]  Via In Basket      Patient: Miguel Hardy   YOB: 1953   Date of Visit: 2024       Dear Dr. Ann:    Thank you for referring Miguel Hardy to me for evaluation. Below are my notes for this visit with him.    If you have questions, please do not hesitate to call me. I look forward to following your patient along with you.      Sincerely,        Padmini Oconnell CNP        CC: No Recipients  Padmini Oconnell CNP  2024  1:11 PM  Signed    OFFICE VISIT      Patient: Miguel Hardy Date of Service: 2024    : 1953 MRN: [id number]     SUBJECTIVE:   HISTORY OF PRESENT ILLNESS:  Miguel Hardy is a 70 year old male who presents today for a post hospital general cardiology follow up.  He is a patient of Dr Gilliam.     He has a history of HTN, Dyslipidemia, asthma, Moderate coronary artery calcifications, aortic regurg, Moderate Ascending and descending Ao dilation.    Since last visit with Dr. Gilliam, he established with CV surgery at [facility] and is preparing for ascending aortic aneurysm repair.  In anticipation for this, he underwent coronary angiogram 2024 with Dr. Cartagena which revealed mild CAD.    Appears comfortable in clinic  Mild right forearm pain last week, a few episodes of achey pain that lasted as econd or two around area of bruising  No right arm numbess/weakness/tingling  Chronic chest pain \"muscle pain\" to left pectoral, ongoing for years  Exercises regularly with walking, up to 5 miles daily  States he is scheduled  for aneurysm repair at [facility] with Dr Espino, states they are also discussing replacing his aortic valve due to AI but not yet decided   Compliant with meds     PAST MEDICAL HISTORY:  Past Medical History:   Diagnosis Date   • Asthma (CMD)    • BPH (benign prostatic hyperplasia)     Follows by Urology   • Closed nondisplaced fracture of proximal phalanx of lesser toe of right foot  [address] [facility] [address] [facility]    NAME: José Miguel Walter Keavy  AGE: [de-identified] y o  SEX: female  : 1940     DATE: 3/25/2019 - Date of last visit was on 3-15-19  Surgery scheduled in am and was contacted by neurosurg office to clear pt as surgery is planned for am  Pt called for information  Chief Complaint: Pre-operative Evaluation    Surgery: GLYCEROL RHIZOTOMY TRIGEMINAL NERVES (V1-V3), RIGHT (Right Face)  Anticipated Date of Surgery: 3-  Referring Provider: Dr Sherley Churchill     History of Present Illness:     Dulce Saldivar is a [de-identified] y o  female who presents to the office on 3- for routine exam and discussed the need for further intervention and now requests a preoperative consultation at the request of surgeon Costa Skelton who plans on performing a [address] [facility] (V1-V3), RIGHT (Right Face) on 3-  Planned anesthesia is general  Patient has a bleeding risk of: no recent abnormal bleeding  Patient does not have objections to receiving blood products if needed   Current anti-platelet/anti-coagulation medications that the patient is prescribed includes: None     Assessment of Chronic Conditions:   SVT - with no problems for years  Chronic Facial Pain     Assessment of Cardiac Risk:  · Denies unstable or severe angina or MI in the last 6 weeks or history of stent placement in the last year   · Denies decompensated heart failure (e g  New onset heart failure, NYHA functional class IV heart failure, or worsening existing heart failure)  · Denies significant arrhythmias such as high grade AV block, symptomatic ventricular arrhythmia, newly recognized ventricular tachycardia, supraventricular tachycardia with resting heart rate >100, or symptomatic bradycardia  · Denies severe heart valve disease including aortic stenosis or symptomatic mitral stenosis     Exercise Capacity:  · Able to walk 4 blocks without symptoms?: Yes  · Able to walk 2 12/17/2019   • Essential (primary) hypertension    • Family history of DVT     On Equilis started 12/2020   • High cholesterol    • Knee pain, chronic     Bilateral for many years   • Testicular lump 2020    Surgically removed by Urology   • Vertigo        MEDICATIONS:  Current Outpatient Medications   Medication Sig   • metoPROLOL tartrate (LOPRESSOR) 25 MG tablet Take 0.5 tablets by mouth in the morning and 0.5 tablets in the evening.   • atorvastatin (LIPITOR) 20 MG tablet TAKE 1 TABLET BY MOUTH AT BEDTIME   • amLODIPine (NORVASC) 2.5 MG tablet TAKE 1 TABLET BY MOUTH DAILY   • Aspirin Low Dose 81 MG EC tablet TAKE 1 TABLET BY MOUTH EVERY DAY   • tamsulosin (FLOMAX) 0.4 MG Cap Take 2 capsules by mouth daily after a meal. Take 30 minutes after evening meal   • finasteride (PROSCAR) 5 MG tablet Take 1 tablet by mouth daily.   • acetaminophen (TYLENOL) 500 MG tablet Take 500 mg by mouth every 6 hours as needed for Pain.     No current facility-administered medications for this visit.       ALLERGIES:  ALLERGIES:   Allergen Reactions   • Latex Other (See Comments)   • Penicillins Other (See Comments)       PAST SURGICAL HISTORY:  Past Surgical History:   Procedure Laterality Date   • Appendectomy  1964   • Total knee arthroplasty Right 07/22/2022       FAMILY HISTORY:  Family History   Problem Relation Age of Onset   • Other Father         DVT   • Heart disease Father    • Cancer, Breast Sister 66   • Other Brother 74        Skin lesions and brain cancer    • Patient is unaware of any medical problems Mother    • Patient is unaware of any medical problems Daughter    • Patient is unaware of any medical problems Son    • Patient is unaware of any medical problems Maternal Aunt    • Patient is unaware of any medical problems Maternal Uncle    • Patient is unaware of any medical problems Paternal Aunt    • Patient is unaware of any medical problems Paternal Uncle    • Patient is unaware of any medical problems Maternal  flights without symptoms?: Yes    Prior Anesthesia Reactions: No     Personal history of venous thromboembolic disease? No    History of steroid use for >2 weeks within last year? No    STOP-BANG Sleep Apnea Screening Questionnaire: Stop Bang   Do you snore loudly (louder than talking or loud enough to be heard through closed doors)?: 0  Do you often feel tired, fatigued, or sleepy during daytime? : 0  Has anyone observed you stop breathing during your sleep? : 0  Do you have or are you being treated for High Blood Pressure?: 0  Body Mass Index greater than 35 kg/m2?: 0  Age over 53yr old?: 1  Neck circumference greater than 40cm? : 0  Are you male? : 0  Score: 1  Needs sleep eval? : 0       Review of Systems:     Review of Systems   Constitutional: Negative for chills, diaphoresis, fatigue and fever  HENT: Negative  Eyes: Negative  Respiratory: Negative for cough, shortness of breath and wheezing  Cardiovascular: Negative for chest pain and palpitations  Gastrointestinal: Negative for abdominal pain, constipation, diarrhea, nausea and vomiting  Genitourinary: Positive for urgency  Negative for dysuria and frequency  Musculoskeletal: Positive for arthralgias and back pain (knee pains)  Skin: Negative for rash and wound  Neurological: Positive for light-headedness and headaches (every day)  Negative for dizziness, syncope and numbness  Psychiatric/Behavioral: Negative for dysphoric mood (better with cymbalta)  The patient is nervous/anxious (about the upcoming surgery)  Current Problem List:     Patient Active Problem List   Diagnosis    Chronic nonintractable headache    Trigeminal neuralgia    Occipital neuralgia of right side    Trigeminal neuropathy    Myofascial pain on right side    Depression with anxiety    Other chronic pain       Allergies:      Allergies   Allergen Reactions    Dilaudid [Hydromorphone] Shortness Of Breath     Other reaction(s): Respiratory Distress  Other reaction(s): HORENESS    Penicillins Rash     Rash       Physical Exam:       Current Outpatient Medications:     Acetaminophen 325 MG CAPS, Take 650 mg by mouth every 6 (six) hours as needed, Disp: , Rfl:     Cholecalciferol (VITAMIN D PO), Take 5,000 Units by mouth Daily  , Disp: , Rfl:     cycloSPORINE (RESTASIS) 0 05 % ophthalmic emulsion, Apply 1 drop to eye 2 (two) times a day, Disp: , Rfl:     DULoxetine (CYMBALTA) 60 mg delayed release capsule, TAKE ONE CAPSULE BY MOUTH EVERY DAY, Disp: 30 capsule, Rfl: 0    gabapentin (NEURONTIN) 300 mg capsule, Take 300 mg hs, Disp: 90 capsule, Rfl: 0    Multiple Vitamins-Minerals (PRESERVISION AREDS 2+MULTI VIT) CAPS, BID, Disp: , Rfl:     verapamil (VERELAN PM) 120 MG 24 hr capsule, TAKE 1 CAPSULE BY MOUTH AT  NIGHT, Disp: 90 capsule, Rfl: 1    Past Medical History:       Past Medical History:   Diagnosis Date    Arthritis     Dry eye syndrome     Macular degeneration     Memory loss     Migraine     Spinal stenosis of lumbar region     Supraventricular tachycardia (HCC)     Vitamin D deficiency     last assessed 2/7/17        Past Surgical History:   Procedure Laterality Date    APPENDECTOMY      BACK SURGERY      lower back surgery lumbar disc    CATARACT EXTRACTION      FEMUR FRACTURE SURGERY      REPLACEMENT TOTAL KNEE Right     REPLACEMENT TOTAL KNEE Left         Family History   Problem Relation Age of Onset    Aortic aneurysm Mother         abdominal aortic aneurysm    Hypertension Mother     Breast cancer Mother     Hypertension Father     Hypertension Sister     Hyperlipidemia Sister         Social History     Socioeconomic History    Marital status:       Spouse name: Not on file    Number of children: Not on file    Years of education: Not on file    Highest education level: Not on file   Occupational History    Occupation: retired   Social Needs    Financial resource strain: Not on Jimmy Grandmother    • Patient is unaware of any medical problems Maternal Grandfather    • Patient is unaware of any medical problems Paternal Grandmother    • Patient is unaware of any medical problems Paternal Grandfather    • Patient is unaware of any medical problems Other        SOCIAL HISTORY:  Social History     Tobacco Use   • Smoking status: Never     Passive exposure: Never   • Smokeless tobacco: Never   Vaping Use   • Vaping status: never used   Substance Use Topics   • Alcohol use: No   • Drug use: No       Review of Systems   Constitutional: Negative for chills, fever, malaise/fatigue, weight gain and weight loss.   HENT:  Negative for hearing loss and nosebleeds.    Cardiovascular:  Negative for chest pain, claudication, dyspnea on exertion, leg swelling, near-syncope, orthopnea, palpitations, paroxysmal nocturnal dyspnea and syncope.   Respiratory:  Negative for shortness of breath, sleep disturbances due to breathing and snoring.    Hematologic/Lymphatic: Does not bruise/bleed easily.   Skin:  Negative for color change, dry skin, rash and suspicious lesions.   Musculoskeletal:  Negative for arthritis, falls and myalgias.   Gastrointestinal:  Negative for hematochezia and melena.   Genitourinary:  Negative for hematuria.   Neurological:  Negative for dizziness, focal weakness, light-headedness and paresthesias.   Allergic/Immunologic: Negative for environmental allergies.        No new food allergies        All other systems reviewed and are negative.    OBJECTIVE:     Physical Exam   Constitutional: He appears healthy. No distress.   Vital signs reviewed   HENT:   Mouth/Throat: Oropharynx is clear.   Eyes: Conjunctivae are normal.   Neck: Thyroid normal.   Cardiovascular: Normal rate, regular rhythm, S1 normal, S2 normal, normal heart sounds, intact distal pulses and normal pulses.   Pulmonary/Chest: Effort normal and breath sounds normal. He exhibits no tenderness.   Abdominal: Soft. Bowel sounds are  insecurity:     Worry: Not on file     Inability: Not on file    Transportation needs:     Medical: Not on file     Non-medical: Not on file   Tobacco Use    Smoking status: Former Smoker    Smokeless tobacco: Never Used   Substance and Sexual Activity    Alcohol use: Yes     Comment: occasional    Drug use: No    Sexual activity: Not on file   Lifestyle    Physical activity:     Days per week: Not on file     Minutes per session: Not on file    Stress: Not on file   Relationships    Social connections:     Talks on phone: Not on file     Gets together: Not on file     Attends Anabaptist service: Not on file     Active member of club or organization: Not on file     Attends meetings of clubs or organizations: Not on file     Relationship status: Not on file    Intimate partner violence:     Fear of current or ex partner: Not on file     Emotionally abused: Not on file     Physically abused: Not on file     Forced sexual activity: Not on file   Other Topics Concern    Not on file   Social History Narrative    Always uses seatbelt        Physical Exam:     There were no vitals taken for this visit  Physical Exam   Constitutional: She is oriented to person, place, and time  She appears well-developed and well-nourished  No distress  HENT:   Head: Normocephalic and atraumatic  Right Ear: External ear normal    Left Ear: External ear normal    Mouth/Throat: Oropharynx is clear and moist    Eyes: Pupils are equal, round, and reactive to light  Conjunctivae and EOM are normal  Right eye exhibits no discharge  Left eye exhibits no discharge  Neck: Normal range of motion  Neck supple  Cardiovascular: Normal rate, regular rhythm and normal heart sounds  Exam reveals no friction rub  No murmur heard  Pulmonary/Chest: Effort normal and breath sounds normal  No respiratory distress  She has no wheezes  Abdominal: Soft  Bowel sounds are normal  There is no tenderness     Musculoskeletal: Normal range of normal.   Musculoskeletal:         General: No edema. Normal range of motion.      Cervical back: Normal range of motion and neck supple.   Neurological: He is alert and oriented to person, place, and time. He has normal motor skills. Gait normal.   Skin: Skin is warm and dry. No rash noted. No cyanosis. Nails show no clubbing.   Right radial site approximated, well healed, mild bruising, no erythema, or swelling, nontender to palpation             Visit Vitals  /60 (BP Location: LUE - Left upper extremity, Patient Position: Sitting, Cuff Size: Regular)   Pulse 62   Ht 5' 11\" (1.803 m)   Wt 72.1 kg (158 lb 15.2 oz)   SpO2 96%   BMI 22.17 kg/m²     Body mass index is 22.17 kg/m².  Wt Readings from Last 4 Encounters:   05/22/24 72.1 kg (158 lb 15.2 oz)   05/16/24 70.7 kg (155 lb 13.8 oz)   04/24/24 72.6 kg (160 lb 0.9 oz)   02/29/24 73.7 kg (162 lb 7.7 oz)       Vital Signs and previous readings were reviewed during today's visit    DIAGNOSTIC STUDIES:   LAB RESULTS:  Recent Labs   Lab 05/25/23  0903   Cholesterol 100   HDL 54   Triglycerides 39   CALCLDL 38   Non-HDL Cholesterol 46       No results found for: \"LIPOA\"    Recent Labs   Lab 05/17/24  0410 05/16/24  1026 04/24/24  1633 03/11/24  0844   Sodium 141 139 140 141   Chloride 108 105 107 113*   BUN 20 22* 21* 23*   Potassium 4.2 4.1 4.6 4.3   Glucose 85 107* 94 105*   Creatinine 1.07 1.13 1.14 0.97   Calcium 8.6 8.6 9.1 8.5       Recent Labs   Lab 05/17/24  0410 05/16/24  1026 04/24/24  1633 03/11/24  0844 10/11/23  1155 05/25/23  0903   WBC 5.3 5.2 5.2 4.9 6.3 5.0   RBC 4.11* 4.66 4.66 4.61 4.69 4.56   HGB 13.8 15.6 15.6 15.2 15.3 15.4   HCT 40.0 46.3 46.5 45.3 45.8 45.9   MCV 97.3 99.4 99.8 98.3 97.7 100.7*   MCHC 34.5 33.7 33.5 33.6 33.4 33.6   RDW-CV 13.0 13.2 13.4 13.0 12.7 13.4   * 127* 139* 149 133* 132*   Lymphocytes, Percent  --   --  29 28 24 24       Recent Labs   Lab 04/24/24  1633 03/11/24  0844 10/11/23  1155 05/25/23  0903   GPT 19  27 25 28        No results for input(s): \"NTPROB\" in the last 8765 hours.    Cardiac Testin2024 coronary angiogram @ [facility]:  Mild CAD     2024 carotid duplex @ [facility]:   The bilateral extracranial carotid arterial systems do not demonstrate  any hemodynamically significant disease by duplex    [facility]  1. Left ventricle: The cavity size is normal. Wall thickness is mildly     increased. Systolic function is normal. The average 2D speckle global     longitudinal strain is normal. The global longitudinal strain value is     -22.5%. The ejection fraction was measured by 3D assessment. The ejection     fraction is 61%.  2. Aortic valve: There is moderate regurgitation. The regurgitation pressure     half-time is 486ms.  3. Ascending aorta: The vessel is moderately dilated and measures 4.7 cm.  4. Right ventricle: The cavity size is normal. Wall thickness is normal.     Systolic function is normal. Systolic pressure is within the normal range.     The estimated peak pressure is 17mm Hg.  5. Inferior vena cava: The IVC is normal-sized.  6. Compared to prior study from 3/14/2019, there is worsening. There is now     moderate dilation of the ascending aorta and moderate AI.  .  CTA chest 3/20/2024 @ [facility]:  Stable aneurysmal dilatation of the thoracic aorta, measuring up to 5.5 cm ascending    CT Chest wo contrast 2023 @ [facility]:  Stable 4.3 x 4.4 cm dilated aortic root (series 2, image 86). Stable  dilated mid ascending thoracic aorta measuring 5.2 x 5.0 cm in diameter  (series 2, image 70). Stable dilated mid descending thoracic aorta  measuring 3.7 x 3.9 cm (series 2, image 63), relatively unchanged when  measured similarly. Stable size of the proximal descending aorta measuring  4.7 x 4.9 cm (series 2, image 42), unchanged when measured similarly. The  heart is normal in size. Coronary artery and thoracic aortic  calcifications.     [facility]  1.   Aneurysmal dilation of the  motion  She exhibits no edema, tenderness or deformity  Ambulates with walker  Neurological: She is alert and oriented to person, place, and time  No cranial nerve deficit  Skin: Skin is warm and dry  No rash noted  She is not diaphoretic  Psychiatric: She has a normal mood and affect  Her behavior is normal  Judgment and thought content normal         Data:     Pre-operative work-up  CBC:   Results from last 6 Months   Lab Units 03/22/19  1307   WBC Thousand/uL 5 50   RBC Million/uL 4 18   HEMOGLOBIN g/dL 12 8   HEMATOCRIT % 40 3   MCV fL 96   MCH pg 30 6   MCHC g/dL 31 8   RDW % 13 1   MPV fL 10 7   PLATELETS Thousands/uL 209   NRBC AUTO /100 WBCs 0   NEUTROS PCT % 66   LYMPHS PCT % 22   MONOS PCT % 8   EOS PCT % 2   BASOS PCT % 1   NEUTROS ABS Thousands/µL 3 70   LYMPHS ABS Thousands/µL 1 21   MONOS ABS Thousand/µL 0 43   EOS ABS Thousand/µL 0 09     Chemistry Profile:   Results from last 6 Months   Lab Units 03/22/19  1307 11/20/18  0910   POTASSIUM mmol/L 4 0 4 2   CHLORIDE mmol/L 105 106   CO2 mmol/L 30 32   BUN mg/dL 16 19   CREATININE mg/dL 0 61 0 62   GLUCOSE FASTING mg/dL  --  86   CALCIUM mg/dL 9 1 9 0   AST U/L 14 13   ALT U/L 17 17   ALK PHOS U/L 67 52   EGFR ml/min/1 73sq m 86 86     Coagulation Studies:   Results from last 6 Months   Lab Units 03/22/19  1307   PROTIME seconds 12 9   INR  0 98   PTT seconds 30     Endocrine Studies:   Results from last 6 Months   Lab Units 03/22/19  1307   HEMOGLOBIN A1C % 5 5       EKG: EKG: nsr without any ectopy with non specific st changes - flattening of the t wave in lead 3 and flipped avf  Chest x-ray: n/a    Previous cardiopulmonary studies within the past year:  · Echocardiogram: not indicated  · Cardiac Catheterization: n/a  · Stress Test: n/a  · Pulmonary Function Testing: n/a      Assessment & Recommendations:     No diagnosis found      Pre-Op Evaluation Assessment  [de-identified] y o  female with planned surgery:   GLYCEROL RHIZOTOMY TRIGEMINAL NERVES (V1-V3), RIGHT (Right Face)  Known risk factors for perioperative complications: h/o svt with no occurrences for years    Cardiac Risk Estimation: per the Revised Cardiac Risk Index (Circ  100:1043, 1999), the patient's risk factors for cardiac complications include None, putting her in: RCI RISK CLASS I (0 risk factors, risk of major cardiac compl  appr  0 5%)  Current medications which may produce withdrawal symptoms if withheld perioperatively: None  Pre-Op Evaluation Plan  1  Further preoperative workup as follows:   - None; no further preoperative work-up is required    2  Change in medication regimen before surgery:   - None, continue medication regimen including morning of surgery, with sip of water    3  Prophylaxis for cardiac events with perioperative beta-blockers: not indicated  4  Patient requires further consultation with: None    Clearance  Patient is CLEARED for surgery without any additional cardiac testing  Gurpreet Alfaro, [address] [facility] [address] [id number]  Phone#  [phone]  Fax#  [phone]    Portions of the record may have been created with voice recognition software   Occasional wrong word or "sound a like" substitutions may have occurred due to the inherent limitations of voice recognition software   Read the chart carefully and recognize, using context, where substitutions have occurred  thoracic aorta as detailed above, with the  ascending thoracic aorta measuring up to 5.2 cm, essentially unchanged  since prior examination dated 11/29/2022.  2.   Few new 8 mm and smaller nodules in the upper lobes as detailed above.   Additional noncalcified pulmonary nodules are unchanged.  These may be  related to infectious or inflammatory process, with neoplasm not excluded.   Short interval follow-up with CT of the chest in three months is advised to  assess for interval change.  3.   An 8mm hypoattenuating lesion in the mid pancreatic body, not  definitively seen on the prior examination.  This could represent a  prominent pancreatic left with cystic lesion or cystic neoplasm also a  consideration.  Recommend follow-up with CT or MRI of the pancreas in six  months to ensure stability.     TTE 2/2/2023 @ [facility]:  1. Ascending aorta: The ascending aorta measures 4.7 cm (AP) and is    moderately dilated.  2. Aortic root: The aortic root measures 4.1 cm (AP) and is mildly dilated.  3. Left ventricle: The cavity size is normal. Wall thickness is mildly    increased. Systolic function is normal. The estimated ejection fraction    is 55-60%. Wall motion is normal; there are no regional wall motion    abnormalities. The diastolic function is indeterminate based on the [employer]    2016 guidelines There is no evidence of a thrombus with the use of    commercial IV contrast.  4. Right ventricle: The cavity size is dilated. Systolic function is normal    by visual assessment, by TAPSE, and by tricuspid lateral annular systolic    velocity (S').  IMPRESSION:  No previous study was available for comparison.      CTA Chest 11/29/2022 @ [facility]:  *    Aortic root: 4 cm at the level of the sinus of Valsalva.  *    Mid ascending aorta (level of the right main pulmonary artery): 4.9 x  5.2 cm (transverse by AP) versus 4.3 x 4.5 cm previously.  *    Proximal descending thoracic aorta: 4.7 x 4.3 cm versus 4.3 x 3.9 cm  previously.  *     Mid descending thoracic aorta: 3.5 x 3.2 cm, unchanged.  *    Distal descending thoracic aorta: 2.9 x 2.8 cm, unchanged.  *    No dissection flap, intramural hematoma, intimal irregularity.     CTA chest 11/2/2021 @ [facility]: The root of the aorta measures 2.8 cm at the annulus, 4.0 cm at the sinuses  of Valsalva, and 3.8 cm at the sinotubular junction.  The mid-ascending aortic aneurysm measures 4.5 cm.  The arch of the aorta measures 3.3 cm.  The descending aortic aneurysm measures 4.5 cm     MPI 4/7/2021 @ [facility]: No fixed perfusion defects or stress induced reversible ischemia. LVEF 60-70%     CTA Chest 2/10/2021 @ [facility]: ascending Ao aneurysm 4.5 cm in AP diameter, Descending thoracic aortic aneurysm measuring 4.5 cm in AP diameter, and 4.6 cm in transverse diameter, moderate coronary artery calcifications     [facility] negative for ischemia, exercised 6 min, 7 METS< 101% MPHR, EF 60-65%, grade I diastolic dysfunction     Lab Results Reviewed and Diagnostic Data Reviewed    The ASCVD Risk score (Cali STEELE, et al., 2019) failed to calculate for the following reasons:    The valid total cholesterol range is 130 to 320 mg/dL    Hemoglobin A1C (%)   Date Value   03/11/2024 5.3   .         Patient's chart was reviewed for previous cardiology lab work and testing. Pertinent findings were reviewed with the patient during today's visit.    ASSESSMENT AND PLAN:   This is a 70 year old year-old male who presents with:    #1 HTN  - C/w Norvasc 2.5mg  - Patient notes that he was previously on a higher dose of Norvasc (10mg) that was reduced when diagnosed with UTI sepsis  - Home BP log  - Low salt, regular exercise     #2  Moderate coronary artery calcifications  - Mild CAD per 2024 angiogram, right radial site well healed with mild bruising  -C/w Lipitor 20mg + ASA 81mg     #3 Dyslipidemia, goal LDL < 70  - Continue Lipitor 20mg  - Lipids at goal 5/2023; re-check yearly - pending per PCP orders, reminded to  be fasting  - Healthy diet and exercise     #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  He was seen by CTS who is now managing this - he is planned for repeat CT, TTE, and will need coronary angiogram if surgery is indicated.  Repeat CT 5/2023 showed no change.  Repeat CT Chest wo contrast 9/2023 showed stable mid ascending Ao dilation 5.2 x 5.0 cm.  Has not been back to see CTS.  - 2024 CTA 5.5 cm, TTE showing moderate AI   -Re-established with CT surgery Dr Espino, scheduled for repair 6/13/2024 at [facility], could possibly need AV intervention as well      #5 Shallow breath, h/o airway disease  - Saw Pulmonary Medicine previously, and was prescribed inhalers unclear if he every initiated them       No orders of the defined types were placed in this encounter.      Recommendations  1. Follow up with Dr Gilliam in two months      MARCIN Vallejo  Adult Nurse Practitioner  Cardiology     Instructions provided as documented in the AVS.    The patient indicated understanding of the diagnosis and agreed with the plan of care.      Greater than 50% of the visit was spent counseling regarding above issues; total time spent 30 minutes.

## (undated) DEVICE — SPECIMEN CONTAINER STERILE PEEL PACK

## (undated) DEVICE — GLOVE SRG BIOGEL ECLIPSE 7.5

## (undated) DEVICE — CANNULA 20 GAUGE 100ML

## (undated) DEVICE — CHLORAPREP HI-LITE 10.5ML ORANGE

## (undated) DEVICE — GUIDEWIRE STRGHT TIP 0.035 IN  SOLO PLUS

## (undated) DEVICE — VIAL DECANTER

## (undated) DEVICE — GLOVE INDICATOR PI UNDERGLOVE SZ 7.5 BLUE

## (undated) DEVICE — SHEATH URETERAL ACCESS 10/12FR 35CM PROXIS

## (undated) DEVICE — 3M™ TEGADERM™ TRANSPARENT FILM DRESSING FRAME STYLE, 1624W, 2-3/8 IN X 2-3/4 IN (6 CM X 7 CM), 100/CT 4CT/CASE: Brand: 3M™ TEGADERM™

## (undated) DEVICE — CATH URETERAL 5FR X 70 CM FLEX TIP POLYUR BARD

## (undated) DEVICE — LASER FIBER HOLMIUM 272MICRON

## (undated) DEVICE — SYRINGE 50ML LL

## (undated) DEVICE — SYRINGE 10ML LL

## (undated) DEVICE — BANDAID SHEER SPOT

## (undated) DEVICE — PACK TUR

## (undated) DEVICE — BASKET SPECIMEN RETRIVAL 1.9FR 120CM